# Patient Record
Sex: FEMALE | Race: OTHER | HISPANIC OR LATINO | Employment: UNEMPLOYED | URBAN - METROPOLITAN AREA
[De-identification: names, ages, dates, MRNs, and addresses within clinical notes are randomized per-mention and may not be internally consistent; named-entity substitution may affect disease eponyms.]

---

## 2020-03-25 ENCOUNTER — TELEPHONE (OUTPATIENT)
Dept: OBGYN CLINIC | Facility: OTHER | Age: 30
End: 2020-03-25

## 2020-03-25 NOTE — TELEPHONE ENCOUNTER
Attempted to call patient, went directly to [employer] then proceeded to say voicemail box is not set up yet  If patient happens to call, advise them that Dr Gerhardt Last is NOT seeing patients in the office for her appointment tomorrow 03/26/2020  He is offering to do virtual visits with patients, if patient is interested in this please set up accordingly and contact me, Hannah, so I can put it in Bookings

## 2020-04-01 ENCOUNTER — TELEPHONE (OUTPATIENT)
Dept: OBGYN CLINIC | Facility: OTHER | Age: 30
End: 2020-04-01

## 2020-04-30 ENCOUNTER — TELEMEDICINE (OUTPATIENT)
Dept: ENDOCRINOLOGY | Facility: CLINIC | Age: 30
End: 2020-04-30
Payer: COMMERCIAL

## 2020-04-30 ENCOUNTER — TELEPHONE (OUTPATIENT)
Dept: ENDOCRINOLOGY | Facility: CLINIC | Age: 30
End: 2020-04-30

## 2020-04-30 DIAGNOSIS — E78.2 MIXED HYPERLIPIDEMIA: ICD-10-CM

## 2020-04-30 DIAGNOSIS — I10 ESSENTIAL HYPERTENSION: ICD-10-CM

## 2020-04-30 DIAGNOSIS — E10.9 TYPE 1 DIABETES MELLITUS WITHOUT COMPLICATION (HCC): Primary | ICD-10-CM

## 2020-04-30 PROBLEM — E78.5 HYPERLIPIDEMIA: Status: ACTIVE | Noted: 2020-04-30

## 2020-04-30 PROCEDURE — G2012 BRIEF CHECK IN BY MD/QHP: HCPCS | Performed by: INTERNAL MEDICINE

## 2020-04-30 RX ORDER — INSULIN LISPRO 100 [IU]/ML
5 INJECTION, SOLUTION INTRAVENOUS; SUBCUTANEOUS
Qty: 5 PEN | Refills: 1 | Status: SHIPPED | OUTPATIENT
Start: 2020-04-30 | End: 2020-07-08 | Stop reason: SDUPTHER

## 2020-05-04 LAB
CREAT ?TM UR-SCNC: 23 UMOL/L
EXT MICROALBUMIN URINE RANDOM: 0.2
HBA1C MFR BLD HPLC: 13.9 %
MICROALBUMIN/CREAT UR: 9 MG/G{CREAT}

## 2020-05-05 ENCOUNTER — TELEPHONE (OUTPATIENT)
Dept: ENDOCRINOLOGY | Facility: CLINIC | Age: 30
End: 2020-05-05

## 2020-05-05 LAB
ALBUMIN SERPL-MCNC: 3.8 G/DL (ref 3.6–5.1)
ALBUMIN/CREAT UR: 9 MCG/MG CREAT
ALBUMIN/GLOB SERPL: 1.8 (CALC) (ref 1–2.5)
ALP SERPL-CCNC: 76 U/L (ref 31–125)
ALT SERPL-CCNC: 10 U/L (ref 6–29)
AST SERPL-CCNC: 9 U/L (ref 10–30)
BILIRUB SERPL-MCNC: 0.3 MG/DL (ref 0.2–1.2)
BUN SERPL-MCNC: 17 MG/DL (ref 7–25)
BUN/CREAT SERPL: ABNORMAL (CALC) (ref 6–22)
CALCIUM SERPL-MCNC: 8.4 MG/DL (ref 8.6–10.2)
CHLORIDE SERPL-SCNC: 101 MMOL/L (ref 98–110)
CHOLEST SERPL-MCNC: 204 MG/DL
CHOLEST/HDLC SERPL: 2.6 (CALC)
CO2 SERPL-SCNC: 20 MMOL/L (ref 20–32)
CREAT SERPL-MCNC: 0.59 MG/DL (ref 0.5–1.1)
CREAT UR-MCNC: 23 MG/DL (ref 20–275)
EST. AVERAGE GLUCOSE BLD GHB EST-MCNC: 352 (CALC)
EST. AVERAGE GLUCOSE BLD GHB EST-SCNC: 19.5 (CALC)
GLOBULIN SER CALC-MCNC: 2.1 G/DL (CALC) (ref 1.9–3.7)
GLUCOSE SERPL-MCNC: 413 MG/DL (ref 65–99)
HBA1C MFR BLD: 13.9 % OF TOTAL HGB
HDLC SERPL-MCNC: 77 MG/DL
LDLC SERPL CALC-MCNC: 108 MG/DL (CALC)
MICROALBUMIN UR-MCNC: 0.2 MG/DL
NONHDLC SERPL-MCNC: 127 MG/DL (CALC)
POTASSIUM SERPL-SCNC: 4.1 MMOL/L (ref 3.5–5.3)
PROT SERPL-MCNC: 5.9 G/DL (ref 6.1–8.1)
SL AMB EGFR AFRICAN AMERICAN: 145 ML/MIN/1.73M2
SL AMB EGFR NON AFRICAN AMERICAN: 125 ML/MIN/1.73M2
SODIUM SERPL-SCNC: 131 MMOL/L (ref 135–146)
T4 FREE SERPL-MCNC: 1.2 NG/DL (ref 0.8–1.8)
TRIGL SERPL-MCNC: 93 MG/DL
TSH SERPL-ACNC: 1.04 MIU/L

## 2020-05-21 ENCOUNTER — TELEPHONE (OUTPATIENT)
Dept: ENDOCRINOLOGY | Facility: CLINIC | Age: 30
End: 2020-05-21

## 2020-06-04 ENCOUNTER — APPOINTMENT (OUTPATIENT)
Dept: RADIOLOGY | Facility: AMBULARY SURGERY CENTER | Age: 30
End: 2020-06-04
Attending: ORTHOPAEDIC SURGERY
Payer: COMMERCIAL

## 2020-06-04 ENCOUNTER — TELEPHONE (OUTPATIENT)
Dept: FAMILY MEDICINE CLINIC | Facility: CLINIC | Age: 30
End: 2020-06-04

## 2020-06-04 ENCOUNTER — OFFICE VISIT (OUTPATIENT)
Dept: OBGYN CLINIC | Facility: CLINIC | Age: 30
End: 2020-06-04
Payer: COMMERCIAL

## 2020-06-04 VITALS
BODY MASS INDEX: 23.39 KG/M2 | WEIGHT: 116 LBS | SYSTOLIC BLOOD PRESSURE: 108 MMHG | DIASTOLIC BLOOD PRESSURE: 72 MMHG | HEIGHT: 59 IN | HEART RATE: 98 BPM

## 2020-06-04 DIAGNOSIS — M76.31 IT BAND SYNDROME, RIGHT: ICD-10-CM

## 2020-06-04 DIAGNOSIS — M25.561 RIGHT KNEE PAIN, UNSPECIFIED CHRONICITY: Primary | ICD-10-CM

## 2020-06-04 DIAGNOSIS — M25.561 RIGHT KNEE PAIN, UNSPECIFIED CHRONICITY: ICD-10-CM

## 2020-06-04 PROCEDURE — 73562 X-RAY EXAM OF KNEE 3: CPT

## 2020-06-04 PROCEDURE — 99243 OFF/OP CNSLTJ NEW/EST LOW 30: CPT | Performed by: ORTHOPAEDIC SURGERY

## 2020-06-04 RX ORDER — SIMVASTATIN 10 MG
TABLET ORAL
COMMUNITY
End: 2020-11-14 | Stop reason: HOSPADM

## 2020-06-04 RX ORDER — BENZONATATE 100 MG/1
CAPSULE ORAL
COMMUNITY
End: 2020-11-14 | Stop reason: HOSPADM

## 2020-06-04 RX ORDER — LISINOPRIL 2.5 MG/1
TABLET ORAL
Status: ON HOLD | COMMUNITY
End: 2020-11-12

## 2020-06-04 RX ORDER — ATORVASTATIN CALCIUM 10 MG/1
TABLET, FILM COATED ORAL
COMMUNITY
End: 2020-11-14 | Stop reason: HOSPADM

## 2020-07-08 DIAGNOSIS — E10.9 TYPE 1 DIABETES MELLITUS WITHOUT COMPLICATION (HCC): ICD-10-CM

## 2020-07-08 RX ORDER — INSULIN LISPRO 100 [IU]/ML
5 INJECTION, SOLUTION INTRAVENOUS; SUBCUTANEOUS
Qty: 5 PEN | Refills: 1 | Status: SHIPPED | OUTPATIENT
Start: 2020-07-08 | End: 2020-11-14 | Stop reason: HOSPADM

## 2020-08-14 DIAGNOSIS — M25.561 ACUTE PAIN OF RIGHT KNEE: Primary | ICD-10-CM

## 2020-08-14 NOTE — PROGRESS NOTES
Kiana from [address] ortho called for amb referral for knee pain for pt who was seen in [facility]

## 2020-09-03 ENCOUNTER — TELEPHONE (OUTPATIENT)
Dept: OBGYN CLINIC | Facility: CLINIC | Age: 30
End: 2020-09-03

## 2020-09-03 NOTE — TELEPHONE ENCOUNTER
Patient was a no show for OV today with Dr Sindy Sumner, tried to reach patient, no answer and VM not set up  slc

## 2020-11-12 ENCOUNTER — HOSPITAL ENCOUNTER (INPATIENT)
Facility: HOSPITAL | Age: 30
LOS: 2 days | Discharge: HOME | DRG: 566 | End: 2020-11-14
Attending: INTERNAL MEDICINE | Admitting: INTERNAL MEDICINE
Payer: COMMERCIAL

## 2020-11-12 ENCOUNTER — HOSPITAL ENCOUNTER (EMERGENCY)
Facility: HOSPITAL | Age: 30
Discharge: ANOTHER HOSPITAL | End: 2020-11-12
Attending: EMERGENCY MEDICINE
Payer: COMMERCIAL

## 2020-11-12 ENCOUNTER — APPOINTMENT (EMERGENCY)
Dept: ULTRASOUND IMAGING | Facility: HOSPITAL | Age: 30
End: 2020-11-12
Payer: COMMERCIAL

## 2020-11-12 VITALS
HEART RATE: 102 BPM | DIASTOLIC BLOOD PRESSURE: 62 MMHG | TEMPERATURE: 98.8 F | SYSTOLIC BLOOD PRESSURE: 116 MMHG | RESPIRATION RATE: 22 BRPM | WEIGHT: 125.2 LBS | BODY MASS INDEX: 25.29 KG/M2 | OXYGEN SATURATION: 100 %

## 2020-11-12 DIAGNOSIS — Z34.90 PREGNANCY, UNSPECIFIED GESTATIONAL AGE: ICD-10-CM

## 2020-11-12 DIAGNOSIS — Z34.90 PREGNANCY, UNSPECIFIED GESTATIONAL AGE: Primary | ICD-10-CM

## 2020-11-12 DIAGNOSIS — E10.10 DIABETIC KETOACIDOSIS WITHOUT COMA ASSOCIATED WITH TYPE 1 DIABETES MELLITUS (HCC): Primary | ICD-10-CM

## 2020-11-12 DIAGNOSIS — Z34.90 PREGNANCY: ICD-10-CM

## 2020-11-12 DIAGNOSIS — E10.9 TYPE 1 DIABETES MELLITUS WITHOUT COMPLICATION (HCC): ICD-10-CM

## 2020-11-12 DIAGNOSIS — Z3A.01 LESS THAN 8 WEEKS GESTATION OF PREGNANCY: ICD-10-CM

## 2020-11-12 LAB
ALBUMIN SERPL BCP-MCNC: 4.3 G/DL (ref 3.4–4.8)
ALP SERPL-CCNC: 81.2 U/L (ref 35–140)
ALT SERPL W P-5'-P-CCNC: 12 U/L (ref 5–54)
ANION GAP SERPL CALCULATED.3IONS-SCNC: 14 MMOL/L (ref 4–13)
ANION GAP SERPL CALCULATED.3IONS-SCNC: 24 MMOL/L (ref 4–13)
AST SERPL W P-5'-P-CCNC: 17 U/L (ref 15–41)
ATRIAL RATE: 107 BPM
B-HCG SERPL-ACNC: 9395 MIU/ML
BASE EX.OXY STD BLDV CALC-SCNC: 78.6 % (ref 60–80)
BASE EXCESS BLDA CALC-SCNC: -18 MMOL/L (ref -2–3)
BASE EXCESS BLDA CALC-SCNC: -19 MMOL/L (ref -2–3)
BASE EXCESS BLDV CALC-SCNC: -14.2 MMOL/L
BASOPHILS # BLD AUTO: 0.02 THOUSANDS/ΜL (ref 0–0.1)
BASOPHILS NFR BLD AUTO: 0 % (ref 0–1)
BETA-HYDROXYBUTYRATE: 5.7 MMOL/L
BILIRUB SERPL-MCNC: 0.48 MG/DL (ref 0.3–1.2)
BILIRUB UR QL STRIP: NEGATIVE
BUN SERPL-MCNC: 16 MG/DL (ref 6–20)
BUN SERPL-MCNC: 9 MG/DL (ref 5–25)
CA-I BLD-SCNC: 1.17 MMOL/L (ref 1.12–1.32)
CA-I BLD-SCNC: 1.17 MMOL/L (ref 1.12–1.32)
CALCIUM SERPL-MCNC: 7.7 MG/DL (ref 8.3–10.1)
CALCIUM SERPL-MCNC: 9.1 MG/DL (ref 8.4–10.2)
CHLORIDE SERPL-SCNC: 105 MMOL/L (ref 100–108)
CHLORIDE SERPL-SCNC: 96 MMOL/L (ref 96–108)
CLARITY UR: CLEAR
CO2 SERPL-SCNC: 13 MMOL/L (ref 21–32)
CO2 SERPL-SCNC: 8 MMOL/L (ref 22–33)
COLOR UR: YELLOW
CREAT SERPL-MCNC: 0.51 MG/DL (ref 0.6–1.3)
CREAT SERPL-MCNC: 0.78 MG/DL (ref 0.4–1.1)
EOSINOPHIL # BLD AUTO: 0.01 THOUSAND/ΜL (ref 0–0.61)
EOSINOPHIL NFR BLD AUTO: 0 % (ref 0–6)
ERYTHROCYTE [DISTWIDTH] IN BLOOD BY AUTOMATED COUNT: 11.6 % (ref 11.6–15.1)
EXT PREG TEST URINE: POSITIVE
EXT. CONTROL ED NAV: ABNORMAL
FLUAV RNA RESP QL NAA+PROBE: NEGATIVE
FLUBV RNA RESP QL NAA+PROBE: NEGATIVE
GFR SERPL CREATININE-BSD FRML MDRD: 104 ML/MIN/1.73SQ M
GFR SERPL CREATININE-BSD FRML MDRD: 131 ML/MIN/1.73SQ M
GLUCOSE SERPL-MCNC: 125 MG/DL (ref 65–140)
GLUCOSE SERPL-MCNC: 148 MG/DL (ref 65–140)
GLUCOSE SERPL-MCNC: 194 MG/DL (ref 65–140)
GLUCOSE SERPL-MCNC: 236 MG/DL (ref 65–140)
GLUCOSE SERPL-MCNC: 249 MG/DL (ref 65–140)
GLUCOSE SERPL-MCNC: 258 MG/DL (ref 65–140)
GLUCOSE SERPL-MCNC: 278 MG/DL (ref 65–140)
GLUCOSE SERPL-MCNC: 486 MG/DL (ref 65–140)
GLUCOSE SERPL-MCNC: 638 MG/DL (ref 65–140)
GLUCOSE SERPL-MCNC: >500 MG/DL (ref 65–140)
GLUCOSE SERPL-MCNC: >600 MG/DL (ref 65–140)
GLUCOSE UR STRIP-MCNC: ABNORMAL MG/DL
HCO3 BLDA-SCNC: 6.7 MMOL/L (ref 24–30)
HCO3 BLDA-SCNC: 8.9 MMOL/L (ref 24–30)
HCO3 BLDV-SCNC: 12.4 MMOL/L (ref 24–30)
HCT VFR BLD AUTO: 43.3 % (ref 34.8–46.1)
HCT VFR BLD CALC: 35 % (ref 34.8–46.1)
HCT VFR BLD CALC: 44 % (ref 34.8–46.1)
HGB BLD-MCNC: 15.2 G/DL (ref 11.5–15.4)
HGB BLDA-MCNC: 11.9 G/DL (ref 11.5–15.4)
HGB BLDA-MCNC: 15 G/DL (ref 11.5–15.4)
HGB UR QL STRIP.AUTO: NEGATIVE
IMM GRANULOCYTES # BLD AUTO: 0.01 THOUSAND/UL (ref 0–0.2)
IMM GRANULOCYTES NFR BLD AUTO: 0 % (ref 0–2)
KETONES UR STRIP-MCNC: ABNORMAL MG/DL
LEUKOCYTE ESTERASE UR QL STRIP: NEGATIVE
LYMPHOCYTES # BLD AUTO: 1.16 THOUSANDS/ΜL (ref 0.6–4.47)
LYMPHOCYTES NFR BLD AUTO: 14 % (ref 14–44)
MAGNESIUM SERPL-MCNC: 1.7 MG/DL (ref 1.6–2.6)
MCH RBC QN AUTO: 32.1 PG (ref 26.8–34.3)
MCHC RBC AUTO-ENTMCNC: 35.1 G/DL (ref 31.4–37.4)
MCV RBC AUTO: 91 FL (ref 82–98)
MONOCYTES # BLD AUTO: 0.28 THOUSAND/ΜL (ref 0.17–1.22)
MONOCYTES NFR BLD AUTO: 3 % (ref 4–12)
NEUTROPHILS # BLD AUTO: 6.9 THOUSANDS/ΜL (ref 1.85–7.62)
NEUTS SEG NFR BLD AUTO: 83 % (ref 43–75)
NITRITE UR QL STRIP: NEGATIVE
O2 CT BLDV-SCNC: 16.3 ML/DL
P AXIS: 81 DEGREES
PCO2 BLD: 10 MMOL/L (ref 21–32)
PCO2 BLD: 17.1 MM HG (ref 42–50)
PCO2 BLD: 25.3 MM HG (ref 42–50)
PCO2 BLD: 7 MMOL/L (ref 21–32)
PCO2 BLDV: 31.9 MM HG (ref 42–50)
PH BLD: 7.15 [PH] (ref 7.3–7.4)
PH BLD: 7.2 [PH] (ref 7.3–7.4)
PH BLDV: 7.21 [PH] (ref 7.3–7.4)
PH UR STRIP.AUTO: 5 [PH]
PHOSPHATE SERPL-MCNC: 2.2 MG/DL (ref 2.7–4.5)
PLATELET # BLD AUTO: 235 THOUSANDS/UL (ref 149–390)
PLATELET # BLD AUTO: 277 THOUSANDS/UL (ref 149–390)
PMV BLD AUTO: 8.8 FL (ref 8.9–12.7)
PMV BLD AUTO: 9.4 FL (ref 8.9–12.7)
PO2 BLD: 36 MM HG (ref 35–45)
PO2 BLD: 60 MM HG (ref 35–45)
PO2 BLDV: 45.5 MM HG (ref 35–45)
POTASSIUM BLD-SCNC: 4 MMOL/L (ref 3.5–5.3)
POTASSIUM BLD-SCNC: 4.6 MMOL/L (ref 3.5–5.3)
POTASSIUM SERPL-SCNC: 3.7 MMOL/L (ref 3.5–5.3)
POTASSIUM SERPL-SCNC: 4.7 MMOL/L (ref 3.5–5)
PR INTERVAL: 134 MS
PROT SERPL-MCNC: 7.1 G/DL (ref 6.4–8.3)
PROT UR STRIP-MCNC: NEGATIVE MG/DL
QRS AXIS: 52 DEGREES
QRSD INTERVAL: 79 MS
QT INTERVAL: 330 MS
QTC INTERVAL: 441 MS
RBC # BLD AUTO: 4.74 MILLION/UL (ref 3.81–5.12)
RSV RNA RESP QL NAA+PROBE: NEGATIVE
SAO2 % BLD FROM PO2: 55 % (ref 60–85)
SAO2 % BLD FROM PO2: 86 % (ref 60–85)
SARS-COV-2 RNA RESP QL NAA+PROBE: NEGATIVE
SODIUM BLD-SCNC: 128 MMOL/L (ref 136–145)
SODIUM BLD-SCNC: 134 MMOL/L (ref 136–145)
SODIUM SERPL-SCNC: 128 MMOL/L (ref 133–145)
SODIUM SERPL-SCNC: 132 MMOL/L (ref 136–145)
SP GR UR STRIP.AUTO: 1.02 (ref 1–1.03)
SPECIMEN SOURCE: ABNORMAL
SPECIMEN SOURCE: ABNORMAL
T WAVE AXIS: 45 DEGREES
UROBILINOGEN UR QL STRIP.AUTO: 0.2 E.U./DL
VENTRICULAR RATE: 107 BPM
WBC # BLD AUTO: 8.38 THOUSAND/UL (ref 4.31–10.16)

## 2020-11-12 PROCEDURE — 82948 REAGENT STRIP/BLOOD GLUCOSE: CPT

## 2020-11-12 PROCEDURE — 81025 URINE PREGNANCY TEST: CPT | Performed by: PHYSICIAN ASSISTANT

## 2020-11-12 PROCEDURE — 96361 HYDRATE IV INFUSION ADD-ON: CPT

## 2020-11-12 PROCEDURE — 0241U HB NFCT DS VIR RESP RNA 4 TRGT: CPT | Performed by: INTERNAL MEDICINE

## 2020-11-12 PROCEDURE — 36415 COLL VENOUS BLD VENIPUNCTURE: CPT | Performed by: PHYSICIAN ASSISTANT

## 2020-11-12 PROCEDURE — 99284 EMERGENCY DEPT VISIT MOD MDM: CPT

## 2020-11-12 PROCEDURE — 84702 CHORIONIC GONADOTROPIN TEST: CPT | Performed by: PHYSICIAN ASSISTANT

## 2020-11-12 PROCEDURE — 82330 ASSAY OF CALCIUM: CPT

## 2020-11-12 PROCEDURE — 96376 TX/PRO/DX INJ SAME DRUG ADON: CPT

## 2020-11-12 PROCEDURE — 84132 ASSAY OF SERUM POTASSIUM: CPT

## 2020-11-12 PROCEDURE — 82947 ASSAY GLUCOSE BLOOD QUANT: CPT

## 2020-11-12 PROCEDURE — 80053 COMPREHEN METABOLIC PANEL: CPT | Performed by: PHYSICIAN ASSISTANT

## 2020-11-12 PROCEDURE — 76801 OB US < 14 WKS SINGLE FETUS: CPT

## 2020-11-12 PROCEDURE — 80048 BASIC METABOLIC PNL TOTAL CA: CPT | Performed by: INTERNAL MEDICINE

## 2020-11-12 PROCEDURE — 96366 THER/PROPH/DIAG IV INF ADDON: CPT

## 2020-11-12 PROCEDURE — 96365 THER/PROPH/DIAG IV INF INIT: CPT

## 2020-11-12 PROCEDURE — 82010 KETONE BODYS QUAN: CPT | Performed by: PHYSICIAN ASSISTANT

## 2020-11-12 PROCEDURE — 82805 BLOOD GASES W/O2 SATURATION: CPT | Performed by: INTERNAL MEDICINE

## 2020-11-12 PROCEDURE — 85049 AUTOMATED PLATELET COUNT: CPT | Performed by: INTERNAL MEDICINE

## 2020-11-12 PROCEDURE — 99291 CRITICAL CARE FIRST HOUR: CPT | Performed by: PHYSICIAN ASSISTANT

## 2020-11-12 PROCEDURE — 85014 HEMATOCRIT: CPT

## 2020-11-12 PROCEDURE — 81003 URINALYSIS AUTO W/O SCOPE: CPT | Performed by: PHYSICIAN ASSISTANT

## 2020-11-12 PROCEDURE — 85025 COMPLETE CBC W/AUTO DIFF WBC: CPT | Performed by: PHYSICIAN ASSISTANT

## 2020-11-12 PROCEDURE — 99254 IP/OBS CNSLTJ NEW/EST MOD 60: CPT | Performed by: OBSTETRICS & GYNECOLOGY

## 2020-11-12 PROCEDURE — 99291 CRITICAL CARE FIRST HOUR: CPT | Performed by: INTERNAL MEDICINE

## 2020-11-12 PROCEDURE — 93010 ELECTROCARDIOGRAM REPORT: CPT | Performed by: INTERNAL MEDICINE

## 2020-11-12 PROCEDURE — 82803 BLOOD GASES ANY COMBINATION: CPT

## 2020-11-12 PROCEDURE — 84100 ASSAY OF PHOSPHORUS: CPT | Performed by: INTERNAL MEDICINE

## 2020-11-12 PROCEDURE — 84295 ASSAY OF SERUM SODIUM: CPT

## 2020-11-12 PROCEDURE — 87086 URINE CULTURE/COLONY COUNT: CPT | Performed by: PHYSICIAN ASSISTANT

## 2020-11-12 PROCEDURE — 83735 ASSAY OF MAGNESIUM: CPT | Performed by: INTERNAL MEDICINE

## 2020-11-12 PROCEDURE — 93005 ELECTROCARDIOGRAM TRACING: CPT

## 2020-11-12 RX ORDER — MAGNESIUM SULFATE HEPTAHYDRATE 40 MG/ML
4 INJECTION, SOLUTION INTRAVENOUS ONCE
Status: COMPLETED | OUTPATIENT
Start: 2020-11-12 | End: 2020-11-13

## 2020-11-12 RX ORDER — SODIUM CHLORIDE 9 MG/ML
2000 INJECTION, SOLUTION INTRAVENOUS CONTINUOUS
Status: CANCELLED | OUTPATIENT
Start: 2020-11-12 | End: 2020-11-12

## 2020-11-12 RX ORDER — MAGNESIUM SULFATE HEPTAHYDRATE 40 MG/ML
4 INJECTION, SOLUTION INTRAVENOUS ONCE
Status: DISCONTINUED | OUTPATIENT
Start: 2020-11-12 | End: 2020-11-14 | Stop reason: HOSPADM

## 2020-11-12 RX ORDER — POTASSIUM CHLORIDE 20MEQ/15ML
40 LIQUID (ML) ORAL ONCE
Status: COMPLETED | OUTPATIENT
Start: 2020-11-12 | End: 2020-11-12

## 2020-11-12 RX ORDER — SODIUM CHLORIDE 9 MG/ML
250 INJECTION, SOLUTION INTRAVENOUS CONTINUOUS
Status: DISCONTINUED | OUTPATIENT
Start: 2020-11-12 | End: 2020-11-12

## 2020-11-12 RX ORDER — DEXTROSE AND SODIUM CHLORIDE 5; .45 G/100ML; G/100ML
250 INJECTION, SOLUTION INTRAVENOUS CONTINUOUS
Status: DISCONTINUED | OUTPATIENT
Start: 2020-11-12 | End: 2020-11-13

## 2020-11-12 RX ORDER — DEXTROSE AND SODIUM CHLORIDE 5; .9 G/100ML; G/100ML
250 INJECTION, SOLUTION INTRAVENOUS CONTINUOUS
Status: CANCELLED | OUTPATIENT
Start: 2020-11-12

## 2020-11-12 RX ORDER — DEXTROSE AND SODIUM CHLORIDE 5; .45 G/100ML; G/100ML
250 INJECTION, SOLUTION INTRAVENOUS CONTINUOUS
Status: DISCONTINUED | OUTPATIENT
Start: 2020-11-12 | End: 2020-11-12 | Stop reason: HOSPADM

## 2020-11-12 RX ORDER — SODIUM CHLORIDE 9 MG/ML
500 INJECTION, SOLUTION INTRAVENOUS CONTINUOUS
Status: DISCONTINUED | OUTPATIENT
Start: 2020-11-12 | End: 2020-11-12

## 2020-11-12 RX ORDER — SODIUM CHLORIDE 9 MG/ML
500 INJECTION, SOLUTION INTRAVENOUS CONTINUOUS
Status: CANCELLED | OUTPATIENT
Start: 2020-11-12 | End: 2020-11-12

## 2020-11-12 RX ORDER — SODIUM CHLORIDE 9 MG/ML
250 INJECTION, SOLUTION INTRAVENOUS CONTINUOUS
Status: CANCELLED | OUTPATIENT
Start: 2020-11-12 | End: 2020-11-13

## 2020-11-12 RX ORDER — POTASSIUM CHLORIDE 14.9 MG/ML
20 INJECTION INTRAVENOUS ONCE
Status: DISCONTINUED | OUTPATIENT
Start: 2020-11-12 | End: 2020-11-12

## 2020-11-12 RX ORDER — SODIUM CHLORIDE 9 MG/ML
2000 INJECTION, SOLUTION INTRAVENOUS CONTINUOUS
Status: DISCONTINUED | OUTPATIENT
Start: 2020-11-12 | End: 2020-11-12

## 2020-11-12 RX ORDER — HYDRALAZINE HYDROCHLORIDE 20 MG/ML
5 INJECTION INTRAMUSCULAR; INTRAVENOUS EVERY 6 HOURS PRN
Status: DISCONTINUED | OUTPATIENT
Start: 2020-11-12 | End: 2020-11-13

## 2020-11-12 RX ORDER — CHLORHEXIDINE GLUCONATE 0.12 MG/ML
15 RINSE ORAL EVERY 12 HOURS SCHEDULED
Status: DISCONTINUED | OUTPATIENT
Start: 2020-11-12 | End: 2020-11-12

## 2020-11-12 RX ADMIN — DEXTROSE AND SODIUM CHLORIDE 250 ML/HR: 5; .45 INJECTION, SOLUTION INTRAVENOUS at 17:44

## 2020-11-12 RX ADMIN — SODIUM CHLORIDE, SODIUM LACTATE, POTASSIUM CHLORIDE, AND CALCIUM CHLORIDE 1000 ML: .6; .31; .03; .02 INJECTION, SOLUTION INTRAVENOUS at 17:45

## 2020-11-12 RX ADMIN — INSULIN HUMAN 10 UNITS: 100 INJECTION, SOLUTION PARENTERAL at 12:25

## 2020-11-12 RX ADMIN — POTASSIUM CHLORIDE 40 MEQ: 20 SOLUTION ORAL at 20:54

## 2020-11-12 RX ADMIN — MAGNESIUM SULFATE HEPTAHYDRATE 4 G: 40 INJECTION, SOLUTION INTRAVENOUS at 20:55

## 2020-11-12 RX ADMIN — SODIUM CHLORIDE 5.7 UNITS/HR: 9 INJECTION, SOLUTION INTRAVENOUS at 13:12

## 2020-11-12 RX ADMIN — POTASSIUM PHOSPHATE, MONOBASIC POTASSIUM PHOSPHATE, DIBASIC 30 MMOL: 224; 236 INJECTION, SOLUTION, CONCENTRATE INTRAVENOUS at 22:14

## 2020-11-12 RX ADMIN — SODIUM CHLORIDE 2 UNITS/HR: 9 INJECTION, SOLUTION INTRAVENOUS at 20:16

## 2020-11-12 RX ADMIN — DEXTROSE AND SODIUM CHLORIDE 250 ML/HR: 5; .45 INJECTION, SOLUTION INTRAVENOUS at 20:17

## 2020-11-12 RX ADMIN — SODIUM CHLORIDE 1000 ML: 0.9 INJECTION, SOLUTION INTRAVENOUS at 12:16

## 2020-11-12 RX ADMIN — DEXTROSE AND SODIUM CHLORIDE 250 ML/HR: 5; .45 INJECTION, SOLUTION INTRAVENOUS at 15:36

## 2020-11-12 RX ADMIN — SODIUM CHLORIDE 1000 ML: 0.9 INJECTION, SOLUTION INTRAVENOUS at 12:25

## 2020-11-13 LAB
ANION GAP SERPL CALCULATED.3IONS-SCNC: 11 MMOL/L (ref 4–13)
B-HCG SERPL-ACNC: 6879.9 MIU/ML
BACTERIA UR CULT: NORMAL
BUN SERPL-MCNC: 5 MG/DL (ref 5–25)
CALCIUM SERPL-MCNC: 7 MG/DL (ref 8.3–10.1)
CHLORIDE SERPL-SCNC: 107 MMOL/L (ref 100–108)
CO2 SERPL-SCNC: 17 MMOL/L (ref 21–32)
CREAT SERPL-MCNC: 0.28 MG/DL (ref 0.6–1.3)
ERYTHROCYTE [DISTWIDTH] IN BLOOD BY AUTOMATED COUNT: 11.6 % (ref 11.6–15.1)
GFR SERPL CREATININE-BSD FRML MDRD: 160 ML/MIN/1.73SQ M
GLUCOSE SERPL-MCNC: 101 MG/DL (ref 65–140)
GLUCOSE SERPL-MCNC: 105 MG/DL (ref 65–140)
GLUCOSE SERPL-MCNC: 108 MG/DL (ref 65–140)
GLUCOSE SERPL-MCNC: 112 MG/DL (ref 65–140)
GLUCOSE SERPL-MCNC: 131 MG/DL (ref 65–140)
GLUCOSE SERPL-MCNC: 156 MG/DL (ref 65–140)
GLUCOSE SERPL-MCNC: 173 MG/DL (ref 65–140)
GLUCOSE SERPL-MCNC: 173 MG/DL (ref 65–140)
GLUCOSE SERPL-MCNC: 176 MG/DL (ref 65–140)
GLUCOSE SERPL-MCNC: 187 MG/DL (ref 65–140)
GLUCOSE SERPL-MCNC: 197 MG/DL (ref 65–140)
GLUCOSE SERPL-MCNC: 205 MG/DL (ref 65–140)
GLUCOSE SERPL-MCNC: 305 MG/DL (ref 65–140)
HCT VFR BLD AUTO: 37.3 % (ref 34.8–46.1)
HGB BLD-MCNC: 13 G/DL (ref 11.5–15.4)
MAGNESIUM SERPL-MCNC: 2.5 MG/DL (ref 1.6–2.6)
MAGNESIUM SERPL-MCNC: 2.9 MG/DL (ref 1.6–2.6)
MCH RBC QN AUTO: 32.2 PG (ref 26.8–34.3)
MCHC RBC AUTO-ENTMCNC: 34.9 G/DL (ref 31.4–37.4)
MCV RBC AUTO: 92 FL (ref 82–98)
PHOSPHATE SERPL-MCNC: 2.5 MG/DL (ref 2.7–4.5)
PHOSPHATE SERPL-MCNC: 4.3 MG/DL (ref 2.7–4.5)
PLATELET # BLD AUTO: 216 THOUSANDS/UL (ref 149–390)
PMV BLD AUTO: 8.6 FL (ref 8.9–12.7)
POTASSIUM SERPL-SCNC: 3.5 MMOL/L (ref 3.5–5.3)
POTASSIUM SERPL-SCNC: 4.1 MMOL/L (ref 3.5–5.3)
RBC # BLD AUTO: 4.04 MILLION/UL (ref 3.81–5.12)
SODIUM SERPL-SCNC: 135 MMOL/L (ref 136–145)
WBC # BLD AUTO: 6.98 THOUSAND/UL (ref 4.31–10.16)

## 2020-11-13 PROCEDURE — 90686 IIV4 VACC NO PRSV 0.5 ML IM: CPT | Performed by: INTERNAL MEDICINE

## 2020-11-13 PROCEDURE — 82948 REAGENT STRIP/BLOOD GLUCOSE: CPT

## 2020-11-13 PROCEDURE — 90471 IMMUNIZATION ADMIN: CPT | Performed by: INTERNAL MEDICINE

## 2020-11-13 PROCEDURE — 84100 ASSAY OF PHOSPHORUS: CPT | Performed by: PHYSICIAN ASSISTANT

## 2020-11-13 PROCEDURE — 99232 SBSQ HOSP IP/OBS MODERATE 35: CPT | Performed by: INTERNAL MEDICINE

## 2020-11-13 PROCEDURE — 85732 THROMBOPLASTIN TIME PARTIAL: CPT | Performed by: INTERNAL MEDICINE

## 2020-11-13 PROCEDURE — 85670 THROMBIN TIME PLASMA: CPT | Performed by: INTERNAL MEDICINE

## 2020-11-13 PROCEDURE — 85613 RUSSELL VIPER VENOM DILUTED: CPT | Performed by: INTERNAL MEDICINE

## 2020-11-13 PROCEDURE — 86038 ANTINUCLEAR ANTIBODIES: CPT | Performed by: INTERNAL MEDICINE

## 2020-11-13 PROCEDURE — 80048 BASIC METABOLIC PNL TOTAL CA: CPT | Performed by: PHYSICIAN ASSISTANT

## 2020-11-13 PROCEDURE — 99232 SBSQ HOSP IP/OBS MODERATE 35: CPT | Performed by: OBSTETRICS & GYNECOLOGY

## 2020-11-13 PROCEDURE — 83735 ASSAY OF MAGNESIUM: CPT | Performed by: PHYSICIAN ASSISTANT

## 2020-11-13 PROCEDURE — 85027 COMPLETE CBC AUTOMATED: CPT | Performed by: NURSE PRACTITIONER

## 2020-11-13 PROCEDURE — 84132 ASSAY OF SERUM POTASSIUM: CPT | Performed by: PHYSICIAN ASSISTANT

## 2020-11-13 PROCEDURE — 86039 ANTINUCLEAR ANTIBODIES (ANA): CPT | Performed by: INTERNAL MEDICINE

## 2020-11-13 PROCEDURE — 84702 CHORIONIC GONADOTROPIN TEST: CPT | Performed by: INTERNAL MEDICINE

## 2020-11-13 PROCEDURE — 85705 THROMBOPLASTIN INHIBITION: CPT | Performed by: INTERNAL MEDICINE

## 2020-11-13 RX ORDER — POTASSIUM CHLORIDE 20 MEQ/1
20 TABLET, EXTENDED RELEASE ORAL ONCE
Status: DISCONTINUED | OUTPATIENT
Start: 2020-11-13 | End: 2020-11-13

## 2020-11-13 RX ORDER — INSULIN GLARGINE 100 [IU]/ML
20 INJECTION, SOLUTION SUBCUTANEOUS
Status: DISCONTINUED | OUTPATIENT
Start: 2020-11-13 | End: 2020-11-14 | Stop reason: HOSPADM

## 2020-11-13 RX ORDER — SODIUM CHLORIDE, SODIUM GLUCONATE, SODIUM ACETATE, POTASSIUM CHLORIDE, MAGNESIUM CHLORIDE, SODIUM PHOSPHATE, DIBASIC, AND POTASSIUM PHOSPHATE .53; .5; .37; .037; .03; .012; .00082 G/100ML; G/100ML; G/100ML; G/100ML; G/100ML; G/100ML; G/100ML
50 INJECTION, SOLUTION INTRAVENOUS CONTINUOUS
Status: DISCONTINUED | OUTPATIENT
Start: 2020-11-13 | End: 2020-11-13

## 2020-11-13 RX ORDER — ACETAMINOPHEN 325 MG/1
650 TABLET ORAL EVERY 6 HOURS PRN
Status: DISCONTINUED | OUTPATIENT
Start: 2020-11-13 | End: 2020-11-14 | Stop reason: HOSPADM

## 2020-11-13 RX ORDER — KETAMINE HYDROCHLORIDE 50 MG/ML
INJECTION, SOLUTION, CONCENTRATE INTRAMUSCULAR; INTRAVENOUS
Status: DISPENSED
Start: 2020-11-13 | End: 2020-11-13

## 2020-11-13 RX ORDER — INSULIN GLARGINE 100 [IU]/ML
10 INJECTION, SOLUTION SUBCUTANEOUS ONCE
Status: COMPLETED | OUTPATIENT
Start: 2020-11-13 | End: 2020-11-13

## 2020-11-13 RX ORDER — POTASSIUM CHLORIDE 14.9 MG/ML
20 INJECTION INTRAVENOUS ONCE
Status: DISCONTINUED | OUTPATIENT
Start: 2020-11-13 | End: 2020-11-14 | Stop reason: HOSPADM

## 2020-11-13 RX ORDER — POTASSIUM CHLORIDE 20 MEQ/1
40 TABLET, EXTENDED RELEASE ORAL ONCE
Status: COMPLETED | OUTPATIENT
Start: 2020-11-13 | End: 2020-11-13

## 2020-11-13 RX ADMIN — PRENATAL VIT W/ FE FUMARATE-FA TAB 27-0.8 MG 1 TABLET: 27-0.8 TAB at 08:11

## 2020-11-13 RX ADMIN — INSULIN LISPRO 5 UNITS: 100 INJECTION, SOLUTION INTRAVENOUS; SUBCUTANEOUS at 08:31

## 2020-11-13 RX ADMIN — SODIUM CHLORIDE, SODIUM GLUCONATE, SODIUM ACETATE, POTASSIUM CHLORIDE AND MAGNESIUM CHLORIDE 50 ML/HR: 526; 502; 368; 37; 30 INJECTION, SOLUTION INTRAVENOUS at 00:37

## 2020-11-13 RX ADMIN — INSULIN GLARGINE 10 UNITS: 100 INJECTION, SOLUTION SUBCUTANEOUS at 08:08

## 2020-11-13 RX ADMIN — INFLUENZA VIRUS VACCINE 0.5 ML: 15; 15; 15; 15 SUSPENSION INTRAMUSCULAR at 10:14

## 2020-11-13 RX ADMIN — POTASSIUM CHLORIDE 40 MEQ: 1500 TABLET, EXTENDED RELEASE ORAL at 06:41

## 2020-11-13 RX ADMIN — INSULIN LISPRO 5 UNITS: 100 INJECTION, SOLUTION INTRAVENOUS; SUBCUTANEOUS at 17:09

## 2020-11-13 RX ADMIN — INSULIN LISPRO 5 UNITS: 100 INJECTION, SOLUTION INTRAVENOUS; SUBCUTANEOUS at 11:51

## 2020-11-13 RX ADMIN — INSULIN GLARGINE 20 UNITS: 100 INJECTION, SOLUTION SUBCUTANEOUS at 22:31

## 2020-11-14 VITALS
OXYGEN SATURATION: 99 % | DIASTOLIC BLOOD PRESSURE: 60 MMHG | SYSTOLIC BLOOD PRESSURE: 104 MMHG | TEMPERATURE: 98.9 F | WEIGHT: 128.53 LBS | HEART RATE: 95 BPM | BODY MASS INDEX: 26.98 KG/M2 | HEIGHT: 58 IN | RESPIRATION RATE: 18 BRPM

## 2020-11-14 PROBLEM — E10.10 DIABETIC KETOACIDOSIS ASSOCIATED WITH TYPE 1 DIABETES MELLITUS (HCC): Status: RESOLVED | Noted: 2020-11-12 | Resolved: 2020-11-14

## 2020-11-14 LAB
ALBUMIN SERPL BCP-MCNC: 2.8 G/DL (ref 3.5–5)
ALP SERPL-CCNC: 64 U/L (ref 46–116)
ALT SERPL W P-5'-P-CCNC: 18 U/L (ref 12–78)
ANION GAP SERPL CALCULATED.3IONS-SCNC: 4 MMOL/L (ref 4–13)
AST SERPL W P-5'-P-CCNC: 13 U/L (ref 5–45)
B-HCG SERPL-ACNC: ABNORMAL MIU/ML
BILIRUB SERPL-MCNC: 0.45 MG/DL (ref 0.2–1)
BUN SERPL-MCNC: 6 MG/DL (ref 5–25)
CALCIUM ALBUM COR SERPL-MCNC: 9.3 MG/DL (ref 8.3–10.1)
CALCIUM SERPL-MCNC: 8.3 MG/DL (ref 8.3–10.1)
CHLORIDE SERPL-SCNC: 107 MMOL/L (ref 100–108)
CO2 SERPL-SCNC: 26 MMOL/L (ref 21–32)
CREAT SERPL-MCNC: 0.46 MG/DL (ref 0.6–1.3)
GFR SERPL CREATININE-BSD FRML MDRD: 136 ML/MIN/1.73SQ M
GLUCOSE SERPL-MCNC: 112 MG/DL (ref 65–140)
GLUCOSE SERPL-MCNC: 143 MG/DL (ref 65–140)
GLUCOSE SERPL-MCNC: 84 MG/DL (ref 65–140)
POTASSIUM SERPL-SCNC: 3.6 MMOL/L (ref 3.5–5.3)
PROT SERPL-MCNC: 5.8 G/DL (ref 6.4–8.2)
SODIUM SERPL-SCNC: 137 MMOL/L (ref 136–145)

## 2020-11-14 PROCEDURE — 80053 COMPREHEN METABOLIC PANEL: CPT | Performed by: INTERNAL MEDICINE

## 2020-11-14 PROCEDURE — 84702 CHORIONIC GONADOTROPIN TEST: CPT | Performed by: OBSTETRICS & GYNECOLOGY

## 2020-11-14 PROCEDURE — 99239 HOSP IP/OBS DSCHRG MGMT >30: CPT | Performed by: INTERNAL MEDICINE

## 2020-11-14 PROCEDURE — 99232 SBSQ HOSP IP/OBS MODERATE 35: CPT | Performed by: OBSTETRICS & GYNECOLOGY

## 2020-11-14 PROCEDURE — 82948 REAGENT STRIP/BLOOD GLUCOSE: CPT

## 2020-11-14 RX ORDER — PRENATAL 168/IRON/FOLIC/OMEGA3 27-800-235
1 CAPSULE ORAL DAILY
Refills: 0 | Status: ON HOLD
Start: 2020-11-14 | End: 2022-03-08

## 2020-11-14 RX ORDER — INSULIN GLARGINE 100 [IU]/ML
55 INJECTION, SOLUTION SUBCUTANEOUS DAILY
Qty: 6 PEN | Refills: 1 | Status: SHIPPED | OUTPATIENT
Start: 2020-11-14 | End: 2021-04-14 | Stop reason: SDUPTHER

## 2020-11-14 RX ADMIN — INSULIN LISPRO 5 UNITS: 100 INJECTION, SOLUTION INTRAVENOUS; SUBCUTANEOUS at 08:10

## 2020-11-14 RX ADMIN — PRENATAL VIT W/ FE FUMARATE-FA TAB 27-0.8 MG 1 TABLET: 27-0.8 TAB at 10:06

## 2020-11-14 RX ADMIN — INSULIN LISPRO 5 UNITS: 100 INJECTION, SOLUTION INTRAVENOUS; SUBCUTANEOUS at 12:37

## 2020-11-16 LAB
ANA HOMOGEN SER QL IF: NORMAL
ANA HOMOGEN TITR SER: NORMAL {TITER}
RYE IGE QN: POSITIVE

## 2020-11-17 ENCOUNTER — OFFICE VISIT (OUTPATIENT)
Dept: OBGYN CLINIC | Facility: CLINIC | Age: 30
End: 2020-11-17
Payer: COMMERCIAL

## 2020-11-17 VITALS
SYSTOLIC BLOOD PRESSURE: 108 MMHG | BODY MASS INDEX: 26.66 KG/M2 | HEIGHT: 58 IN | DIASTOLIC BLOOD PRESSURE: 62 MMHG | WEIGHT: 127 LBS | TEMPERATURE: 97.4 F

## 2020-11-17 DIAGNOSIS — Z3A.01 LESS THAN 8 WEEKS GESTATION OF PREGNANCY: ICD-10-CM

## 2020-11-17 DIAGNOSIS — E10.9 TYPE 1 DIABETES MELLITUS WITHOUT COMPLICATION (HCC): ICD-10-CM

## 2020-11-17 DIAGNOSIS — N91.2 AMENORRHEA: Primary | ICD-10-CM

## 2020-11-17 LAB
APTT SCREEN TO CONFIRM RATIO: 1.09 RATIO (ref 0–1.4)
CONFIRM APTT/NORMAL: 29.3 SEC (ref 0–55)
LA PPP-IMP: NORMAL
SCREEN APTT: 25.8 SEC (ref 0–51.9)
SCREEN DRVVT: 29.7 SEC (ref 0–47)
THROMBIN TIME: 15.6 SEC (ref 0–23)

## 2020-11-17 PROCEDURE — 99202 OFFICE O/P NEW SF 15 MIN: CPT | Performed by: OBSTETRICS & GYNECOLOGY

## 2020-11-25 ENCOUNTER — HOSPITAL ENCOUNTER (OUTPATIENT)
Dept: ULTRASOUND IMAGING | Facility: HOSPITAL | Age: 30
Discharge: HOME | End: 2020-11-25
Attending: OBSTETRICS & GYNECOLOGY
Payer: COMMERCIAL

## 2020-11-25 DIAGNOSIS — N91.2 AMENORRHEA: ICD-10-CM

## 2020-11-25 PROCEDURE — 76801 OB US < 14 WKS SINGLE FETUS: CPT

## 2020-12-02 ENCOUNTER — OFFICE VISIT (OUTPATIENT)
Dept: OBGYN CLINIC | Facility: CLINIC | Age: 30
End: 2020-12-02
Payer: COMMERCIAL

## 2020-12-02 VITALS
SYSTOLIC BLOOD PRESSURE: 112 MMHG | HEIGHT: 58 IN | WEIGHT: 131 LBS | BODY MASS INDEX: 27.5 KG/M2 | DIASTOLIC BLOOD PRESSURE: 60 MMHG

## 2020-12-02 DIAGNOSIS — Z91.89 AT RISK FOR SEXUALLY TRANSMITTED DISEASE DUE TO UNPROTECTED SEX: ICD-10-CM

## 2020-12-02 DIAGNOSIS — Z12.4 SCREENING FOR MALIGNANT NEOPLASM OF THE CERVIX: ICD-10-CM

## 2020-12-02 DIAGNOSIS — Z01.419 ROUTINE GYNECOLOGICAL EXAMINATION: Primary | ICD-10-CM

## 2020-12-02 DIAGNOSIS — Z11.3 SCREENING FOR STDS (SEXUALLY TRANSMITTED DISEASES): ICD-10-CM

## 2020-12-02 PROCEDURE — 99395 PREV VISIT EST AGE 18-39: CPT | Performed by: OBSTETRICS & GYNECOLOGY

## 2020-12-02 PROCEDURE — 87491 CHLMYD TRACH DNA AMP PROBE: CPT | Performed by: OBSTETRICS & GYNECOLOGY

## 2020-12-02 PROCEDURE — G0145 SCR C/V CYTO,THINLAYER,RESCR: HCPCS | Performed by: OBSTETRICS & GYNECOLOGY

## 2020-12-02 PROCEDURE — 87591 N.GONORRHOEAE DNA AMP PROB: CPT | Performed by: OBSTETRICS & GYNECOLOGY

## 2020-12-03 LAB
C TRACH DNA SPEC QL NAA+PROBE: NEGATIVE
N GONORRHOEA DNA SPEC QL NAA+PROBE: NEGATIVE

## 2020-12-08 LAB
LAB AP GYN PRIMARY INTERPRETATION: NORMAL
Lab: NORMAL
PATH INTERP SPEC-IMP: NORMAL

## 2020-12-09 ENCOUNTER — INITIAL PRENATAL (OUTPATIENT)
Dept: OBGYN CLINIC | Facility: CLINIC | Age: 30
End: 2020-12-09

## 2020-12-09 VITALS
DIASTOLIC BLOOD PRESSURE: 70 MMHG | BODY MASS INDEX: 26.99 KG/M2 | SYSTOLIC BLOOD PRESSURE: 110 MMHG | TEMPERATURE: 97.4 F | WEIGHT: 128.6 LBS | HEIGHT: 58 IN | HEART RATE: 80 BPM | RESPIRATION RATE: 16 BRPM

## 2020-12-09 DIAGNOSIS — E13.9 OTHER SPECIFIED DIABETES MELLITUS WITHOUT COMPLICATION, WITH LONG-TERM CURRENT USE OF INSULIN (HCC): ICD-10-CM

## 2020-12-09 DIAGNOSIS — Z34.81 MULTIGRAVIDA IN FIRST TRIMESTER: ICD-10-CM

## 2020-12-09 DIAGNOSIS — Z33.1 NORMAL PREGNANCY, INCIDENTAL: Primary | ICD-10-CM

## 2020-12-09 DIAGNOSIS — Z79.4 OTHER SPECIFIED DIABETES MELLITUS WITHOUT COMPLICATION, WITH LONG-TERM CURRENT USE OF INSULIN (HCC): ICD-10-CM

## 2020-12-10 ENCOUNTER — TELEPHONE (OUTPATIENT)
Dept: PERINATAL CARE | Facility: CLINIC | Age: 30
End: 2020-12-10

## 2020-12-23 ENCOUNTER — TELEPHONE (OUTPATIENT)
Dept: PERINATAL CARE | Facility: CLINIC | Age: 30
End: 2020-12-23

## 2020-12-28 ENCOUNTER — TELEPHONE (OUTPATIENT)
Dept: PERINATAL CARE | Facility: CLINIC | Age: 30
End: 2020-12-28

## 2020-12-28 ENCOUNTER — ROUTINE PRENATAL (OUTPATIENT)
Dept: OBGYN CLINIC | Facility: CLINIC | Age: 30
End: 2020-12-28
Payer: COMMERCIAL

## 2020-12-28 VITALS
SYSTOLIC BLOOD PRESSURE: 112 MMHG | DIASTOLIC BLOOD PRESSURE: 64 MMHG | HEIGHT: 58 IN | BODY MASS INDEX: 27.92 KG/M2 | WEIGHT: 133 LBS | TEMPERATURE: 98 F

## 2020-12-28 DIAGNOSIS — Z3A.11 11 WEEKS GESTATION OF PREGNANCY: ICD-10-CM

## 2020-12-28 DIAGNOSIS — E10.9 INSULIN DEPENDENT DIABETES MELLITUS TYPE IA (HCC): Primary | ICD-10-CM

## 2020-12-28 PROCEDURE — 99213 OFFICE O/P EST LOW 20 MIN: CPT | Performed by: OBSTETRICS & GYNECOLOGY

## 2020-12-28 RX ORDER — INSULIN LISPRO 100 [IU]/ML
INJECTION, SOLUTION INTRAVENOUS; SUBCUTANEOUS
Status: ON HOLD | COMMUNITY
End: 2021-01-30 | Stop reason: CLARIF

## 2020-12-28 RX ORDER — LANCING DEVICE
EACH MISCELLANEOUS
Status: ON HOLD | COMMUNITY
End: 2021-01-30 | Stop reason: CLARIF

## 2021-01-04 ENCOUNTER — TELEPHONE (OUTPATIENT)
Dept: PERINATAL CARE | Facility: CLINIC | Age: 31
End: 2021-01-04

## 2021-01-04 NOTE — TELEPHONE ENCOUNTER
Attemptped to leave voicemail on patient's phone  Voicemail not set-up yet  Will call patient later to reschedule

## 2021-01-05 ENCOUNTER — TELEPHONE (OUTPATIENT)
Dept: PERINATAL CARE | Facility: CLINIC | Age: 31
End: 2021-01-05

## 2021-01-06 ENCOUNTER — OB ABSTRACT (OUTPATIENT)
Dept: OBGYN CLINIC | Facility: CLINIC | Age: 31
End: 2021-01-06

## 2021-01-12 ENCOUNTER — HOSPITAL ENCOUNTER (EMERGENCY)
Facility: HOSPITAL | Age: 31
Discharge: HOME | End: 2021-01-12
Attending: EMERGENCY MEDICINE | Admitting: EMERGENCY MEDICINE
Payer: COMMERCIAL

## 2021-01-12 VITALS
HEART RATE: 62 BPM | OXYGEN SATURATION: 100 % | BODY MASS INDEX: 27.92 KG/M2 | HEIGHT: 58 IN | WEIGHT: 133 LBS | RESPIRATION RATE: 18 BRPM | DIASTOLIC BLOOD PRESSURE: 61 MMHG | SYSTOLIC BLOOD PRESSURE: 130 MMHG | TEMPERATURE: 97.5 F

## 2021-01-12 DIAGNOSIS — Z20.822 ENCOUNTER FOR LABORATORY TESTING FOR COVID-19 VIRUS: Primary | ICD-10-CM

## 2021-01-12 PROCEDURE — 99283 EMERGENCY DEPT VISIT LOW MDM: CPT

## 2021-01-12 PROCEDURE — U0003 INFECTIOUS AGENT DETECTION BY NUCLEIC ACID (DNA OR RNA); SEVERE ACUTE RESPIRATORY SYNDROME CORONAVIRUS 2 (SARS-COV-2) (CORONAVIRUS DISEASE [COVID-19]), AMPLIFIED PROBE TECHNIQUE, MAKING USE OF HIGH THROUGHPUT TECHNOLOGIES AS DESCRIBED BY CMS-2020-01-R: HCPCS | Performed by: PHYSICIAN ASSISTANT

## 2021-01-12 PROCEDURE — 99282 EMERGENCY DEPT VISIT SF MDM: CPT | Performed by: PHYSICIAN ASSISTANT

## 2021-01-12 PROCEDURE — U0005 INFEC AGEN DETEC AMPLI PROBE: HCPCS | Performed by: PHYSICIAN ASSISTANT

## 2021-01-12 NOTE — DISCHARGE INSTRUCTIONS
You were tested today for COVID-19, BUT you also had contact with a COVID + person  You must continue quarantining for 14 days after last contact with that person, so until 1/25  If test is positive , you must continue isolation for 10 days since onset of symptoms, must be fever free for 24 hours and show improvement of symptoms  Please follow-up with your primary care doctor for any notes needed

## 2021-01-12 NOTE — ED PROVIDER NOTES
History  Chief Complaint   Patient presents with    Medical Problem     Pt requesting covid testings, positive contact yesterday  Pt with Past Medical History: Diabetes mellitus type 1, HTN, High cholesterol, Miscarriage , no pertinent PSH, presents to ED requesting COVID testing  Pt reports boyfriend found out he was +, just got out of correction and she was with him  and   Pt has no symptoms currently  Pt states currentyl pregnant            Prior to Admission Medications   Prescriptions Last Dose Informant Patient Reported? Taking?    Lancet Devices (Lancing Device) MISC   Yes No   Sig: lancing device   AS DIRECTED   Prenat-Fe Carbonyl-FA-Omega 3 (One-A-Day Womens Prenatal 1) 28-0 8-235 MG CAPS   No No   Sig: Take 1 tablet by mouth daily   insulin glargine (Basaglar KwikPen) 100 units/mL injection pen   No No   Sig: Inject 55 Units under the skin daily   insulin lispro (Admelog SoloStar) 100 units/mL injection pen   Yes No   Sig: Admelog SoloStar U-100 Insulin lispro 100 unit/mL subcutaneous pen      Facility-Administered Medications: None       Past Medical History:   Diagnosis Date    Diabetes mellitus ([facility] [address] )     uses kwiki pen     Diabetes mellitus type 1 ([facility] )     High blood pressure     recently dx/ put on lisinopril     High cholesterol     Hypertension      no hypersion     Miscarriage        Past Surgical History:   Procedure Laterality Date     SECTION  01/02/2014     X1    DILATION AND CURETTAGE OF UTERUS  04/10/2019    Missed AB     WISDOM TOOTH EXTRACTION         Family History   Problem Relation Age of Onset    Diabetes Paternal Grandmother     No Known Problems Mother     No Known Problems Father     No Known Problems Sister     No Known Problems Brother     No Known Problems Daughter     No Known Problems Maternal Grandmother     No Known Problems Paternal Grandfather     No Known Problems Sister      I have reviewed and agree with the history as documented  E-Cigarette/Vaping    E-Cigarette Use Never User      E-Cigarette/Vaping Substances    Nicotine No     THC No     CBD No     Flavoring No     Other No     Unknown No      Social History     Tobacco Use    Smoking status: Never Smoker    Smokeless tobacco: Never Used   Substance Use Topics    Alcohol use: Never     Frequency: Never     Comment: Alcohol intake:   None - As per Carol Ellison Drug use: Never     Comment: Illicit drugs:   No  - As per [place]        Review of Systems   Constitutional: Negative for chills and fever  HENT: Negative for hearing loss and sore throat  Eyes: Negative for visual disturbance  Respiratory: Negative for cough and shortness of breath  Cardiovascular: Negative for chest pain and leg swelling  Gastrointestinal: Negative for abdominal pain and vomiting  Genitourinary: Negative for dysuria, frequency, vaginal bleeding and vaginal discharge  Musculoskeletal: Negative for arthralgias and myalgias  Skin: Negative for color change and pallor  Neurological: Negative for dizziness, weakness, light-headedness and headaches  Psychiatric/Behavioral: Negative for behavioral problems  All other systems reviewed and are negative  Physical Exam  Physical Exam  Vitals signs and nursing note reviewed  Constitutional:       Appearance: Normal appearance  She is well-developed  She is not ill-appearing  HENT:      Head: Normocephalic and atraumatic  Right Ear: External ear normal       Left Ear: External ear normal       Nose: Nose normal       Mouth/Throat:      Mouth: Mucous membranes are moist       Pharynx: Oropharynx is clear  Eyes:      Conjunctiva/sclera: Conjunctivae normal    Neck:      Musculoskeletal: Normal range of motion  Cardiovascular:      Rate and Rhythm: Normal rate and regular rhythm  Pulmonary:      Effort: Pulmonary effort is normal       Breath sounds: Normal breath sounds     Musculoskeletal: Normal range of motion  Skin:     General: Skin is warm and dry  Capillary Refill: Capillary refill takes less than 2 seconds  Findings: No rash  Neurological:      General: No focal deficit present  Mental Status: She is alert and oriented to person, place, and time  Psychiatric:         Mood and Affect: Mood normal          Behavior: Behavior normal          Vital Signs  ED Triage Vitals   Temperature Pulse Respirations Blood Pressure SpO2   01/12/21 1603 01/12/21 1604 01/12/21 1603 01/12/21 1604 01/12/21 1604   97 5 °F (36 4 °C) 62 18 130/61 100 %      Temp Source Heart Rate Source Patient Position - Orthostatic VS BP Location FiO2 (%)   01/12/21 1603 01/12/21 1603 01/12/21 1603 01/12/21 1603 --   Tympanic Monitor Sitting Left arm       Pain Score       --                  Vitals:    01/12/21 1603 01/12/21 1604   BP:  130/61   Pulse:  62   Patient Position - Orthostatic VS: Sitting          Visual Acuity      ED Medications  Medications - No data to display    Diagnostic Studies  Results Reviewed     Procedure Component Value Units Date/Time    Novel Coronavirus (COVID-19), PCR [facility] [[id number]] Collected: 01/12/21 1643    Lab Status: In process Specimen: Nasopharyngeal Swab Updated: 01/12/21 1645                 No orders to display              Procedures  Procedures         ED Course                                           MDM  Number of Diagnoses or Management Options  Encounter for laboratory testing for COVID-19 virus:   Diagnosis management comments: Discussed CDC recommendations with patient including 14 days quarantine after positive contact and 10 days isolation after positive test instructions were given and explained to patient in great length  /  Pt lives in apartment with mother and 2 kids      Disposition  Final diagnoses:   Encounter for laboratory testing for COVID-19 virus     Time reflects when diagnosis was documented in both MDM as applicable and the Disposition within this note Time User Action Codes Description Comment    1/12/2021  4:41 PM Eros Augustin Add [L40 039] Encounter for laboratory testing for COVID-19 virus       ED Disposition     ED Disposition Condition Date/Time Comment    Discharge Stable Tue Jan 12, 2021  4:41 PM Iron Peralta discharge to home/self care  Follow-up Information     Follow up With Specialties Details Why Contact Info    Sonido Sanchez, [address] [place] [address]  [phone]            Discharge Medication List as of 1/12/2021  4:44 PM      CONTINUE these medications which have NOT CHANGED    Details   insulin glargine (Basaglar KwikPen) 100 units/mL injection pen Inject 55 Units under the skin daily, Starting Sat 11/14/2020, Until Mon 12/14/2020, Normal      insulin lispro (Admelog SoloStar) 100 units/mL injection pen Admelog SoloStar U-100 Insulin lispro 100 unit/mL subcutaneous pen, Historical Med      Lancet Devices (Lancing Device) MISC lancing device   AS DIRECTED, Historical Med      Prenat-Fe Carbonyl-FA-Omega 3 (One-A-Day Womens Prenatal 1) 28-0 8-235 MG CAPS Take 1 tablet by mouth daily, Starting Sat 11/14/2020, No Print           No discharge procedures on file      PDMP Review     None          ED Provider  Electronically Signed by           Carmen Ayala PA-C  01/12/21 8210

## 2021-01-13 ENCOUNTER — TELEPHONE (OUTPATIENT)
Dept: PERINATAL CARE | Facility: OTHER | Age: 31
End: 2021-01-13

## 2021-01-13 LAB
CFTR MUT ANL BLD/T: NEGATIVE
EXTERNAL GENE TEST BETA-THALASSEMIA: NORMAL
GLUCOSE P FAST SERPL-MCNC: 44 MG/DL
SARS-COV-2 RNA SPEC QL NAA+PROBE: NOT DETECTED

## 2021-01-13 NOTE — RESULT ENCOUNTER NOTE
Attempted calling Milind Castaneda twice, no answer, unable to leave a message, she does not have a voice mail set up

## 2021-01-13 NOTE — TELEPHONE ENCOUNTER
Attempted to reach patient by phone and left voicemail to confirm appointment for MFM ultrasound  1 support person ( must be over the age of 15) may accompany you for your appointment  Please wear masks ( [place] [facility] of [facility])  You and your support person will be screened upon arrival   IF not feeling well- cough, fever, shortness of breath or any flu like symptoms contact your primary care physician or [phone]  ? Please call our office prior to entering the building  Check in and rooming questions will be done via phone  Inside office # provided:  ? [place] line: [phone]    [facility] does not allow cell phone use, recording device or streaming during ultrasound     Any questions with these instructions please call Maternal Fetal Medicine nurse line today @ # [phone]

## 2021-01-14 ENCOUNTER — ROUTINE PRENATAL (OUTPATIENT)
Dept: PERINATAL CARE | Facility: CLINIC | Age: 31
End: 2021-01-14
Payer: COMMERCIAL

## 2021-01-14 DIAGNOSIS — Z3A.13 13 WEEKS GESTATION OF PREGNANCY: ICD-10-CM

## 2021-01-14 DIAGNOSIS — Z98.891 PREVIOUS CESAREAN SECTION: ICD-10-CM

## 2021-01-14 DIAGNOSIS — Z13.79 GENETIC SCREENING: ICD-10-CM

## 2021-01-14 DIAGNOSIS — Z36.82 NUCHAL TRANSLUCENCY OF FETUS ON PRENATAL ULTRASOUND: ICD-10-CM

## 2021-01-14 DIAGNOSIS — E10.65 TYPE 1 DIABETES MELLITUS WITH HYPERGLYCEMIA (HCC): Primary | ICD-10-CM

## 2021-01-14 DIAGNOSIS — Z79.4 OTHER SPECIFIED DIABETES MELLITUS WITHOUT COMPLICATION, WITH LONG-TERM CURRENT USE OF INSULIN (HCC): ICD-10-CM

## 2021-01-14 DIAGNOSIS — E13.9 OTHER SPECIFIED DIABETES MELLITUS WITHOUT COMPLICATION, WITH LONG-TERM CURRENT USE OF INSULIN (HCC): ICD-10-CM

## 2021-01-14 DIAGNOSIS — I10 ESSENTIAL HYPERTENSION: ICD-10-CM

## 2021-01-14 PROCEDURE — 99215 OFFICE O/P EST HI 40 MIN: CPT | Performed by: OBSTETRICS & GYNECOLOGY

## 2021-01-14 PROCEDURE — 76813 OB US NUCHAL MEAS 1 GEST: CPT | Performed by: OBSTETRICS & GYNECOLOGY

## 2021-01-14 NOTE — LETTER
2021     Halina Becerra CRNA  [facility]    Patient: Damián Byrne   YOB: 1990   Date of Visit: 2021       Dear Dr Bethany Cantrell: Thank you for referring Damián Byrne to me for evaluation  Below are my notes for this consultation  If you have questions, please do not hesitate to call me  I look forward to following your patient along with you  Sincerely,        César Lynch MD        CC: MD César Gibson MD  2021  4:19 PM  Sign when Signing Visit  Ob ultrasound and MFM consultation     Ms María Valdez is a 30 yo   patient at 14 9/7 weeks gestation  T 1 DM non compliant and hx of elevated HbA1c of 12 2 and normal creatinine of 0 5 on 20  Ms María Valdez is not a good historian and has missed several recent medical appointments with the Diabetes counselors  An ultrasound for viability, dating and nuchal translucency was completed today  See Ob Procedures in EPIC  1  Live, mueller fetus with size = dates; VICKI 2021  Normal nuchal translucency 1 3  Today's ultrasound findings and suggested follow-up were discussed  with the patient  The Sequential Screen was discussed in detail, including the sensitivity for detection of Down syndrome estimated at 95%  Cell-free DNA (cfDNA)  (Non invasive prenatal testing)-NIPT) screening has a 99% detection rate for Down syndrome, 96% for trisomy 18, and 91% for trisomy 13 with <1% false positive rate  It may need to obtain approval from the insurance company  The patient  declined use of definitive prenatal diagnosis, which is possible only through genetic amniocentesis or CVS       The patient had a fingerstick blood collection for hCG and CAITY-A to complete the initial component of the Sequential Screen  Results should be available within one week  Ob Hx:     : Delivery at term via C section, live baby girl, 8lb 14 oz   Unclear the indication for a C section  2019: Unexplained miscarriage at 8 weeks  No D/C    Medical hx: T 1 DM diagnosed approximately in 2012  Has received counseling by endocrinology in the past  Seen by ABIGAIL at [facility] in 2013 for her  pregnancy delivered in 2014  Had a pre conceptional consultation with ABIGAIL on 08 25 16 at [facility] [place] [place]  More recently was admitted from the ED on 11 20 20 due to DKA and received treatment in the ICU at [facility]   HbA1c at 12 2 on 11 06 20, with a random glucose at 305 mg/dl on 11 -13-20  Found to have a + YUMIKO and negative LAC on 11 13 20  Consulted the ED on 01 12 21 due to concern about exposure to COVID-19; her partner was incarcerated and found to be COVID + with whom she had contact  The test result reported for her as negative  Recommended to observe a quarantine for 14 days  Has a Dx of CHTN from noted in the record  Not reported by Ms Rachel Hernández  Surgical hx:  C section x1    Medications:  PNV  Insulin glargine (Basaglar) 55 units and Lispro as per a sliding scale  The medical record indicates she was prescribed lisinoprine for [facility]  Allergies: none    Family Hx: non contributory    Social Hx: No tobacco alcohol or illicit drug use  I reviewed the results of this ultrasound with Ms Rachel Hernández and answered her questions  Unfortunately Ms Rachel Hernández has been able to achieve very poor control of Type 1 DM  Overall, the maternal and fetal prognosis during pregnancies complicated by type I diabetes depends upon the degree of maternal metabolic control (reflected by glucose control) and the presence or absence of maternal microvascular disease particularly, the presence or absence of maternal nephropathy and hypertension  Ms Rachel Hernández indicated to me she was not certain if she has had an ophtalmologic examination in the recent past or at all  Background retinopathy may increase somewhat during pregnancy (3% of patients)   There is some controversy regarding whether or not pregnancy itself increases the risk of proliferative retinopathy  Some data suggest that the tight glucose control obtained during pregnancy may increase the risk of proliferative retinopathy  Other data suggest that the incidence of progressive proliferative retinopathy depends on the severity of the pre-existing poor glucose control  However, untreated proliferative retinopathy is progressive in up to 80 percent of pregnant patients  Overall, tight control has proven beneficial to gravid and non-gravid patients  Ms Basim Win should complete a 24 hr urine collection which will help us determine if there is evidence of renal damage, reflected by proteinuria  Pregnancy does not appear to have a post pregnancy negative impact on diabetic nephropathy  The degree of nephropathy is related to the incidence of fetal growth restriction and maternal pregnancy induced hypertension/preeclampsia  In addition to the increased metabolic demand that occurs in normal pregnancy, preeclampsia causes impaired renal function and proteinuria  Thus women with pre-gestational diabetic nephropathy may experience transient worsening of their renal function  Once we have the result of a 24 hr urine collection we can discuss further if there is evidence of proteinuria that would indicate some degree of nephropathy  A urinalysis for examination of the sediment and casts is also recommended  She will be at increased risk for preeclampsia  Over 48 percent of women with pregestational diabetic nephropathy will develop preeclampsia and or fetal growth restriction (FGR)  Low dose aspirin therapy may delay the onset of preeclampsia and reduce its severity  Pregnancy increases the risk of diabetic keto-acidosis (DKA)  This increase is largely due to an increase in the rate of pyelonephritis and/or a failure to accommodate the increasing insulin requirements of the second half of pregnancy   Unfortunately Ms Basim Win was recently diagnosed with DKA, episode she recovered from  These events are associated with increased risk for maternal morbidity and mortality  Due to the risk of fetal macrosomia and placental insufficiency, diabetic women have an increased risk of  section (40-50%)  Additionally, those with microvascular disease have an increased risk of postsurgical wound complications  Fetal risks    There is a fourfold-increased risk of congenital anomalies among the fetuses of 's with preconceptional diabetes mellitus  The baseline population risk is 2-3%, translating into an overall risk of congenital anomalies in these women of approximately 10 percent  The risk of congenital anomalies is modified by the degree of glycemic control as reflected by the hemoglobin A1C  When the value is > 9 0 the risk of congenital anomalies approaches 25-30%  For reasons that remain unclear, even women with good control (Hb A1C <7 2) have an increased risk of congenital anomalies (3-5%)  The most common anomalies encountered are cardiac in nature followed by neural tube defects, skeletal hypoplasia and genito-urinary system abnormalities  Fetuses whose mothers have microvascular disease often suffer the consequences of  fetal growth restriction and maternal preeclampsia  Both may result in premature birth and its associated sequelae  The incidence of late fetal death is increased in diabetic women with poor glycemic control  This is likely due to fetal hyperinsulinemia that may result in fetal hypoxia  [de-identified] percent of gravidas with diabetes mellitus deliver fetuses that are macrosomic  Good glucose control reduces this risk substantially  Because fetal hyperglycemia and hyperinsulinemia cause fetal organomegally, these fetuses tend to have normal sized heads and large bodies and are prone to shoulder dystocia at the time of birth  Shoulder dystocia can result in permanent fetal neurological injury or death      ASSESSMENT  I discussed with  Ms Nela Zhang the impact that not well controlled pregestational type I diabetes may have on pregnancy and vice versa  She is aware of the increased risk for congenital malformations (most commonly cardiac, NTDs, skeletal hypoplasia and genitourinary), miscarriage, placental insufficiency related fluid and fetal growth rate abnormalities, fetal macrosomia, polyhydramnios,  labor and delivery, maternal hypertensive complications, maternal and fetal trauma associated with difficult vaginal delivery if macrosomia or shoulder dystocia is present, increased risk of  delivery, as well as post-operative wound complications  There is also increased risk of  morbidities, such as hypoglycemia, hypothermia and calcium metabolic disorders in the  period  She is aware of the ever increasing need for insulin through the course of the pregnancy, and if not closely controlled, her increased risk of diabetic ketoacidosis  I also briefly discussed diabetes associated increased life risk for cardiac disease, stroke, peripheral neuropathy, blindness and renal failure  In Ms Sadia Garcia unfortunately there has been poor control for at least one year or longer, and her las measurement of HgbA1C is > 10  Her pregnancy is also complicated by [facility]  She did not indicate is taking lisinopril an ACE inhibitor which is associated with increased risk for fetal congenital anomalies such as cardiac anomalies, are contraindicated in the 2nd and 3rd trimester due to increased risk for renal damage  It is unclear to me when she D/C use  I encouraged Ms Sadia Garcia to keep her appointment with the diabetes counselor    RECOMMENDATIONS    1  Obtain an EKG, baseline eye exam report and if a retinal exam was not done please schedule it  Repeat the exam each trimester if the exam is benign and more often if there is evidence of proliferative retinopathy  2  24-hour urine for total protein and creatinine clearance   If these tests are normal (total protein < 300 mg/day and a creatinine clearance is > 125 ml per minute) repeat only as necessary to help rule out preeclampsia  If the tests are abnormal begin monthly testing by 22 weeks  3  Do a urine culture every month  Treat the positives even if she is asymptomatic  4  TSH should also be done to screen for thyroid dysfunction  5 Daily prenatal vitamins  6  I recommend consideration of initiation of low-dose aspirin daily for the prevention of  preeclampsia, per ACOG Committee Opinion # [id number]  This prophylactic medication is more effective when started prior to 16 weeks but can be started as late as 28 weeks  Use of aspirin 162 mg daily  (81 milligram baby aspirin two tablets)   up to 36 weeks gestation is recommended, which is a dose different than the one recommended by ACOG  New data suggests that a dose higher than 100 mg and started before 16 weeks gestation can reduce the risk of  preeclampsia ([facility]  2017  [facility] et al)  Since Aspirin in the [place] comes only in 2 dosage forms 81 and 325 mg we are now advising use of 162 mg daily as a way to approach a dose > 100 mg and close to 150 mg, but less than 300 mg which is the maximum dose that was studied  7  Follow-up multiple marker serum screening at 16 to 20 weeks gestation is recommended to complete the Sequential Screen  8  Early fetal LII ultrasound at 16-18 weeks    9  Fetal Level II ultrasound imaging is scheduled at about 20 weeks gestation  10  fetal echocardiogram by pediatric cardiology at 20 weeks gestation  11  Obtain an ultrasonographic assessment of the fetus every 4 weeks beginning after 24 weeks in order to assess fetal growth, fetal weight, and amniotic fluid volume  12  Begin  testing with non-stress tests (twice weekly) and weekly amniotic fluid volume assessments by 30-32 weeks gestation or sooner if the fetal/maternal status indicates, with daily fetal movement counts      13  Plan delivery at 39 weeks as long as the  testing is reassuring and there is no evidence of other maternal or fetal complications  It is likely delivery will need to be recommended before 39 weeks  I will contact Ms Asher Toussaint and ask her to attend a follow-up appointment this week, to discuss non continuation of pregnancy  I encouarge you to have that conversation with Ms Asher Toussaint as well if you consider it appropriate  She was not able to remain for en extended visit today  The risks for her and to the fetus from her current metabolic status and the recent episode of DKA, indicate she is not stable resulting in significantly increased risks for fetal ane maternal morbidity and mortality  Thank you for referring your patient to our offices  The limitations of ultrasound to detect all anomalies was reviewed and how it is not  a test to rule out aneuploidy  If you have any further questions do not hesitate to contact us as [phone]      Silvana Hernandez MD

## 2021-01-14 NOTE — LETTER
2021     Opal Jamison CRNA  [facility]    Patient: Radha Lozoya   YOB: 1990   Date of Visit: 2021       Dear Dr Mary oG: Thank you for referring Radha Lozoya to me for evaluation  Below are my notes for this consultation  If you have questions, please do not hesitate to call me  I look forward to following your patient along with you  Sincerely,        Josh Yao MD        CC: DEREK Goyal MD  2021  4:23 PM  Signed  Ob ultrasound and MFM consultation     Ms Nga Sarah is a 32 yo   patient at 14 9/7 weeks gestation  T 1 DM non compliant and hx of elevated HbA1c of 12 2 and normal creatinine of 0 5 on 20  Ms Nga Sarah is not a good historian and has missed several recent medical appointments with the Diabetes counselors  An ultrasound for viability, dating and nuchal translucency was completed today  See Ob Procedures in EPIC  1  Live, mueller fetus with size = dates; VICKI 2021  Normal nuchal translucency 1 3  Today's ultrasound findings and suggested follow-up were discussed  with the patient  The Sequential Screen was discussed in detail, including the sensitivity for detection of Down syndrome estimated at 95%  Cell-free DNA (cfDNA)  (Non invasive prenatal testing)-NIPT) screening has a 99% detection rate for Down syndrome, 96% for trisomy 18, and 91% for trisomy 13 with <1% false positive rate  It may need to obtain approval from the insurance company  The patient  declined use of definitive prenatal diagnosis, which is possible only through genetic amniocentesis or CVS       The patient had a fingerstick blood collection for hCG and CAITY-A to complete the initial component of the Sequential Screen  Results should be available within one week  Ob Hx:     : Delivery at term via C section, live baby girl, 8lb 14 oz   Unclear the indication for a C section  2019: Unexplained miscarriage at 8 weeks  No D/C    Medical hx: T 1 DM diagnosed approximately in 2012  Has received counseling by endocrinology in the past  Seen by ABIGAIL at [facility] in 2013 for her  pregnancy delivered in 2014  Had a pre conceptional consultation with ABIGAIL on 08 25 16 at [facility]  More recently was admitted from the ED on 11 20 20 due to DKA and received treatment in the ICU at [facility]   HbA1c at 12 2 on 11 06 20, with a random glucose at 305 mg/dl on 11 -13-20  Found to have a + YUMIKO and negative LAC on 11 13 20  Consulted the ED on 01 12 21 due to concern about exposure to COVID-19; her partner was incarcerated and found to be COVID + with whom she had contact  The test result reported for her as negative  Recommended to observe a quarantine for 14 days  Has a Dx of CHTN from noted in the record  Not reported by Ms Nela Zhang  Surgical hx:  C section x1    Medications:  PNV  Insulin glargine (Basaglar) 55 units and Lispro as per a sliding scale  The medical record indicates she was prescribed lisinoprine for [facility]  Allergies: none    Family Hx: non contributory    Social Hx: No tobacco alcohol or illicit drug use  I reviewed the results of this ultrasound with Ms Nela Zhang and answered her questions  Unfortunately Ms Nela Zhang has been able to achieve very poor control of Type 1 DM  Overall, the maternal and fetal prognosis during pregnancies complicated by type I diabetes depends upon the degree of maternal metabolic control (reflected by glucose control) and the presence or absence of maternal microvascular disease particularly, the presence or absence of maternal nephropathy and hypertension  Ms Nela Zhang indicated to me she was not certain if she has had an ophtalmologic examination in the recent past or at all  Background retinopathy may increase somewhat during pregnancy (3% of patients)   There is some controversy regarding whether or not pregnancy itself increases the risk of proliferative retinopathy  Some data suggest that the tight glucose control obtained during pregnancy may increase the risk of proliferative retinopathy  Other data suggest that the incidence of progressive proliferative retinopathy depends on the severity of the pre-existing poor glucose control  However, untreated proliferative retinopathy is progressive in up to 80 percent of pregnant patients  Overall, tight control has proven beneficial to gravid and non-gravid patients  Ms Basim Win should complete a 24 hr urine collection which will help us determine if there is evidence of renal damage, reflected by proteinuria  Pregnancy does not appear to have a post pregnancy negative impact on diabetic nephropathy  The degree of nephropathy is related to the incidence of fetal growth restriction and maternal pregnancy induced hypertension/preeclampsia  In addition to the increased metabolic demand that occurs in normal pregnancy, preeclampsia causes impaired renal function and proteinuria  Thus women with pre-gestational diabetic nephropathy may experience transient worsening of their renal function  Once we have the result of a 24 hr urine collection we can discuss further if there is evidence of proteinuria that would indicate some degree of nephropathy  A urinalysis for examination of the sediment and casts is also recommended  She will be at increased risk for preeclampsia  Over 48 percent of women with pregestational diabetic nephropathy will develop preeclampsia and or fetal growth restriction (FGR)  Low dose aspirin therapy may delay the onset of preeclampsia and reduce its severity  Pregnancy increases the risk of diabetic keto-acidosis (DKA)  This increase is largely due to an increase in the rate of pyelonephritis and/or a failure to accommodate the increasing insulin requirements of the second half of pregnancy   Unfortunately Ms Basim Win was recently diagnosed with DKA, episode she recovered from  These events are associated with increased risk for maternal morbidity and mortality  Due to the risk of fetal macrosomia and placental insufficiency, diabetic women have an increased risk of  section (40-50%)  Additionally, those with microvascular disease have an increased risk of postsurgical wound complications  Fetal risks    There is a fourfold-increased risk of congenital anomalies among the fetuses of 's with preconceptional diabetes mellitus  The baseline population risk is 2-3%, translating into an overall risk of congenital anomalies in these women of approximately 10 percent  The risk of congenital anomalies is modified by the degree of glycemic control as reflected by the hemoglobin A1C  When the value is > 9 0 the risk of congenital anomalies approaches 25-30%  For reasons that remain unclear, even women with good control (Hb A1C <7 2) have an increased risk of congenital anomalies (3-5%)  The most common anomalies encountered are cardiac in nature followed by neural tube defects, skeletal hypoplasia and genito-urinary system abnormalities  Fetuses whose mothers have microvascular disease often suffer the consequences of  fetal growth restriction and maternal preeclampsia  Both may result in premature birth and its associated sequelae  The incidence of late fetal death is increased in diabetic women with poor glycemic control  This is likely due to fetal hyperinsulinemia that may result in fetal hypoxia  [de-identified] percent of gravidas with diabetes mellitus deliver fetuses that are macrosomic  Good glucose control reduces this risk substantially  Because fetal hyperglycemia and hyperinsulinemia cause fetal organomegally, these fetuses tend to have normal sized heads and large bodies and are prone to shoulder dystocia at the time of birth  Shoulder dystocia can result in permanent fetal neurological injury or death      ASSESSMENT  I discussed with  Ms Nathalie Durán the impact that not well controlled pregestational type I diabetes may have on pregnancy and vice versa  She is aware of the increased risk for congenital malformations (most commonly cardiac, NTDs, skeletal hypoplasia and genitourinary), miscarriage, placental insufficiency related fluid and fetal growth rate abnormalities, fetal macrosomia, polyhydramnios,  labor and delivery, maternal hypertensive complications, maternal and fetal trauma associated with difficult vaginal delivery if macrosomia or shoulder dystocia is present, increased risk of  delivery, as well as post-operative wound complications  There is also increased risk of  morbidities, such as hypoglycemia, hypothermia and calcium metabolic disorders in the  period  She is aware of the ever increasing need for insulin through the course of the pregnancy, and if not closely controlled, her increased risk of diabetic ketoacidosis  I also briefly discussed diabetes associated increased life risk for cardiac disease, stroke, peripheral neuropathy, blindness and renal failure  In Ms Karen Hartman unfortunately there has been poor control for at least one year or longer, and her las measurement of HgbA1C is > 10  Her pregnancy is also complicated by [facility]  She did not indicate is taking lisinopril an ACE inhibitor which is associated with increased risk for fetal congenital anomalies such as cardiac anomalies, are contraindicated in the 2nd and 3rd trimester due to increased risk for renal damage  It is unclear to me when she D/C use  I encouraged Ms Karen Hartman to keep her appointment with the diabetes counselor    RECOMMENDATIONS    1  Obtain an EKG, baseline eye exam report and if a retinal exam was not done please schedule it  Repeat the exam each trimester if the exam is benign and more often if there is evidence of proliferative retinopathy  2  24-hour urine for total protein and creatinine clearance   If these tests are normal (total protein < 300 mg/day and a creatinine clearance is > 125 ml per minute) repeat only as necessary to help rule out preeclampsia  If the tests are abnormal begin monthly testing by 22 weeks  3  Do a urine culture every month  Treat the positives even if she is asymptomatic  4  TSH should also be done to screen for thyroid dysfunction  5 Daily prenatal vitamins  6  I recommend consideration of initiation of low-dose aspirin daily for the prevention of  preeclampsia, per ACOG Committee Opinion # [id number]  This prophylactic medication is more effective when started prior to 16 weeks but can be started as late as 28 weeks  Use of aspirin 162 mg daily  (81 milligram baby aspirin two tablets)   up to 36 weeks gestation is recommended, which is a dose different than the one recommended by ACOG  New data suggests that a dose higher than 100 mg and started before 16 weeks gestation can reduce the risk of  preeclampsia ([facility]  2017  [facility] D et al)  Since Aspirin in the [place] comes only in 2 dosage forms 81 and 325 mg we are now advising use of 162 mg daily as a way to approach a dose > 100 mg and close to 150 mg, but less than 300 mg which is the maximum dose that was studied  7  Follow-up multiple marker serum screening at 16 to 20 weeks gestation is recommended to complete the Sequential Screen  8  Early fetal LII ultrasound at 16-18 weeks    9  Fetal Level II ultrasound imaging is scheduled at about 20 weeks gestation  10  fetal echocardiogram by pediatric cardiology at 20 weeks gestation  11  Obtain an ultrasonographic assessment of the fetus every 4 weeks beginning after 24 weeks in order to assess fetal growth, fetal weight, and amniotic fluid volume      12  Begin  testing with non-stress tests (twice weekly) and weekly amniotic fluid volume assessments by 30-32 weeks gestation or sooner if the fetal/maternal status indicates, with daily fetal movement counts  15  Plan delivery at 39 weeks as long as the  testing is reassuring and there is no evidence of other maternal or fetal complications  It is likely delivery will need to be recommended before 39 weeks  I will contact Ms Elen Arciniega and ask her to attend a follow-up appointment this week, to discuss non continuation of pregnancy  I encouarge you to have that conversation with Ms Elen Arciniega as well if you consider it appropriate  She was not able to remain for en extended visit today  The risks for her and to the fetus from her current metabolic status and the recent episode of DKA, indicate she is not stable resulting in significantly increased risks for fetal ane maternal morbidity and mortality  Thank you for referring your patient to our offices  The limitations of ultrasound to detect all anomalies was reviewed and how it is not  a test to rule out aneuploidy  If you have any further questions do not hesitate to contact us as [phone]      Osorio Castillo MD

## 2021-01-17 VITALS
BODY MASS INDEX: 27.16 KG/M2 | HEART RATE: 87 BPM | WEIGHT: 129.4 LBS | HEIGHT: 58 IN | SYSTOLIC BLOOD PRESSURE: 118 MMHG | DIASTOLIC BLOOD PRESSURE: 71 MMHG

## 2021-01-17 PROBLEM — Z13.79 GENETIC SCREENING: Status: ACTIVE | Noted: 2021-01-17

## 2021-01-17 PROBLEM — Z98.891 PREVIOUS CESAREAN SECTION: Status: ACTIVE | Noted: 2021-01-17

## 2021-01-17 PROBLEM — Z3A.13 13 WEEKS GESTATION OF PREGNANCY: Status: RESOLVED | Noted: 2021-01-17 | Resolved: 2021-01-17

## 2021-01-17 PROBLEM — Z3A.13 13 WEEKS GESTATION OF PREGNANCY: Status: ACTIVE | Noted: 2021-01-17

## 2021-01-17 PROBLEM — Z36.82 NUCHAL TRANSLUCENCY OF FETUS ON PRENATAL ULTRASOUND: Status: ACTIVE | Noted: 2021-01-17

## 2021-01-17 NOTE — PROGRESS NOTES
Ob ultrasound and MFM consultation     Ms Jori Fowler is a 30 yo   patient at 15 6/7 weeks gestation  T 1 DM non compliant and hx of elevated HbA1c of 12 2 and normal creatinine of 0 5 on 20  Ms Jori Fowler is not a good historian and has missed several recent medical appointments with the Diabetes counselors  An ultrasound for viability, dating and nuchal translucency was completed today  See Ob Procedures in EPIC  1  Live, mueller fetus with size = dates; VICKI 2021  Normal nuchal translucency 1 3  Today's ultrasound findings and suggested follow-up were discussed  with the patient  The Sequential Screen was discussed in detail, including the sensitivity for detection of Down syndrome estimated at 95%  Cell-free DNA (cfDNA)  (Non invasive prenatal testing)-NIPT) screening has a 99% detection rate for Down syndrome, 96% for trisomy 18, and 91% for trisomy 13 with <1% false positive rate  It may need to obtain approval from the insurance company  The patient  declined use of definitive prenatal diagnosis, which is possible only through genetic amniocentesis or CVS       The patient had a fingerstick blood collection for hCG and CAITY-A to complete the initial component of the Sequential Screen  Results should be available within one week  Ob Hx:     2014: Delivery at term via C section, live baby girl, 8lb 14 oz  Unclear the indication for a C section  2019: Unexplained miscarriage at 8 weeks  No D/C    Medical hx: T 1 DM diagnosed approximately in   Has received counseling by endocrinology in the past  Seen by MFM at [facility] in  for her  pregnancy delivered in   Had a pre conceptional consultation with MFM on 16 at [facility]  More recently was admitted from the ED on 20 due to DKA and received treatment in the ICU at [facility]   HbA1c at 12 2 on 20, with a random glucose at 305 mg/dl on     Found to have a + YUMIKO and negative LAC on 11 13 20  Consulted the ED on 01 12 21 due to concern about exposure to COVID-19; her partner was incarcerated and found to be COVID + with whom she had contact  The test result reported for her as negative  Recommended to observe a quarantine for 14 days  Has a Dx of CHTN from noted in the record  Not reported by Ms Elen Arciniega  Surgical hx:  C section x1    Medications:  PNV  Insulin glargine (Basaglar) 55 units and Lispro as per a sliding scale  The medical record indicates she was prescribed lisinoprine for [facility]  Allergies: none    Family Hx: non contributory    Social Hx: No tobacco alcohol or illicit drug use  I reviewed the results of this ultrasound with Ms Elen Arciniega and answered her questions  Unfortunately Ms Elen Arciniega has been able to achieve very poor control of Type 1 DM  Overall, the maternal and fetal prognosis during pregnancies complicated by type I diabetes depends upon the degree of maternal metabolic control (reflected by glucose control) and the presence or absence of maternal microvascular disease particularly, the presence or absence of maternal nephropathy and hypertension  Ms Elen Arciniega indicated to me she was not certain if she has had an ophtalmologic examination in the recent past or at all  Background retinopathy may increase somewhat during pregnancy (3% of patients)  There is some controversy regarding whether or not pregnancy itself increases the risk of proliferative retinopathy  Some data suggest that the tight glucose control obtained during pregnancy may increase the risk of proliferative retinopathy  Other data suggest that the incidence of progressive proliferative retinopathy depends on the severity of the pre-existing poor glucose control  However, untreated proliferative retinopathy is progressive in up to 80 percent of pregnant patients  Overall, tight control has proven beneficial to gravid and non-gravid patients      Ms Elen Arciniega should complete a 24 hr urine collection which will help us determine if there is evidence of renal damage, reflected by proteinuria  Pregnancy does not appear to have a post pregnancy negative impact on diabetic nephropathy  The degree of nephropathy is related to the incidence of fetal growth restriction and maternal pregnancy induced hypertension/preeclampsia  In addition to the increased metabolic demand that occurs in normal pregnancy, preeclampsia causes impaired renal function and proteinuria  Thus women with pre-gestational diabetic nephropathy may experience transient worsening of their renal function  Once we have the result of a 24 hr urine collection we can discuss further if there is evidence of proteinuria that would indicate some degree of nephropathy  A urinalysis for examination of the sediment and casts is also recommended  She will be at increased risk for preeclampsia  Over 48 percent of women with pregestational diabetic nephropathy will develop preeclampsia and or fetal growth restriction (FGR)  Low dose aspirin therapy may delay the onset of preeclampsia and reduce its severity  Pregnancy increases the risk of diabetic keto-acidosis (DKA)  This increase is largely due to an increase in the rate of pyelonephritis and/or a failure to accommodate the increasing insulin requirements of the second half of pregnancy  Unfortunately Ms Kris Meza was recently diagnosed with DKA, episode she recovered from  These events are associated with increased risk for maternal morbidity and mortality  Due to the risk of fetal macrosomia and placental insufficiency, diabetic women have an increased risk of  section (40-50%)  Additionally, those with microvascular disease have an increased risk of postsurgical wound complications  Fetal risks    There is a fourfold-increased risk of congenital anomalies among the fetuses of 's with preconceptional diabetes mellitus   The baseline population risk is 2-3%, translating into an overall risk of congenital anomalies in these women of approximately 10 percent  The risk of congenital anomalies is modified by the degree of glycemic control as reflected by the hemoglobin A1C  When the value is > 9 0 the risk of congenital anomalies approaches 25-30%  For reasons that remain unclear, even women with good control (Hb A1C <7 2) have an increased risk of congenital anomalies (3-5%)  The most common anomalies encountered are cardiac in nature followed by neural tube defects, skeletal hypoplasia and genito-urinary system abnormalities  Fetuses whose mothers have microvascular disease often suffer the consequences of  fetal growth restriction and maternal preeclampsia  Both may result in premature birth and its associated sequelae  The incidence of late fetal death is increased in diabetic women with poor glycemic control  This is likely due to fetal hyperinsulinemia that may result in fetal hypoxia  [de-identified] percent of gravidas with diabetes mellitus deliver fetuses that are macrosomic  Good glucose control reduces this risk substantially  Because fetal hyperglycemia and hyperinsulinemia cause fetal organomegally, these fetuses tend to have normal sized heads and large bodies and are prone to shoulder dystocia at the time of birth  Shoulder dystocia can result in permanent fetal neurological injury or death  ASSESSMENT  I discussed with  Ms Jamal Pallas the impact that not well controlled pregestational type I diabetes may have on pregnancy and vice versa   She is aware of the increased risk for congenital malformations (most commonly cardiac, NTDs, skeletal hypoplasia and genitourinary), miscarriage, placental insufficiency related fluid and fetal growth rate abnormalities, fetal macrosomia, polyhydramnios,  labor and delivery, maternal hypertensive complications, maternal and fetal trauma associated with difficult vaginal delivery if macrosomia or shoulder dystocia is present, increased risk of  delivery, as well as post-operative wound complications  There is also increased risk of  morbidities, such as hypoglycemia, hypothermia and calcium metabolic disorders in the  period  She is aware of the ever increasing need for insulin through the course of the pregnancy, and if not closely controlled, her increased risk of diabetic ketoacidosis  I also briefly discussed diabetes associated increased life risk for cardiac disease, stroke, peripheral neuropathy, blindness and renal failure  In Ms Sadia Garcia unfortunately there has been poor control for at least one year or longer, and her las measurement of HgbA1C is > 10  Her pregnancy is also complicated by [facility]  She did not indicate is taking lisinopril an ACE inhibitor which is associated with increased risk for fetal congenital anomalies such as cardiac anomalies, are contraindicated in the 2nd and 3rd trimester due to increased risk for renal damage  It is unclear to me when she D/C use  I encouraged Ms Sadia Garcia to keep her appointment with the diabetes counselor    RECOMMENDATIONS    1  Obtain an EKG, baseline eye exam report and if a retinal exam was not done please schedule it  Repeat the exam each trimester if the exam is benign and more often if there is evidence of proliferative retinopathy  2  24-hour urine for total protein and creatinine clearance  If these tests are normal (total protein < 300 mg/day and a creatinine clearance is > 125 ml per minute) repeat only as necessary to help rule out preeclampsia  If the tests are abnormal begin monthly testing by 22 weeks  3  Do a urine culture every month  Treat the positives even if she is asymptomatic  4  TSH should also be done to screen for thyroid dysfunction  5 Daily prenatal vitamins  6  I recommend consideration of initiation of low-dose aspirin daily for the prevention of  preeclampsia, per ACOG Committee Opinion # [id number] [id number]   This prophylactic medication is more effective when started prior to 16 weeks but can be started as late as 28 weeks  Use of aspirin 162 mg daily  (81 milligram baby aspirin two tablets)   up to 36 weeks gestation is recommended, which is a dose different than the one recommended by ACOG  New data suggests that a dose higher than 100 mg and started before 16 weeks gestation can reduce the risk of  preeclampsia (NEJ  2017  [facility] D et al)  Since Aspirin in the [place] comes only in 2 dosage forms 81 and 325 mg we are now advising use of 162 mg daily as a way to approach a dose > 100 mg and close to 150 mg, but less than 300 mg which is the maximum dose that was studied  7  Follow-up multiple marker serum screening at 16 to 20 weeks gestation is recommended to complete the Sequential Screen  8  Early fetal LII ultrasound at 16-18 weeks    9  Fetal Level II ultrasound imaging is scheduled at about 20 weeks gestation  10  fetal echocardiogram by pediatric cardiology at 20 weeks gestation  11  Obtain an ultrasonographic assessment of the fetus every 4 weeks beginning after 24 weeks in order to assess fetal growth, fetal weight, and amniotic fluid volume  12  Begin  testing with non-stress tests (twice weekly) and weekly amniotic fluid volume assessments by 30-32 weeks gestation or sooner if the fetal/maternal status indicates, with daily fetal movement counts  15  Plan delivery at 39 weeks as long as the  testing is reassuring and there is no evidence of other maternal or fetal complications  It is likely delivery will need to be recommended before 39 weeks  I will contact Ms David Madison and ask her to attend a follow-up appointment this week, to discuss non continuation of pregnancy  I encouarge you to have that conversation with Ms David Madison as well if you consider it appropriate  She was not able to remain for en extended visit today   The risks for her and to the fetus from her current metabolic status and the recent episode of DKA, indicate she is not stable resulting in significantly increased risks for fetal ane maternal morbidity and mortality  Thank you for referring your patient to our offices  The limitations of ultrasound to detect all anomalies was reviewed and how it is not  a test to rule out aneuploidy  If you have any further questions do not hesitate to contact us as [phone]      Tona Chapa MD

## 2021-01-19 ENCOUNTER — TELEPHONE (OUTPATIENT)
Dept: PERINATAL CARE | Facility: CLINIC | Age: 31
End: 2021-01-19

## 2021-01-19 NOTE — TELEPHONE ENCOUNTER
Called patient to set up appointment with Evgeny Damon and one for the dietician  Patient's voicemail not set up  Unable to leave her a message for a call back

## 2021-01-21 ENCOUNTER — TELEPHONE (OUTPATIENT)
Dept: PERINATAL CARE | Facility: CLINIC | Age: 31
End: 2021-01-21

## 2021-01-21 NOTE — TELEPHONE ENCOUNTER
I attempted to call Aleksandr Daniel to review the result of her sequential screen part 1  However, there was no answer and her voice mailbox isn't set up and her communication consent does not allow a message to be left  TRF mailed with instructions

## 2021-01-21 NOTE — TELEPHONE ENCOUNTER
----- Message from Stacy Scanlon MD sent at 1/21/2021 10:21 AM EST -----  I have reviewed the patient's lab results which are normal   Please contact the patient to inform her of the normal results        Stacy Scanlon MD Fever greater than 101/Persistent Nausea and Vomiting/Unable to Urinate Swelling that continues/Fever greater than 101/Bleeding that does not stop/Pain not relieved by Medications/Persistent Nausea and Vomiting

## 2021-01-21 NOTE — LETTER
01/21/21  Eric Saleht  1990    Thank you for completing Part 1 of your Sequential Screen  To obtain a complete test result, please complete blood work for Part 2 Sequential Screen between the weeks of 2/1/21 to 2/15/21  Please verify a laboratory In Network with your insurance plan [facility] or [facility])  If you choose to use a [facility] lab, below are the sites to have this blood work drawn  If you choose [facility], the specimen must be forwarded to [place]  For [facility] [employer]   Call Maternal Fetal Medicine nurse line any questions at [phone] [address] [facility] Office Building  [address] [facility], [facility] [address]                [address] [facility] [address] 80 [facility]  [facility] 97, [place], [address] [place] [address]             [facility] [address], Radha Soares PA    [address] [address]             1425 [facility] [facility] [address]  1430 [facility], 230 [facility] [address] [place] [address]  [address] [facility] [address], [facility] [address]                        [address] [facility] [address], [facility] Rashaad 2313 [facility] [address]            207 [facility], [address]  Jose Miguel , [facility] [address]    Thank you,  Julianna 73 Maternal Fetal Medicine Staff

## 2021-01-22 ENCOUNTER — TELEPHONE (OUTPATIENT)
Dept: OBGYN CLINIC | Facility: CLINIC | Age: 31
End: 2021-01-22

## 2021-01-22 ENCOUNTER — OB ABSTRACT (OUTPATIENT)
Dept: OBGYN CLINIC | Facility: CLINIC | Age: 31
End: 2021-01-22

## 2021-01-22 DIAGNOSIS — R73.09 ELEVATED HEMOGLOBIN A1C: Primary | ICD-10-CM

## 2021-01-22 NOTE — TELEPHONE ENCOUNTER
Paper blood work results from [employer], Hemoglobin A 1 c is  10 5  High , spoke with the patient ABOUT HER BLOOD RK, [address] [facility] DOES HAVE AN APPOINTMENT WITH HER ENDOCRINOLOGIST SHE HAD IN THE PAST, SHE DOES NOT KNOW THE NAME OR WHEN THE APPOINTMENT IS FOR, ADVISED THAT WE ARE PUTTING IN A REFERRAL  AND IF SHE GETS THE INFO FROM WHO SHE IS GOING TO WE CAN SEND HER THE REFERRAL , referral issued and sent in the mail

## 2021-01-27 ENCOUNTER — HOSPITAL ENCOUNTER (EMERGENCY)
Facility: HOSPITAL | Age: 31
End: 2021-01-27
Attending: INTERNAL MEDICINE | Admitting: INTERNAL MEDICINE
Payer: COMMERCIAL

## 2021-01-27 ENCOUNTER — HOSPITAL ENCOUNTER (INPATIENT)
Facility: HOSPITAL | Age: 31
LOS: 3 days | Discharge: HOME | DRG: 566 | End: 2021-01-30
Attending: INTERNAL MEDICINE | Admitting: INTERNAL MEDICINE
Payer: COMMERCIAL

## 2021-01-27 VITALS
OXYGEN SATURATION: 100 % | SYSTOLIC BLOOD PRESSURE: 133 MMHG | RESPIRATION RATE: 27 BRPM | TEMPERATURE: 98.3 F | HEART RATE: 102 BPM | DIASTOLIC BLOOD PRESSURE: 68 MMHG

## 2021-01-27 DIAGNOSIS — E10.10 DIABETIC KETOACIDOSIS WITHOUT COMA ASSOCIATED WITH TYPE 1 DIABETES MELLITUS (HCC): Primary | ICD-10-CM

## 2021-01-27 DIAGNOSIS — Z3A.16 16 WEEKS GESTATION OF PREGNANCY: Primary | ICD-10-CM

## 2021-01-27 DIAGNOSIS — Z3A.16 16 WEEKS GESTATION OF PREGNANCY: ICD-10-CM

## 2021-01-27 PROBLEM — E87.5 HYPERKALEMIA: Status: ACTIVE | Noted: 2021-01-27

## 2021-01-27 PROBLEM — E87.20 METABOLIC ACIDOSIS WITH INCREASED ANION GAP AND ACCUMULATION OF ORGANIC ACIDS: Status: ACTIVE | Noted: 2021-01-27

## 2021-01-27 PROBLEM — E87.2 METABOLIC ACIDOSIS WITH INCREASED ANION GAP AND ACCUMULATION OF ORGANIC ACIDS: Status: ACTIVE | Noted: 2021-01-27

## 2021-01-27 PROBLEM — R06.02 SHORTNESS OF BREATH: Status: ACTIVE | Noted: 2021-01-27

## 2021-01-27 LAB
ALBUMIN SERPL BCP-MCNC: 4.6 G/DL (ref 3.4–4.8)
ALP SERPL-CCNC: 75.6 U/L (ref 35–140)
ALT SERPL W P-5'-P-CCNC: 16 U/L (ref 5–54)
ANION GAP SERPL CALCULATED.3IONS-SCNC: 12 MMOL/L (ref 4–13)
ANION GAP SERPL CALCULATED.3IONS-SCNC: 19 MMOL/L (ref 4–13)
ANION GAP SERPL CALCULATED.3IONS-SCNC: 28 MMOL/L (ref 4–13)
AST SERPL W P-5'-P-CCNC: 27 U/L (ref 15–41)
ATRIAL RATE: 117 BPM
BACTERIA UR QL AUTO: NORMAL /HPF
BASE EXCESS BLDA CALC-SCNC: -24 MMOL/L (ref -2–3)
BASOPHILS # BLD AUTO: 0.02 THOUSANDS/ΜL (ref 0–0.1)
BASOPHILS NFR BLD AUTO: 0 % (ref 0–1)
BETA-HYDROXYBUTYRATE: 5.2 MMOL/L
BILIRUB SERPL-MCNC: 0.4 MG/DL (ref 0.3–1.2)
BILIRUB UR QL STRIP: NEGATIVE
BUN SERPL-MCNC: 14 MG/DL (ref 5–25)
BUN SERPL-MCNC: 20 MG/DL (ref 5–25)
BUN SERPL-MCNC: 27 MG/DL (ref 6–20)
CA-I BLD-SCNC: 1.1 MMOL/L (ref 1.12–1.32)
CALCIUM SERPL-MCNC: 7.5 MG/DL (ref 8.3–10.1)
CALCIUM SERPL-MCNC: 8.1 MG/DL (ref 8.3–10.1)
CALCIUM SERPL-MCNC: 9.8 MG/DL (ref 8.4–10.2)
CHLORIDE SERPL-SCNC: 104 MMOL/L (ref 100–108)
CHLORIDE SERPL-SCNC: 108 MMOL/L (ref 100–108)
CHLORIDE SERPL-SCNC: 97 MMOL/L (ref 96–108)
CLARITY UR: CLEAR
CO2 SERPL-SCNC: 12 MMOL/L (ref 21–32)
CO2 SERPL-SCNC: 14 MMOL/L (ref 21–32)
CO2 SERPL-SCNC: 7 MMOL/L (ref 22–33)
COLOR UR: YELLOW
CREAT SERPL-MCNC: 0.69 MG/DL (ref 0.6–1.3)
CREAT SERPL-MCNC: 0.77 MG/DL (ref 0.6–1.3)
CREAT SERPL-MCNC: 1.01 MG/DL (ref 0.4–1.1)
EOSINOPHIL # BLD AUTO: 0.06 THOUSAND/ΜL (ref 0–0.61)
EOSINOPHIL NFR BLD AUTO: 1 % (ref 0–6)
ERYTHROCYTE [DISTWIDTH] IN BLOOD BY AUTOMATED COUNT: 12.3 % (ref 11.6–15.1)
EST. AVERAGE GLUCOSE BLD GHB EST-MCNC: 258 MG/DL
FLUAV RNA RESP QL NAA+PROBE: NEGATIVE
FLUBV RNA RESP QL NAA+PROBE: NEGATIVE
GFR SERPL CREATININE-BSD FRML MDRD: 105 ML/MIN/1.73SQ M
GFR SERPL CREATININE-BSD FRML MDRD: 119 ML/MIN/1.73SQ M
GFR SERPL CREATININE-BSD FRML MDRD: 76 ML/MIN/1.73SQ M
GLUCOSE SERPL-MCNC: 140 MG/DL (ref 65–140)
GLUCOSE SERPL-MCNC: 162 MG/DL (ref 65–140)
GLUCOSE SERPL-MCNC: 181 MG/DL (ref 65–140)
GLUCOSE SERPL-MCNC: 197 MG/DL (ref 65–140)
GLUCOSE SERPL-MCNC: 206 MG/DL (ref 65–140)
GLUCOSE SERPL-MCNC: 211 MG/DL (ref 65–140)
GLUCOSE SERPL-MCNC: 212 MG/DL (ref 65–140)
GLUCOSE SERPL-MCNC: 212 MG/DL (ref 65–140)
GLUCOSE SERPL-MCNC: 213 MG/DL (ref 65–140)
GLUCOSE SERPL-MCNC: 213 MG/DL (ref 65–140)
GLUCOSE SERPL-MCNC: 233 MG/DL (ref 65–140)
GLUCOSE SERPL-MCNC: 259 MG/DL (ref 65–140)
GLUCOSE SERPL-MCNC: 364 MG/DL (ref 65–140)
GLUCOSE SERPL-MCNC: 570 MG/DL (ref 65–140)
GLUCOSE SERPL-MCNC: 94 MG/DL (ref 65–140)
GLUCOSE SERPL-MCNC: >500 MG/DL (ref 65–140)
GLUCOSE UR STRIP-MCNC: ABNORMAL MG/DL
HBA1C MFR BLD: 10.6 %
HCO3 BLDA-SCNC: 4.8 MMOL/L (ref 24–30)
HCT VFR BLD AUTO: 46.6 % (ref 34.8–46.1)
HCT VFR BLD CALC: 42 % (ref 34.8–46.1)
HGB BLD-MCNC: 16.1 G/DL (ref 11.5–15.4)
HGB BLDA-MCNC: 14.3 G/DL (ref 11.5–15.4)
HGB UR QL STRIP.AUTO: ABNORMAL
IMM GRANULOCYTES # BLD AUTO: 0.07 THOUSAND/UL (ref 0–0.2)
IMM GRANULOCYTES NFR BLD AUTO: 1 % (ref 0–2)
KETONES UR STRIP-MCNC: ABNORMAL MG/DL
LEUKOCYTE ESTERASE UR QL STRIP: NEGATIVE
LYMPHOCYTES # BLD AUTO: 1.05 THOUSANDS/ΜL (ref 0.6–4.47)
LYMPHOCYTES NFR BLD AUTO: 8 % (ref 14–44)
MAGNESIUM SERPL-MCNC: 1.6 MG/DL (ref 1.6–2.6)
MAGNESIUM SERPL-MCNC: 2 MG/DL (ref 1.6–2.6)
MCH RBC QN AUTO: 31.2 PG (ref 26.8–34.3)
MCHC RBC AUTO-ENTMCNC: 34.5 G/DL (ref 31.4–37.4)
MCV RBC AUTO: 90 FL (ref 82–98)
MONOCYTES # BLD AUTO: 0.17 THOUSAND/ΜL (ref 0.17–1.22)
MONOCYTES NFR BLD AUTO: 1 % (ref 4–12)
NEUTROPHILS # BLD AUTO: 11.61 THOUSANDS/ΜL (ref 1.85–7.62)
NEUTS SEG NFR BLD AUTO: 89 % (ref 43–75)
NITRITE UR QL STRIP: NEGATIVE
NON-SQ EPI CELLS URNS QL MICRO: NORMAL /HPF
P AXIS: 87 DEGREES
PCO2 BLD: 18.4 MM HG (ref 42–50)
PCO2 BLD: 5 MMOL/L (ref 21–32)
PH BLD: 7.02 [PH] (ref 7.3–7.4)
PH UR STRIP.AUTO: 5.5 [PH]
PHOSPHATE SERPL-MCNC: 2.2 MG/DL (ref 2.7–4.5)
PHOSPHATE SERPL-MCNC: 2.6 MG/DL (ref 2.7–4.5)
PLATELET # BLD AUTO: 258 THOUSANDS/UL (ref 149–390)
PMV BLD AUTO: 9.6 FL (ref 8.9–12.7)
PO2 BLD: 42 MM HG (ref 35–45)
POTASSIUM BLD-SCNC: 5 MMOL/L (ref 3.5–5.3)
POTASSIUM SERPL-SCNC: 4.1 MMOL/L (ref 3.5–5.3)
POTASSIUM SERPL-SCNC: 4.6 MMOL/L (ref 3.5–5.3)
POTASSIUM SERPL-SCNC: 5.5 MMOL/L (ref 3.5–5)
PR INTERVAL: 123 MS
PROT SERPL-MCNC: 8 G/DL (ref 6.4–8.3)
PROT UR STRIP-MCNC: ABNORMAL MG/DL
QRS AXIS: 77 DEGREES
QRSD INTERVAL: 92 MS
QT INTERVAL: 319 MS
QTC INTERVAL: 445 MS
RBC # BLD AUTO: 5.16 MILLION/UL (ref 3.81–5.12)
RBC #/AREA URNS AUTO: NORMAL /HPF
RSV RNA RESP QL NAA+PROBE: NEGATIVE
SAO2 % BLD FROM PO2: 57 % (ref 60–85)
SARS-COV-2 RNA RESP QL NAA+PROBE: NEGATIVE
SODIUM BLD-SCNC: 135 MMOL/L (ref 136–145)
SODIUM SERPL-SCNC: 132 MMOL/L (ref 133–145)
SODIUM SERPL-SCNC: 134 MMOL/L (ref 136–145)
SODIUM SERPL-SCNC: 135 MMOL/L (ref 136–145)
SP GR UR STRIP.AUTO: >=1.03 (ref 1–1.03)
SPECIMEN SOURCE: ABNORMAL
T WAVE AXIS: 56 DEGREES
UROBILINOGEN UR QL STRIP.AUTO: 0.2 E.U./DL
VENTRICULAR RATE: 117 BPM
WBC # BLD AUTO: 12.98 THOUSAND/UL (ref 4.31–10.16)
WBC #/AREA URNS AUTO: NORMAL /HPF

## 2021-01-27 PROCEDURE — 96361 HYDRATE IV INFUSION ADD-ON: CPT

## 2021-01-27 PROCEDURE — 85025 COMPLETE CBC W/AUTO DIFF WBC: CPT | Performed by: INTERNAL MEDICINE

## 2021-01-27 PROCEDURE — 80053 COMPREHEN METABOLIC PANEL: CPT | Performed by: INTERNAL MEDICINE

## 2021-01-27 PROCEDURE — 82948 REAGENT STRIP/BLOOD GLUCOSE: CPT

## 2021-01-27 PROCEDURE — 82947 ASSAY GLUCOSE BLOOD QUANT: CPT

## 2021-01-27 PROCEDURE — 93010 ELECTROCARDIOGRAM REPORT: CPT | Performed by: INTERNAL MEDICINE

## 2021-01-27 PROCEDURE — 84132 ASSAY OF SERUM POTASSIUM: CPT

## 2021-01-27 PROCEDURE — 96365 THER/PROPH/DIAG IV INF INIT: CPT

## 2021-01-27 PROCEDURE — 96366 THER/PROPH/DIAG IV INF ADDON: CPT

## 2021-01-27 PROCEDURE — 81001 URINALYSIS AUTO W/SCOPE: CPT | Performed by: INTERNAL MEDICINE

## 2021-01-27 PROCEDURE — 82330 ASSAY OF CALCIUM: CPT

## 2021-01-27 PROCEDURE — 96374 THER/PROPH/DIAG INJ IV PUSH: CPT

## 2021-01-27 PROCEDURE — 99285 EMERGENCY DEPT VISIT HI MDM: CPT | Performed by: INTERNAL MEDICINE

## 2021-01-27 PROCEDURE — 82010 KETONE BODYS QUAN: CPT | Performed by: INTERNAL MEDICINE

## 2021-01-27 PROCEDURE — 87086 URINE CULTURE/COLONY COUNT: CPT | Performed by: INTERNAL MEDICINE

## 2021-01-27 PROCEDURE — 85014 HEMATOCRIT: CPT

## 2021-01-27 PROCEDURE — NC001 PR NO CHARGE: Performed by: OBSTETRICS & GYNECOLOGY

## 2021-01-27 PROCEDURE — 0241U HB NFCT DS VIR RESP RNA 4 TRGT: CPT | Performed by: INTERNAL MEDICINE

## 2021-01-27 PROCEDURE — 99291 CRITICAL CARE FIRST HOUR: CPT | Performed by: NURSE PRACTITIONER

## 2021-01-27 PROCEDURE — 84100 ASSAY OF PHOSPHORUS: CPT | Performed by: NURSE PRACTITIONER

## 2021-01-27 PROCEDURE — 80048 BASIC METABOLIC PNL TOTAL CA: CPT | Performed by: NURSE PRACTITIONER

## 2021-01-27 PROCEDURE — 83735 ASSAY OF MAGNESIUM: CPT | Performed by: NURSE PRACTITIONER

## 2021-01-27 PROCEDURE — 82803 BLOOD GASES ANY COMBINATION: CPT

## 2021-01-27 PROCEDURE — 36415 COLL VENOUS BLD VENIPUNCTURE: CPT | Performed by: INTERNAL MEDICINE

## 2021-01-27 PROCEDURE — 93005 ELECTROCARDIOGRAM TRACING: CPT

## 2021-01-27 PROCEDURE — 83036 HEMOGLOBIN GLYCOSYLATED A1C: CPT | Performed by: INTERNAL MEDICINE

## 2021-01-27 PROCEDURE — 84295 ASSAY OF SERUM SODIUM: CPT

## 2021-01-27 PROCEDURE — 99291 CRITICAL CARE FIRST HOUR: CPT

## 2021-01-27 RX ORDER — POTASSIUM CHLORIDE 14.9 MG/ML
20 INJECTION INTRAVENOUS
Status: DISCONTINUED | OUTPATIENT
Start: 2021-01-27 | End: 2021-01-27

## 2021-01-27 RX ORDER — POTASSIUM CHLORIDE 14.9 MG/ML
20 INJECTION INTRAVENOUS ONCE
Status: DISCONTINUED | OUTPATIENT
Start: 2021-01-27 | End: 2021-01-27

## 2021-01-27 RX ORDER — POTASSIUM CHLORIDE 20 MEQ/1
20 TABLET, EXTENDED RELEASE ORAL ONCE
Status: COMPLETED | OUTPATIENT
Start: 2021-01-27 | End: 2021-01-27

## 2021-01-27 RX ORDER — SODIUM CHLORIDE 9 MG/ML
500 INJECTION, SOLUTION INTRAVENOUS CONTINUOUS
Status: DISPENSED | OUTPATIENT
Start: 2021-01-27 | End: 2021-01-27

## 2021-01-27 RX ORDER — SODIUM CHLORIDE 9 MG/ML
1000 INJECTION, SOLUTION INTRAVENOUS CONTINUOUS
Status: DISPENSED | OUTPATIENT
Start: 2021-01-27 | End: 2021-01-27

## 2021-01-27 RX ORDER — SODIUM CHLORIDE 9 MG/ML
3 INJECTION INTRAVENOUS
Status: DISCONTINUED | OUTPATIENT
Start: 2021-01-27 | End: 2021-01-27 | Stop reason: HOSPADM

## 2021-01-27 RX ORDER — MAGNESIUM SULFATE HEPTAHYDRATE 40 MG/ML
4 INJECTION, SOLUTION INTRAVENOUS ONCE
Status: COMPLETED | OUTPATIENT
Start: 2021-01-27 | End: 2021-01-28

## 2021-01-27 RX ORDER — DEXTROSE AND SODIUM CHLORIDE 5; .9 G/100ML; G/100ML
250 INJECTION, SOLUTION INTRAVENOUS CONTINUOUS
Status: DISCONTINUED | OUTPATIENT
Start: 2021-01-27 | End: 2021-01-28

## 2021-01-27 RX ORDER — SODIUM CHLORIDE 9 MG/ML
250 INJECTION, SOLUTION INTRAVENOUS CONTINUOUS
Status: DISCONTINUED | OUTPATIENT
Start: 2021-01-27 | End: 2021-01-28

## 2021-01-27 RX ORDER — MAGNESIUM SULFATE HEPTAHYDRATE 40 MG/ML
2 INJECTION, SOLUTION INTRAVENOUS ONCE
Status: DISCONTINUED | OUTPATIENT
Start: 2021-01-27 | End: 2021-01-27

## 2021-01-27 RX ADMIN — SODIUM CHLORIDE 2000 ML: 0.9 INJECTION, SOLUTION INTRAVENOUS at 11:17

## 2021-01-27 RX ADMIN — POTASSIUM CHLORIDE 20 MEQ: 1500 TABLET, EXTENDED RELEASE ORAL at 17:26

## 2021-01-27 RX ADMIN — DEXTROSE AND SODIUM CHLORIDE 250 ML/HR: 5; .9 INJECTION, SOLUTION INTRAVENOUS at 15:57

## 2021-01-27 RX ADMIN — ENOXAPARIN SODIUM 30 MG: 30 INJECTION SUBCUTANEOUS at 15:57

## 2021-01-27 RX ADMIN — MAGNESIUM SULFATE HEPTAHYDRATE 4 G: 40 INJECTION, SOLUTION INTRAVENOUS at 21:32

## 2021-01-27 RX ADMIN — SODIUM PHOSPHATE, MONOBASIC, MONOHYDRATE 12 MMOL: 276; 142 INJECTION, SOLUTION INTRAVENOUS at 17:59

## 2021-01-27 RX ADMIN — POTASSIUM CHLORIDE 20 MEQ: 1500 TABLET, EXTENDED RELEASE ORAL at 21:32

## 2021-01-27 RX ADMIN — DEXTROSE AND SODIUM CHLORIDE 250 ML/HR: 5; .9 INJECTION, SOLUTION INTRAVENOUS at 20:22

## 2021-01-27 RX ADMIN — MAGNESIUM OXIDE TAB 400 MG (241.3 MG ELEMENTAL MG) 800 MG: 400 (241.3 MG) TAB at 17:26

## 2021-01-27 RX ADMIN — INSULIN HUMAN 10 UNITS: 100 INJECTION, SOLUTION PARENTERAL at 11:20

## 2021-01-27 RX ADMIN — SODIUM CHLORIDE 5.6 UNITS/HR: 9 INJECTION, SOLUTION INTRAVENOUS at 16:09

## 2021-01-27 RX ADMIN — SODIUM CHLORIDE 6 UNITS/HR: 9 INJECTION, SOLUTION INTRAVENOUS at 12:30

## 2021-01-27 NOTE — CONSULTS
CONSULTATION - Gynecology   Pernell Monge 31 y o  female MRN: [id number]  Unit/Bed#: ICU 01 Encounter: [id number]      SUBJECTIVE    Physician/Team requesting consult: ICU  Reason for Consult / Principal Problem: Pregnancy at 15w5d in the setting of DKA     HPI: Pernell Monge is a 31 y o   at 15w5d with type 1 diabetic who presents with DKA to [place]  Patient presents to the ED with shortness of breath and noted to be in DKA with POC glucose >500  Patient was then transferred to [facility] for ICU bed with OB/GYN in house  Consult was placed as patient is pregnant  Patient states that she missed her insulin for the passed two days and has a history of homelessness, living in homeless shelter, noncompliance with medications  This is her second admission this pregnancy for DKA where she received treatment in the ICU at [facility]  Her last hba1c on  was 12 2  Upon arrival, patient was noted to be feeling better  She denies any vaginal bleeding, leakage of fluid, or vaginal discharge  Patient states she had a prior  section in  and states it was because "her cervix did not dilate " She also had a prior miscarriage at 8 weeks  Review of Systems   Constitutional: Negative for chills, fatigue and fever  Eyes: Negative for visual disturbance  Respiratory: Negative for shortness of breath  Cardiovascular: Negative for chest pain  Gastrointestinal: Negative for constipation, diarrhea, nausea and vomiting  Genitourinary: Negative for pelvic pain, urgency, vaginal bleeding, vaginal discharge and vaginal pain  Neurological: Negative for headaches         Past Medical History:   Diagnosis Date    Diabetes mellitus ([facility] )     uses kwiki pen     Diabetes mellitus type 1 ([facility] )     High blood pressure     recently dx/ put on lisinopril     High cholesterol     Hypertension      no hypersion     Miscarriage     Recurrent pregnancy loss, antepartum condition or complication      Past Surgical History:   Procedure Laterality Date     SECTION  01/02/2014     X1    DILATION AND CURETTAGE OF UTERUS  04/10/2019    Missed AB     WISDOM TOOTH EXTRACTION       OB History    Para Term  AB Living   4 1 1 0 1 1   SAB TAB Ectopic Multiple Live Births   1 0 0 0 1      # Outcome Date GA Lbr Bill/2nd Weight Sex Delivery Anes PTL Lv   4 Current            3 2018 8w0d    SAB      2 Term 14 40w0d  3629 g (8 lb) F CS-Classical Spinal N JAMILA      Complications: Failure to Progress in First Stage   1               Family History   Problem Relation Age of Onset    Diabetes Paternal Grandmother     No Known Problems Mother     No Known Problems Father     No Known Problems Sister     No Known Problems Brother     No Known Problems Daughter     No Known Problems Maternal Grandmother     No Known Problems Paternal Grandfather     No Known Problems Sister      Social History   Social History     Substance and Sexual Activity   Alcohol Use Never    Frequency: Never    Comment: Alcohol intake:   None - As per [place]      Social History     Substance and Sexual Activity   Drug Use Never    Comment: Illicit drugs:   No  - As per [place]      Social History     Tobacco Use   Smoking Status Never Smoker   Smokeless Tobacco Never Used       Meds/Allergies   Current Facility-Administered Medications   Medication Dose Route Frequency    dextrose 5 % and sodium chloride 0 9 % infusion  250 mL/hr Intravenous Continuous    enoxaparin (LOVENOX) subcutaneous injection 30 mg  30 mg Subcutaneous Daily    insulin regular (HumuLIN R,NovoLIN R) 1 Units/mL in sodium chloride 0 9 % 100 mL infusion  0 1-30 Units/hr Intravenous Continuous    sodium chloride 0 9 % infusion  500 mL/hr Intravenous Continuous    Followed by    sodium chloride 0 9 % infusion  250 mL/hr Intravenous Continuous    sodium phosphate 12 mmol in sodium chloride 0 9 % 100 mL Infusion  12 mmol Intravenous Once       No Known Allergies    Objective   Vitals:  Vitals:    01/27/21 1514 01/27/21 1747 01/27/21 1806   BP: 123/62 103/57 110/62   Pulse: 98 90    Resp: (!) 23 21    SpO2: 100% 100%    Weight: 56 kg (123 lb 7 3 oz)     Height: 4' 10" (1 473 m)           Intake/Output Summary (Last 24 hours) at 1/27/2021 1818  Last data filed at 1/27/2021 1808  Gross per 24 hour   Intake 12 5 ml   Output 550 ml   Net -537 5 ml       Physical Exam  Vitals signs and nursing note reviewed  Constitutional:       Appearance: She is well-developed  Cardiovascular:      Rate and Rhythm: Normal rate and regular rhythm  Pulmonary:      Effort: Pulmonary effort is normal       Breath sounds: Normal breath sounds  Abdominal:      General: Bowel sounds are normal       Tenderness: There is no abdominal tenderness  There is no guarding or rebound  Musculoskeletal: Normal range of motion  General: No tenderness  Neurological:      Mental Status: She is alert and oriented to person, place, and time           LAB RESULTS  Admission on 01/27/2021   Component Date Value    POC Glucose 01/27/2021 162*    Sodium 01/27/2021 135*    Potassium 01/27/2021 4 6     Chloride 01/27/2021 104     CO2 01/27/2021 12*    ANION GAP 01/27/2021 19*    BUN 01/27/2021 20     Creatinine 01/27/2021 0 77     Glucose 01/27/2021 212*    Calcium 01/27/2021 8 1*    eGFR 01/27/2021 105     Magnesium 01/27/2021 2 0     Phosphorus 01/27/2021 2 2*    POC Glucose 01/27/2021 197*   Admission on 01/27/2021, Discharged on 01/27/2021   Component Date Value    POC Glucose 01/27/2021 >500*    BETA-HYDROXYBUTYRATE 01/27/2021 5 2*    Sodium 01/27/2021 132*    Potassium 01/27/2021 5 5*    Chloride 01/27/2021 97     CO2 01/27/2021 7*    ANION GAP 01/27/2021 28*    BUN 01/27/2021 27*    Creatinine 01/27/2021 1 01     Glucose 01/27/2021 570*    Calcium 01/27/2021 9 8     AST 01/27/2021 27     ALT 01/27/2021 16     Alkaline Phosphatase 01/27/2021 75 6     Total Protein 01/27/2021 8 0     Albumin 01/27/2021 4 6     Total Bilirubin 01/27/2021 0 40     eGFR 01/27/2021 76     WBC 01/27/2021 12 98*    RBC 01/27/2021 5 16*    Hemoglobin 01/27/2021 16 1*    Hematocrit 01/27/2021 46 6*    MCV 01/27/2021 90     MCH 01/27/2021 31 2     MCHC 01/27/2021 34 5     RDW 01/27/2021 12 3     MPV 01/27/2021 9 6     Platelets 82/21/5516 258     Neutrophils Relative 01/27/2021 89*    Immat GRANS % 01/27/2021 1     Lymphocytes Relative 01/27/2021 8*    Monocytes Relative 01/27/2021 1*    Eosinophils Relative 01/27/2021 1     Basophils Relative 01/27/2021 0     Neutrophils Absolute 01/27/2021 11 61*    Immature Grans Absolute 01/27/2021 0 07     Lymphocytes Absolute 01/27/2021 1 05     Monocytes Absolute 01/27/2021 0 17     Eosinophils Absolute 01/27/2021 0 06     Basophils Absolute 01/27/2021 0 02     Color, UA 01/27/2021 Yellow     Clarity, UA 01/27/2021 Clear     Specific Gravity, UA 01/27/2021 >=1 030     pH, UA 01/27/2021 5 5     Leukocytes, UA 01/27/2021 Negative     Nitrite, UA 01/27/2021 Negative     Protein, UA 01/27/2021 1+*    Glucose, UA 01/27/2021 2+*    Ketones, UA 01/27/2021 80 (3+)*    Urobilinogen, UA 01/27/2021 0 2     Bilirubin, UA 01/27/2021 Negative     Blood, UA 01/27/2021 Trace-Intact*    URINE COMMENT 01/27/2021      Hemoglobin A1C 01/27/2021 10 6*    EAG 01/27/2021 258     Ventricular Rate 01/27/2021 117     Atrial Rate 01/27/2021 117     [place] Interval 01/27/2021 123     QRSD Interval 01/27/2021 92     QT Interval 01/27/2021 319     QTC Interval 01/27/2021 445     P Axis 01/27/2021 87     QRS Axis 01/27/2021 77     T Wave Axis 01/27/2021 56     ph, Alexandre ISTAT 01/27/2021 7 024*    pCO2, Alexandre i-STAT 01/27/2021 18 4*    pO2, Alexandre i-STAT 01/27/2021 42 0     BE, i-STAT 01/27/2021 -24*    HCO3, Alexandre i-STAT 01/27/2021 4 8*    CO2, i-STAT 01/27/2021 5*    O2 Sat, i-STAT 01/27/2021 57*    SODIUM, I-STAT 01/27/2021 135*    Potassium, i-STAT 2021 5 0     Calcium, Ionized i-STAT 2021 1 10*    Hct, i-STAT 2021 42     Hgb, i-STAT 2021 14 3     Glucose, i-STAT 2021 364*    Specimen Type 2021 VENOUS     SARS-CoV-2 2021 Negative     INFLUENZA A PCR 2021 Negative     INFLUENZA B PCR 2021 Negative     RSV PCR 2021 Negative     RBC, UA 2021 None Seen     WBC, UA 2021 0-5     Epithelial Cells 2021 Occasional     Bacteria, UA 2021 None Seen     URINE COMMENT 2021      POC Glucose 2021 259*    POC Glucose 2021 212*       IMAGING  TAUS shows fetal cardiac activity +147bpm  +FM    Assessment/Plan     Assessment:  Patient is a 30 yo  at 15w5d who is here with viable IUP at 15w5d and +FHT  Plan:  Discussed with patient importance of glucose control in T1DM especially in pregnancy  Pregnancy risks range from cardiac abnormalities, fetal demise, to even possible maternal death  We discussed with patient that she has a diabetes education appointment on 21 and  appointment 21  Patient expresses understanding  All questions answered  MFM also aware  As pregnancy is nonviable, will not do NST or fetal tones  Will reassess fetal tones prior to discharge  If patient develops any worsening symptoms, please feel free to reach out to OB/GYN again  Will sign off at this time  Patient was seen and evaluated with Dr Muriel Glover  2021  6:18 PM

## 2021-01-27 NOTE — ASSESSMENT & PLAN NOTE
· As evidence by potassium 5 5 on admission, in the setting of DKA  · Do not treat K at this time since value is expected to go down secondary to DKA  · trend q 4 hours

## 2021-01-27 NOTE — ASSESSMENT & PLAN NOTE
· Per patient, this is her 3rd pregnancy  · Consult OBGYN to help monitor fetus  · Primary team to manage DKA

## 2021-01-27 NOTE — EMTALA/ACUTE CARE TRANSFER
[facility] EMERGENCY DEPARTMENT  [address] [facility] [address]  Dept: [phone]      EMTALA TRANSFER CONSENT    NAME Taniya Olea                                         1990                              MRN [id number]    I have been informed of my rights regarding examination, treatment, and transfer   by Dr Aditi Thomas MD    Benefits: Specialized equipment and/or services available at the receiving facility (Include comment)________________________    Risks: Potential for delay in receiving treatment, Potential deterioration of medical condition, Loss of IV, Increased discomfort during transfer, Possible worsening of condition or death during transfer      Transfer Request   I acknowledge that my medical condition has been evaluated and explained to me by the emergency department physician or other qualified medical person and/or my attending physician who has recommended and offered to me further medical examination and treatment  I understand the Hospital's obligation with respect to the treatment and stabilization of my emergency medical condition  I nevertheless request to be transferred  I release the Hospital, the doctor, and any other persons caring for me from all responsibility or liability for any injury or ill effects that may result from my transfer and agree to accept all responsibility for the consequences of my choice to transfer, rather than receive stabilizing treatment at the Hospital  I understand that because the transfer is my request, my insurance may not provide reimbursement for the services  The Hospital will assist and direct me and my family in how to make arrangements for transfer, but the hospital is not liable for any fees charged by the transport service    In spite of this understanding, I refuse to consent to further medical examination and treatment which has been offered to me, and request transfer to  Radah Rodriguez Name, [facility] State : [facility]  I authorize the performance of emergency medical procedures and treatments upon me in both transit and upon arrival at the receiving facility  Additionally, I authorize the release of any and all medical records to the receiving facility and request they be transported with me, if possible  I authorize the performance of emergency medical procedures and treatments upon me in both transit and upon arrival at the receiving facility  Additionally, I authorize the release of any and all medical records to the receiving facility and request they be transported with me, if possible  I understand that the safest mode of transportation during a medical emergency is an ambulance and that the Hospital advocates the use of this mode of transport  Risks of traveling to the receiving facility by car, including absence of medical control, life sustaining equipment, such as oxygen, and medical personnel has been explained to me and I fully understand them  (EUGENIA CORRECT BOX BELOW)  [  ]  I consent to the stated transfer and to be transported by ambulance/helicopter  [  ]  I consent to the stated transfer, but refuse transportation by ambulance and accept full responsibility for my transportation by car  I understand the risks of non-ambulance transfers and I exonerate the Hospital and its staff from any deterioration in my condition that results from this refusal     X___________________________________________    DATE  21  TIME________  Signature of patient or legally responsible individual signing on patient behalf           RELATIONSHIP TO PATIENT_________________________          Provider Certification    NAME Claudy Wong                                        [facility] 1990                              MRN [id number]    A medical screening exam was performed on the above named patient    Based on the examination:    Condition Necessitating Transfer The primary encounter diagnosis was Diabetic ketoacidosis without coma associated with type 1 diabetes mellitus ([facility] )  A diagnosis of 16 weeks gestation of pregnancy was also pertinent to this visit  Patient Condition: The patient has been stabilized such that within reasonable medical probability, no material deterioration of the patient condition or the condition of the unborn child(parveen) is likely to result from the transfer    Reason for Transfer: Level of Care needed not available at this facility    Transfer Requirements: [address]   · Space available and qualified personnel available for treatment as acknowledged by    · Agreed to accept transfer and to provide appropriate medical treatment as acknowledged by       Ketty  · Appropriate medical records of the examination and treatment of the patient are provided at the time of transfer   [address], [address] _______  · Transfer will be performed by qualified personnel from    and appropriate transfer equipment as required, including the use of necessary and appropriate life support measures      Provider Certification: I have examined the patient and explained the following risks and benefits of being transferred/refusing transfer to the patient/family:  General risk, such as traffic hazards, adverse weather conditions, rough terrain or turbulence, possible failure of equipment (including vehicle or aircraft), or consequences of actions of persons outside the control of the transport personnel, Risk of worsening condition, The possibility of a transport vehicle being unavailable, Unanticipated needs of medical equipment and personnel during transport      Based on these reasonable risks and benefits to the patient and/or the unborn child(parveen), and based upon the information available at the time of the patients examination, I certify that the medical benefits reasonably to be expected from the provision of appropriate medical treatments at another medical facility outweigh the increasing risks, if any, to the individuals medical condition, and in the case of labor to the unborn child, from effecting the transfer      X____________________________________________ DATE 01/27/21        TIME_______      ORIGINAL - SEND TO MEDICAL RECORDS   COPY - SEND WITH PATIENT DURING TRANSFER

## 2021-01-27 NOTE — PLAN OF CARE
Problem: Potential for Falls  Goal: Patient will remain free of falls  Description: INTERVENTIONS:  - Assess patient frequently for physical needs  -  Identify cognitive and physical deficits and behaviors that affect risk of falls    -  [place] fall precautions as indicated by assessment   - Educate patient/family on patient safety including physical limitations  - Instruct patient to call for assistance with activity based on assessment  - Modify environment to reduce risk of injury  - Consider OT/PT consult to assist with strengthening/mobility  1/27/2021 1734 by Humphrey Johnston RN  Outcome: Progressing  1/27/2021 1733 by Humphrey Johnston RN  Outcome: Progressing     Problem: PAIN - ADULT  Goal: Verbalizes/displays adequate comfort level or baseline comfort level  Description: Interventions:  - Encourage patient to monitor pain and request assistance  - Assess pain using appropriate pain scale  - Administer analgesics based on type and severity of pain and evaluate response  - Implement non-pharmacological measures as appropriate and evaluate response  - Consider cultural and social influences on pain and pain management  - Notify physician/advanced practitioner if interventions unsuccessful or patient reports new pain  Outcome: Progressing     Problem: INFECTION - ADULT  Goal: Absence or prevention of progression during hospitalization  Description: INTERVENTIONS:  - Assess and monitor for signs and symptoms of infection  - Monitor lab/diagnostic results  - Monitor all insertion sites, i e  indwelling lines, tubes, and drains  - Monitor endotracheal if appropriate and nasal secretions for changes in amount and color  - [place] appropriate cooling/warming therapies per order  - Administer medications as ordered  - Instruct and encourage patient and family to use good hand hygiene technique  - Identify and instruct in appropriate isolation precautions for identified infection/condition  Outcome: Progressing  Goal: Absence of fever/infection during neutropenic period  Description: INTERVENTIONS:  - Monitor WBC    Outcome: Progressing     Problem: SAFETY ADULT  Goal: Patient will remain free of falls  Description: INTERVENTIONS:  - Assess patient frequently for physical needs  -  Identify cognitive and physical deficits and behaviors that affect risk of falls    -  [place] fall precautions as indicated by assessment   - Educate patient/family on patient safety including physical limitations  - Instruct patient to call for assistance with activity based on assessment  - Modify environment to reduce risk of injury  - Consider OT/PT consult to assist with strengthening/mobility  1/27/2021 1734 by Jesika Shah RN  Outcome: Progressing  1/27/2021 1733 by Jesika Shah RN  Outcome: Progressing  Goal: Maintain or return to baseline ADL function  Description: INTERVENTIONS:  -  Assess patient's ability to carry out ADLs; assess patient's baseline for ADL function and identify physical deficits which impact ability to perform ADLs (bathing, care of mouth/teeth, toileting, grooming, dressing, etc )  - Assess/evaluate cause of self-care deficits   - Assess range of motion  - Assess patient's mobility; develop plan if impaired  - Assess patient's need for assistive devices and provide as appropriate  - Encourage maximum independence but intervene and supervise when necessary  - Involve family in performance of ADLs  - Assess for home care needs following discharge   - Consider OT consult to assist with ADL evaluation and planning for discharge  - Provide patient education as appropriate  Outcome: Progressing  Goal: Maintain or return mobility status to optimal level  Description: INTERVENTIONS:  - Assess patient's baseline mobility status (ambulation, transfers, stairs, etc )    - Identify cognitive and physical deficits and behaviors that affect mobility  - Identify mobility aids required to assist with transfers and/or ambulation (gait belt, sit-to-stand, lift, walker, cane, etc )  - [place] fall precautions as indicated by assessment  - Record patient progress and toleration of activity level on Mobility SBAR; progress patient to next Phase/Stage  - Instruct patient to call for assistance with activity based on assessment  - Consider rehabilitation consult to assist with strengthening/weightbearing, etc   Outcome: Progressing     Problem: DISCHARGE PLANNING  Goal: Discharge to home or other facility with appropriate resources  Description: INTERVENTIONS:  - Identify barriers to discharge w/patient and caregiver  - Arrange for needed discharge resources and transportation as appropriate  - Identify discharge learning needs (meds, wound care, etc )  - Arrange for interpretive services to assist at discharge as needed  - Refer to Case Management Department for coordinating discharge planning if the patient needs post-hospital services based on physician/advanced practitioner order or complex needs related to functional status, cognitive ability, or social support system  Outcome: Progressing     Problem: Knowledge Deficit  Goal: Patient/family/caregiver demonstrates understanding of disease process, treatment plan, medications, and discharge instructions  Description: Complete learning assessment and assess knowledge base    Interventions:  - Provide teaching at level of understanding  - Provide teaching via preferred learning methods  Outcome: Progressing     Problem: DISCHARGE PLANNING - CARE MANAGEMENT  Goal: Discharge to post-acute care or home with appropriate resources  Description: INTERVENTIONS:  - Conduct assessment to determine patient/family and health care team treatment goals, and need for post-acute services based on payer coverage, community resources, and patient preferences, and barriers to discharge  - Address psychosocial, clinical, and financial barriers to discharge as identified in assessment in conjunction with the patient/family and health care team  - Arrange appropriate level of post-acute services according to patients   needs and preference and payer coverage in collaboration with the physician and health care team  - Communicate with and update the patient/family, physician, and health care team regarding progress on the discharge plan  - Arrange appropriate transportation to post-acute venues  Outcome: Progressing

## 2021-01-27 NOTE — ASSESSMENT & PLAN NOTE
· Patient presented initially with complaints of shortness of breath, found to be in DKA  · Stable on room air at this time  · COVID-19 test negative however patient does admit to a recent exposure  · No chest x-ray was done secondary to patient being 16 weeks pregnant

## 2021-01-27 NOTE — ASSESSMENT & PLAN NOTE
Lab Results   Component Value Date    HGBA1C 13 9 (H) 05/04/2020       Recent Labs     01/27/21  1102 01/27/21  1331 01/27/21  1417 01/27/21  1516   POCGLU >500* 259* 212* 162*       Blood Sugar Average: Last 72 hrs:  (P) 162     · Presented to [facility] on 01/27/2021 with complaints of shortness of breath  · History of diabetes type 1 since age 25  · Insulin dependent at home however has been recently noncompliance  · Presented in DKA - in [facility] receives 10 units regular insulin subcu, 2 L normal saline, and started on insulin infusion  · Transferred to [address] [id number] for further monitoring since patient is 16 weeks pregnant  · Continue DKA protocol  · Trend labs q 4 hours as per protocol  · Keep NPO until anion gap closes and bicarb at least greater than 21

## 2021-01-27 NOTE — H&P
H&P- Missy Rowland 1990, 31 y o  female MRN: [id number]    Unit/Bed#: ICU 01 Encounter: [id number]    Primary Care Provider: Robin Jacobo DO   Date and time admitted to hospital: 1/27/2021  3:03 PM        * Diabetic ketoacidosis without coma associated with type 1 diabetes mellitus [facility])  Assessment & Plan  Lab Results   Component Value Date    HGBA1C 13 9 (H) 05/04/2020       Recent Labs     01/27/21  1102 01/27/21  1331 01/27/21  1417 01/27/21  1516   POCGLU >500* 259* 212* 162*       Blood Sugar Average: Last 72 hrs:  (P) 162     · Presented to [facility] on 01/27/2021 with complaints of shortness of breath  · History of diabetes type 1 since age 25  · Insulin dependent at home however has been recently noncompliance  · Presented in DKA - in [facility] ER receives 10 units regular insulin subcu, 2 L normal saline, and started on insulin infusion  · Transferred to [facility] for further monitoring since patient is 16 weeks pregnant  · Continue DKA protocol  · Trend labs q 4 hours as per protocol  · Keep NPO until anion gap closes and bicarb at least greater than 21    Metabolic acidosis with increased anion gap and accumulation of organic acids  Assessment & Plan  · As evidence by anion gap 28, venous pH 7 02 in the setting of DKA  · Continue to trend labs q 4 hours as per DKA protocol  · Leave patient on insulin infusion until anion gap closes and sodium bicarb normalizes    Hyperkalemia  Assessment & Plan  · As evidence by potassium 5 5 on admission, in the setting of DKA  · Do not treat K at this time since value is expected to go down secondary to DKA  · trend q 4 hours    Shortness of breath  Assessment & Plan  · Patient presented initially with complaints of shortness of breath, found to be in DKA  · Stable on room air at this time  · COVID-19 test negative however patient does admit to a recent exposure  · No chest x-ray was done secondary to patient being 16 weeks pregnant    16 weeks gestation of pregnancy  Assessment & Plan  · Per patient, this is her 3rd pregnancy  · Consult OBGYN to help monitor fetus  · Primary team to manage DKA      -------------------------------------------------------------------------------------------------------------  Chief Complaint:  shortness of breath, found to be in DKA    History of Present Illness   Rusty Tenorio is a 31 y o  female who presents with no significant past medical history arrived to the [facility] on 01/27/2021 with complaints of shortness of breath  She was found to be COVID negative however admitted to having a recent COVID exposure  She is currently 16 weeks pregnant with her 3rd child  She has a 9year-old at home and her 2nd pregnancy was a miscarriage  When asked about her diabetes, the patient states she has been diagnosis since she was 25 and although she is insulin dependent, she sometimes is noncompliant due to lack of resource  She states she does not always take her insulin every day as prescribed, and does not check her blood sugar most the time  Her last insulin use was approximately 2 days ago per patient, and she stated she started with some nausea earlier this morning  She states she has had DKA admissions in the past   She denies any fevers, chills, shortness of breath, or pain at this time  So far she states her pregnancy has been uneventful   Although she has not followed up yet, she states she does have an appointment with an endocrinologist in February  In the ER at [facility], patient was found to have glucose 570, WBC 12 98, K 5 5, sodium 132, anion gap 28, VBG pH 7 02  At that time she received 10 units of regular insulin subcu, 2 L normal saline, and was started on a DKA insulin infusion  [facility] monitoring of both her DKA and her pregnancy  OBGYN team was notified of the patient's arrival   Patient will require greater than 2 midnight stay      History obtained from the patient   -------------------------------------------------------------------------------------------------------------  Dispo: Admit to Critical Care     Code Status: Level 1 - Full Code  --------------------------------------------------------------------------------------------------------------  Review of Systems   Constitutional: Positive for fatigue  HENT: Negative  Eyes: Negative  Respiratory: Positive for shortness of breath  Cardiovascular: Negative  Gastrointestinal: Positive for nausea  Endocrine: Negative  Genitourinary: Negative  Musculoskeletal: Negative  Skin: Negative  Allergic/Immunologic: Negative  Neurological: Positive for weakness  Hematological: Negative  Psychiatric/Behavioral: Negative  A 12-point, complete review of systems was reviewed and negative except as stated above     Physical Exam  Vitals signs and nursing note reviewed  Constitutional:       Appearance: Normal appearance  HENT:      Head: Normocephalic and atraumatic  Right Ear: Tympanic membrane, ear canal and external ear normal       Left Ear: Tympanic membrane, ear canal and external ear normal       Nose: Nose normal       Mouth/Throat:      Mouth: Mucous membranes are dry  Pharynx: Oropharynx is clear  Eyes:      Extraocular Movements: Extraocular movements intact  Conjunctiva/sclera: Conjunctivae normal       Pupils: Pupils are equal, round, and reactive to light  Neck:      Musculoskeletal: Normal range of motion and neck supple  Cardiovascular:      Rate and Rhythm: Regular rhythm  Tachycardia present  Pulses: Normal pulses  Heart sounds: Normal heart sounds  Pulmonary:      Effort: Pulmonary effort is normal       Comments: Bilateral breath sounds clear  On room air  Abdominal:      Palpations: Abdomen is soft  Comments: Hypoactive bowel sounds   Sixteen weeks pregnant   Musculoskeletal: Normal range of motion     Skin: General: Skin is warm and dry  Capillary Refill: Capillary refill takes less than 2 seconds  Neurological:      Mental Status: She is alert and oriented to person, place, and time  Mental status is at baseline  Psychiatric:         Mood and Affect: Mood normal          Behavior: Behavior normal          Thought Content: Thought content normal          Judgment: Judgment normal        --------------------------------------------------------------------------------------------------------------  Vitals:   Vitals:    01/27/21 1514   BP: 123/62   Pulse: 98   Resp: (!) 23   SpO2: 100%   Weight: 56 kg (123 lb 7 3 oz)   Height: 4' 10" (1 473 m)     Temp  Min: 98 3 °F (36 8 °C)  Max: 98 3 °F (36 8 °C)  IBW: 40 9 kg  Height: 4' 10" (147 3 cm)  Body mass index is 25 8 kg/m²  Laboratory and Diagnostics:  Results from last 7 days   Lab Units 01/27/21  1227 01/27/21  1115   WBC Thousand/uL  --  12 98*   HEMOGLOBIN g/dL  --  16 1*   I STAT HEMOGLOBIN g/dl 14 3  --    HEMATOCRIT %  --  46 6*   HEMATOCRIT, ISTAT % 42  --    PLATELETS Thousands/uL  --  258   NEUTROS PCT %  --  89*   MONOS PCT %  --  1*     Results from last 7 days   Lab Units 01/27/21  1227 01/27/21  1115   SODIUM mmol/L  --  132*   POTASSIUM mmol/L  --  5 5*   CHLORIDE mmol/L  --  97   CO2 mmol/L  --  7*   CO2, I-STAT mmol/L 5*  --    ANION GAP mmol/L  --  28*   BUN mg/dL  --  27*   CREATININE mg/dL  --  1 01   CALCIUM mg/dL  --  9 8   GLUCOSE RANDOM mg/dL  --  570*   ALT U/L  --  16   AST U/L  --  27   ALK PHOS U/L  --  75 6   ALBUMIN g/dL  --  4 6   TOTAL BILIRUBIN mg/dL  --  0 40                         EKG: Sinus tachycardia, heart rate 102  Imaging: I have personally reviewed pertinent reports  No results found        Historical Information   Past Medical History:   Diagnosis Date    Diabetes mellitus ([facility] )     uses kwiki pen     Diabetes mellitus type 1 ([facility] )     High blood pressure     recently dx/ put on lisinopril     High cholesterol  Hypertension      no hypersion     Miscarriage     Recurrent pregnancy loss, antepartum condition or complication      Past Surgical History:   Procedure Laterality Date     SECTION  01/02/2014     X1    DILATION AND CURETTAGE OF UTERUS  04/10/2019    Missed AB     WISDOM TOOTH EXTRACTION       Social History   Social History     Substance and Sexual Activity   Alcohol Use Never    Frequency: Never    Comment: Alcohol intake:   None - As per [place]      Social History     Substance and Sexual Activity   Drug Use Never    Comment: Illicit drugs:   No  - As per [place]      Social History     Tobacco Use   Smoking Status Never Smoker   Smokeless Tobacco Never Used       Family History:   Family History   Problem Relation Age of Onset    Diabetes Paternal Grandmother     No Known Problems Mother     No Known Problems Father     No Known Problems Sister     No Known Problems Brother     No Known Problems Daughter     No Known Problems Maternal Grandmother     No Known Problems Paternal Grandfather     No Known Problems Sister      Family history unknown      Medications:  Current Facility-Administered Medications   Medication Dose Route Frequency    dextrose 5 % and sodium chloride 0 9 % infusion  250 mL/hr Intravenous Continuous    enoxaparin (LOVENOX) subcutaneous injection 30 mg  30 mg Subcutaneous Daily    insulin regular (HumuLIN R,NovoLIN R) 1 Units/mL in sodium chloride 0 9 % 100 mL infusion  0 1-30 Units/hr Intravenous Continuous    sodium chloride 0 9 % infusion  1,000 mL/hr Intravenous Continuous    Followed by   Wili Pastrana sodium chloride 0 9 % infusion  500 mL/hr Intravenous Continuous    Followed by   Wili Pastrana sodium chloride 0 9 % infusion  250 mL/hr Intravenous Continuous     Home medications:  Prior to Admission Medications   Prescriptions Last Dose Informant Patient Reported? Taking?    Lancet Devices (Lancing Device) MISC  Self Yes No   Sig: lancing device   AS DIRECTED   Prenat-Fe Carbonyl-FA-Omega 3 (One-A-Day Womens Prenatal 1) 28-0 8-235 MG CAPS  Self No No   Sig: Take 1 tablet by mouth daily   insulin glargine (Basaglar KwikPen) 100 units/mL injection pen   No No   Sig: Inject 55 Units under the skin daily   insulin lispro (Admelog SoloStar) 100 units/mL injection pen  Self Yes No   Sig: Admelog SoloStar U-100 Insulin lispro 100 unit/mL subcutaneous pen      Facility-Administered Medications: None     Allergies:  No Known Allergies    ------------------------------------------------------------------------------------------------------------  Anticipated Length of Stay is > 2 midnights    Care Time Delivered:   Upon my evaluation, this patient had a high probability of imminent or life-threatening deterioration due to Diabetic ketoacidosis, metabolic acidosis, which required my direct attention, intervention, and personal management  I have personally provided 30 minutes (1545 to 1615) of critical care time, exclusive of procedures, teaching, family meetings, and any prior time recorded by providers other than myself  CECILIO Ontiveros        Portions of the record may have been created with voice recognition software  Occasional wrong word or "sound a like" substitutions may have occurred due to the inherent limitations of voice recognition software    Read the chart carefully and recognize, using context, where substitutions have occurred

## 2021-01-27 NOTE — ED PROVIDER NOTES
History  Chief Complaint   Patient presents with    Shortness of Breath     pt reports SOB that started last night with assoc N/V  Reports she is about 16 weeks pregnant with recent prenatal care  Recent covid test was negative but reports recent +exp     This is a 31years old , pregnant 16 weeks  Came for having shortness of breaths was respiratory rate 22 per minute  Patient is type 1 diabetic  Patient gave herself 65 units of lispro insulin this morning  And she came to the ER  On the arrival to the ER her blood sugar is more than 500  Patient take the glargine insulin 55 units every day she give it to herself  Patient did not take any insulin for 2 days  Patient had been in DKA in the past   Patient denies any dysuria hematuria  Patient has no cough no chest pain  Patient denies any vaginal bleeding or discharge  Prior to Admission Medications   Prescriptions Last Dose Informant Patient Reported? Taking?    Lancet Devices (Lancing Device) MISC  Self Yes No   Sig: lancing device   AS DIRECTED   Prenat-Fe Carbonyl-FA-Omega 3 (One-A-Day Womens Prenatal 1) 28-0 8-235 MG CAPS  Self No Yes   Sig: Take 1 tablet by mouth daily   insulin glargine (Basaglar KwikPen) 100 units/mL injection pen   No No   Sig: Inject 55 Units under the skin daily   insulin lispro (Admelog SoloStar) 100 units/mL injection pen Not Taking at Unknown time Self Yes No   Sig: Admelog SoloStar U-100 Insulin lispro 100 unit/mL subcutaneous pen      Facility-Administered Medications: None       Past Medical History:   Diagnosis Date    Diabetes mellitus ([facility] )     uses kwiki pen     Diabetes mellitus type 1 ([facility] )     High blood pressure     recently dx/ put on lisinopril     High cholesterol     Hypertension      no hypersion     Miscarriage     Recurrent pregnancy loss, antepartum condition or complication        Past Surgical History:   Procedure Laterality Date     SECTION  01/02/2014     X1    DILATION AND CURETTAGE OF UTERUS  04/10/2019    Missed AB     WISDOM TOOTH EXTRACTION         Family History   Problem Relation Age of Onset    Diabetes Paternal Grandmother     No Known Problems Mother     No Known Problems Father     No Known Problems Sister     No Known Problems Brother     No Known Problems Daughter     No Known Problems Maternal Grandmother     No Known Problems Paternal Grandfather     No Known Problems Sister      I have reviewed and agree with the history as documented  E-Cigarette/Vaping    E-Cigarette Use Never User      E-Cigarette/Vaping Substances    Nicotine No     THC No     CBD No     Flavoring No     Other No     Unknown No      Social History     Tobacco Use    Smoking status: Never Smoker    Smokeless tobacco: Never Used   Substance Use Topics    Alcohol use: Never     Frequency: Never     Comment: Alcohol intake:   None - As per [facility] Drug use: Never     Comment: Illicit drugs:   No  - As per [place]        Review of Systems   Constitutional: Negative for fatigue and fever  Respiratory: Negative for cough and shortness of breath  Cardiovascular: Negative for chest pain and palpitations  Gastrointestinal: Negative for abdominal pain, diarrhea, nausea and vomiting  Genitourinary: Negative for difficulty urinating, dysuria, flank pain and frequency  Musculoskeletal: Negative for back pain, myalgias, neck pain and neck stiffness  Skin: Negative for color change, pallor and rash  Neurological: Negative for dizziness, light-headedness and headaches  Psychiatric/Behavioral: Negative for agitation and behavioral problems  Physical Exam  Physical Exam  Constitutional:       Appearance: She is well-developed  HENT:      Head: Normocephalic and atraumatic  Mouth/Throat:      Pharynx: No oropharyngeal exudate  Neck:      Musculoskeletal: Normal range of motion and neck supple     Cardiovascular:      Rate and Rhythm: Normal rate and regular rhythm  Heart sounds: Normal heart sounds  No murmur  No friction rub  No gallop  Pulmonary:      Effort: Pulmonary effort is normal  No respiratory distress  Breath sounds: Normal breath sounds  No decreased breath sounds, wheezing, rhonchi or rales  Chest:      Chest wall: No mass, deformity, tenderness, crepitus or edema  Abdominal:      General: Bowel sounds are normal       Palpations: Abdomen is soft  Musculoskeletal: Normal range of motion  General: No tenderness or deformity  Right lower leg: No edema  Left lower leg: No edema  Skin:     General: Skin is warm and dry  Capillary Refill: Capillary refill takes less than 2 seconds  Neurological:      Mental Status: She is alert and oriented to person, place, and time     Psychiatric:         Behavior: Behavior normal          Vital Signs  ED Triage Vitals   Temperature Pulse Respirations Blood Pressure SpO2   01/27/21 1113 01/27/21 1055 01/27/21 1055 01/27/21 1055 01/27/21 1055   98 3 °F (36 8 °C) (!) 112 22 125/86 100 %      Temp Source Heart Rate Source Patient Position - Orthostatic VS BP Location FiO2 (%)   01/27/21 1113 01/27/21 1055 01/27/21 1055 01/27/21 1055 --   Oral Monitor Lying Left arm       Pain Score       01/27/21 1055       No Pain           Vitals:    01/27/21 1055 01/27/21 1155 01/27/21 1313   BP: 125/86 116/80 133/68   Pulse: (!) 112 (!) 106 102   Patient Position - Orthostatic VS: Lying Lying Lying         Visual Acuity      ED Medications  Medications   sodium chloride 0 9 % bolus 2,000 mL (0 mL Intravenous Stopped 1/27/21 1300)   insulin regular (HumuLIN R,NovoLIN R) injection 10 Units (10 Units Intravenous Given 1/27/21 1120)       Diagnostic Studies  Results Reviewed     Procedure Component Value Units Date/Time    Fingerstick Glucose (POCT) [[id number]]  (Abnormal) Collected: 01/27/21 1417    Lab Status: Final result Updated: 01/27/21 1417     POC Glucose 212 mg/dl     COVID19, Influenza A/B, RSV PCR, [facility] [[id number]]  (Normal) Collected: 01/27/21 1313    Lab Status: Final result Specimen: Nasopharyngeal Swab Updated: 01/27/21 1400     SARS-CoV-2 Negative     INFLUENZA A PCR Negative     INFLUENZA B PCR Negative     RSV PCR Negative    Narrative: This test has been authorized by FDA under an EUA (Emergency Use Assay) for use by authorized laboratories  Clinical caution and judgement should be used with the interpretation of these results with consideration of the clinical impression and other laboratory testing  Testing reported as "Positive" or "Negative" has been proven to be accurate according to standard laboratory validation requirements  All testing is performed with control materials showing appropriate reactivity at standard intervals  Urine Microscopic [[id number]] Collected: 01/27/21 1312    Lab Status: Final result Specimen: Urine, Clean Catch Updated: 01/27/21 1335     RBC, UA None Seen /hpf      WBC, UA 0-5 /hpf      Epithelial Cells Occasional /hpf      Bacteria, UA None Seen /hpf      URINE COMMENT --    Fingerstick Glucose (POCT) [[id number]]  (Abnormal) Collected: 01/27/21 1331    Lab Status: Final result Updated: 01/27/21 1333     POC Glucose 259 mg/dl     UA w Reflex to Microscopic w Reflex to Culture [[id number]]  (Abnormal) Collected: 01/27/21 1312    Lab Status: Final result Specimen: Urine, Clean Catch Updated: 01/27/21 1321     Color, UA Yellow     Clarity, UA Clear     Specific Gravity, UA >=1 030     pH, UA 5 5     Leukocytes, UA Negative     Nitrite, UA Negative     Protein, UA 1+ mg/dl      Glucose, UA 2+ mg/dl      Ketones, UA 80 (3+) mg/dl      Urobilinogen, UA 0 2 E U /dl      Bilirubin, UA Negative     Blood, UA Trace-Intact     URINE COMMENT --    Urine culture [[id number]] Collected: 01/27/21 1312    Lab Status:  In process Specimen: Urine, Clean Catch Updated: 01/27/21 1321    POCT Blood Gas (CG8+) [[id number]]  (Abnormal) Collected: 01/27/21 1227    Lab Status: Final result Specimen: Venous Updated: 01/27/21 1231     ph, Alexandre ISTAT 7 024     pCO2, Alexandre i-STAT 18 4 mm HG      pO2, Alexandre i-STAT 42 0 mm HG      BE, i-STAT -24 mmol/L      HCO3, Alexandre i-STAT 4 8 mmol/L      CO2, i-STAT 5 mmol/L      O2 Sat, i-STAT 57 %      SODIUM, I-STAT 135 mmol/l      Potassium, i-STAT 5 0 mmol/L      Calcium, Ionized i-STAT 1 10 mmol/L      Hct, i-STAT 42 %      Hgb, i-STAT 14 3 g/dl      Glucose, i-STAT 364 mg/dl      Specimen Type VENOUS    Comprehensive metabolic panel [[id number]]  (Abnormal) Collected: 01/27/21 1115    Lab Status: Final result Specimen: Blood from Arm, Left Updated: 01/27/21 1206     Sodium 132 mmol/L      Potassium 5 5 mmol/L      Chloride 97 mmol/L      CO2 7 mmol/L      ANION GAP 28 mmol/L      BUN 27 mg/dL      Creatinine 1 01 mg/dL      Glucose 570 mg/dL      Calcium 9 8 mg/dL      AST 27 U/L      ALT 16 U/L      Alkaline Phosphatase 75 6 U/L      Total Protein 8 0 g/dL      Albumin 4 6 g/dL      Total Bilirubin 0 40 mg/dL      eGFR 76 ml/min/1 73sq m     Narrative:      [place] guidelines for Chronic Kidney Disease (CKD):     Stage 1 with normal or high GFR (GFR > 90 mL/min/1 73 square meters)    Stage 2 Mild CKD (GFR = 60-89 mL/min/1 73 square meters)    Stage 3A Moderate CKD (GFR = 45-59 mL/min/1 73 square meters)    Stage 3B Moderate CKD (GFR = 30-44 mL/min/1 73 square meters)    Stage 4 Severe CKD (GFR = 15-29 mL/min/1 73 square meters)    Stage 5 End Stage CKD (GFR <15 mL/min/1 73 square meters)  Note: GFR calculation is accurate only with a steady state creatinine    Beta Hydroxybutyrate [[id number]]  (Abnormal) Collected: 01/27/21 1115    Lab Status: Final result Specimen: Blood from Arm, Left Updated: 01/27/21 1132     BETA-HYDROXYBUTYRATE 5 2 mmol/L     CBC and differential [[id number]]  (Abnormal) Collected: 01/27/21 1115    Lab Status: Final result Specimen: Blood from Arm, Left Updated: 01/27/21 1127     WBC 12 98 Thousand/uL RBC 5 16 Million/uL      Hemoglobin 16 1 g/dL      Hematocrit 46 6 %      MCV 90 fL      MCH 31 2 pg      MCHC 34 5 g/dL      RDW 12 3 %      MPV 9 6 fL      Platelets 869 Thousands/uL      Neutrophils Relative 89 %      Immat GRANS % 1 %      Lymphocytes Relative 8 %      Monocytes Relative 1 %      Eosinophils Relative 1 %      Basophils Relative 0 %      Neutrophils Absolute 11 61 Thousands/µL      Immature Grans Absolute 0 07 Thousand/uL      Lymphocytes Absolute 1 05 Thousands/µL      Monocytes Absolute 0 17 Thousand/µL      Eosinophils Absolute 0 06 Thousand/µL      Basophils Absolute 0 02 Thousands/µL     Hemoglobin A1C [[id number]] Collected: 01/27/21 1115    Lab Status: In process Specimen: Blood from Arm, Left Updated: 01/27/21 1123    Fingerstick Glucose (POCT) [[id number]]  (Abnormal) Collected: 01/27/21 1102    Lab Status: Final result Updated: 01/27/21 1104     POC Glucose >500 mg/dl                  No orders to display              Procedures  Procedures         ED Course  ED Course as of Jan 27 1621   Wed Jan 27, 2021   1133 EKG; Sinus tachy rate 117/min, R atrial enlargement  Low voltage  SBIRT 22yo+      Most Recent Value   SBIRT (22 yo +)   In order to provide better care to our patients, we are screening all of our patients for alcohol and drug use  Would it be okay to ask you these screening questions? No Filed at: 01/27/2021 1055   Initial Alcohol Screen: US AUDIT-C    1  How often do you have a drink containing alcohol?  0 Filed at: 01/27/2021 1055   2  How many drinks containing alcohol do you have on a typical day you are drinking? 0 Filed at: 01/27/2021 1055   3a  Male UNDER 65: How often do you have five or more drinks on one occasion? 0 Filed at: 01/27/2021 1055   3b  FEMALE Any Age, or MALE 65+: How often do you have 4 or more drinks on one occassion?   0 Filed at: 01/27/2021 1055   Audit-C Score  0 Filed at: 01/27/2021 1055   CINDY: How many times in the past year have you    Used an illegal drug or used a prescription medication for non-medical reasons? Never Filed at: 01/27/2021 1055                    MDM  Number of Diagnoses or Management Options  Diagnosis management comments: Pt informed she is in DKA  Talked to dr Kang Mtz at [facility] and pt accepted to ICU  CASE DISCUSSED with pt and informed of her findings       Amount and/or Complexity of Data Reviewed  Clinical lab tests: ordered and reviewed    Risk of Complications, Morbidity, and/or Mortality  Presenting problems: high  Diagnostic procedures: high  Management options: high        Disposition  Final diagnoses:   Diabetic ketoacidosis without coma associated with type 1 diabetes mellitus ([facility] )   16 weeks gestation of pregnancy     Time reflects when diagnosis was documented in both MDM as applicable and the Disposition within this note     Time User Action Codes Description Comment    1/27/2021 12:39 PM Estiven Lundy [E10 10] Diabetic ketoacidosis without coma associated with type 1 diabetes mellitus ([facility] )     1/27/2021 12:39 PM Estiven Lundy [Z3A 16] 16 weeks gestation of pregnancy       ED Disposition     ED Disposition Condition Date/Time Comment    Transfer to Another Facility-In Network  Wed Jan 27, 2021 12:40 PM Eleni Rosenberg should be transferred out to [facility], dr Kang Mtz accepted   Pt will go TO ICU        MD Documentation      Most Recent Value   Patient Condition  The patient has been stabilized such that within reasonable medical probability, no material deterioration of the patient condition or the condition of the unborn child(parveen) is likely to result from the transfer   Reason for Transfer  Level of Care needed not available at this facility   Benefits of Transfer  Specialized equipment and/or services available at the receiving facility (Include comment)________________________   Risks of Transfer  Potential for delay in receiving treatment, Potential deterioration of medical condition, Loss of IV, Increased discomfort during transfer, Possible worsening of condition or death during transfer   Accepting Physician  MAR[address] Name, Ronnie Hyatt   Sending MD  [facility]   Provider Certification  General risk, such as traffic hazards, adverse weather conditions, rough terrain or turbulence, possible failure of equipment (including vehicle or aircraft), or consequences of actions of persons outside the control of the transport personnel, Risk of worsening condition, The possibility of a transport vehicle being unavailable, Unanticipated needs of medical equipment and personnel during transport      RN Documentation      [address] Name, Ronnie Hyatt      Follow-up Information    None         Discharge Medication List as of 1/27/2021  2:32 PM      CONTINUE these medications which have NOT CHANGED    Details   Prenat-Fe Carbonyl-FA-Omega 3 (One-A-Day Womens Prenatal 1) 28-0 8-235 MG CAPS Take 1 tablet by mouth daily, Starting Sat 11/14/2020, No Print      insulin glargine (Basaglar KwikPen) 100 units/mL injection pen Inject 55 Units under the skin daily, Starting Sat 11/14/2020, Until Mon 12/14/2020, Normal      insulin lispro (Admelog SoloStar) 100 units/mL injection pen Admelog SoloStar U-100 Insulin lispro 100 unit/mL subcutaneous pen, Historical Med      Lancet Devices (Lancing Device) MISC lancing device   AS DIRECTED, Historical Med           No discharge procedures on file      PDMP Review     None          ED Provider  Electronically Signed by           Mark Britt MD  01/27/21 1512

## 2021-01-27 NOTE — ASSESSMENT & PLAN NOTE
· As evidence by anion gap 28, venous pH 7 02 in the setting of DKA  · Continue to trend labs q 4 hours as per DKA protocol  · Leave patient on insulin infusion until anion gap closes and sodium bicarb normalizes

## 2021-01-28 PROBLEM — E87.5 HYPERKALEMIA: Status: RESOLVED | Noted: 2021-01-27 | Resolved: 2021-01-28

## 2021-01-28 LAB
ALBUMIN SERPL BCP-MCNC: 2.5 G/DL (ref 3.5–5)
ALP SERPL-CCNC: 46 U/L (ref 46–116)
ALT SERPL W P-5'-P-CCNC: 14 U/L (ref 12–78)
ANION GAP SERPL CALCULATED.3IONS-SCNC: 10 MMOL/L (ref 4–13)
ANION GAP SERPL CALCULATED.3IONS-SCNC: 11 MMOL/L (ref 4–13)
AST SERPL W P-5'-P-CCNC: 12 U/L (ref 5–45)
BACTERIA UR CULT: NORMAL
BASOPHILS # BLD AUTO: 0.02 THOUSANDS/ΜL (ref 0–0.1)
BASOPHILS NFR BLD AUTO: 0 % (ref 0–1)
BILIRUB SERPL-MCNC: 0.28 MG/DL (ref 0.2–1)
BUN SERPL-MCNC: 13 MG/DL (ref 5–25)
BUN SERPL-MCNC: 13 MG/DL (ref 5–25)
CALCIUM ALBUM COR SERPL-MCNC: 8.2 MG/DL (ref 8.3–10.1)
CALCIUM SERPL-MCNC: 7 MG/DL (ref 8.3–10.1)
CALCIUM SERPL-MCNC: 7.6 MG/DL (ref 8.3–10.1)
CHLORIDE SERPL-SCNC: 111 MMOL/L (ref 100–108)
CHLORIDE SERPL-SCNC: 112 MMOL/L (ref 100–108)
CO2 SERPL-SCNC: 15 MMOL/L (ref 21–32)
CO2 SERPL-SCNC: 17 MMOL/L (ref 21–32)
CREAT SERPL-MCNC: 0.52 MG/DL (ref 0.6–1.3)
CREAT SERPL-MCNC: 0.6 MG/DL (ref 0.6–1.3)
EOSINOPHIL # BLD AUTO: 0.01 THOUSAND/ΜL (ref 0–0.61)
EOSINOPHIL NFR BLD AUTO: 0 % (ref 0–6)
ERYTHROCYTE [DISTWIDTH] IN BLOOD BY AUTOMATED COUNT: 12.6 % (ref 11.6–15.1)
GFR SERPL CREATININE-BSD FRML MDRD: 124 ML/MIN/1.73SQ M
GFR SERPL CREATININE-BSD FRML MDRD: 130 ML/MIN/1.73SQ M
GLUCOSE SERPL-MCNC: 104 MG/DL (ref 65–140)
GLUCOSE SERPL-MCNC: 109 MG/DL (ref 65–140)
GLUCOSE SERPL-MCNC: 113 MG/DL (ref 65–140)
GLUCOSE SERPL-MCNC: 116 MG/DL (ref 65–140)
GLUCOSE SERPL-MCNC: 118 MG/DL (ref 65–140)
GLUCOSE SERPL-MCNC: 127 MG/DL (ref 65–140)
GLUCOSE SERPL-MCNC: 128 MG/DL (ref 65–140)
GLUCOSE SERPL-MCNC: 210 MG/DL (ref 65–140)
GLUCOSE SERPL-MCNC: 252 MG/DL (ref 65–140)
GLUCOSE SERPL-MCNC: 77 MG/DL (ref 65–140)
GLUCOSE SERPL-MCNC: 90 MG/DL (ref 65–140)
GLUCOSE SERPL-MCNC: 96 MG/DL (ref 65–140)
GLUCOSE SERPL-MCNC: 96 MG/DL (ref 65–140)
GLUCOSE SERPL-MCNC: 97 MG/DL (ref 65–140)
HCT VFR BLD AUTO: 35.7 % (ref 34.8–46.1)
HGB BLD-MCNC: 12.5 G/DL (ref 11.5–15.4)
IMM GRANULOCYTES # BLD AUTO: 0.05 THOUSAND/UL (ref 0–0.2)
IMM GRANULOCYTES NFR BLD AUTO: 1 % (ref 0–2)
LYMPHOCYTES # BLD AUTO: 1.65 THOUSANDS/ΜL (ref 0.6–4.47)
LYMPHOCYTES NFR BLD AUTO: 16 % (ref 14–44)
MAGNESIUM SERPL-MCNC: 2.6 MG/DL (ref 1.6–2.6)
MAGNESIUM SERPL-MCNC: 3.6 MG/DL (ref 1.6–2.6)
MCH RBC QN AUTO: 32.9 PG (ref 26.8–34.3)
MCHC RBC AUTO-ENTMCNC: 35 G/DL (ref 31.4–37.4)
MCV RBC AUTO: 94 FL (ref 82–98)
MONOCYTES # BLD AUTO: 0.56 THOUSAND/ΜL (ref 0.17–1.22)
MONOCYTES NFR BLD AUTO: 5 % (ref 4–12)
NEUTROPHILS # BLD AUTO: 8.22 THOUSANDS/ΜL (ref 1.85–7.62)
NEUTS SEG NFR BLD AUTO: 78 % (ref 43–75)
NRBC BLD AUTO-RTO: 0 /100 WBCS
PHOSPHATE SERPL-MCNC: 1.4 MG/DL (ref 2.7–4.5)
PHOSPHATE SERPL-MCNC: 1.5 MG/DL (ref 2.7–4.5)
PLATELET # BLD AUTO: 203 THOUSANDS/UL (ref 149–390)
PMV BLD AUTO: 8.5 FL (ref 8.9–12.7)
POTASSIUM SERPL-SCNC: 3.5 MMOL/L (ref 3.5–5.3)
POTASSIUM SERPL-SCNC: 3.5 MMOL/L (ref 3.5–5.3)
PROT SERPL-MCNC: 5.6 G/DL (ref 6.4–8.2)
RBC # BLD AUTO: 3.8 MILLION/UL (ref 3.81–5.12)
SODIUM SERPL-SCNC: 137 MMOL/L (ref 136–145)
SODIUM SERPL-SCNC: 139 MMOL/L (ref 136–145)
WBC # BLD AUTO: 10.51 THOUSAND/UL (ref 4.31–10.16)

## 2021-01-28 PROCEDURE — 85025 COMPLETE CBC W/AUTO DIFF WBC: CPT | Performed by: NURSE PRACTITIONER

## 2021-01-28 PROCEDURE — 83735 ASSAY OF MAGNESIUM: CPT | Performed by: NURSE PRACTITIONER

## 2021-01-28 PROCEDURE — 84100 ASSAY OF PHOSPHORUS: CPT | Performed by: NURSE PRACTITIONER

## 2021-01-28 PROCEDURE — 82948 REAGENT STRIP/BLOOD GLUCOSE: CPT

## 2021-01-28 PROCEDURE — 99232 SBSQ HOSP IP/OBS MODERATE 35: CPT | Performed by: INTERNAL MEDICINE

## 2021-01-28 PROCEDURE — 80048 BASIC METABOLIC PNL TOTAL CA: CPT | Performed by: NURSE PRACTITIONER

## 2021-01-28 PROCEDURE — 80053 COMPREHEN METABOLIC PANEL: CPT | Performed by: NURSE PRACTITIONER

## 2021-01-28 RX ORDER — POTASSIUM CHLORIDE 20 MEQ/1
40 TABLET, EXTENDED RELEASE ORAL ONCE
Status: COMPLETED | OUTPATIENT
Start: 2021-01-28 | End: 2021-01-28

## 2021-01-28 RX ORDER — TRISODIUM CITRATE DIHYDRATE AND CITRIC ACID MONOHYDRATE 500; 334 MG/5ML; MG/5ML
30 SOLUTION ORAL
Status: COMPLETED | OUTPATIENT
Start: 2021-01-28 | End: 2021-01-29

## 2021-01-28 RX ORDER — POTASSIUM CHLORIDE 20 MEQ/1
40 TABLET, EXTENDED RELEASE ORAL
Status: COMPLETED | OUTPATIENT
Start: 2021-01-28 | End: 2021-01-28

## 2021-01-28 RX ADMIN — ENOXAPARIN SODIUM 30 MG: 30 INJECTION SUBCUTANEOUS at 09:08

## 2021-01-28 RX ADMIN — POTASSIUM CHLORIDE 40 MEQ: 1500 TABLET, EXTENDED RELEASE ORAL at 06:02

## 2021-01-28 RX ADMIN — INSULIN LISPRO 2 UNITS: 100 INJECTION, SOLUTION INTRAVENOUS; SUBCUTANEOUS at 22:27

## 2021-01-28 RX ADMIN — DEXTROSE AND SODIUM CHLORIDE 250 ML/HR: 5; .9 INJECTION, SOLUTION INTRAVENOUS at 00:14

## 2021-01-28 RX ADMIN — SODIUM CITRATE AND CITRIC ACID MONOHYDRATE 30 ML: 500; 334 SOLUTION ORAL at 22:25

## 2021-01-28 RX ADMIN — DEXTROSE AND SODIUM CHLORIDE 250 ML/HR: 5; .9 INJECTION, SOLUTION INTRAVENOUS at 08:38

## 2021-01-28 RX ADMIN — POTASSIUM CHLORIDE 40 MEQ: 1500 TABLET, EXTENDED RELEASE ORAL at 11:21

## 2021-01-28 RX ADMIN — POTASSIUM CHLORIDE 40 MEQ: 1500 TABLET, EXTENDED RELEASE ORAL at 17:17

## 2021-01-28 RX ADMIN — DEXTROSE AND SODIUM CHLORIDE 250 ML/HR: 5; .9 INJECTION, SOLUTION INTRAVENOUS at 04:43

## 2021-01-28 RX ADMIN — SODIUM CITRATE AND CITRIC ACID MONOHYDRATE 30 ML: 500; 334 SOLUTION ORAL at 11:30

## 2021-01-28 RX ADMIN — SODIUM CITRATE AND CITRIC ACID MONOHYDRATE 30 ML: 500; 334 SOLUTION ORAL at 18:08

## 2021-01-28 RX ADMIN — INSULIN LISPRO 1 UNITS: 100 INJECTION, SOLUTION INTRAVENOUS; SUBCUTANEOUS at 09:58

## 2021-01-28 RX ADMIN — SODIUM PHOSPHATE, MONOBASIC, MONOHYDRATE 30 MMOL: 276; 142 INJECTION, SOLUTION INTRAVENOUS at 06:02

## 2021-01-28 RX ADMIN — INSULIN LISPRO 2 UNITS: 100 INJECTION, SOLUTION INTRAVENOUS; SUBCUTANEOUS at 18:08

## 2021-01-28 NOTE — PROGRESS NOTES
Pastoral Care Progress Note    2021  Patient: Maria A Yeboah : 1990  Admission Date & Time: 2021 1503  MRN: [id number] CSN: [id number]                     Chaplaincy Interventions Utilized:       Exploration: Explored emotional needs & resources and Explored relational needs & resources    Relationship Building: Cultivated a relationship of care and support    Spiritual Coping Strategies Utilized:   No spiritual coping    Patient visited no special needs at presence

## 2021-01-28 NOTE — PROGRESS NOTES
Progress Note - Taniya Olea 1990, 31 y o  female MRN: [id number]    Unit/Bed#: ICU 01 Encounter: [id number]    Primary Care Provider: Jodi Francois DO   Date and time admitted to hospital: 1/27/2021  3:03 PM        * Diabetic ketoacidosis without coma associated with type 1 diabetes mellitus [facility])  Assessment & Plan  Lab Results   Component Value Date    HGBA1C 10 6 (H) 01/27/2021       Recent Labs     01/28/21  0013 01/28/21  0116 01/28/21  0222 01/28/21  0315   POCGLU 77 90 96 113       Blood Sugar Average: Last 72 hrs:  (P) 066 [id number]     · Presented to [facility] on 01/27/2021 with complaints of shortness of breath  · History of diabetes type 1 since age 25  · Insulin dependent at home however has been recently noncompliance  · Continue DKA protocol  · Trend labs q 6 hours  · Keep NPO until anion gap closes and bicarb at least greater than 21     Metabolic acidosis with increased anion gap and accumulation of organic acids  Assessment & Plan  · As evidence by anion gap 28, venous pH 7 02 in the setting of DKA  · Continue to trend labs q6  · Leave patient on insulin infusion until anion gap closes and sodium bicarb normalizes     Hyperkalemia-resolved as of 1/28/2021  Assessment & Plan  · As evidence by potassium 5 5 on admission, in the setting of DKA  · Do not treat K at this time since value is expected to go down secondary to DKA  · trend q 4 hours    Shortness of breath  Assessment & Plan  · Patient presented initially with complaints of shortness of breath, found to be in DKA  · Stable on room air at this time  · COVID-19 test negative however patient does admit to a recent exposure  · No chest x-ray was done secondary to patient being 16 weeks pregnant     16 weeks gestation of pregnancy  Assessment & Plan  · Per patient, this is her 3rd pregnancy  · Consult OBGYN to help monitor fetus  · Primary team to manage DKA ----------------------------------------------------------------------------------------  HPI/24hr events: No events overnight  Disposition: Admit to Stepdown Level 1  Code Status: Level 1 - Full Code  ---------------------------------------------------------------------------------------  SUBJECTIVE  Offers no complaints  Review of Systems  Review of systems was reviewed and negative unless stated above in HPI/24-hour events   ---------------------------------------------------------------------------------------  OBJECTIVE    Vitals   Vitals:    21 0244 21 0344 21 0444 21 0531   BP: (!) 103/46 104/57 103/56    Pulse: 86 84 84    Resp: 15 15 16    Temp:  97 6 °F (36 4 °C)     TempSrc:       SpO2: 100% 99% 100%    Weight:   62 7 kg (138 lb 3 7 oz) 62 7 kg (138 lb 3 7 oz)   Height:         Temp (24hrs), Av 4 °F (36 9 °C), Min:97 6 °F (36 4 °C), Max:98 9 °F (37 2 °C)  Current: Temperature: 97 6 °F (36 4 °C)          Respiratory:  SpO2: SpO2: 100 %       Invasive/non-invasive ventilation settings   Respiratory    Lab Data (Last 4 hours)    None         O2/Vent Data (Last 4 hours)    None                Physical Exam  Vitals signs and nursing note reviewed  Constitutional:       Appearance: Normal appearance  She is normal weight  HENT:      Head: Normocephalic and atraumatic  Right Ear: External ear normal       Left Ear: External ear normal       Nose: Nose normal       Mouth/Throat:      Mouth: Mucous membranes are moist       Pharynx: Oropharynx is clear  Eyes:      Extraocular Movements: Extraocular movements intact  Pupils: Pupils are equal, round, and reactive to light  Neck:      Musculoskeletal: Normal range of motion and neck supple  Cardiovascular:      Rate and Rhythm: Normal rate and regular rhythm  Pulses: Normal pulses  Heart sounds: Normal heart sounds     Pulmonary:      Effort: Pulmonary effort is normal       Breath sounds: Normal breath sounds  Abdominal:      General: Bowel sounds are normal       Palpations: Abdomen is soft  Musculoskeletal: Normal range of motion  Skin:     General: Skin is warm and dry  Capillary Refill: Capillary refill takes less than 2 seconds  Neurological:      General: No focal deficit present  Mental Status: She is alert and oriented to person, place, and time  Psychiatric:         Mood and Affect: Mood normal          Behavior: Behavior normal          Thought Content: Thought content normal          Judgment: Judgment normal          Laboratory and Diagnostics:  Results from last 7 days   Lab Units 01/28/21  0438 01/27/21  1227 01/27/21  1115   WBC Thousand/uL 10 51*  --  12 98*   HEMOGLOBIN g/dL 12 5  --  16 1*   I STAT HEMOGLOBIN g/dl  --  14 3  --    HEMATOCRIT % 35 7  --  46 6*   HEMATOCRIT, ISTAT %  --  42  --    PLATELETS Thousands/uL 203  --  258   NEUTROS PCT % 78*  --  89*   MONOS PCT % 5  --  1*     Results from last 7 days   Lab Units 01/28/21 0438 01/28/21  0014 01/27/21 2018 01/27/21  1608 01/27/21  1227 01/27/21  1115   SODIUM mmol/L 139 137 134* 135*  --  132*   POTASSIUM mmol/L 3 5 3 5 4 1 4 6  --  5 5*   CHLORIDE mmol/L 112* 111* 108 104  --  97   CO2 mmol/L 17* 15* 14* 12*  --  7*   CO2, I-STAT mmol/L  --   --   --   --  5*  --    ANION GAP mmol/L 10 11 12 19*  --  28*   BUN mg/dL 13 13 14 20  --  27*   CREATININE mg/dL 0 52* 0 60 0 69 0 77  --  1 01   CALCIUM mg/dL 7 0* 7 6* 7 5* 8 1*  --  9 8   GLUCOSE RANDOM mg/dL 109 96 233* 212*  --  570*   ALT U/L 14  --   --   --   --  16   AST U/L 12  --   --   --   --  27   ALK PHOS U/L 46  --   --   --   --  75 6   ALBUMIN g/dL 2 5*  --   --   --   --  4 6   TOTAL BILIRUBIN mg/dL 0 28  --   --   --   --  0 40     Results from last 7 days   Lab Units 01/28/21 0014 01/27/21 2018 01/27/21  1608   MAGNESIUM mg/dL 3 6* 1 6 2 0   PHOSPHORUS mg/dL 1 5* 2 6* 2 2*                   ABG:    VBG:          Micro        EKG: NSR  Imaging:  I have personally reviewed pertinent reports  Intake and Output  I/O       01/26 0701 - 01/27 0700 01/27 0701 - 01/28 0700    I V  (mL/kg)  3233 2 (51 6)    Total Intake(mL/kg)  3233 2 (51 6)    Urine (mL/kg/hr)  550    Total Output  550    Net  +2683 2                Height and Weights   Height: 4' 10" (147 3 cm)  IBW: 40 9 kg  Body mass index is 28 89 kg/m²  Weight (last 2 days)     Date/Time   Weight    01/28/21 0531   62 7 (138 23)    01/28/21 0444   62 7 (138 23)    01/27/21 1514   56 (123 46)                Nutrition       Diet Orders   (From admission, onward)             Start     Ordered    01/27/21 1522  Diet NPO  Diet effective now     Question Answer Comment   Diet Type NPO    RD to adjust diet per protocol?  No        01/27/21 1535                  Active Medications  Scheduled Meds:  Current Facility-Administered Medications   Medication Dose Route Frequency Provider Last Rate    dextrose 5 % and sodium chloride 0 9 %  250 mL/hr Intravenous Continuous [place] Seen, CRNP 250 mL/hr (01/28/21 0443)    enoxaparin  30 mg Subcutaneous Daily [place] Seen, CRNP      insulin regular (HumuLIN R,NovoLIN R) infusion  0 1-30 Units/hr Intravenous Continuous [place] Seen, CRNP Stopped (01/27/21 2334)    potassium chloride  40 mEq Oral Once CECILIO Alvarez       Continuous Infusions:  dextrose 5 % and sodium chloride 0 9 %, 250 mL/hr, Last Rate: 250 mL/hr (01/28/21 0443)  insulin regular (HumuLIN R,NovoLIN R) infusion, 0 1-30 Units/hr, Last Rate: Stopped (01/27/21 2334)      PRN Meds:        Invasive Devices Review  Invasive Devices     Peripheral Intravenous Line            Peripheral IV 01/27/21 Left Hand less than 1 day    Peripheral IV 01/27/21 Right Hand less than 1 day                Rationale for remaining devices: medical necessity  ---------------------------------------------------------------------------------------  Advance Directive and Living Will:      Power of :    POLST: ---------------------------------------------------------------------------------------  Care Time Delivered:   No Critical Care time spent       CECILIO Ken      Portions of the record may have been created with voice recognition software  Occasional wrong word or "sound a like" substitutions may have occurred due to the inherent limitations of voice recognition software    Read the chart carefully and recognize, using context, where substitutions have occurred

## 2021-01-28 NOTE — UTILIZATION REVIEW
Notification of Inpatient Admission/Inpatient Authorization Request   This is a Notification of Inpatient Admission for [address]  Be advised that this patient was admitted to our facility under Inpatient Status  Contact Paty Solomon at [phone] for additional admission information  [facility] DEPT  DEDICATED [phone]  Patient Name:   Taniya Olea   YOB: 1990       [address]   [address]:   [address] [facility]  Tax ID: [id number]  NPI: [id number] Attending Provider/NPI:  Phone:  Address: Jonatan White [[id number]]  [phone]  Same as the facility   Place of Service Code: 24 Place of Service Name:  [address] [place] [address]   Start Date: 1/27/21 1503 Discharge Date & Time: No discharge date for patient encounter  Type of Admission: Inpatient Status Discharge Disposition   (if discharged): [address] [place] [address]   Patient Diagnoses: DKA (diabetic ketoacidoses) ([facility] [address] ) [E11 10]     Orders: Admission Orders (From admission, onward)     Ordered        01/27/21 1516  Inpatient Admission  Once                    Assigned Utilization Review Contact: Todd Burkett  Utilization   Network Utilization Review Department  Phone: [phone]; Fax [phone]  Email: [email or web address]   ATTENTION PAYERS: Please call the assigned Utilization  directly with any questions or concerns ALL voicemails in the department are confidential  Send all requests for admission clinical reviews, approved or denied determinations and any other requests to dedicated fax number belonging to the campus where the patient is receiving treatment

## 2021-01-28 NOTE — ASSESSMENT & PLAN NOTE
· As evidence by anion gap 28, venous pH 7 02 in the setting of DKA  · Continue to trend labs q6  · Leave patient on insulin infusion until anion gap closes and sodium bicarb normalizes

## 2021-01-28 NOTE — UTILIZATION REVIEW
Initial Clinical Review    Transfer from [facility] ED    Admission: Date/Time/Statement:   Admission Orders (From admission, onward)     Ordered        01/27/21 1516  Inpatient Admission  Once                   Orders Placed This Encounter   Procedures    Inpatient Admission     Standing Status:   Standing     Number of Occurrences:   1     Order Specific Question:   Level of Care     Answer:   Level 1 Stepdown [13]     Order Specific Question:   Estimated length of stay     Answer:   More than 2 Midnights     Order Specific Question:   Certification     Answer:   I certify that inpatient services are medically necessary for this patient for a duration of greater than two midnights  See H&P and MD Progress Notes for additional information about the patient's course of treatment  Assessment/Plan:    31 Y O   Female initially presented to ED at  [facility] with shortness of breath,  COVID  Negative, however, admitted to recent COVID  Exposure  Currently  Is  16 weeks pregnant with 3rd child,  Has a  10 YO and other pregnancy was a  Miscarriage  Additional PMH  Is  DM, diagnosed at  Age 25 and insulin  Dependent, however, sometimes noncompliant due to lack of resources, and  Past admissions for DKA  Does not always  Take her insulin and does not check blood sugars  Last used insulin  2 days  Prior to admission and had nausea the am of admission  Labs  In ED  Revealed  Blood sugar  570, K  5 5, anion gap 28, WBC  12 98 and  VBG Ph 7 02  Given insulin, IVF, started insulin drip  Transferred to [facility] for further care  Admit  Ip with  DKA, metabolic acidosis with increased anion gap, hyperkalemia  And shortness of breath and plan is  IVF, insulin drip,monitor labs, NPO initially and OB consult  1/28    Remains in  [facility]  Insulin drip and IVF  D/c  Wait  OB  Consult  Continue to monitor labs,  No CXR due to pregnancy          Additional Vital Signs:   01/28/21 0908  --  90  16  --  --  --  -- 01/28/21 0900  --  86  18  --  --  --  --   01/28/21 0844  --  --  --  99/58  73  --  --   01/28/21 0836  --  --  --  --  --  100 %  --   01/28/21 0800  --  90  16  --  --  99 %  --   01/28/21 0744  --  --  --  100/50  67  --  --   01/28/21 0727  99 2 °F (37 3 °C)  --  --  --  --  --  --   01/28/21 0644  --  84  16  100/54  65  99 %  --   01/28/21 0544  --  86  15  99/52  65  99 %  --   01/28/21 0444  --  84  16  103/56  76  100 %  --   01/28/21 0344  97 6 °F (36 4 °C)  84  15  104/57  79  99 %  --   01/28/21 0244  --  86  15  103/46Abnormal   68  100 %  --   01/28/21 0144  --  84  14  106/57  73  99 %  --   01/28/21 0044  --  90  15  104/53  75  99 %  --   01/27/21 2344  98 4 °F (36 9 °C)  96  19  107/59  74  98 %  --   01/27/21 2244  --  92  19  105/54  74  98 %  --   01/27/21 2144  --  96  19  105/53  77  99 %  --   01/27/21 2044  --  98  20  104/50  68  98 %  --   01/27/21 1944  98 9 °F (37 2 °C)  102  19  105/54  67  99 %  --   01/27/21 1900  98 9 °F (37 2 °C)  --  --  --  --  --  --   01/27/21 1806  --  --  --  110/62  77  --  --   01/27/21 1747  --  90  21  103/57  75  100 %  None (Room air)   01/27/21 1600  98 4 °F (36 9 °C)  --  --  --  --  --  --   01/27/21 1514  --  98  23Abnormal   123/62  80  100 %           Pertinent Labs/Diagnostic Test Results:   EKG     ST   HR  117      R atrial enlargement       Results from last 7 days   Lab Units 01/27/21  1313   SARS-COV-2  Negative     Results from last 7 days   Lab Units 01/28/21  0438 01/27/21  1227 01/27/21  1115   WBC Thousand/uL 10 51*  --  12 98*   HEMOGLOBIN g/dL 12 5  --  16 1*   I STAT HEMOGLOBIN g/dl  --  14 3  --    HEMATOCRIT % 35 7  --  46 6*   HEMATOCRIT, ISTAT %  --  42  --    PLATELETS Thousands/uL 203  --  258   NEUTROS ABS Thousands/µL 8 22*  --  11 61*         Results from last 7 days   Lab Units 01/28/21  0438 01/28/21  0014 01/27/21  2018 01/27/21  1608 01/27/21  1227 01/27/21  1115   SODIUM mmol/L 139 137 134* 135*  --  132*   POTASSIUM mmol/L 3 5 3 5 4 1 4 6  --  5 5*   CHLORIDE mmol/L 112* 111* 108 104  --  97   CO2 mmol/L 17* 15* 14* 12*  --  7*   CO2, I-STAT mmol/L  --   --   --   --  5*  --    ANION GAP mmol/L 10 11 12 19*  --  28*   BUN mg/dL 13 13 14 20  --  27*   CREATININE mg/dL 0 52* 0 60 0 69 0 77  --  1 01   EGFR ml/min/1 73sq m 130 124 119 105  --  76   CALCIUM mg/dL 7 0* 7 6* 7 5* 8 1*  --  9 8   CALCIUM, IONIZED, ISTAT mmol/L  --   --   --   --  1 10*  --    MAGNESIUM mg/dL 2 6 3 6* 1 6 2 0  --   --    PHOSPHORUS mg/dL 1 4* 1 5* 2 6* 2 2*  --   --      Results from last 7 days   Lab Units 01/28/21  0438 01/27/21  1115   AST U/L 12 27   ALT U/L 14 16   ALK PHOS U/L 46 75 6   TOTAL PROTEIN g/dL 5 6* 8 0   ALBUMIN g/dL 2 5* 4 6   TOTAL BILIRUBIN mg/dL 0 28 0 40     Results from last 7 days   Lab Units 01/28/21  0910 01/28/21  0819 01/28/21  0726 01/28/21  0611 01/28/21  0443 01/28/21  0315 01/28/21  0222 01/28/21  0116 01/28/21  0013 01/27/21  2324 01/27/21  2208 01/27/21  2105   POC GLUCOSE mg/dl 128 127 118 97 104 113 96 90 77 94 140 181*     Results from last 7 days   Lab Units 01/28/21  0438 01/28/21  0014 01/27/21  2018 01/27/21  1608 01/27/21  1115   GLUCOSE RANDOM mg/dL 109 96 233* 212* 570*         Results from last 7 days   Lab Units 01/27/21  1115   HEMOGLOBIN A1C % 10 6*   EAG mg/dl 258     BETA-HYDROXYBUTYRATE   Date Value Ref Range Status   01/27/2021 5 2 (H) <0 6 mmol/L Final              Results from last 7 days   Lab Units 01/27/21  1227   PH, ALEXSANDER I-STAT  7 024*   PCO2, ALEXSANDER ISTAT mm HG 18 4*   PO2, ALEXSANDER ISTAT mm HG 42 0   HCO3, ALEXSANDER ISTAT mmol/L 4 8*   I STAT BASE EXC mmol/L -24*   I STAT O2 SAT % 57*         Results from last 7 days   Lab Units 01/27/21  1312   CLARITY UA  Clear   COLOR UA  Yellow   SPEC GRAV UA  >=1 030   PH UA  5 5   GLUCOSE UA mg/dl 2+*   KETONES UA mg/dl 80 (3+)*   BLOOD UA  Trace-Intact*   PROTEIN UA mg/dl 1+*   NITRITE UA  Negative   BILIRUBIN UA  Negative   UROBILINOGEN UA E U /dl 0 2   LEUKOCYTES UA  Negative   WBC UA /hpf 0-5   RBC UA /hpf None Seen   BACTERIA UA /hpf None Seen   EPITHELIAL CELLS WET PREP /hpf Occasional     Results from last 7 days   Lab Units 01/27/21  1313   INFLUENZA A PCR  Negative   INFLUENZA B PCR  Negative   RSV PCR  Negative           Present on Admission:   Diabetic ketoacidosis without coma associated with type 1 diabetes mellitus ([place] [address] )   Metabolic acidosis with increased anion gap and accumulation of organic acids   (Resolved) Hyperkalemia   Shortness of breath      Admitting Diagnosis: DKA (diabetic ketoacidoses) ([place] [address] ) [E11 10]  Age/Sex: 31 y o  female  Admission Orders:  Scheduled Medications:  enoxaparin, 30 mg, Subcutaneous, Daily  insulin lispro, 1-5 Units, Subcutaneous, TID AC  insulin lispro, 1-5 Units, Subcutaneous, HS  sodium phosphate, 30 mmol, Intravenous, Once      Continuous IV Infusions:  Insulin drip   D/C  1/28  @  0909  IVF  250/hr - D/C   1/28  @  0909     PRN Meds:       IP CONSULT TO CASE MANAGEMENT  IP CONSULT TO OB GYN     Fall precautions  Dysphagia eval  Neuro checks Q 4 hrs  Daily weight    Network Utilization Review Department  ATTENTION: Please call with any questions or concerns to [phone] and carefully listen to the prompts so that you are directed to the right person  All voicemails are confidential   Maranda Kidd all requests for admission clinical reviews, approved or denied determinations and any other requests to dedicated fax number below belonging to the campus where the patient is receiving treatment   List of dedicated fax numbers for the Facilities:  [address] DENIALS (Administrative/Medical Necessity) [phone]   [address] [place] [address] (Maternity/NICU/Pediatrics) 261 [facility],[address] 40 [address] [facility] [address] [place] 1323 [facility] 115 [facility] (  [address] [place] "[address]ph[address]) [address] [place] [address] [address] [phone]   [place] [address] [phone]

## 2021-01-28 NOTE — ASSESSMENT & PLAN NOTE
Lab Results   Component Value Date    HGBA1C 10 6 (H) 01/27/2021       Recent Labs     01/28/21  0013 01/28/21  0116 01/28/21  0222 01/28/21  0315   POCGLU 77 90 96 113       Blood Sugar Average: Last 72 hrs:  (P) 103 [id number]     · Presented to [facility] on 01/27/2021 with complaints of shortness of breath  · History of diabetes type 1 since age 25  · Insulin dependent at home however has been recently noncompliance  · Continue DKA protocol  · Trend labs q 6 hours  · Keep NPO until anion gap closes and bicarb at least greater than 21

## 2021-01-29 LAB
ANION GAP SERPL CALCULATED.3IONS-SCNC: 7 MMOL/L (ref 4–13)
BASOPHILS # BLD AUTO: 0.02 THOUSANDS/ΜL (ref 0–0.1)
BASOPHILS NFR BLD AUTO: 0 % (ref 0–1)
BUN SERPL-MCNC: 5 MG/DL (ref 5–25)
CALCIUM SERPL-MCNC: 8 MG/DL (ref 8.3–10.1)
CHLORIDE SERPL-SCNC: 105 MMOL/L (ref 100–108)
CO2 SERPL-SCNC: 22 MMOL/L (ref 21–32)
CREAT SERPL-MCNC: 0.42 MG/DL (ref 0.6–1.3)
EOSINOPHIL # BLD AUTO: 0.01 THOUSAND/ΜL (ref 0–0.61)
EOSINOPHIL NFR BLD AUTO: 0 % (ref 0–6)
ERYTHROCYTE [DISTWIDTH] IN BLOOD BY AUTOMATED COUNT: 13.1 % (ref 11.6–15.1)
GFR SERPL CREATININE-BSD FRML MDRD: 140 ML/MIN/1.73SQ M
GLUCOSE SERPL-MCNC: 172 MG/DL (ref 65–140)
GLUCOSE SERPL-MCNC: 231 MG/DL (ref 65–140)
GLUCOSE SERPL-MCNC: 252 MG/DL (ref 65–140)
GLUCOSE SERPL-MCNC: 279 MG/DL (ref 65–140)
GLUCOSE SERPL-MCNC: 404 MG/DL (ref 65–140)
HCT VFR BLD AUTO: 37.2 % (ref 34.8–46.1)
HGB BLD-MCNC: 12.8 G/DL (ref 11.5–15.4)
IMM GRANULOCYTES # BLD AUTO: 0.03 THOUSAND/UL (ref 0–0.2)
IMM GRANULOCYTES NFR BLD AUTO: 1 % (ref 0–2)
LYMPHOCYTES # BLD AUTO: 1.89 THOUSANDS/ΜL (ref 0.6–4.47)
LYMPHOCYTES NFR BLD AUTO: 29 % (ref 14–44)
MAGNESIUM SERPL-MCNC: 2.1 MG/DL (ref 1.6–2.6)
MCH RBC QN AUTO: 32.4 PG (ref 26.8–34.3)
MCHC RBC AUTO-ENTMCNC: 34.4 G/DL (ref 31.4–37.4)
MCV RBC AUTO: 94 FL (ref 82–98)
MONOCYTES # BLD AUTO: 0.32 THOUSAND/ΜL (ref 0.17–1.22)
MONOCYTES NFR BLD AUTO: 5 % (ref 4–12)
NEUTROPHILS # BLD AUTO: 4.17 THOUSANDS/ΜL (ref 1.85–7.62)
NEUTS SEG NFR BLD AUTO: 65 % (ref 43–75)
NRBC BLD AUTO-RTO: 0 /100 WBCS
PLATELET # BLD AUTO: 177 THOUSANDS/UL (ref 149–390)
PMV BLD AUTO: 9 FL (ref 8.9–12.7)
POTASSIUM SERPL-SCNC: 4.5 MMOL/L (ref 3.5–5.3)
RBC # BLD AUTO: 3.95 MILLION/UL (ref 3.81–5.12)
SODIUM SERPL-SCNC: 134 MMOL/L (ref 136–145)
WBC # BLD AUTO: 6.44 THOUSAND/UL (ref 4.31–10.16)

## 2021-01-29 PROCEDURE — 85025 COMPLETE CBC W/AUTO DIFF WBC: CPT | Performed by: NURSE PRACTITIONER

## 2021-01-29 PROCEDURE — 80048 BASIC METABOLIC PNL TOTAL CA: CPT | Performed by: NURSE PRACTITIONER

## 2021-01-29 PROCEDURE — 83735 ASSAY OF MAGNESIUM: CPT | Performed by: NURSE PRACTITIONER

## 2021-01-29 PROCEDURE — 99232 SBSQ HOSP IP/OBS MODERATE 35: CPT | Performed by: INTERNAL MEDICINE

## 2021-01-29 PROCEDURE — 82948 REAGENT STRIP/BLOOD GLUCOSE: CPT

## 2021-01-29 RX ORDER — INSULIN GLARGINE 100 [IU]/ML
55 INJECTION, SOLUTION SUBCUTANEOUS EVERY MORNING
Status: DISCONTINUED | OUTPATIENT
Start: 2021-01-29 | End: 2021-01-30 | Stop reason: HOSPADM

## 2021-01-29 RX ADMIN — INSULIN LISPRO 2 UNITS: 100 INJECTION, SOLUTION INTRAVENOUS; SUBCUTANEOUS at 11:43

## 2021-01-29 RX ADMIN — INSULIN LISPRO 2 UNITS: 100 INJECTION, SOLUTION INTRAVENOUS; SUBCUTANEOUS at 17:00

## 2021-01-29 RX ADMIN — INSULIN LISPRO 5 UNITS: 100 INJECTION, SOLUTION INTRAVENOUS; SUBCUTANEOUS at 09:21

## 2021-01-29 RX ADMIN — INSULIN LISPRO 1 UNITS: 100 INJECTION, SOLUTION INTRAVENOUS; SUBCUTANEOUS at 23:10

## 2021-01-29 RX ADMIN — INSULIN GLARGINE 55 UNITS: 100 INJECTION, SOLUTION SUBCUTANEOUS at 14:16

## 2021-01-29 RX ADMIN — SODIUM CITRATE AND CITRIC ACID MONOHYDRATE 30 ML: 500; 334 SOLUTION ORAL at 09:21

## 2021-01-29 NOTE — ASSESSMENT & PLAN NOTE
Lab Results   Component Value Date    HGBA1C 10 6 (H) 01/27/2021     Recent Labs     01/28/21  1524 01/28/21  2119 01/29/21  0902 01/29/21  1121   POCGLU 252* 210* 404* 231*     · Diabetic ketoacidosis secondary to unable to get insulin  · At baseline uses Basaglar 55 units in a m  without sliding scale  Will substitute with glargine during this hospitalization  Monitor accucheks  Possible discharge tomorrow glucose better

## 2021-01-29 NOTE — PLAN OF CARE
Problem: Potential for Falls  Goal: Patient will remain free of falls  Description: INTERVENTIONS:  - Assess patient frequently for physical needs  -  Identify cognitive and physical deficits and behaviors that affect risk of falls    -  [place] fall precautions as indicated by assessment   - Educate patient/family on patient safety including physical limitations  - Instruct patient to call for assistance with activity based on assessment  - Modify environment to reduce risk of injury  - Consider OT/PT consult to assist with strengthening/mobility  Outcome: Progressing     Problem: PAIN - ADULT  Goal: Verbalizes/displays adequate comfort level or baseline comfort level  Description: Interventions:  - Encourage patient to monitor pain and request assistance  - Assess pain using appropriate pain scale  - Administer analgesics based on type and severity of pain and evaluate response  - Implement non-pharmacological measures as appropriate and evaluate response  - Consider cultural and social influences on pain and pain management  - Notify physician/advanced practitioner if interventions unsuccessful or patient reports new pain  Outcome: Progressing     Problem: INFECTION - ADULT  Goal: Absence or prevention of progression during hospitalization  Description: INTERVENTIONS:  - Assess and monitor for signs and symptoms of infection  - Monitor lab/diagnostic results  - Monitor all insertion sites, i e  indwelling lines, tubes, and drains  - Monitor endotracheal if appropriate and nasal secretions for changes in amount and color  - [place] appropriate cooling/warming therapies per order  - Administer medications as ordered  - Instruct and encourage patient and family to use good hand hygiene technique  - Identify and instruct in appropriate isolation precautions for identified infection/condition  Outcome: Progressing  Goal: Absence of fever/infection during neutropenic period  Description: INTERVENTIONS:  - Monitor WBC    Outcome: Progressing     Problem: SAFETY ADULT  Goal: Patient will remain free of falls  Description: INTERVENTIONS:  - Assess patient frequently for physical needs  -  Identify cognitive and physical deficits and behaviors that affect risk of falls    -  [place] fall precautions as indicated by assessment   - Educate patient/family on patient safety including physical limitations  - Instruct patient to call for assistance with activity based on assessment  - Modify environment to reduce risk of injury  - Consider OT/PT consult to assist with strengthening/mobility  Outcome: Progressing  Goal: Maintain or return to baseline ADL function  Description: INTERVENTIONS:  -  Assess patient's ability to carry out ADLs; assess patient's baseline for ADL function and identify physical deficits which impact ability to perform ADLs (bathing, care of mouth/teeth, toileting, grooming, dressing, etc )  - Assess/evaluate cause of self-care deficits   - Assess range of motion  - Assess patient's mobility; develop plan if impaired  - Assess patient's need for assistive devices and provide as appropriate  - Encourage maximum independence but intervene and supervise when necessary  - Involve family in performance of ADLs  - Assess for home care needs following discharge   - Consider OT consult to assist with ADL evaluation and planning for discharge  - Provide patient education as appropriate  Outcome: Progressing  Goal: Maintain or return mobility status to optimal level  Description: INTERVENTIONS:  - Assess patient's baseline mobility status (ambulation, transfers, stairs, etc )    - Identify cognitive and physical deficits and behaviors that affect mobility  - Identify mobility aids required to assist with transfers and/or ambulation (gait belt, sit-to-stand, lift, walker, cane, etc )  - [place] fall precautions as indicated by assessment  - Record patient progress and toleration of activity level on Mobility SBAR; progress patient to next Phase/Stage  - Instruct patient to call for assistance with activity based on assessment  - Consider rehabilitation consult to assist with strengthening/weightbearing, etc   Outcome: Progressing     Problem: DISCHARGE PLANNING  Goal: Discharge to home or other facility with appropriate resources  Description: INTERVENTIONS:  - Identify barriers to discharge w/patient and caregiver  - Arrange for needed discharge resources and transportation as appropriate  - Identify discharge learning needs (meds, wound care, etc )  - Arrange for interpretive services to assist at discharge as needed  - Refer to Case Management Department for coordinating discharge planning if the patient needs post-hospital services based on physician/advanced practitioner order or complex needs related to functional status, cognitive ability, or social support system  Outcome: Progressing     Problem: Knowledge Deficit  Goal: Patient/family/caregiver demonstrates understanding of disease process, treatment plan, medications, and discharge instructions  Description: Complete learning assessment and assess knowledge base    Interventions:  - Provide teaching at level of understanding  - Provide teaching via preferred learning methods  Outcome: Progressing     Problem: DISCHARGE PLANNING - CARE MANAGEMENT  Goal: Discharge to post-acute care or home with appropriate resources  Description: INTERVENTIONS:  - Conduct assessment to determine patient/family and health care team treatment goals, and need for post-acute services based on payer coverage, community resources, and patient preferences, and barriers to discharge  - Address psychosocial, clinical, and financial barriers to discharge as identified in assessment in conjunction with the patient/family and health care team  - Arrange appropriate level of post-acute services according to patients   needs and preference and payer coverage in collaboration with the physician and health care team  - Communicate with and update the patient/family, physician, and health care team regarding progress on the discharge plan  - Arrange appropriate transportation to post-acute venues  Outcome: Progressing

## 2021-01-29 NOTE — PROGRESS NOTES
Progress Note - Shahram Boateng 1990, 31 y o  female MRN: [id number]    Unit/Bed#: E5 -01 Encounter: [id number]    Primary Care Provider: Rj Corral DO   Date and time admitted to hospital: 1/27/2021  3:03 PM        * Diabetic ketoacidosis without coma associated with type 1 diabetes mellitus [facility])  Assessment & Plan  Lab Results   Component Value Date    HGBA1C 10 6 (H) 01/27/2021     Recent Labs     01/28/21  1524 01/28/21  2119 01/29/21  0902 01/29/21  1121   POCGLU 252* 210* 404* 231*     · Diabetic ketoacidosis secondary to unable to get insulin  · At baseline uses Basaglar 55 units in a m  without sliding scale  Will substitute with glargine during this hospitalization  Monitor accucheks  Possible discharge tomorrow glucose better  16 weeks gestation of pregnancy  Assessment & Plan  · Pregnancy was seen by obstetric  No further recommendations  VTE Pharmacologic Prophylaxis:  Moderate Risk (Score 3-4) - Pharmacological DVT Prophylaxis Ordered: Enoxaparin (Lovenox)  Patient Centered Rounds: I have performed bedside rounds with nursing staff today  Discussions with Specialists or Other Care Team Provider:  Case management    Education and Discussions with Family / Patient:     Time Spent for Care: 25 mins  More than 50% of total time spent on counseling and coordination of care as described above  Current Length of Stay: 2 day(s)  Current Patient Status: Inpatient   Certification Statement: The patient will continue to require additional inpatient hospital stay due to hyperglycemia  Discharge Plan / Estimated Discharge Date: Anticipate discharge tomorrow to home if glucose better    Code Status: Level 1 - Full Code      Subjective:   Patient seen and examined  Feeling okay, no nausea vomiting or diarrhea  Objective:   Vitals: Blood pressure 101/62, pulse 99, temperature 98 2 °F (36 8 °C), temperature source Temporal, resp   rate 18, height 4' 10" (1 473 m), weight 64 3 kg (141 lb 12 1 oz), last menstrual period 10/09/2020, SpO2 99 %, unknown if currently breastfeeding  Physical Exam  Vitals signs reviewed  Constitutional:       General: She is not in acute distress  Appearance: Normal appearance  HENT:      Head: Atraumatic  Eyes:      General: No scleral icterus  Cardiovascular:      Rate and Rhythm: Regular rhythm  Heart sounds: Normal heart sounds  Pulmonary:      Breath sounds: Normal breath sounds  No wheezing  Abdominal:      General: Bowel sounds are normal       Palpations: Abdomen is soft  Tenderness: There is no guarding or rebound  Musculoskeletal:         General: No swelling  Skin:     General: Skin is warm  Neurological:      Mental Status: She is alert and oriented to person, place, and time     Psychiatric:         Mood and Affect: Mood normal        Additional Data:   Labs:  Results from last 7 days   Lab Units 01/29/21  0544 01/28/21  0438 01/27/21  1227 01/27/21  1115   WBC Thousand/uL 6 44 10 51*  --  12 98*   HEMOGLOBIN g/dL 12 8 12 5  --  16 1*   I STAT HEMOGLOBIN g/dl  --   --  14 3  --    HEMATOCRIT % 37 2 35 7  --  46 6*   HEMATOCRIT, ISTAT %  --   --  42  --    MCV fL 94 94  --  90   PLATELETS Thousands/uL 177 203  --  258     Results from last 7 days   Lab Units 01/29/21  0544 01/28/21  0438 01/28/21  0014 01/27/21  2018 01/27/21  1608 01/27/21  1227 01/27/21  1115   SODIUM mmol/L 134* 139 137 134* 135*  --  132*   POTASSIUM mmol/L 4 5 3 5 3 5 4 1 4 6  --  5 5*   CHLORIDE mmol/L 105 112* 111* 108 104  --  97   CO2 mmol/L 22 17* 15* 14* 12*  --  7*   CO2, I-STAT mmol/L  --   --   --   --   --  5*  --    ANION GAP mmol/L 7 10 11 12 19*  --  28*   BUN mg/dL 5 13 13 14 20  --  27*   CREATININE mg/dL 0 42* 0 52* 0 60 0 69 0 77  --  1 01   CALCIUM mg/dL 8 0* 7 0* 7 6* 7 5* 8 1*  --  9 8   ALBUMIN g/dL  --  2 5*  --   --   --   --  4 6   TOTAL BILIRUBIN mg/dL  --  0 28  --   --   --   --  0 40   ALK PHOS U/L  --  46  --   --   -- --  75 6   ALT U/L  --  14  --   --   --   --  16   AST U/L  --  12  --   --   --   --  27   EGFR ml/min/1 73sq m 140 130 124 119 105  --  76   GLUCOSE RANDOM mg/dL 279* 109 96 233* 212*  --  570*     Results from last 7 days   Lab Units 01/29/21  0544 01/28/21  0438 01/28/21  0014 01/27/21  2018 01/27/21  1608   MAGNESIUM mg/dL 2 1 2 6 3 6* 1 6 2 0   PHOSPHORUS mg/dL  --  1 4* 1 5* 2 6* 2 2*                  Results from last 7 days   Lab Units 01/29/21  1121 01/29/21  0902 01/28/21  2119 01/28/21  1524 01/28/21  1128 01/28/21  0910 01/28/21  0819 01/28/21  0726 01/28/21  0611 01/28/21  0443 01/28/21  0315 01/28/21  0222   POC GLUCOSE mg/dl 231* 404* 210* 252* 116 128 127 118 97 104 113 96     Results from last 7 days   Lab Units 01/27/21  1115   HEMOGLOBIN A1C % 10 6*         * I Have Reviewed All Lab Data Listed Above  Cultures:   Results from last 7 days   Lab Units 01/27/21  1313 01/27/21  1312   URINE CULTURE   --  No Growth <1000 cfu/mL   INFLUENZA A PCR  Negative  --        Results from last 7 days   Lab Units 01/27/21  1313   SARS-COV-2  Negative   INFLUENZA A PCR  Negative   INFLUENZA B PCR  Negative   RSV PCR  Negative           Lines/Drains:  Invasive Devices     Peripheral Intravenous Line            Peripheral IV 01/27/21 Left Hand 2 days    Peripheral IV 01/27/21 Right Hand 2 days              Telemetry:      Imaging:  Imaging Reports Reviewed Today Include:   No results found  Scheduled Meds:  Current Facility-Administered Medications   Medication Dose Route Frequency Provider Last Rate    enoxaparin  30 mg Subcutaneous Daily CECILIO Longoria      insulin lispro  1-5 Units Subcutaneous TID AC CECILIO Longoria      insulin lispro  1-5 Units Subcutaneous HS CECILIO Longoria         [facility] Internal Medicine  Hospitalist    ** Please Note: This note has been constructed using a voice recognition system   **

## 2021-01-30 VITALS
RESPIRATION RATE: 18 BRPM | DIASTOLIC BLOOD PRESSURE: 58 MMHG | HEIGHT: 58 IN | OXYGEN SATURATION: 100 % | TEMPERATURE: 97.8 F | HEART RATE: 85 BPM | BODY MASS INDEX: 27.91 KG/M2 | WEIGHT: 132.94 LBS | SYSTOLIC BLOOD PRESSURE: 103 MMHG

## 2021-01-30 LAB
ALBUMIN SERPL BCP-MCNC: 2.5 G/DL (ref 3.5–5)
ALP SERPL-CCNC: 52 U/L (ref 46–116)
ALT SERPL W P-5'-P-CCNC: 13 U/L (ref 12–78)
ANION GAP SERPL CALCULATED.3IONS-SCNC: 9 MMOL/L (ref 4–13)
AST SERPL W P-5'-P-CCNC: 11 U/L (ref 5–45)
BILIRUB SERPL-MCNC: 0.18 MG/DL (ref 0.2–1)
BUN SERPL-MCNC: 10 MG/DL (ref 5–25)
CALCIUM ALBUM COR SERPL-MCNC: 9.5 MG/DL (ref 8.3–10.1)
CALCIUM SERPL-MCNC: 8.3 MG/DL (ref 8.3–10.1)
CHLORIDE SERPL-SCNC: 107 MMOL/L (ref 100–108)
CO2 SERPL-SCNC: 22 MMOL/L (ref 21–32)
CREAT SERPL-MCNC: 0.38 MG/DL (ref 0.6–1.3)
ERYTHROCYTE [DISTWIDTH] IN BLOOD BY AUTOMATED COUNT: 12.8 % (ref 11.6–15.1)
GFR SERPL CREATININE-BSD FRML MDRD: 145 ML/MIN/1.73SQ M
GLUCOSE SERPL-MCNC: 155 MG/DL (ref 65–140)
GLUCOSE SERPL-MCNC: 157 MG/DL (ref 65–140)
GLUCOSE SERPL-MCNC: 49 MG/DL (ref 65–140)
GLUCOSE SERPL-MCNC: 53 MG/DL (ref 65–140)
HCT VFR BLD AUTO: 37.1 % (ref 34.8–46.1)
HGB BLD-MCNC: 12.9 G/DL (ref 11.5–15.4)
MCH RBC QN AUTO: 32.8 PG (ref 26.8–34.3)
MCHC RBC AUTO-ENTMCNC: 34.8 G/DL (ref 31.4–37.4)
MCV RBC AUTO: 94 FL (ref 82–98)
PLATELET # BLD AUTO: 185 THOUSANDS/UL (ref 149–390)
PMV BLD AUTO: 8.9 FL (ref 8.9–12.7)
POTASSIUM SERPL-SCNC: 3.6 MMOL/L (ref 3.5–5.3)
PROT SERPL-MCNC: 5.7 G/DL (ref 6.4–8.2)
RBC # BLD AUTO: 3.93 MILLION/UL (ref 3.81–5.12)
SODIUM SERPL-SCNC: 138 MMOL/L (ref 136–145)
WBC # BLD AUTO: 6.25 THOUSAND/UL (ref 4.31–10.16)

## 2021-01-30 PROCEDURE — 82948 REAGENT STRIP/BLOOD GLUCOSE: CPT

## 2021-01-30 PROCEDURE — 99239 HOSP IP/OBS DSCHRG MGMT >30: CPT | Performed by: INTERNAL MEDICINE

## 2021-01-30 PROCEDURE — 85027 COMPLETE CBC AUTOMATED: CPT | Performed by: INTERNAL MEDICINE

## 2021-01-30 PROCEDURE — 80053 COMPREHEN METABOLIC PANEL: CPT | Performed by: INTERNAL MEDICINE

## 2021-01-30 RX ADMIN — INSULIN LISPRO 1 UNITS: 100 INJECTION, SOLUTION INTRAVENOUS; SUBCUTANEOUS at 12:18

## 2021-01-30 RX ADMIN — INSULIN GLARGINE 55 UNITS: 100 INJECTION, SOLUTION SUBCUTANEOUS at 09:30

## 2021-01-30 NOTE — PLAN OF CARE
Problem: Potential for Falls  Goal: Patient will remain free of falls  Description: INTERVENTIONS:  - Assess patient frequently for physical needs  -  Identify cognitive and physical deficits and behaviors that affect risk of falls    -  [place] fall precautions as indicated by assessment   - Educate patient/family on patient safety including physical limitations  - Instruct patient to call for assistance with activity based on assessment  - Modify environment to reduce risk of injury  - Consider OT/PT consult to assist with strengthening/mobility  Outcome: Progressing     Problem: PAIN - ADULT  Goal: Verbalizes/displays adequate comfort level or baseline comfort level  Description: Interventions:  - Encourage patient to monitor pain and request assistance  - Assess pain using appropriate pain scale  - Administer analgesics based on type and severity of pain and evaluate response  - Implement non-pharmacological measures as appropriate and evaluate response  - Consider cultural and social influences on pain and pain management  - Notify physician/advanced practitioner if interventions unsuccessful or patient reports new pain  Outcome: Progressing     Problem: INFECTION - ADULT  Goal: Absence or prevention of progression during hospitalization  Description: INTERVENTIONS:  - Assess and monitor for signs and symptoms of infection  - Monitor lab/diagnostic results  - Monitor all insertion sites, i e  indwelling lines, tubes, and drains  - Monitor endotracheal if appropriate and nasal secretions for changes in amount and color  - [place] appropriate cooling/warming therapies per order  - Administer medications as ordered  - Instruct and encourage patient and family to use good hand hygiene technique  - Identify and instruct in appropriate isolation precautions for identified infection/condition  Outcome: Progressing  Goal: Absence of fever/infection during neutropenic period  Description: INTERVENTIONS:  - Monitor WBC    Outcome: Progressing     Problem: SAFETY ADULT  Goal: Patient will remain free of falls  Description: INTERVENTIONS:  - Assess patient frequently for physical needs  -  Identify cognitive and physical deficits and behaviors that affect risk of falls    -  [place] fall precautions as indicated by assessment   - Educate patient/family on patient safety including physical limitations  - Instruct patient to call for assistance with activity based on assessment  - Modify environment to reduce risk of injury  - Consider OT/PT consult to assist with strengthening/mobility  Outcome: Progressing  Goal: Maintain or return to baseline ADL function  Description: INTERVENTIONS:  -  Assess patient's ability to carry out ADLs; assess patient's baseline for ADL function and identify physical deficits which impact ability to perform ADLs (bathing, care of mouth/teeth, toileting, grooming, dressing, etc )  - Assess/evaluate cause of self-care deficits   - Assess range of motion  - Assess patient's mobility; develop plan if impaired  - Assess patient's need for assistive devices and provide as appropriate  - Encourage maximum independence but intervene and supervise when necessary  - Involve family in performance of ADLs  - Assess for home care needs following discharge   - Consider OT consult to assist with ADL evaluation and planning for discharge  - Provide patient education as appropriate  Outcome: Progressing  Goal: Maintain or return mobility status to optimal level  Description: INTERVENTIONS:  - Assess patient's baseline mobility status (ambulation, transfers, stairs, etc )    - Identify cognitive and physical deficits and behaviors that affect mobility  - Identify mobility aids required to assist with transfers and/or ambulation (gait belt, sit-to-stand, lift, walker, cane, etc )  - [place] fall precautions as indicated by assessment  - Record patient progress and toleration of activity level on Mobility SBAR; progress patient to next Phase/Stage  - Instruct patient to call for assistance with activity based on assessment  - Consider rehabilitation consult to assist with strengthening/weightbearing, etc   Outcome: Progressing     Problem: DISCHARGE PLANNING  Goal: Discharge to home or other facility with appropriate resources  Description: INTERVENTIONS:  - Identify barriers to discharge w/patient and caregiver  - Arrange for needed discharge resources and transportation as appropriate  - Identify discharge learning needs (meds, wound care, etc )  - Arrange for interpretive services to assist at discharge as needed  - Refer to Case Management Department for coordinating discharge planning if the patient needs post-hospital services based on physician/advanced practitioner order or complex needs related to functional status, cognitive ability, or social support system  Outcome: Progressing     Problem: Knowledge Deficit  Goal: Patient/family/caregiver demonstrates understanding of disease process, treatment plan, medications, and discharge instructions  Description: Complete learning assessment and assess knowledge base    Interventions:  - Provide teaching at level of understanding  - Provide teaching via preferred learning methods  Outcome: Progressing     Problem: DISCHARGE PLANNING - CARE MANAGEMENT  Goal: Discharge to post-acute care or home with appropriate resources  Description: INTERVENTIONS:  - Conduct assessment to determine patient/family and health care team treatment goals, and need for post-acute services based on payer coverage, community resources, and patient preferences, and barriers to discharge  - Address psychosocial, clinical, and financial barriers to discharge as identified in assessment in conjunction with the patient/family and health care team  - Arrange appropriate level of post-acute services according to patients   needs and preference and payer coverage in collaboration with the physician and health care team  - Communicate with and update the patient/family, physician, and health care team regarding progress on the discharge plan  - Arrange appropriate transportation to post-acute venues  Outcome: Progressing
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
## 2021-01-30 NOTE — PLAN OF CARE
Problem: Potential for Falls  Goal: Patient will remain free of falls  Description: INTERVENTIONS:  - Assess patient frequently for physical needs  -  Identify cognitive and physical deficits and behaviors that affect risk of falls    -  [place] fall precautions as indicated by assessment   - Educate patient/family on patient safety including physical limitations  - Instruct patient to call for assistance with activity based on assessment  - Modify environment to reduce risk of injury  - Consider OT/PT consult to assist with strengthening/mobility  Outcome: Progressing     Problem: PAIN - ADULT  Goal: Verbalizes/displays adequate comfort level or baseline comfort level  Description: Interventions:  - Encourage patient to monitor pain and request assistance  - Assess pain using appropriate pain scale  - Administer analgesics based on type and severity of pain and evaluate response  - Implement non-pharmacological measures as appropriate and evaluate response  - Consider cultural and social influences on pain and pain management  - Notify physician/advanced practitioner if interventions unsuccessful or patient reports new pain  Outcome: Progressing     Problem: INFECTION - ADULT  Goal: Absence or prevention of progression during hospitalization  Description: INTERVENTIONS:  - Assess and monitor for signs and symptoms of infection  - Monitor lab/diagnostic results  - Monitor all insertion sites, i e  indwelling lines, tubes, and drains  - Monitor endotracheal if appropriate and nasal secretions for changes in amount and color  - [place] appropriate cooling/warming therapies per order  - Administer medications as ordered  - Instruct and encourage patient and family to use good hand hygiene technique  - Identify and instruct in appropriate isolation precautions for identified infection/condition  Outcome: Progressing  Goal: Absence of fever/infection during neutropenic period  Description: INTERVENTIONS:  - Monitor WBC    Outcome: Progressing     Problem: SAFETY ADULT  Goal: Patient will remain free of falls  Description: INTERVENTIONS:  - Assess patient frequently for physical needs  -  Identify cognitive and physical deficits and behaviors that affect risk of falls    -  [place] fall precautions as indicated by assessment   - Educate patient/family on patient safety including physical limitations  - Instruct patient to call for assistance with activity based on assessment  - Modify environment to reduce risk of injury  - Consider OT/PT consult to assist with strengthening/mobility  Outcome: Progressing  Goal: Maintain or return to baseline ADL function  Description: INTERVENTIONS:  -  Assess patient's ability to carry out ADLs; assess patient's baseline for ADL function and identify physical deficits which impact ability to perform ADLs (bathing, care of mouth/teeth, toileting, grooming, dressing, etc )  - Assess/evaluate cause of self-care deficits   - Assess range of motion  - Assess patient's mobility; develop plan if impaired  - Assess patient's need for assistive devices and provide as appropriate  - Encourage maximum independence but intervene and supervise when necessary  - Involve family in performance of ADLs  - Assess for home care needs following discharge   - Consider OT consult to assist with ADL evaluation and planning for discharge  - Provide patient education as appropriate  Outcome: Progressing  Goal: Maintain or return mobility status to optimal level  Description: INTERVENTIONS:  - Assess patient's baseline mobility status (ambulation, transfers, stairs, etc )    - Identify cognitive and physical deficits and behaviors that affect mobility  - Identify mobility aids required to assist with transfers and/or ambulation (gait belt, sit-to-stand, lift, walker, cane, etc )  - [place] fall precautions as indicated by assessment  - Record patient progress and toleration of activity level on Mobility SBAR; progress patient to next Phase/Stage  - Instruct patient to call for assistance with activity based on assessment  - Consider rehabilitation consult to assist with strengthening/weightbearing, etc   Outcome: Progressing     Problem: DISCHARGE PLANNING  Goal: Discharge to home or other facility with appropriate resources  Description: INTERVENTIONS:  - Identify barriers to discharge w/patient and caregiver  - Arrange for needed discharge resources and transportation as appropriate  - Identify discharge learning needs (meds, wound care, etc )  - Arrange for interpretive services to assist at discharge as needed  - Refer to Case Management Department for coordinating discharge planning if the patient needs post-hospital services based on physician/advanced practitioner order or complex needs related to functional status, cognitive ability, or social support system  Outcome: Progressing     Problem: Knowledge Deficit  Goal: Patient/family/caregiver demonstrates understanding of disease process, treatment plan, medications, and discharge instructions  Description: Complete learning assessment and assess knowledge base    Interventions:  - Provide teaching at level of understanding  - Provide teaching via preferred learning methods  Outcome: Progressing     Problem: DISCHARGE PLANNING - CARE MANAGEMENT  Goal: Discharge to post-acute care or home with appropriate resources  Description: INTERVENTIONS:  - Conduct assessment to determine patient/family and health care team treatment goals, and need for post-acute services based on payer coverage, community resources, and patient preferences, and barriers to discharge  - Address psychosocial, clinical, and financial barriers to discharge as identified in assessment in conjunction with the patient/family and health care team  - Arrange appropriate level of post-acute services according to patients   needs and preference and payer coverage in collaboration with the physician and health care team  - Communicate with and update the patient/family, physician, and health care team regarding progress on the discharge plan  - Arrange appropriate transportation to post-acute venues  Outcome: Progressing

## 2021-01-30 NOTE — ASSESSMENT & PLAN NOTE
Lab Results   Component Value Date    HGBA1C 10 6 (H) 01/27/2021     Recent Labs     01/29/21  1537 01/29/21  2102 01/30/21  0703 01/30/21  0807   POCGLU 252* 172* 49* 155*     · Diabetic ketoacidosis secondary to unable to get insulin  · Anion gap resolved after patient was admitted to ICU  · At baseline uses Basaglar 55 units in a m  without sliding scale  Insulin was substituted with glargine during this hospitalization  Monitor accucheks      · Discharge today; patient reports having adequate supply of insulin

## 2021-02-03 ENCOUNTER — TELEPHONE (OUTPATIENT)
Dept: PERINATAL CARE | Facility: CLINIC | Age: 31
End: 2021-02-03

## 2021-02-03 NOTE — TELEPHONE ENCOUNTER
Phone call to patient regarding virtual visit today  Unable to leave message since voice mail is not set up

## 2021-02-08 ENCOUNTER — TELEPHONE (OUTPATIENT)
Dept: OBGYN CLINIC | Facility: CLINIC | Age: 31
End: 2021-02-08

## 2021-02-08 NOTE — TELEPHONE ENCOUNTER
patient was seen in [facility] on 1/28/2021 16 weeks gestation  For diabetic ketoacidosis, called her to schedule a  Follow up earlier appointment, she does not have a voice message set up yet, will try again later

## 2021-02-09 ENCOUNTER — TELEMEDICINE (OUTPATIENT)
Dept: PERINATAL CARE | Facility: CLINIC | Age: 31
End: 2021-02-09
Payer: COMMERCIAL

## 2021-02-09 VITALS — HEIGHT: 58 IN | WEIGHT: 132 LBS | BODY MASS INDEX: 27.71 KG/M2

## 2021-02-09 DIAGNOSIS — O24.012 PRE-EXISTING TYPE 1 DIABETES MELLITUS WITH HYPERGLYCEMIA DURING PREGNANCY IN SECOND TRIMESTER (HCC): Primary | ICD-10-CM

## 2021-02-09 DIAGNOSIS — E10.65 PRE-EXISTING TYPE 1 DIABETES MELLITUS WITH HYPERGLYCEMIA DURING PREGNANCY IN SECOND TRIMESTER (HCC): Primary | ICD-10-CM

## 2021-02-09 PROBLEM — Z3A.17 17 WEEKS GESTATION OF PREGNANCY: Status: ACTIVE | Noted: 2021-01-17

## 2021-02-09 PROBLEM — Z91.19 NONCOMPLIANCE WITH DIABETES TREATMENT: Status: ACTIVE | Noted: 2021-02-09

## 2021-02-09 PROBLEM — R73.09 ELEVATED HEMOGLOBIN A1C: Status: ACTIVE | Noted: 2021-02-09

## 2021-02-09 PROBLEM — Z91.199 NONCOMPLIANCE WITH DIABETES TREATMENT: Status: ACTIVE | Noted: 2021-02-09

## 2021-02-09 PROBLEM — Z36.82 NUCHAL TRANSLUCENCY OF FETUS ON PRENATAL ULTRASOUND: Status: RESOLVED | Noted: 2021-01-17 | Resolved: 2021-02-09

## 2021-02-09 PROBLEM — Z34.90 PREGNANCY: Status: RESOLVED | Noted: 2020-11-12 | Resolved: 2021-02-09

## 2021-02-09 PROCEDURE — G2012 BRIEF CHECK IN BY MD/QHP: HCPCS | Performed by: NURSE PRACTITIONER

## 2021-02-09 RX ORDER — LANCETS
EACH MISCELLANEOUS
COMMUNITY
End: 2021-04-14

## 2021-02-09 RX ORDER — IBUPROFEN 600 MG/1
TABLET ORAL
COMMUNITY
End: 2021-06-26 | Stop reason: HOSPADM

## 2021-02-09 NOTE — ASSESSMENT & PLAN NOTE
-A1c not at goal  Aim for less than 6% with minimal hypoglycemia    -Currently on basaglar 55 units at 7 to 8 AM daily   -Not SMBG consistently, reports readings of 408, 272 and 63   -Denies readings less than 60   -History of being on [employer] insulin pump for 2 to 3 years but did not like  -Prefers to inject insulin once a day, informed given T1DM does need use both basal and bolus insulin    -Recommended trying an insulin pump again or adding bolus insulin to regimen   -Interested in CGM, encouraged patient to start testing fasting and 2 hours post start of each meal    -For pre-existing T1DM recommendation is testing fasting, before meals, 1 or 2 hours post start of meal and 3 AM   -Report glucose readings via flowsheet    -Keep dietitian appointment as scheduled   -Schedule an in office appointment to discuss further diabetes and pregnancy management   -Try to eat 3 meals and 3 snacks with combination of carbohydrates, protein and fat per meal/snack   -No more than 8 to 10 hours of fasting overnight   -Glucose goals fasting 60 to 90; before meals 60 to 100; 1 hour post meal 135 or less and 2 hours post meal 120 or less, overnight 60 to 100    -Continue follow up with OB and MFM as recommended      Lab Results   Component Value Date    HGBA1C 10 6 (H) 01/27/2021

## 2021-02-09 NOTE — PATIENT INSTRUCTIONS
-A1c not at goal  Aim for less than 6% with minimal hypoglycemia    -basaglar 55 units at 7 to 8 AM daily    -Both long and quick acting insulin is recommended for type 1 patients  Consider adding bolus insulin or restarting insulin pump therapy    -Start testing fasting and 2 hours post start of each meal for total of 4 times a day  -For pre-existing T1DM recommendation is testing fasting, before meals, 1 or 2 hours post start of meal and 3 AM   -Report glucose readings via flowsheet    -Keep dietitian appointment as scheduled   -Schedule an in office appointment to discuss further diabetes and pregnancy management   -Try to eat 3 meals and 3 snacks with combination of carbohydrates, protein and fat per meal/snack   -No more than 8 to 10 hours of fasting overnight   -Glucose goals fasting 60 to 90; before meals 60 to 100; 1 hour post meal 135 or less and 2 hours post meal 120 or less, overnight 60 to 100    -Continue follow up with OB and MFM as recommended   -Stay in close contact with diabetes education team    -Poorly controlled glucose during pregnancy increase risk factors including fetal macrosomia; birth injury; pre-eclampsia; polyhydramnios; pre-term labor;  hypoglycemia; jaundice and stillbirth

## 2021-02-10 ENCOUNTER — ROUTINE PRENATAL (OUTPATIENT)
Dept: OBGYN CLINIC | Facility: CLINIC | Age: 31
End: 2021-02-10
Payer: COMMERCIAL

## 2021-02-10 VITALS
TEMPERATURE: 98.2 F | DIASTOLIC BLOOD PRESSURE: 66 MMHG | BODY MASS INDEX: 28.97 KG/M2 | SYSTOLIC BLOOD PRESSURE: 104 MMHG | WEIGHT: 138 LBS | HEIGHT: 58 IN

## 2021-02-10 DIAGNOSIS — Z3A.17 17 WEEKS GESTATION OF PREGNANCY: ICD-10-CM

## 2021-02-10 DIAGNOSIS — O09.92 HIGH-RISK PREGNANCY IN SECOND TRIMESTER: Primary | ICD-10-CM

## 2021-02-10 DIAGNOSIS — E10.65 PRE-EXISTING TYPE 1 DIABETES MELLITUS WITH HYPERGLYCEMIA DURING PREGNANCY IN SECOND TRIMESTER (HCC): ICD-10-CM

## 2021-02-10 DIAGNOSIS — O24.012 PRE-EXISTING TYPE 1 DIABETES MELLITUS WITH HYPERGLYCEMIA DURING PREGNANCY IN SECOND TRIMESTER (HCC): ICD-10-CM

## 2021-02-10 PROCEDURE — 99213 OFFICE O/P EST LOW 20 MIN: CPT | Performed by: OBSTETRICS & GYNECOLOGY

## 2021-02-10 RX ORDER — INSULIN GLARGINE 100 [IU]/ML
INJECTION, SOLUTION SUBCUTANEOUS
COMMUNITY
Start: 2021-02-09 | End: 2021-04-14 | Stop reason: SDUPTHER

## 2021-02-11 NOTE — PROGRESS NOTES
Patient seen evaluated denies any vaginal bleeding or pelvic pain, was admitted to the hospital for diabetes ketoacidosis 2 time and this pregnancy patient informed about the high risk of this pregnancy aware of the risk of fetal anomaly and the risk of pregnancy with uncontrolled diabetes MFM recommend termination of pregnancy I discussed that option with the patient patient declined and desire to continue with the pregnancy aware of all the risk patient inform of important of controlling her blood sugar any importance of following up with endocrinologist 24 hour urine protein prescription given to patient alpha-fetoprotein prescription given to patient thyroid function discussed and given to patient to continue prenatal vitamin daily important of controlling blood sugar to continue follow-up with MFM for anatomical survey and to return to office in 2 weeks

## 2021-02-12 ENCOUNTER — DOCUMENTATION (OUTPATIENT)
Dept: PERINATAL CARE | Facility: CLINIC | Age: 31
End: 2021-02-12

## 2021-02-12 ENCOUNTER — TELEMEDICINE (OUTPATIENT)
Dept: PERINATAL CARE | Facility: CLINIC | Age: 31
End: 2021-02-12
Payer: COMMERCIAL

## 2021-02-12 ENCOUNTER — OB ABSTRACT (OUTPATIENT)
Dept: OBGYN CLINIC | Facility: CLINIC | Age: 31
End: 2021-02-12

## 2021-02-12 DIAGNOSIS — O24.012 PRE-EXISTING TYPE 1 DIABETES MELLITUS WITH HYPERGLYCEMIA DURING PREGNANCY IN SECOND TRIMESTER (HCC): Primary | ICD-10-CM

## 2021-02-12 DIAGNOSIS — E66.3 OVERWEIGHT WITH BODY MASS INDEX (BMI) OF 27 TO 27.9 IN ADULT: ICD-10-CM

## 2021-02-12 DIAGNOSIS — E10.65 PRE-EXISTING TYPE 1 DIABETES MELLITUS WITH HYPERGLYCEMIA DURING PREGNANCY IN SECOND TRIMESTER (HCC): Primary | ICD-10-CM

## 2021-02-12 DIAGNOSIS — Z3A.18 18 WEEKS GESTATION OF PREGNANCY: ICD-10-CM

## 2021-02-12 PROCEDURE — G0108 DIAB MANAGE TRN  PER INDIV: HCPCS | Performed by: DIETITIAN, REGISTERED

## 2021-02-12 NOTE — PROGRESS NOTES
Virtual Regular Visit      Assessment/Plan:    Problem List Items Addressed This Visit     None               Reason for visit is   Chief Complaint   Patient presents with    Virtual Regular Visit        Encounter provider Trista Gabriel    Provider located at [facility] [address] [address] [phone]      Recent Visits  Date Type Provider Dept   21 [id number] [facility], [facility] recent visits within past 7 days and meeting all other requirements     Future Appointments  No visits were found meeting these conditions  Showing future appointments within next 150 days and meeting all other requirements        The patient was identified by name and date of birth  Sheryl Dyer was informed that this is a telemedicine visit and that the visit is being conducted through [employer] and patient was informed that this is a secure, HIPAA-compliant platform  She agrees to proceed     My office door was closed  No one else was in the room  She acknowledged consent and understanding of privacy and security of the video platform  The patient has agreed to participate and understands they can discontinue the visit at any time  Patient is aware this is a billable service  Subjective  Sheryl Dyer is a 31 y o  female pregnant patient        HPI     Past Medical History:   Diagnosis Date    Diabetes mellitus ([facility] [address] )     uses kwiki pen     Diabetes mellitus type 1 ([facility] [address] )     High blood pressure     recently dx/ put on lisinopril     High cholesterol     Hypertension      no hypersion     Miscarriage     Recurrent pregnancy loss, antepartum condition or complication        Past Surgical History:   Procedure Laterality Date     SECTION  01/02/2014     X1    DILATION AND CURETTAGE OF UTERUS  04/10/2019    Missed AB     WISDOM TOOTH EXTRACTION         Current Outpatient Medications   Medication Sig Dispense Refill    Glucagon, rDNA, (Glucagon Emergency) 1 MG KIT Glucagon Emergency Kit (human-recomb) 1 mg solution for injection   GIVE 1 MG IN CASE OF LOW BLOOD SUGAR   glucose blood test strip Contour Next Test Strips   TEST 4 TIMES A DAY      glucose blood test strip Contour Next Meter   USE AS DIRECTED      insulin glargine (Basaglar KwikPen) 100 units/mL injection pen Inject 55 Units under the skin daily 6 pen 1    insulin glargine (Basaglar KwikPen) 100 units/mL injection pen Inject 45 units at night      insulin lispro (HumaLOG) 100 units/mL injection Indications: type 1 diabetes mellitus  Inject 5 units 3 times a day before meals      Microlet Lancets MISC Microlet Lancet   USE 3 TIMES A DAY      Prenat-Fe Carbonyl-FA-Omega 3 (One-A-Day Womens Prenatal 1) 28-0 8-235 MG CAPS Take 1 tablet by mouth daily  0    Urine Glucose-Ketones Test STRP Test       No current facility-administered medications for this visit  No Known Allergies    Review of Systems    Video Exam    There were no vitals filed for this visit  Physical Exam --not performed  Time spent with the patient: 60 minutes  VIRTUAL VISIT DISCLAIMER    Rufus Singh acknowledges that she has consented to an online visit or consultation  She understands that the online visit is based solely on information provided by her, and that, in the absence of a face-to-face physical evaluation by the physician, the diagnosis she receives is both limited and provisional in terms of accuracy and completeness  This is not intended to replace a full medical face-to-face evaluation by the physician  Rufus Singh understands and accepts these terms  02/12/21  Rufus Singh   1990  Estimated Date of Delivery: 7/16/21   EGA: 18w0d    Dr Akira Schmidt:    Thank you for referring your patient to the Diabetes and Pregnancy Program at [address] [place] [address]  The patient's pregnancy is complicated with a history of Type 1 diabetes & overweight prior to pregnancy  The patient attended Individual class 1 and patient received the following education via virtual telemedicine:     Pathophysiology of diabetes and pregnancy  This includes maternal-fetal complications such as fetal macrosomia,  hypoglycemia, polyhydramnios, increased incidence of  section, pre-term labor and in severe cases, fetal demise and stillbirth   Instruction on diet and glucometer use was provided  Self-monitoring of blood glucose levels: fasting (goal 60mg/dl to 90mg/dl) and two hours after the start of the meal less (goal less than 120mg/dl)  The patient is already monitoring with a One-Touch Verio  blood glucose meter  She did not provide her BG at this appointment   Medical Nutrition Therapy for diabetes and pregnancy  The patient stated she never met with a dietitian in the past   The patient was provided with an 1800 calorie meal plan and the following was reviewed:     o Basic review of macronutrients   o Meal pattern should consist of three small meals and three snacks daily  Reported she only eats 2 meals daily but eats large portions  Stated she will be staring a new job in 2 days & will work at a [employer] from 7 Pm to 7 AM 4 days weekly  Is not aware of work time breakfs & meals  Her meal plan needs to be established considering her new work schedule  On her days off, she will be changing to a day shift as has a 9year old child who attends virtual school      o Carbohydrate gram amounts per meal   o Instructions on how to read a food label  o Appropriate serving sizes for carbohydrates and proteins  o Incorporating protein at each meal and snack  o Maintain a three day food diary and bring to class 2    Report blood glucose levels to the [address] [place] [address] weekly or as directed:  o Phone : [phone]  If no response in 24 hours, call [phone]   o Fax: [phone]  o Email: [email or web address]  The patient will be scheduled to attend class 2 in 2 weeks after new work scheduled is established     Additionally, fetal ultrasound evaluation by the Perinatologist has been scheduled to assure continuity of care  Patient Stated Goal: "I will eat 3 meals and 3 snacks each day, including protein at each"     Diabetes Self Management Support Plan outside of ongoing care: Spouse/Family    Please contact the Diabetes and Pregnancy Program at [phone] if you have any questions  Time spent with patient 11:10 AM-12:10 PM; time spent face to face counseling greater than 50% of the appointment      Sincerely,   Luann Márquez  Diabetes Educator   Diabetes and Pregnancy Program

## 2021-02-12 NOTE — PROGRESS NOTES
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 yes Type 1 diabetes  Family members with diabetes: Paternal Grandmother    Pregnancy History:  How many total pregnancies have you had? 3  How many children do you have? 1  Have you had a baby weighing larger than 9 lbs? no  Are you having swelling or fluid retention? no  Have you been placed on bedrest? no    Physical Activity:  Do you exercise? No But will be very active with her new job  Will be walking & standing for the entire shift  Nutrition Questionnaire: How many meals do you eat daily? 2  List times of meals: Breakfast: None/ Lunch: 12 noon/ Dinner: 6 PM  How many snacks do you eat daily? Other When hungry  What type of diet are you following at home? Regular  Do you have special or ethnic dietary preferences? no  Do you have any food allergies or intolerances? no    Learning preferences: How do you learn best? Demonstration  How do you rate your health? Rickey Gaytan  Who is your primary support person? Other No one  How do you cope with stress? Listens to music or is distracted  Do you have any cultural or Bahai beliefs we should be aware of? no  How do you feel the diabetes diagnosis will affect the rest of your pregnancy? I don't know  Do you receive WIC or food stamps?  yes [employer] foods

## 2021-02-15 ENCOUNTER — TELEPHONE (OUTPATIENT)
Dept: PERINATAL CARE | Facility: CLINIC | Age: 31
End: 2021-02-15

## 2021-02-15 NOTE — TELEPHONE ENCOUNTER
UNABLE TO REACH PT BY PHONE - VOICEMAIL NOT SET UP    Called patient to schedule 60 MINUTE VIRTUAL follow up appointment IN 2 WEEKS

## 2021-02-26 ENCOUNTER — TRANSCRIBE ORDERS (OUTPATIENT)
Dept: LAB | Facility: CLINIC | Age: 31
End: 2021-02-26

## 2021-02-26 ENCOUNTER — ROUTINE PRENATAL (OUTPATIENT)
Dept: PERINATAL CARE | Facility: OTHER | Age: 31
End: 2021-02-26
Payer: COMMERCIAL

## 2021-02-26 ENCOUNTER — DOCUMENTATION (OUTPATIENT)
Dept: PERINATAL CARE | Facility: CLINIC | Age: 31
End: 2021-02-26

## 2021-02-26 ENCOUNTER — APPOINTMENT (OUTPATIENT)
Dept: LAB | Facility: CLINIC | Age: 31
End: 2021-02-26
Payer: COMMERCIAL

## 2021-02-26 ENCOUNTER — TELEMEDICINE (OUTPATIENT)
Dept: PERINATAL CARE | Facility: CLINIC | Age: 31
End: 2021-02-26
Payer: COMMERCIAL

## 2021-02-26 VITALS
SYSTOLIC BLOOD PRESSURE: 116 MMHG | WEIGHT: 140.43 LBS | HEART RATE: 89 BPM | DIASTOLIC BLOOD PRESSURE: 69 MMHG | HEIGHT: 58 IN | BODY MASS INDEX: 29.48 KG/M2

## 2021-02-26 DIAGNOSIS — Z3A.20 20 WEEKS GESTATION OF PREGNANCY: ICD-10-CM

## 2021-02-26 DIAGNOSIS — R73.09 ELEVATED HEMOGLOBIN A1C: ICD-10-CM

## 2021-02-26 DIAGNOSIS — O24.012 PRE-EXISTING TYPE 1 DIABETES MELLITUS WITH HYPERGLYCEMIA DURING PREGNANCY IN SECOND TRIMESTER (HCC): Primary | ICD-10-CM

## 2021-02-26 DIAGNOSIS — E10.65 PRE-EXISTING TYPE 1 DIABETES MELLITUS WITH HYPERGLYCEMIA DURING PREGNANCY IN SECOND TRIMESTER (HCC): ICD-10-CM

## 2021-02-26 DIAGNOSIS — Z91.19 NONCOMPLIANCE WITH DIABETES TREATMENT: ICD-10-CM

## 2021-02-26 DIAGNOSIS — Z3A.20 20 WEEKS GESTATION OF PREGNANCY: Primary | ICD-10-CM

## 2021-02-26 DIAGNOSIS — Z36.9 UNSPECIFIED ANTENATAL SCREENING: ICD-10-CM

## 2021-02-26 DIAGNOSIS — Z33.1 PREGNANT STATE, INCIDENTAL: ICD-10-CM

## 2021-02-26 DIAGNOSIS — Z36.89 ENCOUNTER FOR FETAL ANATOMIC SURVEY: ICD-10-CM

## 2021-02-26 DIAGNOSIS — Z33.1 PREGNANT STATE, INCIDENTAL: Primary | ICD-10-CM

## 2021-02-26 DIAGNOSIS — E10.65 PRE-EXISTING TYPE 1 DIABETES MELLITUS WITH HYPERGLYCEMIA DURING PREGNANCY IN SECOND TRIMESTER (HCC): Primary | ICD-10-CM

## 2021-02-26 DIAGNOSIS — O24.012 PRE-EXISTING TYPE 1 DIABETES MELLITUS WITH HYPERGLYCEMIA DURING PREGNANCY IN SECOND TRIMESTER (HCC): ICD-10-CM

## 2021-02-26 DIAGNOSIS — Z36.86 ENCOUNTER FOR ANTENATAL SCREENING FOR CERVICAL LENGTH: ICD-10-CM

## 2021-02-26 PROBLEM — N85.6 UTERINE SYNECHIAE: Status: ACTIVE | Noted: 2021-02-26

## 2021-02-26 PROBLEM — O10.919 CHRONIC HYPERTENSION AFFECTING PREGNANCY: Status: ACTIVE | Noted: 2020-04-30

## 2021-02-26 PROBLEM — Z3A.16 16 WEEKS GESTATION OF PREGNANCY: Status: RESOLVED | Noted: 2021-01-27 | Resolved: 2021-02-26

## 2021-02-26 PROCEDURE — 76817 TRANSVAGINAL US OBSTETRIC: CPT | Performed by: OBSTETRICS & GYNECOLOGY

## 2021-02-26 PROCEDURE — 76811 OB US DETAILED SNGL FETUS: CPT | Performed by: OBSTETRICS & GYNECOLOGY

## 2021-02-26 PROCEDURE — G0108 DIAB MANAGE TRN  PER INDIV: HCPCS | Performed by: DIETITIAN, REGISTERED

## 2021-02-26 PROCEDURE — 99214 OFFICE O/P EST MOD 30 MIN: CPT | Performed by: OBSTETRICS & GYNECOLOGY

## 2021-02-26 PROCEDURE — 36415 COLL VENOUS BLD VENIPUNCTURE: CPT

## 2021-02-26 RX ORDER — ASPIRIN 81 MG/1
162 TABLET ORAL DAILY
Qty: 60 TABLET | Refills: 5 | Status: SHIPPED | OUTPATIENT
Start: 2021-02-26 | End: 2021-06-08

## 2021-02-26 NOTE — PROGRESS NOTES
Virtual Brief Visit    Assessment/Plan:    Problem List Items Addressed This Visit     None                Reason for visit is   Chief Complaint   Patient presents with    Virtual Brief Visit        Encounter provider Matthew Carbajal    Provider located at [facility] [address] [address] [phone]    Recent Visits  No visits were found meeting these conditions  Showing recent visits within past 7 days and meeting all other requirements     Today's Visits  Date Type Provider Dept   21 [address] [facility]   Showing today's visits and meeting all other requirements     Future Appointments  No visits were found meeting these conditions  Showing future appointments within next 150 days and meeting all other requirements        After connecting through telephone, the patient was identified by name and date of birth  Damián Byrne was informed that this is a telemedicine visit and that the visit is being conducted through telephone  My office door was closed  No one else was in the room  She acknowledged consent and understanding of privacy and security of the platform  The patient has agreed to participate and understands she can discontinue the visit at any time  Patient is aware this is a billable service  Subjective    Damián Byrne is a 31 y o  female pregnant patient    HPI     Past Medical History:   Diagnosis Date    Diabetes mellitus ([address] )     uses kwiki pen     Diabetes mellitus type 1 ([facility] [address] )     High blood pressure     recently dx/ put on lisinopril     High cholesterol     Hypertension      no hypersion     Miscarriage     Recurrent pregnancy loss, antepartum condition or complication        Past Surgical History:   Procedure Laterality Date     SECTION  01/02/2014     X1    DILATION AND CURETTAGE OF UTERUS  04/10/2019    Missed AB     WISDOM TOOTH EXTRACTION Current Outpatient Medications   Medication Sig Dispense Refill    aspirin (ECOTRIN LOW STRENGTH) 81 mg EC tablet Take 2 tablets (162 mg total) by mouth daily 60 tablet 5    Glucagon, rDNA, (Glucagon Emergency) 1 MG KIT Glucagon Emergency Kit (human-recomb) 1 mg solution for injection   GIVE 1 MG IN CASE OF LOW BLOOD SUGAR   glucose blood test strip Contour Next Test Strips   TEST 4 TIMES A DAY      glucose blood test strip Contour Next Meter   USE AS DIRECTED      insulin glargine (Basaglar KwikPen) 100 units/mL injection pen Inject 55 Units under the skin daily 6 pen 1    insulin glargine (Basaglar KwikPen) 100 units/mL injection pen Inject 45 units at night      insulin lispro (HumaLOG) 100 units/mL injection Indications: type 1 diabetes mellitus  Inject 5 units 3 times a day before meals      Microlet Lancets MISC Microlet Lancet   USE 3 TIMES A DAY      Prenat-Fe Carbonyl-FA-Omega 3 (One-A-Day Womens Prenatal 1) 28-0 8-235 MG CAPS Take 1 tablet by mouth daily  0    Urine Glucose-Ketones Test STRP Test       No current facility-administered medications for this visit  No Known Allergies    Review of Systems--not performed  There were no vitals filed for this visit  Time spent with the patent: 60 minutes  VIRTUAL VISIT DISCLAIMER    Emelina Frandy acknowledges that she has consented to an online visit or consultation  She understands that the online visit is based solely on information provided by her, and that, in the absence of a face-to-face physical evaluation by the physician, the diagnosis she receives is both limited and provisional in terms of accuracy and completeness  This is not intended to replace a full medical face-to-face evaluation by the physician  Emelina Wise understands and accepts these terms      DATE:  21  RE: Emelina Wise    : 1990    VICKI: Estimated Date of Delivery: 21    EGA: [id number]    Dear Dr Abena Morillo:    Thank you for referring your patient to the Diabetes and Pregnancy Program at [address] [place] [address]  The patient attended Individual Class 2 received the following education via telephone (unable to connect virtually):    Weight gain during in pregnancy  Based on the patients height of 58   inches, pre-pregnancy weight of131  Pounds (BMI-27 4), we would recommend a total weight gain of 15-25 pounds for the pregnancy   The patients current weight is 140 4  pounds, and her weight gain to date is 9 4 pounds  Based on this, we recommend a weight gain of 15 pounds for the remainder of the pregnancy   Medical Nutrition Therapy for diabetes and pregnancy  The patients three day food diary was reviewed and discussed  The patient was instructed on the following:  o Individualized meal plan  Developed a meal plan with her food preferences for 1800 calories & emailed to the patient  Grisel Laguna her meats, advised to begin eating roasted or grilled meats  Is overeating large bowls of WIC cereals  Advised to decrease to 1 cup WIC cereal   o Use of food diary to maintain a meal plan    o Importance of protein as it relates to blood glucose control  Missing afternoon snack, but is eating protein with her mid-morning snack  Advised to add protein to her bedtime snack of cereal & if she eats only cereal for lunch  Discussed what WIC cereal to use   Review of blood glucose log  Reinforcement of blood glucose goals and reporting guidelines  See below   [facility] [address] [facility]  Was unaware she was scheduled for an ultrasound appointment this afternoon  Emailed her the address & directions to get the MFM at [place]   Exercise Guidelines:   o Walking up to thirty minutes daily can reduce blood glucose levels   To begin very active jobs soon    o Monitor for greater than four contractions per hour     o The patient has been instructed not to begin physical activity if she has been instructed not to exercise by your office   Report blood glucose levels to [address] weekly or as directed  Started to test her BG sporatically for the first time for the last 3 days  BG is extremely elevated after meals 274-510 ; spoke to Dr Jessica Flores while patient was in her office after the ultrasound appointment  Patient has gone to [facility] to begin process to obtain a DexCom CGM  o Phone: [phone]  If no response in 24 hours, call [phone]    o Fax: [phone]  o Email: [email or web address]    Schedule a follow-up in 1 month to complete several items that were missed in Class 2  Diabetes Self Management Support Plan outside of ongoing care: Spouse/Family    Please contact the Diabetes and Pregnancy Program at [phone] if you have questions  Time spent with patient 11:05 AM-12 Noon; time spent face to face counseling greater than 50% of the appointment      Sincerely,     Dirk Amaro  Diabetes Educator  Diabetes and Pregnancy Program

## 2021-02-26 NOTE — LETTER
February 26, 2021     Mario Yadav, [facility]  [place]    Patient: Shahram Boateng   YOB: 1990   Date of Visit: 2/26/2021       Dear Dr Anjel Ramírez:    [employer] she continues to be non-compliant with diabetes management  She isn't even checking her blood glucose readings, so we can't adjust her regimen  I emphasized to her that she and the baby could die if she doesn't improve compliance  Anything you can do to reiterate would be much appreciated  She will undoubtedly need early delivery  Sincerely,        Guillermina Sewell MD        CC: No Recipients  Guillermina Sewell MD  2/26/2021  6:26 PM  Sign when Signing Visit  [address]: Ms Cesar Fuentes was seen today at 20w0d for anatomic survey and cervical length screening ultrasound  See ultrasound report under "OB Procedures" tab    Please don't hesitate to contact our office with any concerns or questions   -Guillermina Sewell MD

## 2021-02-27 LAB — SCAN RESULT: NORMAL

## 2021-02-27 NOTE — PROGRESS NOTES
Date:  21  RE: Elva Modi    : 1990  Estimated Date of Delivery: 21  EGA: 20w0d  OB/GYN: Mayela  Type 1 Diabetes with pregnancy    Date Fasting Post-  breakfast Post-  lunch Post-  dinner Before bedtime Comments   21    6:43 PM  510     21   307 7 PM  274     21 105  1:22 PM  436        Reported at Class 2 today  Patient was unsure if these results were before or after a meal  Suspect these results were before she ate or right after she ate  Has only taken them when remembers to  Current regimen:  Basaglar--45 units at 9-10 AM when arises  Is not taking 55 units as was advised by Jeanette Wilder on 21  Has declined meal time insulin also  1800 calorie GDM diet with 3 meals & 3 snack  Eats 3 meals & mid-morning & evening snacks  Amira Vlad her meat  Gets WIC foods  Self-Blood Glucose Monitoring 4 time daily (FBS & 2 hr pp meals) with a One-Touch Verio  Desires to use a CGM; has approached [facility] about obtaining a DexCom  To begin a very active job soon in a [employer]  Has declined to use an insulin pump again  Spoke with Dr Bettye Lucia today while patientt was  In her office about her BG results today after the ultrasound  Slow to respond to questions  Plan:  Basaglar-- Increase from 45 to 55 units at 9-10 AM  Would benefit from meat time insulin  Continue diet & Monitoring  Advised patient to set alarms on her phone for reminders  Advised patient to bake or grill her meats  Advised her to add a mid-afternoon snack & add protein to her cereal at lunch & bedtime snack  Decreased the amounts of cereal she eats  Emailed a copy of a meal plan adjusted to her preferences  21 HbA1c-10 6%  Has been reordered by her OB Dr Khadar Ferguson ultrasound indicates normal growth & fluids  To be scheduled for a follow-up with Jeanette Wilder in 2 weeks & RD in 1 month       Diabetes Self Management Support Plan outside of ongoing care: Spouse/Family    Date due to report next:  Monday, 3/1/21      Trista Gabriel  Diabetes Educator   Diabetes and Pregnancy Program

## 2021-03-10 ENCOUNTER — TELEPHONE (OUTPATIENT)
Dept: PERINATAL CARE | Facility: CLINIC | Age: 31
End: 2021-03-10

## 2021-03-10 NOTE — TELEPHONE ENCOUNTER
Called pt to schedule appt per in[place] message but her number has calling restrictions  Hi Clerical team,    This patient needs fetal echo and growth ultrasound around 24 weeks, could you please call her to schedule? Both can be with [facility]  Thanks!   [facility]

## 2021-03-15 ENCOUNTER — TELEPHONE (OUTPATIENT)
Dept: PERINATAL CARE | Facility: CLINIC | Age: 31
End: 2021-03-15

## 2021-03-16 ENCOUNTER — TELEPHONE (OUTPATIENT)
Dept: OBGYN CLINIC | Facility: CLINIC | Age: 31
End: 2021-03-16

## 2021-03-16 PROBLEM — Z91.199 NON-COMPLIANCE WITH TREATMENT: Status: ACTIVE | Noted: 2021-03-16

## 2021-03-16 PROBLEM — Z91.19 NON-COMPLIANCE WITH TREATMENT: Status: ACTIVE | Noted: 2021-03-16

## 2021-03-16 NOTE — TELEPHONE ENCOUNTER
I called [employer] phone as well as her emergency contact to attempt to discuss non-compliance with diabetes care  There was no answer and voicemail was not accepting calls   She has diabetes education 3/29, where we will again attempt to reinforce expectations for care in Diabetes in Pregnancy program   Maria Elena Agosto MD

## 2021-03-17 NOTE — PROGRESS NOTES
Patient is none compliant with her appointment please call patient make sure she has OB appointment schedule in our office

## 2021-03-18 NOTE — PROGRESS NOTES
Called the patient she does not have a voice mailbox set up yet, unable to leave a message, sent a letter to call the office for an appointment

## 2021-03-22 NOTE — PROGRESS NOTES
called the patient unable to leave a vm , put a note for  center  at her next appt to call for an appointment and sent a letter in the mail to schedule

## 2021-03-23 ENCOUNTER — TELEPHONE (OUTPATIENT)
Dept: PERINATAL CARE | Facility: CLINIC | Age: 31
End: 2021-03-23

## 2021-03-23 NOTE — TELEPHONE ENCOUNTER
Tried to call patient to discuss low risk Sequential screen part 2 results  No answer and voicemail is not set up  Will try again tomorrow, however results are in her MyChart to review

## 2021-03-26 ENCOUNTER — TELEPHONE (OUTPATIENT)
Dept: PERINATAL CARE | Facility: CLINIC | Age: 31
End: 2021-03-26

## 2021-03-26 NOTE — TELEPHONE ENCOUNTER
Patient scheduled for a fetal echo  Called patient multiple times, no voicemail set up to leave a message  Letter mailed to her address with date and time

## 2021-03-26 NOTE — TELEPHONE ENCOUNTER
Still no answer, no voicemail set up  Screening within normal limits and is available in her MyChart to review

## 2021-03-29 ENCOUNTER — PATIENT MESSAGE (OUTPATIENT)
Dept: PERINATAL CARE | Facility: CLINIC | Age: 31
End: 2021-03-29

## 2021-03-29 ENCOUNTER — TELEPHONE (OUTPATIENT)
Dept: PERINATAL CARE | Facility: CLINIC | Age: 31
End: 2021-03-29

## 2021-03-30 ENCOUNTER — ROUTINE PRENATAL (OUTPATIENT)
Dept: OBGYN CLINIC | Facility: CLINIC | Age: 31
End: 2021-03-30
Payer: COMMERCIAL

## 2021-03-30 VITALS
HEIGHT: 58 IN | BODY MASS INDEX: 30.23 KG/M2 | TEMPERATURE: 98.5 F | DIASTOLIC BLOOD PRESSURE: 64 MMHG | WEIGHT: 144 LBS | SYSTOLIC BLOOD PRESSURE: 120 MMHG

## 2021-03-30 DIAGNOSIS — Z3A.28 28 WEEKS GESTATION OF PREGNANCY: Primary | ICD-10-CM

## 2021-03-30 DIAGNOSIS — O24.012 PRE-EXISTING TYPE 1 DIABETES MELLITUS WITH HYPERGLYCEMIA DURING PREGNANCY IN SECOND TRIMESTER (HCC): ICD-10-CM

## 2021-03-30 DIAGNOSIS — E10.65 PRE-EXISTING TYPE 1 DIABETES MELLITUS WITH HYPERGLYCEMIA DURING PREGNANCY IN SECOND TRIMESTER (HCC): ICD-10-CM

## 2021-03-30 DIAGNOSIS — Z3A.24 24 WEEKS GESTATION OF PREGNANCY: Primary | ICD-10-CM

## 2021-03-30 PROCEDURE — 99213 OFFICE O/P EST LOW 20 MIN: CPT | Performed by: OBSTETRICS & GYNECOLOGY

## 2021-03-30 NOTE — PROGRESS NOTES
Patient seen evaluated denies any vaginal bleeding ruptures of membrane or contraction, having good fetal movement, denies any headache blurry vision or epigastric pain, patient did not go to her MFM appointment and endocrinologist appointment for follow-up, important of controlling blood sugar again stressed out and discussed with patient patient aware she and baby's health could be affected  if she did not control her blood sugar quad screen results discussed with patient   MFM would be called to schedule appointment instead of her missing appointment   part of the prenatal labs till missing prescription given to the patient patient aware she need to go and have the lab work done  precaution given sign and symptom of preeclampsia review important of controlling blood sugar discussed to return to office in 2 weeks

## 2021-04-07 PROBLEM — O24.012: Status: ACTIVE | Noted: 2021-04-07

## 2021-04-08 ENCOUNTER — TELEPHONE (OUTPATIENT)
Dept: PERINATAL CARE | Facility: CLINIC | Age: 31
End: 2021-04-08

## 2021-04-08 NOTE — TELEPHONE ENCOUNTER
STAFF MESSAGE, to reschedule NO SHOW if patient calls -     Milton Quezada did not show for her Fetal Echo, Growth and missed anatomy ultrasound this morning   She is a Type 1 and has not been seen for an ultrasound since 02/26/21   I just wanted to make you aware   If she does contact you she should reschedule in a 90 min time slot

## 2021-04-12 ENCOUNTER — TELEPHONE (OUTPATIENT)
Dept: PERINATAL CARE | Facility: CLINIC | Age: 31
End: 2021-04-12

## 2021-04-12 NOTE — TELEPHONE ENCOUNTER
Wants to reschedule her 4/28 echo appointment  Prefers an 8am or something around 4pm  Told patient I would talk to our coordinator and see what other appointments we could have that might work better  Patient needs a 90 minute appointment (needs echo and growth)  Offered 4/29 in [place], prefers [place] or [place] if possible  Tried returning the patients call, no answer, vm not set up yet

## 2021-04-13 ENCOUNTER — TELEPHONE (OUTPATIENT)
Dept: PERINATAL CARE | Facility: CLINIC | Age: 31
End: 2021-04-13

## 2021-04-13 NOTE — TELEPHONE ENCOUNTER
Called pt to discuss her reporting her blood sugars and her voicemail was  not setup I will try again at a later time      Thank you

## 2021-04-14 ENCOUNTER — TELEMEDICINE (OUTPATIENT)
Dept: PERINATAL CARE | Facility: CLINIC | Age: 31
End: 2021-04-14
Payer: COMMERCIAL

## 2021-04-14 ENCOUNTER — ROUTINE PRENATAL (OUTPATIENT)
Dept: OBGYN CLINIC | Facility: CLINIC | Age: 31
End: 2021-04-14
Payer: COMMERCIAL

## 2021-04-14 VITALS
SYSTOLIC BLOOD PRESSURE: 114 MMHG | DIASTOLIC BLOOD PRESSURE: 66 MMHG | BODY MASS INDEX: 31.91 KG/M2 | WEIGHT: 152 LBS | TEMPERATURE: 97.9 F | HEIGHT: 58 IN

## 2021-04-14 VITALS — WEIGHT: 144 LBS | BODY MASS INDEX: 30.23 KG/M2 | HEIGHT: 58 IN

## 2021-04-14 DIAGNOSIS — E10.65 PRE-EXISTING TYPE 1 DIABETES MELLITUS WITH HYPERGLYCEMIA DURING PREGNANCY IN SECOND TRIMESTER (HCC): ICD-10-CM

## 2021-04-14 DIAGNOSIS — Z3A.26 26 WEEKS GESTATION OF PREGNANCY: ICD-10-CM

## 2021-04-14 DIAGNOSIS — O24.012 PRE-EXISTING TYPE 1 DIABETES MELLITUS WITH HYPERGLYCEMIA DURING PREGNANCY IN SECOND TRIMESTER (HCC): Primary | ICD-10-CM

## 2021-04-14 DIAGNOSIS — E10.65 PRE-EXISTING TYPE 1 DIABETES MELLITUS WITH HYPERGLYCEMIA DURING PREGNANCY IN SECOND TRIMESTER (HCC): Primary | ICD-10-CM

## 2021-04-14 DIAGNOSIS — Z91.19 NONCOMPLIANCE WITH DIABETES TREATMENT: ICD-10-CM

## 2021-04-14 DIAGNOSIS — O24.012 PRE-EXISTING TYPE 1 DIABETES MELLITUS WITH HYPERGLYCEMIA DURING PREGNANCY IN SECOND TRIMESTER (HCC): ICD-10-CM

## 2021-04-14 DIAGNOSIS — Z3A.26 26 WEEKS GESTATION OF PREGNANCY: Primary | ICD-10-CM

## 2021-04-14 PROBLEM — E78.2 HYPERLIPIDEMIA, MIXED: Status: ACTIVE | Noted: 2020-04-30

## 2021-04-14 LAB
SL AMB  POCT GLUCOSE, UA: NORMAL
SL AMB LEUKOCYTE ESTERASE,UA: NORMAL
SL AMB POCT NITRITE,UA: NORMAL
SL AMB POCT URINE PROTEIN: NORMAL

## 2021-04-14 PROCEDURE — 99213 OFFICE O/P EST LOW 20 MIN: CPT | Performed by: OBSTETRICS & GYNECOLOGY

## 2021-04-14 PROCEDURE — 99215 OFFICE O/P EST HI 40 MIN: CPT | Performed by: NURSE PRACTITIONER

## 2021-04-14 RX ORDER — BLOOD-GLUCOSE,RECEIVER,CONT
EACH MISCELLANEOUS
Qty: 1 DEVICE | Refills: 0 | Status: SHIPPED | OUTPATIENT
Start: 2021-04-14

## 2021-04-14 RX ORDER — BLOOD-GLUCOSE SENSOR
EACH MISCELLANEOUS
Qty: 1 EACH | Refills: 12 | Status: SHIPPED | OUTPATIENT
Start: 2021-04-14

## 2021-04-14 RX ORDER — BLOOD SUGAR DIAGNOSTIC
STRIP MISCELLANEOUS
Qty: 150 EACH | Refills: 2 | Status: SHIPPED | OUTPATIENT
Start: 2021-04-14 | End: 2021-06-26 | Stop reason: HOSPADM

## 2021-04-14 RX ORDER — INSULIN LISPRO 100 U/ML
5 INJECTION, SOLUTION SUBCUTANEOUS
COMMUNITY
Start: 2021-02-15 | End: 2021-04-14 | Stop reason: SDUPTHER

## 2021-04-14 RX ORDER — INSULIN LISPRO 100 U/ML
5 INJECTION, SOLUTION SUBCUTANEOUS
Qty: 15 ML | Refills: 0 | Status: ON HOLD | OUTPATIENT
Start: 2021-04-14 | End: 2021-04-26 | Stop reason: SDUPTHER

## 2021-04-14 RX ORDER — INSULIN GLARGINE 100 [IU]/ML
55 INJECTION, SOLUTION SUBCUTANEOUS DAILY
Qty: 16.5 ML | Refills: 0 | Status: ON HOLD | OUTPATIENT
Start: 2021-04-14 | End: 2021-04-26 | Stop reason: SDUPTHER

## 2021-04-14 RX ORDER — BLOOD-GLUCOSE TRANSMITTER
EACH MISCELLANEOUS
Qty: 1 EACH | Refills: 3 | Status: SHIPPED | OUTPATIENT
Start: 2021-04-14

## 2021-04-14 RX ORDER — LANCETS
EACH MISCELLANEOUS
Qty: 102 EACH | Refills: 2 | Status: SHIPPED | OUTPATIENT
Start: 2021-04-14

## 2021-04-14 NOTE — PROGRESS NOTES
Patient seen evaluated denies any vaginal bleeding ruptures of membrane or contraction, patient is having good fetal movement, patient still none compliant with monitoring her blood sugar, has appointment today with MFM/nutrition to go over her blood sugar and important off checking her blood sugar and taking her insulin lab prescription given to patient by myself and there is prescription for lab work given by MFM as well important of having her blood sugar checked 4 times a day as well as important of having her lab done explained and discussed with patient in details, patient already has appointment schedule as well with MFM for ultrasound to continue prenatal vitamin daily important of measuring her blood sugar have the blood work done and return to office in 2 weeks next visit Tdap vaccine  Again risk of diabetes that it is not control and pregnancy discussed with patient in details important of compliant and important of controlling her blood sugar discussed

## 2021-04-14 NOTE — PROGRESS NOTES
Virtual Regular Visit      Assessment/Plan:    Problem List Items Addressed This Visit        Endocrine    Pre-existing type 1 diabetes mellitus with hyperglycemia during pregnancy in second trimester ([facility] [address] ) - Primary     -Check A1c, CMP and spot urine protein/creatinine ratio  -A1c goal is less than 6% with minimal hypoglycemia  -Unable to titration insulin due to no glucose readings available   -Start testing at minimum 4 times a day, check fasting and 1 or 2 hours post start of meal  Set phone alarm as a reminder   -Report glucose readings on Monday, 4/19/21    -Continue basaglar/Lantus 55 units at 8 AM daily   -Continue Admelog 5 units before meals   -Try to eat 3 meals and 3 snacks including combination of carbohydrates, protein and fat per meal/snack  -Walk up to 30 minutes a day if no restriction from your OB  -Fetal growth ultrasound every 4 weeks or as recommended   -Starting at 32 weeks gestation NST twice a week and TREVOR weekly  -Stay in close contact with diabetes education team   -Glucose goals fasting 60-90; before meals ; 1 hour post meal 140 or less; 2 hours post meal 120 or less and overnight     -Very important to maintain tight glucose control during pregnancy to decrease risk factors including fetal death    -Follow up with OB and MFM as recommended    -Follow up in 1 week       Lab Results   Component Value Date    HGBA1C 10 6 (H) 01/27/2021            Relevant Medications    Admelog SoloStar 100 units/mL injection pen    insulin glargine (Basaglar KwikPen) 100 units/mL injection pen    Insulin Pen Needle 31G X 5 MM MISC    Contour Next Test test strip    Accu-Chek FastClix Lancets MISC    Continuous Blood Gluc  (Dexcom G6 ) NERY    Continuous Blood Gluc Sensor (Dexcom G6 Sensor) MISC    Continuous Blood Gluc Transmit (Dexcom G6 Transmitter) MISC    Other Relevant Orders    Hemoglobin A1C    Comprehensive metabolic panel    Protein / creatinine ratio, urine Other    Noncompliance with diabetes treatment    Relevant Medications    Admelog SoloStar 100 units/mL injection pen    insulin glargine (Basaglar KwikPen) 100 units/mL injection pen    Insulin Pen Needle 31G X 5 MM MISC    Contour Next Test test strip    Accu-Chek FastClix Lancets MISC    Continuous Blood Gluc  (Dexcom G6 ) NERY    Continuous Blood Gluc Sensor (Dexcom G6 Sensor) MISC    Continuous Blood Gluc Transmit (Dexcom G6 Transmitter) MISC    Other Relevant Orders    Hemoglobin A1C    Comprehensive metabolic panel    Protein / creatinine ratio, urine      Other Visit Diagnoses     26 weeks gestation of pregnancy        Relevant Medications    Admelog SoloStar 100 units/mL injection pen    insulin glargine (Basaglar KwikPen) 100 units/mL injection pen    Insulin Pen Needle 31G X 5 MM MISC    Contour Next Test test strip    Accu-Chek FastClix Lancets MISC    Continuous Blood Gluc  (Dexcom G6 ) NERY    Continuous Blood Gluc Sensor (Dexcom G6 Sensor) MISC    Continuous Blood Gluc Transmit (Dexcom G6 Transmitter) MISC    Other Relevant Orders    Hemoglobin A1C    Comprehensive metabolic panel    Protein / creatinine ratio, urine    Type 1 diabetes mellitus without complication (HCC)        Relevant Medications    Admelog SoloStar 100 units/mL injection pen    insulin glargine (Basaglar KwikPen) 100 units/mL injection pen               Reason for visit is   Chief Complaint   Patient presents with    Virtual Regular Visit    Diabetes Type 1        Encounter provider Kavitha Knapp    Provider located at [address] [place] [address]  [address] [place] [address] [phone]      Recent Visits  Date Type Provider Dept   04/13/21 Telephone Luis Wesley, [address] [place] [address] Drive   04/12/21 Telephone [address] [place] [address] Drive   04/08/21 Telephone Yulia Jain [address] [place] [address]  recent visits within past 7 days and meeting all other requirements     Today's Visits  Date Type Provider Dept   21 [id number] [facility], [address] [facility] today's visits and meeting all other requirements     Future Appointments  No visits were found meeting these conditions  Showing future appointments within next 150 days and meeting all other requirements        The patient was identified by name and date of birth  Cheko Baker was informed that this is a telemedicine visit and that the visit is being conducted through [employer] and patient was informed that this is a secure, HIPAA-compliant platform  She agrees to proceed  My office door was closed  No one else was in the room  She acknowledged consent and understanding of privacy and security of the video platform  The patient has agreed to participate and understands they can discontinue the visit at any time  Patient unable to connect virtually and requested visit be completed via phone  Patient is aware this is a billable service  Subjective  Cheko Baker is a 36 y o  female  27 5/7 weeks gestation pre-existing T1DM diagnosed age 25  Currently on Lantus or basaglar 55 units at 8 AM daily and Admelog 5 units before meals  Last tested glucose readings on , denies hypoglycemia  Eating 2 meals a day and 2 or more snacks  Positive fetal movement  Reports next ultrasound appointment on 21  Difficult to update medication list due to patient keeps saying basaglar or Lantus and Admelog or Humalog         Past Medical History:   Diagnosis Date    Diabetes mellitus ([address] )     uses kwiki pen     Diabetes mellitus type 1 ([facility] [address] )     High blood pressure     recently dx/ put on lisinopril     High cholesterol     Hypertension      no hypersion     Miscarriage     Recurrent pregnancy loss, antepartum condition or complication        Past Surgical History:   Procedure Laterality Date     SECTION  01/02/2014     X1    DILATION AND CURETTAGE OF UTERUS  04/10/2019    Missed AB     WISDOM TOOTH EXTRACTION         Current Outpatient Medications   Medication Sig Dispense Refill    Accu-Chek FastClix Lancets MISC Use 6 a day and as directed  102 each 2    Admelog SoloStar 100 units/mL injection pen Inject 5 Units under the skin 3 (three) times a day before meals 15 mL 0    aspirin (ECOTRIN LOW STRENGTH) 81 mg EC tablet Take 2 tablets (162 mg total) by mouth daily 60 tablet 5    Continuous Blood Gluc  (Dexcom G6 ) NERY Use as directed  1 Device 0    Continuous Blood Gluc Sensor (Dexcom G6 Sensor) MISC 1 box=1 month supply or 3 sensors, use 1 sensor every 10 days  1 each 12    Continuous Blood Gluc Transmit (Dexcom G6 Transmitter) MISC Transmitter change every 90 days  1 each 3    Contour Next Test test strip Test up to 6 times a day  150 each 2    Glucagon, rDNA, (Glucagon Emergency) 1 MG KIT Glucagon Emergency Kit (human-recomb) 1 mg solution for injection   GIVE 1 MG IN CASE OF LOW BLOOD SUGAR   insulin glargine (Basaglar KwikPen) 100 units/mL injection pen Inject 55 Units under the skin daily 16 5 mL 0    Insulin Pen Needle 31G X 5 MM MISC Inject under the skin daily at bedtime Use one a day or as directed  100 each 1    Prenat-Fe Carbonyl-FA-Omega 3 (One-A-Day Womens Prenatal 1) 28-0 8-235 MG CAPS Take 1 tablet by mouth daily  0    Urine Glucose-Ketones Test STRP Test       No current facility-administered medications for this visit  No Known Allergies    Review of Systems   Constitutional: Negative for fatigue and fever  HENT: Negative for congestion, sore throat and trouble swallowing  Eyes: Negative for visual disturbance  Respiratory: Negative for cough and shortness of breath  Cardiovascular: Negative for chest pain, palpitations and leg swelling  Gastrointestinal: Negative for constipation, diarrhea, nausea and vomiting  Endocrine: Negative for polydipsia, polyphagia and polyuria  Genitourinary: Negative for difficulty urinating and vaginal bleeding  Musculoskeletal: Negative for back pain  Neurological: Negative for headaches  Psychiatric/Behavioral: Negative for sleep disturbance  Video Exam    Vitals:    04/14/21 0846   Weight: 65 3 kg (144 lb)   Height: 4' 10" (1 473 m)       Physical Exam   It was my intent to perform this visit via video technology but the patient was not able to do a video connection so the visit was completed via audio telephone only  I spent 60 minutes with patient today in which greater than 50% of the time was spent in counseling/coordination of care regarding insulin regimen, lack of testing and encouraging compliance  VIRTUAL VISIT DISCLAIMER    Rc Hess acknowledges that she has consented to an online visit or consultation  She understands that the online visit is based solely on information provided by her, and that, in the absence of a face-to-face physical evaluation by the physician, the diagnosis she receives is both limited and provisional in terms of accuracy and completeness  This is not intended to replace a full medical face-to-face evaluation by the physician  Rc Hess understands and accepts these terms

## 2021-04-14 NOTE — PATIENT INSTRUCTIONS
-Check A1c, CMP and spot urine protein/creatinine ratio  -A1c goal is less than 6% with minimal hypoglycemia  -Unable to titration insulin due to no glucose readings available   -Start testing at minimum 4 times a day, check fasting and 1 or 2 hours post start of meal  Set phone alarm as a reminder   -Report glucose readings on Monday, 4/19/21    -Continue basaglar/Lantus 55 units at 8 AM daily   -Continue Admelog 5 units before meals   -Try to eat 3 meals and 3 snacks including combination of carbohydrates, protein and fat per meal/snack  -Walk up to 30 minutes a day if no restriction from your OB  -Fetal growth ultrasound every 4 weeks or as recommended   -Starting at 32 weeks gestation NST twice a week and TREVOR weekly  -Stay in close contact with diabetes education team   -Glucose goals fasting 60-90; before meals ; 1 hour post meal 140 or less; 2 hours post meal 120 or less and overnight     -Very important to maintain tight glucose control during pregnancy to decrease risk factors including fetal death    -Follow up with OB and MFM as recommended    -Follow up in 1 week

## 2021-04-14 NOTE — ASSESSMENT & PLAN NOTE
-Check A1c, CMP and spot urine protein/creatinine ratio  -A1c goal is less than 6% with minimal hypoglycemia  -Unable to titration insulin due to no glucose readings available   -Start testing at minimum 4 times a day, check fasting and 1 or 2 hours post start of meal  Set phone alarm as a reminder   -Report glucose readings on Monday, 4/19/21    -Continue basaglar/Lantus 55 units at 8 AM daily   -Continue Admelog 5 units before meals   -Try to eat 3 meals and 3 snacks including combination of carbohydrates, protein and fat per meal/snack  -Walk up to 30 minutes a day if no restriction from your OB  -Fetal growth ultrasound every 4 weeks or as recommended   -Starting at 32 weeks gestation NST twice a week and TREVOR weekly  -Stay in close contact with diabetes education team   -Glucose goals fasting 60-90; before meals ; 1 hour post meal 140 or less; 2 hours post meal 120 or less and overnight     -Very important to maintain tight glucose control during pregnancy to decrease risk factors including fetal death    -Follow up with OB and MFM as recommended    -Follow up in 1 week       Lab Results   Component Value Date    HGBA1C 10 6 (H) 01/27/2021

## 2021-04-16 ENCOUNTER — APPOINTMENT (OUTPATIENT)
Dept: LAB | Facility: HOSPITAL | Age: 31
End: 2021-04-16
Payer: COMMERCIAL

## 2021-04-16 DIAGNOSIS — Z91.19 NONCOMPLIANCE WITH DIABETES TREATMENT: ICD-10-CM

## 2021-04-16 DIAGNOSIS — E13.9 OTHER SPECIFIED DIABETES MELLITUS WITHOUT COMPLICATION, WITH LONG-TERM CURRENT USE OF INSULIN (HCC): ICD-10-CM

## 2021-04-16 DIAGNOSIS — Z79.4 OTHER SPECIFIED DIABETES MELLITUS WITHOUT COMPLICATION, WITH LONG-TERM CURRENT USE OF INSULIN (HCC): ICD-10-CM

## 2021-04-16 DIAGNOSIS — E10.65 PRE-EXISTING TYPE 1 DIABETES MELLITUS WITH HYPERGLYCEMIA DURING PREGNANCY IN SECOND TRIMESTER (HCC): ICD-10-CM

## 2021-04-16 DIAGNOSIS — Z33.1 NORMAL PREGNANCY, INCIDENTAL: ICD-10-CM

## 2021-04-16 DIAGNOSIS — Z3A.28 28 WEEKS GESTATION OF PREGNANCY: ICD-10-CM

## 2021-04-16 DIAGNOSIS — O09.92 HIGH-RISK PREGNANCY IN SECOND TRIMESTER: ICD-10-CM

## 2021-04-16 DIAGNOSIS — O24.012 PRE-EXISTING TYPE 1 DIABETES MELLITUS WITH HYPERGLYCEMIA DURING PREGNANCY IN SECOND TRIMESTER (HCC): ICD-10-CM

## 2021-04-16 DIAGNOSIS — Z3A.26 26 WEEKS GESTATION OF PREGNANCY: ICD-10-CM

## 2021-04-16 LAB
ALBUMIN SERPL BCP-MCNC: 3.3 G/DL (ref 3.4–4.8)
ALP SERPL-CCNC: 64.3 U/L (ref 35–140)
ALT SERPL W P-5'-P-CCNC: 10 U/L (ref 5–54)
ANION GAP SERPL CALCULATED.3IONS-SCNC: 9 MMOL/L (ref 4–13)
AST SERPL W P-5'-P-CCNC: 12 U/L (ref 15–41)
BACTERIA UR QL AUTO: ABNORMAL /HPF
BASOPHILS # BLD AUTO: 0.01 THOUSANDS/ΜL (ref 0–0.1)
BASOPHILS NFR BLD AUTO: 0 % (ref 0–1)
BILIRUB SERPL-MCNC: 0.34 MG/DL (ref 0.3–1.2)
BILIRUB UR QL STRIP: NEGATIVE
BUN SERPL-MCNC: 9 MG/DL (ref 6–20)
CALCIUM ALBUM COR SERPL-MCNC: 8.8 MG/DL (ref 8.3–10.1)
CALCIUM SERPL-MCNC: 8.2 MG/DL (ref 8.4–10.2)
CHLORIDE SERPL-SCNC: 105 MMOL/L (ref 96–108)
CLARITY UR: CLEAR
CO2 SERPL-SCNC: 24 MMOL/L (ref 22–33)
COLOR UR: YELLOW
CREAT SERPL-MCNC: 0.47 MG/DL (ref 0.4–1.1)
CREAT UR-MCNC: 143 MG/DL
EOSINOPHIL # BLD AUTO: 0.04 THOUSAND/ΜL (ref 0–0.61)
EOSINOPHIL NFR BLD AUTO: 1 % (ref 0–6)
ERYTHROCYTE [DISTWIDTH] IN BLOOD BY AUTOMATED COUNT: 11.8 % (ref 11.6–15.1)
EST. AVERAGE GLUCOSE BLD GHB EST-MCNC: 260 MG/DL
GFR SERPL CREATININE-BSD FRML MDRD: 134 ML/MIN/1.73SQ M
GLUCOSE P FAST SERPL-MCNC: 154 MG/DL (ref 70–105)
GLUCOSE UR STRIP-MCNC: ABNORMAL MG/DL
HBA1C MFR BLD: 10.7 %
HBV SURFACE AG SER QL: NORMAL
HCT VFR BLD AUTO: 37.6 % (ref 34.8–46.1)
HGB BLD-MCNC: 12.8 G/DL (ref 11.5–15.4)
HGB UR QL STRIP.AUTO: NEGATIVE
IMM GRANULOCYTES # BLD AUTO: 0.02 THOUSAND/UL (ref 0–0.2)
IMM GRANULOCYTES NFR BLD AUTO: 0 % (ref 0–2)
KETONES UR STRIP-MCNC: NEGATIVE MG/DL
LEUKOCYTE ESTERASE UR QL STRIP: NEGATIVE
LYMPHOCYTES # BLD AUTO: 1.27 THOUSANDS/ΜL (ref 0.6–4.47)
LYMPHOCYTES NFR BLD AUTO: 21 % (ref 14–44)
MCH RBC QN AUTO: 31.8 PG (ref 26.8–34.3)
MCHC RBC AUTO-ENTMCNC: 34 G/DL (ref 31.4–37.4)
MCV RBC AUTO: 93 FL (ref 82–98)
MONOCYTES # BLD AUTO: 0.41 THOUSAND/ΜL (ref 0.17–1.22)
MONOCYTES NFR BLD AUTO: 7 % (ref 4–12)
MUCOUS THREADS UR QL AUTO: ABNORMAL
NEUTROPHILS # BLD AUTO: 4.28 THOUSANDS/ΜL (ref 1.85–7.62)
NEUTS SEG NFR BLD AUTO: 71 % (ref 43–75)
NITRITE UR QL STRIP: NEGATIVE
NON-SQ EPI CELLS URNS QL MICRO: ABNORMAL /HPF
PH UR STRIP.AUTO: 6 [PH]
PLATELET # BLD AUTO: 214 THOUSANDS/UL (ref 149–390)
PMV BLD AUTO: 8.9 FL (ref 8.9–12.7)
POTASSIUM SERPL-SCNC: 3.9 MMOL/L (ref 3.5–5)
PROT SERPL-MCNC: 6.2 G/DL (ref 6.4–8.3)
PROT UR STRIP-MCNC: ABNORMAL MG/DL
PROT UR-MCNC: 40 MG/DL
PROT/CREAT UR: 0.28 MG/G{CREAT} (ref 0–0.1)
RBC # BLD AUTO: 4.03 MILLION/UL (ref 3.81–5.12)
RBC #/AREA URNS AUTO: ABNORMAL /HPF
RUBV IGG SERPL IA-ACNC: 130.6 IU/ML
SODIUM SERPL-SCNC: 138 MMOL/L (ref 133–145)
SP GR UR STRIP.AUTO: 1.02 (ref 1–1.03)
TSH SERPL DL<=0.05 MIU/L-ACNC: 2.61 UIU/ML (ref 0.34–5.6)
UROBILINOGEN UR QL STRIP.AUTO: 0.2 E.U./DL
WBC # BLD AUTO: 6.03 THOUSAND/UL (ref 4.31–10.16)
WBC #/AREA URNS AUTO: ABNORMAL /HPF

## 2021-04-16 PROCEDURE — 80053 COMPREHEN METABOLIC PANEL: CPT

## 2021-04-16 PROCEDURE — 81220 CFTR GENE COM VARIANTS: CPT

## 2021-04-16 PROCEDURE — 82570 ASSAY OF URINE CREATININE: CPT | Performed by: NURSE PRACTITIONER

## 2021-04-16 PROCEDURE — 83036 HEMOGLOBIN GLYCOSYLATED A1C: CPT | Performed by: NURSE PRACTITIONER

## 2021-04-16 PROCEDURE — 36415 COLL VENOUS BLD VENIPUNCTURE: CPT | Performed by: NURSE PRACTITIONER

## 2021-04-16 PROCEDURE — 81401 MOPATH PROCEDURE LEVEL 2: CPT

## 2021-04-16 PROCEDURE — 81001 URINALYSIS AUTO W/SCOPE: CPT

## 2021-04-16 PROCEDURE — 84443 ASSAY THYROID STIM HORMONE: CPT

## 2021-04-16 PROCEDURE — 82105 ALPHA-FETOPROTEIN SERUM: CPT

## 2021-04-16 PROCEDURE — 87086 URINE CULTURE/COLONY COUNT: CPT

## 2021-04-16 PROCEDURE — 86787 VARICELLA-ZOSTER ANTIBODY: CPT

## 2021-04-16 PROCEDURE — 83020 HEMOGLOBIN ELECTROPHORESIS: CPT

## 2021-04-16 PROCEDURE — 84156 ASSAY OF PROTEIN URINE: CPT | Performed by: NURSE PRACTITIONER

## 2021-04-16 PROCEDURE — 80081 OBSTETRIC PANEL INC HIV TSTG: CPT

## 2021-04-17 LAB
ABO GROUP BLD: NORMAL
BACTERIA UR CULT: NORMAL
BLD GP AB SCN SERPL QL: NEGATIVE
RH BLD: POSITIVE
SPECIMEN EXPIRATION DATE: NORMAL

## 2021-04-18 LAB — HIV 1+2 AB+HIV1 P24 AG SERPL QL IA: NORMAL

## 2021-04-19 ENCOUNTER — TELEPHONE (OUTPATIENT)
Dept: PERINATAL CARE | Facility: CLINIC | Age: 31
End: 2021-04-19

## 2021-04-19 LAB
RPR SER QL: NORMAL
VZV IGG SER IA-ACNC: NORMAL

## 2021-04-20 ENCOUNTER — HOSPITAL ENCOUNTER (EMERGENCY)
Facility: HOSPITAL | Age: 31
End: 2021-04-20
Attending: EMERGENCY MEDICINE
Payer: COMMERCIAL

## 2021-04-20 ENCOUNTER — HOSPITAL ENCOUNTER (INPATIENT)
Facility: HOSPITAL | Age: 31
LOS: 6 days | Discharge: LEFT BEFORE DISCHARGE | DRG: 566 | End: 2021-04-26
Attending: INTERNAL MEDICINE | Admitting: INTERNAL MEDICINE
Payer: COMMERCIAL

## 2021-04-20 VITALS
BODY MASS INDEX: 29.89 KG/M2 | RESPIRATION RATE: 21 BRPM | WEIGHT: 143 LBS | OXYGEN SATURATION: 100 % | DIASTOLIC BLOOD PRESSURE: 52 MMHG | SYSTOLIC BLOOD PRESSURE: 110 MMHG | TEMPERATURE: 97.8 F | HEART RATE: 93 BPM

## 2021-04-20 DIAGNOSIS — Z3A.26 26 WEEKS GESTATION OF PREGNANCY: ICD-10-CM

## 2021-04-20 DIAGNOSIS — R51.9 HEADACHE: Primary | ICD-10-CM

## 2021-04-20 DIAGNOSIS — O24.012 PRE-EXISTING TYPE 1 DIABETES MELLITUS WITH HYPERGLYCEMIA DURING PREGNANCY IN SECOND TRIMESTER (HCC): ICD-10-CM

## 2021-04-20 DIAGNOSIS — E11.10 DKA (DIABETIC KETOACIDOSES): ICD-10-CM

## 2021-04-20 DIAGNOSIS — Z91.19 NONCOMPLIANCE WITH DIABETES TREATMENT: ICD-10-CM

## 2021-04-20 DIAGNOSIS — E87.2 METABOLIC ACIDOSIS WITH INCREASED ANION GAP AND ACCUMULATION OF ORGANIC ACIDS: ICD-10-CM

## 2021-04-20 DIAGNOSIS — O24.012 PREGNANCY COMPLICATED BY PRE-EXISTING TYPE 1 DIABETES, SECOND TRIMESTER: Primary | ICD-10-CM

## 2021-04-20 DIAGNOSIS — O10.919 CHRONIC HYPERTENSION AFFECTING PREGNANCY: ICD-10-CM

## 2021-04-20 DIAGNOSIS — Z91.19 NON-COMPLIANCE WITH TREATMENT: ICD-10-CM

## 2021-04-20 DIAGNOSIS — E10.65 PRE-EXISTING TYPE 1 DIABETES MELLITUS WITH HYPERGLYCEMIA DURING PREGNANCY IN SECOND TRIMESTER (HCC): ICD-10-CM

## 2021-04-20 DIAGNOSIS — E10.10 DIABETIC KETOACIDOSIS WITHOUT COMA ASSOCIATED WITH TYPE 1 DIABETES MELLITUS (HCC): ICD-10-CM

## 2021-04-20 LAB
ALBUMIN SERPL BCP-MCNC: 2.4 G/DL (ref 3.5–5)
ALBUMIN SERPL BCP-MCNC: 3.6 G/DL (ref 3.4–4.8)
ALP SERPL-CCNC: 78 U/L (ref 46–116)
ALP SERPL-CCNC: 85.2 U/L (ref 35–140)
ALT SERPL W P-5'-P-CCNC: 14 U/L (ref 5–54)
ALT SERPL W P-5'-P-CCNC: 21 U/L (ref 12–78)
ANION GAP SERPL CALCULATED.3IONS-SCNC: 19 MMOL/L (ref 4–13)
ANION GAP SERPL CALCULATED.3IONS-SCNC: 21 MMOL/L (ref 4–13)
AST SERPL W P-5'-P-CCNC: 25 U/L (ref 15–41)
AST SERPL W P-5'-P-CCNC: 40 U/L (ref 5–45)
BASE EX.OXY STD BLDV CALC-SCNC: 79.6 % (ref 60–80)
BASE EX.OXY STD BLDV CALC-SCNC: 93 % (ref 60–80)
BASE EXCESS BLDV CALC-SCNC: -12.7 MMOL/L
BASE EXCESS BLDV CALC-SCNC: -17.2 MMOL/L
BASOPHILS # BLD AUTO: 0.02 THOUSANDS/ΜL (ref 0–0.1)
BASOPHILS NFR BLD AUTO: 0 % (ref 0–1)
BETA-HYDROXYBUTYRATE: 5.3 MMOL/L
BETA-HYDROXYBUTYRATE: 5.6 MMOL/L
BILIRUB SERPL-MCNC: 0.41 MG/DL (ref 0.2–1)
BILIRUB SERPL-MCNC: 0.52 MG/DL (ref 0.3–1.2)
BILIRUB UR QL STRIP: NEGATIVE
BUN SERPL-MCNC: 12 MG/DL (ref 5–25)
BUN SERPL-MCNC: 13 MG/DL (ref 6–20)
CALCIUM ALBUM COR SERPL-MCNC: 8.7 MG/DL (ref 8.3–10.1)
CALCIUM SERPL-MCNC: 7.4 MG/DL (ref 8.3–10.1)
CALCIUM SERPL-MCNC: 9.1 MG/DL (ref 8.4–10.2)
CHLORIDE SERPL-SCNC: 104 MMOL/L (ref 100–108)
CHLORIDE SERPL-SCNC: 97 MMOL/L (ref 96–108)
CLARITY UR: CLEAR
CO2 SERPL-SCNC: 11 MMOL/L (ref 21–32)
CO2 SERPL-SCNC: 13 MMOL/L (ref 22–33)
COLOR UR: YELLOW
CREAT SERPL-MCNC: 0.61 MG/DL (ref 0.6–1.3)
CREAT SERPL-MCNC: 0.66 MG/DL (ref 0.4–1.1)
EOSINOPHIL # BLD AUTO: 0 THOUSAND/ΜL (ref 0–0.61)
EOSINOPHIL NFR BLD AUTO: 0 % (ref 0–6)
ERYTHROCYTE [DISTWIDTH] IN BLOOD BY AUTOMATED COUNT: 11.8 % (ref 11.6–15.1)
ERYTHROCYTE [DISTWIDTH] IN BLOOD BY AUTOMATED COUNT: 11.9 % (ref 11.6–15.1)
EST. AVERAGE GLUCOSE BLD GHB EST-MCNC: 255 MG/DL
GFR SERPL CREATININE-BSD FRML MDRD: 120 ML/MIN/1.73SQ M
GFR SERPL CREATININE-BSD FRML MDRD: 123 ML/MIN/1.73SQ M
GLUCOSE SERPL-MCNC: 226 MG/DL (ref 65–140)
GLUCOSE SERPL-MCNC: 239 MG/DL (ref 65–140)
GLUCOSE SERPL-MCNC: 241 MG/DL (ref 65–140)
GLUCOSE SERPL-MCNC: 272 MG/DL (ref 65–140)
GLUCOSE SERPL-MCNC: 290 MG/DL (ref 65–140)
GLUCOSE SERPL-MCNC: 421 MG/DL (ref 65–140)
GLUCOSE SERPL-MCNC: 430 MG/DL (ref 65–140)
GLUCOSE SERPL-MCNC: 437 MG/DL (ref 65–140)
GLUCOSE UR STRIP-MCNC: ABNORMAL MG/DL
HBA1C MFR BLD: 10.5 %
HCO3 BLDV-SCNC: 12 MMOL/L (ref 24–30)
HCO3 BLDV-SCNC: 8 MMOL/L (ref 24–30)
HCT VFR BLD AUTO: 33.8 % (ref 34.8–46.1)
HCT VFR BLD AUTO: 40.6 % (ref 34.8–46.1)
HGB A MFR BLD: 2.9 % (ref 1.8–3.2)
HGB A MFR BLD: 97.1 % (ref 96.4–98.8)
HGB BLD-MCNC: 11.3 G/DL (ref 11.5–15.4)
HGB BLD-MCNC: 13.6 G/DL (ref 11.5–15.4)
HGB F MFR BLD: 0 % (ref 0–2)
HGB FRACT BLD-IMP: NORMAL
HGB S MFR BLD: 0 %
HGB UR QL STRIP.AUTO: NEGATIVE
IMM GRANULOCYTES # BLD AUTO: 0.03 THOUSAND/UL (ref 0–0.2)
IMM GRANULOCYTES NFR BLD AUTO: 0 % (ref 0–2)
KETONES UR STRIP-MCNC: ABNORMAL MG/DL
LACTATE SERPL-SCNC: 1.1 MMOL/L (ref 0.5–2)
LEUKOCYTE ESTERASE UR QL STRIP: NEGATIVE
LYMPHOCYTES # BLD AUTO: 1.02 THOUSANDS/ΜL (ref 0.6–4.47)
LYMPHOCYTES NFR BLD AUTO: 9 % (ref 14–44)
MAGNESIUM SERPL-MCNC: 1.6 MG/DL (ref 1.6–2.6)
MCH RBC QN AUTO: 31.4 PG (ref 26.8–34.3)
MCH RBC QN AUTO: 32.6 PG (ref 26.8–34.3)
MCHC RBC AUTO-ENTMCNC: 33.4 G/DL (ref 31.4–37.4)
MCHC RBC AUTO-ENTMCNC: 33.5 G/DL (ref 31.4–37.4)
MCV RBC AUTO: 94 FL (ref 82–98)
MCV RBC AUTO: 97 FL (ref 82–98)
MONOCYTES # BLD AUTO: 0.24 THOUSAND/ΜL (ref 0.17–1.22)
MONOCYTES NFR BLD AUTO: 2 % (ref 4–12)
NEUTROPHILS # BLD AUTO: 10.03 THOUSANDS/ΜL (ref 1.85–7.62)
NEUTS SEG NFR BLD AUTO: 89 % (ref 43–75)
NITRITE UR QL STRIP: NEGATIVE
O2 CT BLDV-SCNC: 15.5 ML/DL
O2 CT BLDV-SCNC: 16 ML/DL
PCO2 BLDV: 18.9 MM HG (ref 42–50)
PCO2 BLDV: 25.6 MM HG (ref 42–50)
PH BLDV: 7.24 [PH] (ref 7.3–7.4)
PH BLDV: 7.29 [PH] (ref 7.3–7.4)
PH UR STRIP.AUTO: 5.5 [PH]
PHOSPHATE SERPL-MCNC: 3.1 MG/DL (ref 2.7–4.5)
PLATELET # BLD AUTO: 205 THOUSANDS/UL (ref 149–390)
PLATELET # BLD AUTO: 242 THOUSANDS/UL (ref 149–390)
PMV BLD AUTO: 9 FL (ref 8.9–12.7)
PMV BLD AUTO: 9.1 FL (ref 8.9–12.7)
PO2 BLDV: 44.8 MM HG (ref 35–45)
PO2 BLDV: 82.6 MM HG (ref 35–45)
POTASSIUM SERPL-SCNC: 4.2 MMOL/L (ref 3.5–5.3)
POTASSIUM SERPL-SCNC: 5.2 MMOL/L (ref 3.5–5)
PROT SERPL-MCNC: 6.3 G/DL (ref 6.4–8.2)
PROT SERPL-MCNC: 7.2 G/DL (ref 6.4–8.3)
PROT UR STRIP-MCNC: NEGATIVE MG/DL
RBC # BLD AUTO: 3.47 MILLION/UL (ref 3.81–5.12)
RBC # BLD AUTO: 4.33 MILLION/UL (ref 3.81–5.12)
SODIUM SERPL-SCNC: 129 MMOL/L (ref 133–145)
SODIUM SERPL-SCNC: 136 MMOL/L (ref 136–145)
SP GR UR STRIP.AUTO: >=1.03 (ref 1–1.03)
UROBILINOGEN UR QL STRIP.AUTO: 0.2 E.U./DL
WBC # BLD AUTO: 11.14 THOUSAND/UL (ref 4.31–10.16)
WBC # BLD AUTO: 11.34 THOUSAND/UL (ref 4.31–10.16)

## 2021-04-20 PROCEDURE — 36415 COLL VENOUS BLD VENIPUNCTURE: CPT | Performed by: EMERGENCY MEDICINE

## 2021-04-20 PROCEDURE — 99284 EMERGENCY DEPT VISIT MOD MDM: CPT | Performed by: EMERGENCY MEDICINE

## 2021-04-20 PROCEDURE — 84100 ASSAY OF PHOSPHORUS: CPT | Performed by: NURSE PRACTITIONER

## 2021-04-20 PROCEDURE — 81003 URINALYSIS AUTO W/O SCOPE: CPT | Performed by: EMERGENCY MEDICINE

## 2021-04-20 PROCEDURE — 82010 KETONE BODYS QUAN: CPT | Performed by: EMERGENCY MEDICINE

## 2021-04-20 PROCEDURE — 4A1HXCZ MONITORING OF PRODUCTS OF CONCEPTION, CARDIAC RATE, EXTERNAL APPROACH: ICD-10-PCS | Performed by: OBSTETRICS & GYNECOLOGY

## 2021-04-20 PROCEDURE — 96374 THER/PROPH/DIAG INJ IV PUSH: CPT

## 2021-04-20 PROCEDURE — 83605 ASSAY OF LACTIC ACID: CPT | Performed by: NURSE PRACTITIONER

## 2021-04-20 PROCEDURE — 82805 BLOOD GASES W/O2 SATURATION: CPT | Performed by: NURSE PRACTITIONER

## 2021-04-20 PROCEDURE — 82948 REAGENT STRIP/BLOOD GLUCOSE: CPT

## 2021-04-20 PROCEDURE — 96361 HYDRATE IV INFUSION ADD-ON: CPT

## 2021-04-20 PROCEDURE — 96366 THER/PROPH/DIAG IV INF ADDON: CPT

## 2021-04-20 PROCEDURE — 80053 COMPREHEN METABOLIC PANEL: CPT | Performed by: EMERGENCY MEDICINE

## 2021-04-20 PROCEDURE — 96365 THER/PROPH/DIAG IV INF INIT: CPT

## 2021-04-20 PROCEDURE — 80053 COMPREHEN METABOLIC PANEL: CPT | Performed by: NURSE PRACTITIONER

## 2021-04-20 PROCEDURE — 85027 COMPLETE CBC AUTOMATED: CPT | Performed by: NURSE PRACTITIONER

## 2021-04-20 PROCEDURE — 85025 COMPLETE CBC W/AUTO DIFF WBC: CPT | Performed by: EMERGENCY MEDICINE

## 2021-04-20 PROCEDURE — 99284 EMERGENCY DEPT VISIT MOD MDM: CPT

## 2021-04-20 PROCEDURE — 99291 CRITICAL CARE FIRST HOUR: CPT | Performed by: NURSE PRACTITIONER

## 2021-04-20 PROCEDURE — 83036 HEMOGLOBIN GLYCOSYLATED A1C: CPT | Performed by: NURSE PRACTITIONER

## 2021-04-20 PROCEDURE — NC001 PR NO CHARGE: Performed by: OBSTETRICS & GYNECOLOGY

## 2021-04-20 PROCEDURE — 83735 ASSAY OF MAGNESIUM: CPT | Performed by: NURSE PRACTITIONER

## 2021-04-20 PROCEDURE — 82805 BLOOD GASES W/O2 SATURATION: CPT | Performed by: EMERGENCY MEDICINE

## 2021-04-20 PROCEDURE — 93005 ELECTROCARDIOGRAM TRACING: CPT

## 2021-04-20 PROCEDURE — 82010 KETONE BODYS QUAN: CPT | Performed by: NURSE PRACTITIONER

## 2021-04-20 RX ORDER — DEXTROSE AND SODIUM CHLORIDE 5; .45 G/100ML; G/100ML
250 INJECTION, SOLUTION INTRAVENOUS CONTINUOUS
Status: DISCONTINUED | OUTPATIENT
Start: 2021-04-20 | End: 2021-04-20

## 2021-04-20 RX ORDER — METOCLOPRAMIDE HYDROCHLORIDE 5 MG/ML
10 INJECTION INTRAMUSCULAR; INTRAVENOUS ONCE
Status: COMPLETED | OUTPATIENT
Start: 2021-04-20 | End: 2021-04-20

## 2021-04-20 RX ORDER — SODIUM CHLORIDE 9 MG/ML
1000 INJECTION, SOLUTION INTRAVENOUS ONCE
Status: COMPLETED | OUTPATIENT
Start: 2021-04-20 | End: 2021-04-20

## 2021-04-20 RX ORDER — DEXTROSE, SODIUM CHLORIDE, AND POTASSIUM CHLORIDE 5; .45; .3 G/100ML; G/100ML; G/100ML
250 INJECTION INTRAVENOUS CONTINUOUS
Status: DISCONTINUED | OUTPATIENT
Start: 2021-04-20 | End: 2021-04-21

## 2021-04-20 RX ORDER — SODIUM CHLORIDE 9 MG/ML
500 INJECTION, SOLUTION INTRAVENOUS CONTINUOUS
Status: DISCONTINUED | OUTPATIENT
Start: 2021-04-20 | End: 2021-04-20 | Stop reason: HOSPADM

## 2021-04-20 RX ORDER — SODIUM CHLORIDE 9 MG/ML
3 INJECTION INTRAVENOUS
Status: DISCONTINUED | OUTPATIENT
Start: 2021-04-20 | End: 2021-04-20 | Stop reason: HOSPADM

## 2021-04-20 RX ORDER — SODIUM CHLORIDE 450 MG/100ML
500 INJECTION, SOLUTION INTRAVENOUS CONTINUOUS
Status: DISPENSED | OUTPATIENT
Start: 2021-04-20 | End: 2021-04-21

## 2021-04-20 RX ORDER — DEXTROSE AND SODIUM CHLORIDE 5; .45 G/100ML; G/100ML
250 INJECTION, SOLUTION INTRAVENOUS CONTINUOUS
Status: DISCONTINUED | OUTPATIENT
Start: 2021-04-20 | End: 2021-04-20 | Stop reason: SDUPTHER

## 2021-04-20 RX ORDER — SODIUM CHLORIDE 9 MG/ML
250 INJECTION, SOLUTION INTRAVENOUS CONTINUOUS
Status: DISCONTINUED | OUTPATIENT
Start: 2021-04-20 | End: 2021-04-20 | Stop reason: HOSPADM

## 2021-04-20 RX ORDER — SODIUM CHLORIDE 450 MG/100ML
1000 INJECTION, SOLUTION INTRAVENOUS CONTINUOUS
Status: DISPENSED | OUTPATIENT
Start: 2021-04-20 | End: 2021-04-20

## 2021-04-20 RX ORDER — HEPARIN SODIUM 5000 [USP'U]/ML
5000 INJECTION, SOLUTION INTRAVENOUS; SUBCUTANEOUS EVERY 8 HOURS SCHEDULED
Status: DISCONTINUED | OUTPATIENT
Start: 2021-04-20 | End: 2021-04-20

## 2021-04-20 RX ORDER — SODIUM CHLORIDE 450 MG/100ML
250 INJECTION, SOLUTION INTRAVENOUS CONTINUOUS
Status: DISPENSED | OUTPATIENT
Start: 2021-04-21 | End: 2021-04-21

## 2021-04-20 RX ORDER — CHLORHEXIDINE GLUCONATE 0.12 MG/ML
15 RINSE ORAL EVERY 12 HOURS SCHEDULED
Status: DISCONTINUED | OUTPATIENT
Start: 2021-04-20 | End: 2021-04-21

## 2021-04-20 RX ORDER — ACETAMINOPHEN 325 MG/1
975 TABLET ORAL ONCE
Status: COMPLETED | OUTPATIENT
Start: 2021-04-20 | End: 2021-04-20

## 2021-04-20 RX ADMIN — SODIUM CHLORIDE 1000 ML/HR: 0.9 INJECTION, SOLUTION INTRAVENOUS at 17:59

## 2021-04-20 RX ADMIN — SODIUM CHLORIDE 500 ML/HR: 0.9 INJECTION, SOLUTION INTRAVENOUS at 19:26

## 2021-04-20 RX ADMIN — SODIUM CHLORIDE 1000 ML: 0.9 INJECTION, SOLUTION INTRAVENOUS at 23:28

## 2021-04-20 RX ADMIN — ACETAMINOPHEN 975 MG: 325 TABLET, FILM COATED ORAL at 17:15

## 2021-04-20 RX ADMIN — SODIUM CHLORIDE 3.25 UNITS/HR: 9 INJECTION, SOLUTION INTRAVENOUS at 20:30

## 2021-04-20 RX ADMIN — SODIUM CHLORIDE 6.5 UNITS/HR: 9 INJECTION, SOLUTION INTRAVENOUS at 18:20

## 2021-04-20 RX ADMIN — CHLORHEXIDINE GLUCONATE 0.12% ORAL RINSE 15 ML: 1.2 LIQUID ORAL at 22:00

## 2021-04-20 RX ADMIN — DEXTROSE, SODIUM CHLORIDE, AND POTASSIUM CHLORIDE 250 ML/HR: 5; .45; .3 INJECTION INTRAVENOUS at 22:58

## 2021-04-20 RX ADMIN — SODIUM CHLORIDE 1000 ML: 0.9 INJECTION, SOLUTION INTRAVENOUS at 16:52

## 2021-04-20 RX ADMIN — METOCLOPRAMIDE HYDROCHLORIDE 10 MG: 5 INJECTION INTRAMUSCULAR; INTRAVENOUS at 17:16

## 2021-04-20 NOTE — LETTER
[address] AND DELIVERY  [address] [place] [address]  Dept: [phone]    April 26, 2021     Patient: Caio Nash   YOB: 1990   Date of Visit: 4/20/2021       To Whom it May Concern:    Caio Nash is under my professional care  She was seen in the hospital from 4/20/2021   to 04/26/21  She may return to school on 4/26/21 without limitations  If you have any questions or concerns, please don't hesitate to call           Sincerely,          Francisco De,

## 2021-04-20 NOTE — EMTALA/ACUTE CARE TRANSFER
[facility] EMERGENCY DEPARTMENT  [address] [facility] [address]  Dept: [phone]      EMTALA TRANSFER CONSENT    NAME Rachelle Lerma                                         1990                              MRN [id number]    I have been informed of my rights regarding examination, treatment, and transfer   by Dr Tia Al DO    Benefits: Specialized equipment and/or services available at the receiving facility (Include comment)________________________    Risks: Potential for delay in receiving treatment, Potential deterioration of medical condition, Loss of IV, Increased discomfort during transfer      Consent for Transfer:  I acknowledge that my medical condition has been evaluated and explained to me by the emergency department physician or other qualified medical person and/or my attending physician, who has recommended that I be transferred to the service of  Accepting Physician: Dr Odalys Ramírez  at [address] Name, [address] : [facility]  The above potential benefits of such transfer, the potential risks associated with such transfer, and the probable risks of not being transferred have been explained to me, and I fully understand them  The doctor has explained that, in my case, the benefits of transfer outweigh the risks  I agree to be transferred  I authorize the performance of emergency medical procedures and treatments upon me in both transit and upon arrival at the receiving facility  Additionally, I authorize the release of any and all medical records to the receiving facility and request they be transported with me, if possible  I understand that the safest mode of transportation during a medical emergency is an ambulance and that the Hospital advocates the use of this mode of transport   Risks of traveling to the receiving facility by car, including absence of medical control, life sustaining equipment, such as oxygen, and medical personnel has been explained to me and I fully understand them  (EUGENIA CORRECT BOX BELOW)  [  ]  I consent to the stated transfer and to be transported by ambulance/helicopter  [  ]  I consent to the stated transfer, but refuse transportation by ambulance and accept full responsibility for my transportation by car  I understand the risks of non-ambulance transfers and I exonerate the Hospital and its staff from any deterioration in my condition that results from this refusal     X___________________________________________    DATE  21  TIME________  Signature of patient or legally responsible individual signing on patient behalf           RELATIONSHIP TO PATIENT_________________________          Provider Certification    NAME Jordy Fried                                         1990                              MRN [id number]    A medical screening exam was performed on the above named patient  Based on the examination:    Condition Necessitating Transfer The primary encounter diagnosis was Headache  A diagnosis of DKA (diabetic ketoacidoses) ([facility] [address] ) was also pertinent to this visit      Patient Condition: The patient has been stabilized such that within reasonable medical probability, no material deterioration of the patient condition or the condition of the unborn child(parveen) is likely to result from the transfer, An emergency transfer is being made prior to stabilization due to the need for definitive care and the benefit of transfer outweighs the risk    Reason for Transfer: Level of Care needed not available at this facility    Transfer Requirements: [address]   · Space available and qualified personnel available for treatment as acknowledged by    · Agreed to accept transfer and to provide appropriate medical treatment as acknowledged by       Dr Vaughn Do   · Appropriate medical records of the examination and treatment of the patient are provided at the time of transfer STAFF INITIAL WHEN COMPLETED _______  · Transfer will be performed by qualified personnel from    and appropriate transfer equipment as required, including the use of necessary and appropriate life support measures  Provider Certification: I have examined the patient and explained the following risks and benefits of being transferred/refusing transfer to the patient/family:  General risk, such as traffic hazards, adverse weather conditions, rough terrain or turbulence, possible failure of equipment (including vehicle or aircraft), or consequences of actions of persons outside the control of the transport personnel, Unanticipated needs of medical equipment and personnel during transport, Risk of worsening condition      Based on these reasonable risks and benefits to the patient and/or the unborn child(parveen), and based upon the information available at the time of the patients examination, I certify that the medical benefits reasonably to be expected from the provision of appropriate medical treatments at another medical facility outweigh the increasing risks, if any, to the individuals medical condition, and in the case of labor to the unborn child, from effecting the transfer      X____________________________________________ DATE 04/20/21        TIME_______      ORIGINAL - SEND TO MEDICAL RECORDS   COPY - SEND WITH PATIENT DURING TRANSFER

## 2021-04-20 NOTE — ED PROVIDER NOTES
History  Chief Complaint   Patient presents with    Headache     Headache since last night, took two aspirin, 6 5 months pregnant  Patient reports she left her insulin at friends house and has not been able to retrieve it  Has not contacted her OB or PCP   Nausea     Patient is a 44-year-old female at 10 and half weeks pregnant history of diabetes on insulin presenting for a headache  Patient says that the headache has been present since last night  It is located in the front of her head  She says that is been gradual in nature and has progressively gotten worse  She says she took 2 aspirin at home without relief  She admits to some mild nausea but denies any visual changes, vomiting, neck pain  She says she has had similar headaches in the past   Patient has been without her insulin for the past 2 days because she left at her friend's house" and has not been able to get it  She did not reach out to her OB or PCP about her symptoms today  She denies any issues with the pregnancy thus far  She denies any abdominal pain, vaginal bleeding, discharge or urinary symptoms  The patient has been able to eat and drink today  Prior to Admission Medications   Prescriptions Last Dose Informant Patient Reported? Taking? Accu-Chek FastClix Lancets MISC   No No   Sig: Use 6 a day and as directed  Admelog SoloStar 100 units/mL injection pen   No No   Sig: Inject 5 Units under the skin 3 (three) times a day before meals   Continuous Blood Gluc  (Dexcom G6 ) NERY   No No   Sig: Use as directed  Continuous Blood Gluc Sensor (Dexcom G6 Sensor) MISC   No No   Si box=1 month supply or 3 sensors, use 1 sensor every 10 days  Continuous Blood Gluc Transmit (Dexcom G6 Transmitter) MISC   No No   Sig: Transmitter change every 90 days  Contour Next Test test strip   No No   Sig: Test up to 6 times a day     Glucagon, rDNA, (Glucagon Emergency) 1 MG KIT  Self Yes No   Sig: Glucagon Emergency Kit (human-recomb) 1 mg solution for injection   GIVE 1 MG IN CASE OF LOW BLOOD SUGAR  Insulin Pen Needle 31G X 5 MM MISC   No No   Sig: Inject under the skin daily at bedtime Use one a day or as directed  Prenat-Fe Carbonyl-FA-Omega 3 (One-A-Day Womens Prenatal 1) 28-0 8-235 MG CAPS  Self No No   Sig: Take 1 tablet by mouth daily   Urine Glucose-Ketones Test STRP  Self Yes No   Sig: Test   aspirin (ECOTRIN LOW STRENGTH) 81 mg EC tablet   No No   Sig: Take 2 tablets (162 mg total) by mouth daily   Patient not taking: Reported on 2021   insulin glargine (Basaglar KwikPen) 100 units/mL injection pen   No No   Sig: Inject 55 Units under the skin daily      Facility-Administered Medications: None       Past Medical History:   Diagnosis Date    Diabetes mellitus ([facility] )     uses kwiki pen     Diabetes mellitus type 1 ([facility] )     High blood pressure     recently dx/ put on lisinopril     High cholesterol     Hypertension      no hypersion     Miscarriage     Recurrent pregnancy loss, antepartum condition or complication        Past Surgical History:   Procedure Laterality Date     SECTION  01/02/2014     X1    DILATION AND CURETTAGE OF UTERUS  04/10/2019    Missed AB     WISDOM TOOTH EXTRACTION         Family History   Problem Relation Age of Onset    Diabetes Paternal Grandmother     No Known Problems Mother     No Known Problems Father     No Known Problems Sister     No Known Problems Brother     No Known Problems Daughter     No Known Problems Maternal Grandmother     No Known Problems Paternal Grandfather     No Known Problems Sister      I have reviewed and agree with the history as documented      E-Cigarette/Vaping    E-Cigarette Use Never User      E-Cigarette/Vaping Substances    Nicotine No     THC No     CBD No     Flavoring No     Other No     Unknown No      Social History     Tobacco Use    Smoking status: Never Smoker    Smokeless tobacco: Never Used   Substance Use Topics    Alcohol use: Never     Frequency: Never     Comment: Alcohol intake:   None - As per Krystyna Lemos Drug use: Never     Comment: Illicit drugs:   No  - As per [place]        Review of Systems   Constitutional: Negative for fever and unexpected weight change  HENT: Negative for congestion, ear pain, sore throat and trouble swallowing  Eyes: Negative for pain and redness  Respiratory: Negative for cough, chest tightness and shortness of breath  Cardiovascular: Negative for chest pain and leg swelling  Gastrointestinal: Negative for abdominal distention, abdominal pain, diarrhea and vomiting  Endocrine: Negative for polyuria  Genitourinary: Negative for dysuria, hematuria, pelvic pain and vaginal bleeding  Musculoskeletal: Negative for back pain and myalgias  Skin: Negative for rash  Neurological: Negative for dizziness, syncope, weakness, light-headedness and headaches  Physical Exam  Physical Exam  Vitals signs and nursing note reviewed  Constitutional:       General: She is not in acute distress  Appearance: She is well-developed  HENT:      Head: Normocephalic and atraumatic  Right Ear: External ear normal       Left Ear: External ear normal       Mouth/Throat:      Pharynx: No oropharyngeal exudate  Eyes:      Conjunctiva/sclera: Conjunctivae normal       Pupils: Pupils are equal, round, and reactive to light  Neck:      Musculoskeletal: Normal range of motion and neck supple  Cardiovascular:      Rate and Rhythm: Normal rate and regular rhythm  Heart sounds: Normal heart sounds  No murmur  No friction rub  No gallop  Pulmonary:      Effort: Pulmonary effort is normal  No respiratory distress  Breath sounds: Normal breath sounds  No wheezing or rales  Abdominal:      General: There is no distension  Palpations: Abdomen is soft  Tenderness: There is no abdominal tenderness  There is no guarding        Comments: Gravid     Musculoskeletal: Normal range of motion  General: No tenderness or deformity  Lymphadenopathy:      Cervical: No cervical adenopathy  Skin:     General: Skin is warm and dry  Neurological:      General: No focal deficit present  Mental Status: She is alert and oriented to person, place, and time  Mental status is at baseline  Cranial Nerves: No cranial nerve deficit  Sensory: No sensory deficit  Motor: No weakness or abnormal muscle tone           Vital Signs  ED Triage Vitals   Temperature Pulse Respirations Blood Pressure SpO2   04/20/21 1619 04/20/21 1619 04/20/21 1619 04/20/21 1619 04/20/21 1619   98 3 °F (36 8 °C) 101 18 111/56 100 %      Temp Source Heart Rate Source Patient Position - Orthostatic VS BP Location FiO2 (%)   04/20/21 1619 04/20/21 1619 04/20/21 1619 04/20/21 1619 --   Oral Monitor Sitting Left arm       Pain Score       04/20/21 1700       7           Vitals:    04/20/21 1619 04/20/21 1826   BP: 111/56 110/52   Pulse: 101 93   Patient Position - Orthostatic VS: Sitting Lying         Visual Acuity      ED Medications  Medications   sodium chloride 0 9 % bolus 1,000 mL (0 mL Intravenous Stopped 4/20/21 1925)   acetaminophen (TYLENOL) tablet 975 mg (975 mg Oral Given 4/20/21 1715)   metoclopramide (REGLAN) injection 10 mg (10 mg Intravenous Given 4/20/21 1716)   sodium chloride 0 9 % infusion (0 mL/hr Intravenous Stopped 4/20/21 1924)       Diagnostic Studies  Results Reviewed     Procedure Component Value Units Date/Time    Fingerstick Glucose (POCT) [[id number]]  (Abnormal) Collected: 04/20/21 2032    Lab Status: Final result Updated: 04/20/21 2046     POC Glucose 272 mg/dl     Fingerstick Glucose (POCT) [[id number]]  (Abnormal) Collected: 04/20/21 1917    Lab Status: Final result Updated: 04/20/21 1918     POC Glucose 290 mg/dl     UA (URINE) with reflex to Scope [[id number]]  (Abnormal) Collected: 04/20/21 1803    Lab Status: Final result Specimen: Urine, Clean Catch Updated: 04/20/21 1813     Color, UA Yellow     Clarity, UA Clear     Specific Gravity, UA >=1 030     pH, UA 5 5     Leukocytes, UA Negative     Nitrite, UA Negative     Protein, UA Negative mg/dl      Glucose, UA 2+ mg/dl      Ketones, UA 80 (3+) mg/dl      Urobilinogen, UA 0 2 E U /dl      Bilirubin, UA Negative     Blood, UA Negative    Fingerstick Glucose (POCT) [[id number]]  (Abnormal) Collected: 04/20/21 1808    Lab Status: Final result Updated: 04/20/21 1809     POC Glucose 437 mg/dl     Comprehensive metabolic panel [[id number]]  (Abnormal) Collected: 04/20/21 1648    Lab Status: Final result Specimen: Blood from Hand, Left Updated: 04/20/21 1728     Sodium 129 mmol/L      Potassium 5 2 mmol/L      Chloride 97 mmol/L      CO2 13 mmol/L      ANION GAP 19 mmol/L      BUN 13 mg/dL      Creatinine 0 66 mg/dL      Glucose 430 mg/dL      Calcium 9 1 mg/dL      AST 25 U/L      ALT 14 U/L      Alkaline Phosphatase 85 2 U/L      Total Protein 7 2 g/dL      Albumin 3 6 g/dL      Total Bilirubin 0 52 mg/dL      eGFR 120 ml/min/1 73sq m     Narrative:      [facility] guidelines for Chronic Kidney Disease (CKD):     Stage 1 with normal or high GFR (GFR > 90 mL/min/1 73 square meters)    Stage 2 Mild CKD (GFR = 60-89 mL/min/1 73 square meters)    Stage 3A Moderate CKD (GFR = 45-59 mL/min/1 73 square meters)    Stage 3B Moderate CKD (GFR = 30-44 mL/min/1 73 square meters)    Stage 4 Severe CKD (GFR = 15-29 mL/min/1 73 square meters)    Stage 5 End Stage CKD (GFR <15 mL/min/1 73 square meters)  Note: GFR calculation is accurate only with a steady state creatinine    Blood gas, venous [[id number]]  (Abnormal) Collected: 04/20/21 1658    Lab Status: Final result Specimen: Blood from Hand, Left Updated: 04/20/21 1713     pH, Alexandre 7 290     pCO2, Alexandre 25 6 mm Hg      pO2, Alexandre 44 8 mm Hg      HCO3, Alexandre 12 0 mmol/L      Base Excess, Alexandre -12 7 mmol/L      O2 Content, Alexandre 15 5 ml/dL      O2 HGB, VENOUS 79 6 % Beta Hydroxybutyrate [[id number]]  (Abnormal) Collected: 04/20/21 1648    Lab Status: Final result Specimen: Blood from Hand, Left Updated: 04/20/21 1701     BETA-HYDROXYBUTYRATE 5 6 mmol/L     CBC and differential [[id number]]  (Abnormal) Collected: 04/20/21 1648    Lab Status: Final result Specimen: Blood from Hand, Left Updated: 04/20/21 1656     WBC 11 34 Thousand/uL      RBC 4 33 Million/uL      Hemoglobin 13 6 g/dL      Hematocrit 40 6 %      MCV 94 fL      MCH 31 4 pg      MCHC 33 5 g/dL      RDW 11 8 %      MPV 9 1 fL      Platelets 893 Thousands/uL      Neutrophils Relative 89 %      Immat GRANS % 0 %      Lymphocytes Relative 9 %      Monocytes Relative 2 %      Eosinophils Relative 0 %      Basophils Relative 0 %      Neutrophils Absolute 10 03 Thousands/µL      Immature Grans Absolute 0 03 Thousand/uL      Lymphocytes Absolute 1 02 Thousands/µL      Monocytes Absolute 0 24 Thousand/µL      Eosinophils Absolute 0 00 Thousand/µL      Basophils Absolute 0 02 Thousands/µL     Fingerstick Glucose (POCT) [[id number]]  (Abnormal) Collected: 04/20/21 1623    Lab Status: Final result Updated: 04/20/21 1625     POC Glucose 421 mg/dl                  No orders to display              Procedures  Procedures         ED Course  ED Course as of Apr 22 1407   Tue Apr 20, 2021   1738 No beds available at [facility], patient will need to be transferred to [address] [place] [address]  Number of Diagnoses or Management Options  DKA (diabetic ketoacidoses) [facility]):   Headache:   Diagnosis management comments: 53-year-old female at 10 and half months gestation, with history of diabetes presenting for headache  Frontal in nature  Gradual since last night  Has been without her insulin for 2 days  Denies visual changes, neck pain, nausea, vomiting, chest pain, shortness breath, abdominal pain  Vitals within normal limits  No focal neurologic deficits on exam  Will obtain DKA workup    Will give Tylenol, Reglan, IV fluids  Labs show evidence of DKA with elevated beta hydroxybutyrate, anion gap  Patient started on insulin drip  Transfer to [facility] ICU        Disposition  Final diagnoses:   Headache   DKA (diabetic ketoacidoses) ([facility] [address] )     Time reflects when diagnosis was documented in both MDM as applicable and the Disposition within this note     Time User Action Codes Description Comment    4/20/2021  5:46 PM Marj Europe [R51 9] Headache     4/20/2021  5:46 PM Derrell Roman Add [E11 10] DKA (diabetic ketoacidoses) [facility])       ED Disposition     ED Disposition Condition Date/Time Comment    Transfer to Another Facility-In Network  Tue Apr 20, 2021  5:46 PM Myles hCan should be transferred out to [address]        MD Documentation      Most Recent Value   Patient Condition  The patient has been stabilized such that within reasonable medical probability, no material deterioration of the patient condition or the condition of the unborn child(parveen) is likely to result from the transfer, An emergency transfer is being made prior to stabilization due to the need for definitive care and the benefit of transfer outweighs the risk   Reason for Transfer  Level of Care needed not available at this facility   Benefits of Transfer  Specialized equipment and/or services available at the receiving facility (Include comment)________________________   Risks of Transfer  Potential for delay in receiving treatment, Potential deterioration of medical condition, Loss of IV, Increased discomfort during transfer   Accepting Physician  Dr León Huntingdon Name, David Pollard   Sending MD Dr Starr Alejandro    Provider Certification  General risk, such as traffic hazards, adverse weather conditions, rough terrain or turbulence, possible failure of equipment (including vehicle or aircraft), or consequences of actions of persons outside the control of the transport personnel, Unanticipated needs of medical equipment and personnel during transport, Risk of worsening condition      RN Documentation      Most [address] [place] [address] Name, Deleonton      Follow-up Information    None         Discharge Medication List as of 4/20/2021  8:31 PM      CONTINUE these medications which have NOT CHANGED    Details   Accu-Chek FastClix Lancets MISC Use 6 a day and as directed , Normal      Admelog SoloStar 100 units/mL injection pen Inject 5 Units under the skin 3 (three) times a day before meals, Starting Wed 4/14/2021, Normal      Continuous Blood Gluc  (Dexcom G6 ) NERY Use as directed , Normal      Continuous Blood Gluc Sensor (Dexcom G6 Sensor) MISC 1 box=1 month supply or 3 sensors, use 1 sensor every 10 days  , Normal      Continuous Blood Gluc Transmit (Dexcom G6 Transmitter) MISC Transmitter change every 90 days  , Normal      Contour Next Test test strip Test up to 6 times a day , Normal      insulin glargine (Basaglar KwikPen) 100 units/mL injection pen Inject 55 Units under the skin daily, Starting Wed 4/14/2021, Normal      Insulin Pen Needle 31G X 5 MM MISC Inject under the skin daily at bedtime Use one a day or as directed , Starting Wed 4/14/2021, Until Fri 5/14/2021, Normal      Prenat-Fe Carbonyl-FA-Omega 3 (One-A-Day Womens Prenatal 1) 28-0 8-235 MG CAPS Take 1 tablet by mouth daily, Starting Sat 11/14/2020, No Print      aspirin (ECOTRIN LOW STRENGTH) 81 mg EC tablet Take 2 tablets (162 mg total) by mouth daily, Starting Fri 2/26/2021, Normal      Glucagon, rDNA, (Glucagon Emergency) 1 MG KIT Glucagon Emergency Kit (human-recomb) 1 mg solution for injection   GIVE 1 MG IN CASE OF LOW BLOOD SUGAR , Historical Med      Urine Glucose-Ketones Test STRP Test, Historical Med           No discharge procedures on file      PDMP Review     None          ED Provider  Electronically Signed by           Jaymie Sims DO  04/22/21 7416

## 2021-04-20 NOTE — EMTALA/ACUTE CARE TRANSFER
[facility] EMERGENCY DEPARTMENT  [address] [facility] [address]  Dept: [phone]      EMTALA TRANSFER CONSENT    NAME Jada Mercado                                         1990                              MRN [id number]    I have been informed of my rights regarding examination, treatment, and transfer   by Dr Marta Barney DO    Benefits:      Risks:        Consent for Transfer:  I acknowledge that my medical condition has been evaluated and explained to me by the emergency department physician or other qualified medical person and/or my attending physician, who has recommended that I be transferred to the service of    at    The above potential benefits of such transfer, the potential risks associated with such transfer, and the probable risks of not being transferred have been explained to me, and I fully understand them  The doctor has explained that, in my case, the benefits of transfer outweigh the risks  I agree to be transferred  I authorize the performance of emergency medical procedures and treatments upon me in both transit and upon arrival at the receiving facility  Additionally, I authorize the release of any and all medical records to the receiving facility and request they be transported with me, if possible  I understand that the safest mode of transportation during a medical emergency is an ambulance and that the Hospital advocates the use of this mode of transport  Risks of traveling to the receiving facility by car, including absence of medical control, life sustaining equipment, such as oxygen, and medical personnel has been explained to me and I fully understand them  (EUGENIA CORRECT BOX BELOW)  [  ]  I consent to the stated transfer and to be transported by ambulance/helicopter  [  ]  I consent to the stated transfer, but refuse transportation by ambulance and accept full responsibility for my transportation by car    I understand the risks of non-ambulance transfers and I exonerate the Hospital and its staff from any deterioration in my condition that results from this refusal     X___________________________________________    DATE  21  TIME________  Signature of patient or legally responsible individual signing on patient behalf           RELATIONSHIP TO PATIENT_________________________          Provider Certification    NAME Cas Gamboa                                         1990                              MRN [id number]    A medical screening exam was performed on the above named patient  Based on the examination:    Condition Necessitating Transfer The primary encounter diagnosis was Headache  A diagnosis of DKA (diabetic ketoacidoses) ([facility] ) was also pertinent to this visit  Patient Condition:      Reason for Transfer:      Transfer Requirements: Facility     · Space available and qualified personnel available for treatment as acknowledged by    · Agreed to accept transfer and to provide appropriate medical treatment as acknowledged by          · Appropriate medical records of the examination and treatment of the patient are provided at the time of transfer   [address] [facility], [address] _______  · Transfer will be performed by qualified personnel from    and appropriate transfer equipment as required, including the use of necessary and appropriate life support measures      Provider Certification: I have examined the patient and explained the following risks and benefits of being transferred/refusing transfer to the patient/family:         Based on these reasonable risks and benefits to the patient and/or the unborn child(parveen), and based upon the information available at the time of the patients examination, I certify that the medical benefits reasonably to be expected from the provision of appropriate medical treatments at another medical facility outweigh the increasing risks, if any, to the individuals medical condition, and in the case of labor to the unborn child, from effecting the transfer      X____________________________________________ DATE 04/20/21        TIME_______      ORIGINAL - SEND TO MEDICAL RECORDS   COPY - SEND WITH PATIENT DURING TRANSFER

## 2021-04-20 NOTE — ED NOTES
[facility] [address]  Dr Del Cid [facility]  [phone]     Steven Barahona, RN  04/20/21 1911

## 2021-04-21 LAB
ANION GAP SERPL CALCULATED.3IONS-SCNC: 10 MMOL/L (ref 4–13)
ANION GAP SERPL CALCULATED.3IONS-SCNC: 13 MMOL/L (ref 4–13)
ANION GAP SERPL CALCULATED.3IONS-SCNC: 14 MMOL/L (ref 4–13)
ANION GAP SERPL CALCULATED.3IONS-SCNC: 15 MMOL/L (ref 4–13)
ATRIAL RATE: 92 BPM
BASE EX.OXY STD BLDV CALC-SCNC: 97.4 % (ref 60–80)
BASE EXCESS BLDV CALC-SCNC: -10.8 MMOL/L
BUN SERPL-MCNC: 10 MG/DL (ref 5–25)
BUN SERPL-MCNC: 3 MG/DL (ref 5–25)
BUN SERPL-MCNC: 5 MG/DL (ref 5–25)
BUN SERPL-MCNC: 6 MG/DL (ref 5–25)
BUN SERPL-MCNC: 7 MG/DL (ref 5–25)
BUN SERPL-MCNC: 7 MG/DL (ref 5–25)
CALCIUM SERPL-MCNC: 6.7 MG/DL (ref 8.3–10.1)
CALCIUM SERPL-MCNC: 6.8 MG/DL (ref 8.3–10.1)
CALCIUM SERPL-MCNC: 6.8 MG/DL (ref 8.3–10.1)
CALCIUM SERPL-MCNC: 7 MG/DL (ref 8.3–10.1)
CALCIUM SERPL-MCNC: 7.4 MG/DL (ref 8.3–10.1)
CALCIUM SERPL-MCNC: 7.5 MG/DL (ref 8.3–10.1)
CF COMMENT: NORMAL
CFTR MUT ANL BLD/T: NORMAL
CHLORIDE SERPL-SCNC: 107 MMOL/L (ref 100–108)
CHLORIDE SERPL-SCNC: 107 MMOL/L (ref 100–108)
CHLORIDE SERPL-SCNC: 108 MMOL/L (ref 100–108)
CHLORIDE SERPL-SCNC: 108 MMOL/L (ref 100–108)
CHLORIDE SERPL-SCNC: 109 MMOL/L (ref 100–108)
CHLORIDE SERPL-SCNC: 109 MMOL/L (ref 100–108)
CO2 SERPL-SCNC: 13 MMOL/L (ref 21–32)
CO2 SERPL-SCNC: 13 MMOL/L (ref 21–32)
CO2 SERPL-SCNC: 14 MMOL/L (ref 21–32)
CO2 SERPL-SCNC: 15 MMOL/L (ref 21–32)
CO2 SERPL-SCNC: 16 MMOL/L (ref 21–32)
CO2 SERPL-SCNC: 20 MMOL/L (ref 21–32)
CREAT SERPL-MCNC: 0.42 MG/DL (ref 0.6–1.3)
CREAT SERPL-MCNC: 0.47 MG/DL (ref 0.6–1.3)
CREAT SERPL-MCNC: 0.5 MG/DL (ref 0.6–1.3)
CREAT SERPL-MCNC: 0.55 MG/DL (ref 0.6–1.3)
CREAT SERPL-MCNC: 0.57 MG/DL (ref 0.6–1.3)
CREAT SERPL-MCNC: 0.68 MG/DL (ref 0.6–1.3)
GFR SERPL CREATININE-BSD FRML MDRD: 119 ML/MIN/1.73SQ M
GFR SERPL CREATININE-BSD FRML MDRD: 126 ML/MIN/1.73SQ M
GFR SERPL CREATININE-BSD FRML MDRD: 127 ML/MIN/1.73SQ M
GFR SERPL CREATININE-BSD FRML MDRD: 131 ML/MIN/1.73SQ M
GFR SERPL CREATININE-BSD FRML MDRD: 134 ML/MIN/1.73SQ M
GFR SERPL CREATININE-BSD FRML MDRD: 139 ML/MIN/1.73SQ M
GLUCOSE SERPL-MCNC: 147 MG/DL (ref 65–140)
GLUCOSE SERPL-MCNC: 149 MG/DL (ref 65–140)
GLUCOSE SERPL-MCNC: 159 MG/DL (ref 65–140)
GLUCOSE SERPL-MCNC: 171 MG/DL (ref 65–140)
GLUCOSE SERPL-MCNC: 174 MG/DL (ref 65–140)
GLUCOSE SERPL-MCNC: 177 MG/DL (ref 65–140)
GLUCOSE SERPL-MCNC: 177 MG/DL (ref 65–140)
GLUCOSE SERPL-MCNC: 180 MG/DL (ref 65–140)
GLUCOSE SERPL-MCNC: 185 MG/DL (ref 65–140)
GLUCOSE SERPL-MCNC: 193 MG/DL (ref 65–140)
GLUCOSE SERPL-MCNC: 199 MG/DL (ref 65–140)
GLUCOSE SERPL-MCNC: 205 MG/DL (ref 65–140)
GLUCOSE SERPL-MCNC: 205 MG/DL (ref 65–140)
GLUCOSE SERPL-MCNC: 209 MG/DL (ref 65–140)
GLUCOSE SERPL-MCNC: 210 MG/DL (ref 65–140)
GLUCOSE SERPL-MCNC: 214 MG/DL (ref 65–140)
GLUCOSE SERPL-MCNC: 218 MG/DL (ref 65–140)
GLUCOSE SERPL-MCNC: 220 MG/DL (ref 65–140)
GLUCOSE SERPL-MCNC: 225 MG/DL (ref 65–140)
GLUCOSE SERPL-MCNC: 229 MG/DL (ref 65–140)
GLUCOSE SERPL-MCNC: 289 MG/DL (ref 65–140)
GLUCOSE SERPL-MCNC: 295 MG/DL (ref 65–140)
HCO3 BLDV-SCNC: 12.8 MMOL/L (ref 24–30)
MAGNESIUM SERPL-MCNC: 1.5 MG/DL (ref 1.6–2.6)
MAGNESIUM SERPL-MCNC: 1.7 MG/DL (ref 1.6–2.6)
MAGNESIUM SERPL-MCNC: 1.9 MG/DL (ref 1.6–2.6)
MAGNESIUM SERPL-MCNC: 2.5 MG/DL (ref 1.6–2.6)
O2 CT BLDV-SCNC: 17 ML/DL
P AXIS: 77 DEGREES
PCO2 BLDV: 23.1 MM HG (ref 42–50)
PH BLDV: 7.36 [PH] (ref 7.3–7.4)
PHOSPHATE SERPL-MCNC: 1.3 MG/DL (ref 2.7–4.5)
PHOSPHATE SERPL-MCNC: 2.1 MG/DL (ref 2.7–4.5)
PHOSPHATE SERPL-MCNC: 2.3 MG/DL (ref 2.7–4.5)
PHOSPHATE SERPL-MCNC: 2.5 MG/DL (ref 2.7–4.5)
PHOSPHATE SERPL-MCNC: 2.6 MG/DL (ref 2.7–4.5)
PHOSPHATE SERPL-MCNC: 6 MG/DL (ref 2.7–4.5)
PO2 BLDV: 160.2 MM HG (ref 35–45)
POTASSIUM SERPL-SCNC: 3.2 MMOL/L (ref 3.5–5.3)
POTASSIUM SERPL-SCNC: 3.4 MMOL/L (ref 3.5–5.3)
POTASSIUM SERPL-SCNC: 4.1 MMOL/L (ref 3.5–5.3)
POTASSIUM SERPL-SCNC: 4.2 MMOL/L (ref 3.5–5.3)
POTASSIUM SERPL-SCNC: 4.2 MMOL/L (ref 3.5–5.3)
POTASSIUM SERPL-SCNC: 5.5 MMOL/L (ref 3.5–5.3)
PR INTERVAL: 125 MS
QRS AXIS: 89 DEGREES
QRSD INTERVAL: 75 MS
QT INTERVAL: 367 MS
QTC INTERVAL: 454 MS
SODIUM SERPL-SCNC: 134 MMOL/L (ref 136–145)
SODIUM SERPL-SCNC: 136 MMOL/L (ref 136–145)
SODIUM SERPL-SCNC: 136 MMOL/L (ref 136–145)
SODIUM SERPL-SCNC: 137 MMOL/L (ref 136–145)
SODIUM SERPL-SCNC: 137 MMOL/L (ref 136–145)
SODIUM SERPL-SCNC: 139 MMOL/L (ref 136–145)
T WAVE AXIS: 72 DEGREES
VENTRICULAR RATE: 92 BPM

## 2021-04-21 PROCEDURE — 99358 PROLONG SERVICE W/O CONTACT: CPT | Performed by: OBSTETRICS & GYNECOLOGY

## 2021-04-21 PROCEDURE — 99232 SBSQ HOSP IP/OBS MODERATE 35: CPT | Performed by: INTERNAL MEDICINE

## 2021-04-21 PROCEDURE — 80048 BASIC METABOLIC PNL TOTAL CA: CPT | Performed by: NURSE PRACTITIONER

## 2021-04-21 PROCEDURE — 84100 ASSAY OF PHOSPHORUS: CPT | Performed by: NURSE PRACTITIONER

## 2021-04-21 PROCEDURE — 59025 FETAL NON-STRESS TEST: CPT | Performed by: OBSTETRICS & GYNECOLOGY

## 2021-04-21 PROCEDURE — 99255 IP/OBS CONSLTJ NEW/EST HI 80: CPT | Performed by: INTERNAL MEDICINE

## 2021-04-21 PROCEDURE — 82948 REAGENT STRIP/BLOOD GLUCOSE: CPT

## 2021-04-21 PROCEDURE — 93010 ELECTROCARDIOGRAM REPORT: CPT | Performed by: INTERNAL MEDICINE

## 2021-04-21 PROCEDURE — 83735 ASSAY OF MAGNESIUM: CPT | Performed by: NURSE PRACTITIONER

## 2021-04-21 PROCEDURE — 82805 BLOOD GASES W/O2 SATURATION: CPT | Performed by: NURSE PRACTITIONER

## 2021-04-21 PROCEDURE — 99233 SBSQ HOSP IP/OBS HIGH 50: CPT | Performed by: OBSTETRICS & GYNECOLOGY

## 2021-04-21 PROCEDURE — NC001 PR NO CHARGE: Performed by: OBSTETRICS & GYNECOLOGY

## 2021-04-21 RX ORDER — ACETAMINOPHEN 325 MG/1
650 TABLET ORAL EVERY 6 HOURS PRN
Status: DISCONTINUED | OUTPATIENT
Start: 2021-04-21 | End: 2021-04-26 | Stop reason: HOSPADM

## 2021-04-21 RX ORDER — POTASSIUM CHLORIDE 14.9 MG/ML
20 INJECTION INTRAVENOUS
Status: COMPLETED | OUTPATIENT
Start: 2021-04-21 | End: 2021-04-21

## 2021-04-21 RX ORDER — DEXTROSE AND SODIUM CHLORIDE 5; .45 G/100ML; G/100ML
125 INJECTION, SOLUTION INTRAVENOUS CONTINUOUS
Status: DISCONTINUED | OUTPATIENT
Start: 2021-04-21 | End: 2021-04-22

## 2021-04-21 RX ORDER — POTASSIUM CHLORIDE 20 MEQ/1
40 TABLET, EXTENDED RELEASE ORAL
Status: COMPLETED | OUTPATIENT
Start: 2021-04-21 | End: 2021-04-21

## 2021-04-21 RX ORDER — CALCIUM GLUCONATE 20 MG/ML
2 INJECTION, SOLUTION INTRAVENOUS ONCE
Status: COMPLETED | OUTPATIENT
Start: 2021-04-21 | End: 2021-04-21

## 2021-04-21 RX ORDER — MAGNESIUM SULFATE HEPTAHYDRATE 40 MG/ML
4 INJECTION, SOLUTION INTRAVENOUS ONCE
Status: COMPLETED | OUTPATIENT
Start: 2021-04-21 | End: 2021-04-21

## 2021-04-21 RX ORDER — ACETAMINOPHEN 160 MG/5ML
650 SUSPENSION, ORAL (FINAL DOSE FORM) ORAL EVERY 4 HOURS PRN
Status: DISCONTINUED | OUTPATIENT
Start: 2021-04-21 | End: 2021-04-21

## 2021-04-21 RX ADMIN — DEXTROSE AND SODIUM CHLORIDE 350 ML/HR: 5; .45 INJECTION, SOLUTION INTRAVENOUS at 17:16

## 2021-04-21 RX ADMIN — POTASSIUM CHLORIDE 20 MEQ: 14.9 INJECTION, SOLUTION INTRAVENOUS at 09:04

## 2021-04-21 RX ADMIN — POTASSIUM CHLORIDE 20 MEQ: 14.9 INJECTION, SOLUTION INTRAVENOUS at 14:42

## 2021-04-21 RX ADMIN — POTASSIUM CHLORIDE 40 MEQ: 1500 TABLET, EXTENDED RELEASE ORAL at 16:26

## 2021-04-21 RX ADMIN — POTASSIUM CHLORIDE 40 MEQ: 1500 TABLET, EXTENDED RELEASE ORAL at 15:07

## 2021-04-21 RX ADMIN — ACETAMINOPHEN 650 MG: 325 TABLET, FILM COATED ORAL at 15:34

## 2021-04-21 RX ADMIN — ACETAMINOPHEN 650 MG: 650 SUSPENSION ORAL at 06:44

## 2021-04-21 RX ADMIN — INSULIN LISPRO 5 UNITS: 100 INJECTION, SOLUTION INTRAVENOUS; SUBCUTANEOUS at 16:55

## 2021-04-21 RX ADMIN — POTASSIUM CHLORIDE 20 MEQ: 14.9 INJECTION, SOLUTION INTRAVENOUS at 10:55

## 2021-04-21 RX ADMIN — DEXTROSE AND SODIUM CHLORIDE 250 ML/HR: 5; .45 INJECTION, SOLUTION INTRAVENOUS at 06:35

## 2021-04-21 RX ADMIN — DEXTROSE AND SODIUM CHLORIDE 350 ML/HR: 5; .45 INJECTION, SOLUTION INTRAVENOUS at 13:56

## 2021-04-21 RX ADMIN — ACETAMINOPHEN 650 MG: 325 TABLET, FILM COATED ORAL at 21:13

## 2021-04-21 RX ADMIN — SODIUM PHOSPHATE, MONOBASIC, MONOHYDRATE 12 MMOL: 276; 142 INJECTION, SOLUTION INTRAVENOUS at 06:35

## 2021-04-21 RX ADMIN — CALCIUM GLUCONATE 2 G: 20 INJECTION, SOLUTION INTRAVENOUS at 10:41

## 2021-04-21 RX ADMIN — DEXTROSE, SODIUM CHLORIDE, AND POTASSIUM CHLORIDE 250 ML/HR: 5; .45; .3 INJECTION INTRAVENOUS at 03:05

## 2021-04-21 RX ADMIN — SODIUM CHLORIDE 10 UNITS/HR: 9 INJECTION, SOLUTION INTRAVENOUS at 18:55

## 2021-04-21 RX ADMIN — MAGNESIUM SULFATE HEPTAHYDRATE 4 G: 40 INJECTION, SOLUTION INTRAVENOUS at 06:27

## 2021-04-21 RX ADMIN — MAGNESIUM OXIDE TAB 400 MG (241.3 MG ELEMENTAL MG) 800 MG: 400 (241.3 MG) TAB at 22:46

## 2021-04-21 RX ADMIN — SODIUM PHOSPHATE, MONOBASIC, MONOHYDRATE 30 MMOL: 276; 142 INJECTION, SOLUTION INTRAVENOUS at 16:50

## 2021-04-21 RX ADMIN — SODIUM CHLORIDE 6.5 UNITS/HR: 9 INJECTION, SOLUTION INTRAVENOUS at 05:08

## 2021-04-21 RX ADMIN — DEXTROSE AND SODIUM CHLORIDE 350 ML/HR: 5; .45 INJECTION, SOLUTION INTRAVENOUS at 10:22

## 2021-04-21 NOTE — H&P
[address] [place] [address]  H&P- Dominique Martinez 1990, 36 y o  female MRN: [id number]  Unit/Bed#: ICU 11 Encounter: [id number]  Primary Care Provider: Keenan Quan DO   Date and time admitted to hospital: 4/20/2021  8:32 PM    * Diabetic ketoacidosis without coma associated with type 1 diabetes mellitus [facility])  Assessment & Plan  Lab Results   Component Value Date    HGBA1C 10 7 (H) 04/16/2021       Recent Labs     04/20/21  1623 04/20/21  1808 04/20/21 1917 04/20/21 2032   POCGLU 421* 437* 290* 272*       Blood Sugar Average: Last 72 hrs:     · Presented with nausea and headache which started this morning, patient stated she has not taken her insulin for approximately two days because she left it at a friends house  · Likely in the setting of noncompliance  · Fluid resuscitation per DKA protocol   · Insulin infusion per DKA protocol  · Monitor glucose closely  · Monitor BMP Q2 with potential for complication and difficult management secondary to pregnancy  · UA clean, no further infectious workup necessary  · Most recent Hgb A1C as above    Metabolic acidosis with increased anion gap and accumulation of organic acids  Assessment & Plan  · In the setting of DKA   · Plan as above     Pregnancy complicated by pre-existing type 1 diabetes, second trimester  Assessment & Plan  Lab Results   Component Value Date    HGBA1C 10 7 (H) 04/16/2021       Recent Labs     04/20/21  1623 04/20/21  1808 04/20/21 1917 04/20/21 2032   POCGLU 421* 437* 290* 272*       Blood Sugar Average: Last 72 hrs:     · Fetal maternal medicine and OB/GYN consulted and following closely, appreciate their input  · Plan for Q4 hour fetal monitoring per their recommendation overnight    Non-compliance with treatment  Assessment & Plan  · Encourage lifestyle modification   · Case management consult    -------------------------------------------------------------------------------------------------------------  Chief Complaint: nausea and headache    History of Present Illness   HX and PE limited by: n/a  Cheko Baker is a 36 y o  female with past medical history type 1 diabetes and currently 26 weeks pregnant who presented to [facility] with nausea and headache which started this morning  Patient states she has not taken her insulin for at least two days because she left it at a friends house and has been unable to get in touch with them  She does have a history of noncompliance and prior admissions for DKA  Patient was transferred to [facility] for fetal monitoring  Denies recent fever, chills, vomiting or diarrhea  DKA appears to be secondary to noncompliance  Placed in the ICU for monitoring and management of her DKA  History obtained from chart review and the patient   -------------------------------------------------------------------------------------------------------------  Dispo: Admit to Stepdown Level 1    Code Status: Level 1 - Full Code  --------------------------------------------------------------------------------------------------------------  Review of Systems   Constitutional: Negative for chills and fever  HENT: Negative  Eyes: Negative  Respiratory: Negative for chest tightness and shortness of breath  Cardiovascular: Negative for chest pain and leg swelling  Gastrointestinal: Negative for abdominal distention, nausea and vomiting  Endocrine: Negative  Genitourinary: Negative  Musculoskeletal: Negative  Skin: Negative  Allergic/Immunologic: Negative  Neurological: Negative  Hematological: Negative  Psychiatric/Behavioral: Negative  A 12-point, complete review of systems was reviewed and negative except as stated above     Physical Exam  Vitals signs and nursing note reviewed  Constitutional:       Appearance: Normal appearance  HENT:      Head: Normocephalic  Mouth/Throat:      Mouth: Mucous membranes are dry  Pharynx: Oropharynx is clear     Eyes:      Pupils: Pupils are equal, round, and reactive to light  Neck:      Musculoskeletal: Normal range of motion  Cardiovascular:      Rate and Rhythm: Normal rate and regular rhythm  Pulses: Normal pulses  Heart sounds: Normal heart sounds  Pulmonary:      Effort: Pulmonary effort is normal       Breath sounds: Normal breath sounds  Abdominal:      General: Bowel sounds are normal    Musculoskeletal: Normal range of motion  Skin:     General: Skin is warm and dry  Neurological:      General: No focal deficit present  Mental Status: She is alert and oriented to person, place, and time  --------------------------------------------------------------------------------------------------------------  Vitals:   Vitals:    04/20/21 2033 04/20/21 2035   BP: 119/64    Pulse: 102    Resp: (!) 25    Temp:  98 5 °F (36 9 °C)   TempSrc:  Temporal   SpO2: 100%    Weight:  68 3 kg (150 lb 9 2 oz)   Height:  4' 10" (1 473 m)     Temp  Min: 97 8 °F (36 6 °C)  Max: 98 5 °F (36 9 °C)  IBW (Ideal Body Weight): 40 9 kg  Height: 4' 10" (147 3 cm)  Body mass index is 31 47 kg/m²      Laboratory and Diagnostics:  Results from last 7 days   Lab Units 04/20/21 2101 04/20/21 1648 04/16/21  0754   WBC Thousand/uL 11 14* 11 34* 6 03   HEMOGLOBIN g/dL 11 3* 13 6 12 8   HEMATOCRIT % 33 8* 40 6 37 6   PLATELETS Thousands/uL 205 242 214   NEUTROS PCT %  --  89* 71   MONOS PCT %  --  2* 7     Results from last 7 days   Lab Units 04/20/21 2101 04/20/21 1648 04/16/21  0754   SODIUM mmol/L 136 129* 138   POTASSIUM mmol/L 4 2 5 2* 3 9   CHLORIDE mmol/L 104 97 105   CO2 mmol/L 11* 13* 24   ANION GAP mmol/L 21* 19* 9   BUN mg/dL 12 13 9   CREATININE mg/dL 0 61 0 66 0 47   CALCIUM mg/dL 7 4* 9 1 8 2*   GLUCOSE RANDOM mg/dL 239* 430*  --    ALT U/L 21 14 10   AST U/L 40 25 12*   ALK PHOS U/L 78 85 2 64 3   ALBUMIN g/dL 2 4* 3 6 3 3*   TOTAL BILIRUBIN mg/dL 0 41 0 52 0 34     Results from last 7 days   Lab Units 04/20/21  2101   MAGNESIUM mg/dL 1 6   PHOSPHORUS mg/dL 3 1               Results from last 7 days   Lab Units 21  2101   LACTIC ACID mmol/L 1 1     ABG:    VBG:  Results from last 7 days   Lab Units 21  2101   PH ALEXSANDER  7 244*   PCO2 ALEXSANDER mm Hg 18 9*   PO2 ALEXSANDER mm Hg 82 6*   HCO3 ALEXSANDER mmol/L 8 0*   BASE EXC ALEXSANDER mmol/L -17 2           Micro:  Results from last 7 days   Lab Units 21  0754   URINE CULTURE  20,000-29,000 cfu/ml        EKG: normal sinus rhythm  Imaging: I have personally reviewed pertinent reports     and I have personally reviewed pertinent films in PACS      Historical Information   Past Medical History:   Diagnosis Date    Diabetes mellitus ([facility] [address] )     uses kwiki pen     Diabetes mellitus type 1 ([facility] [address] )     High blood pressure     recently dx/ put on lisinopril     High cholesterol     Hypertension      no hypersion     Miscarriage     Recurrent pregnancy loss, antepartum condition or complication      Past Surgical History:   Procedure Laterality Date     SECTION  01/02/2014     X1    DILATION AND CURETTAGE OF UTERUS  04/10/2019    Missed AB     WISDOM TOOTH EXTRACTION       Social History   Social History     Substance and Sexual Activity   Alcohol Use Never    Frequency: Never    Comment: Alcohol intake:   None - As per [place]      Social History     Substance and Sexual Activity   Drug Use Never    Comment: Illicit drugs:   No  - As per [place]      Social History     Tobacco Use   Smoking Status Never Smoker   Smokeless Tobacco Never Used     Exercise History: non contributory  Family History:   Family History   Problem Relation Age of Onset    Diabetes Paternal Grandmother     No Known Problems Mother     No Known Problems Father     No Known Problems Sister     No Known Problems Brother     No Known Problems Daughter     No Known Problems Maternal Grandmother     No Known Problems Paternal Grandfather     No Known Problems Sister      Family history unknown and I have reviewed this patient's family history and commented on sigificant items within the HPI      Medications:  Current Facility-Administered Medications   Medication Dose Route Frequency    chlorhexidine (PERIDEX) 0 12 % oral rinse 15 mL  15 mL Swish & Spit Q12H [address]    dextrose 5 % and sodium chloride 0 45 % with KCl 40 mEq/L infusion (premix)  250 mL/hr Intravenous Continuous    insulin regular (HumuLIN R,NovoLIN R) 1 Units/mL in sodium chloride 0 9 % 100 mL infusion  0 1-30 Units/hr Intravenous Continuous    sodium chloride infusion 0 45 %  500 mL/hr Intravenous Continuous    Followed by   Authur Sever ON 2021] sodium chloride infusion 0 45 %  250 mL/hr Intravenous Continuous     Home medications:  Prior to Admission Medications   Prescriptions Last Dose Informant Patient Reported? Taking? Accu-Chek FastClix Lancets MISC Past Week at Unknown time  No Yes   Sig: Use 6 a day and as directed  Admelog SoloStar 100 units/mL injection pen Past Week at Unknown time  No Yes   Sig: Inject 5 Units under the skin 3 (three) times a day before meals   Continuous Blood Gluc  (Dexcom G6 ) NERY Past Week at Unknown time  No Yes   Sig: Use as directed  Continuous Blood Gluc Sensor (Dexcom G6 Sensor) MISC Past Week at Unknown time  No Yes   Si box=1 month supply or 3 sensors, use 1 sensor every 10 days  Continuous Blood Gluc Transmit (Dexcom G6 Transmitter) MISC Past Week at Unknown time  No Yes   Sig: Transmitter change every 90 days  Contour Next Test test strip Past Week at Unknown time  No Yes   Sig: Test up to 6 times a day  Glucagon, rDNA, (Glucagon Emergency) 1 MG KIT Not Taking at Unknown time Self Yes No   Sig: Glucagon Emergency Kit (human-recomb) 1 mg solution for injection   GIVE 1 MG IN CASE OF LOW BLOOD SUGAR  Insulin Pen Needle 31G X 5 MM MISC Past Week at Unknown time  No Yes   Sig: Inject under the skin daily at bedtime Use one a day or as directed     Prenat-Fe Carbonyl-FA-Omega 3 (One-A-Day Womens Prenatal 1) 28-0 8-235 MG CAPS 4/20/2021 at Unknown time Self No Yes   Sig: Take 1 tablet by mouth daily   Urine Glucose-Ketones Test STRP Unknown at Unknown time Self Yes No   Sig: Test   aspirin (ECOTRIN LOW STRENGTH) 81 mg EC tablet Not Taking at Unknown time  No No   Sig: Take 2 tablets (162 mg total) by mouth daily   Patient not taking: Reported on 4/14/2021   insulin glargine (Basaglar KwikPen) 100 units/mL injection pen Past Week at Unknown time  No Yes   Sig: Inject 55 Units under the skin daily      Facility-Administered Medications: None     Allergies:  No Known Allergies    ------------------------------------------------------------------------------------------------------------  Advance Directive and Living Will:      Power of :    POLST:    ------------------------------------------------------------------------------------------------------------  Anticipated Length of Stay is > 2 midnights    Care Time Delivered:   Upon my evaluation, this patient had a high probability of imminent or life-threatening deterioration due to type 1 diabetes complicated by DKA and current pregnancy, which required my direct attention, intervention, and personal management  I have personally provided 35 minutes (2030 to 2105) of critical care time, exclusive of procedures, teaching, family meetings, and any prior time recorded by providers other than myself  CECILIO Bowden        Portions of the record may have been created with voice recognition software  Occasional wrong word or "sound a like" substitutions may have occurred due to the inherent limitations of voice recognition software    Read the chart carefully and recognize, using context, where substitutions have occurred

## 2021-04-21 NOTE — ASSESSMENT & PLAN NOTE
Lab Results   Component Value Date    HGBA1C 10 7 (H) 04/16/2021       Recent Labs     04/20/21  1623 04/20/21  1808 04/20/21  1917 04/20/21 2032   POCGLU 421* 437* 290* 272*       Blood Sugar Average: Last 72 hrs:     · Presented with nausea and headache which started this morning, patient stated she has not taken her insulin for approximately two days because she left it at a friends house  · Likely in the setting of noncompliance  · Fluid resuscitation per DKA protocol   · Insulin infusion per DKA protocol  · Monitor glucose closely  · Monitor BMP Q2 with potential for complication and difficult management secondary to pregnancy  · UA clean, no further infectious workup necessary  · Most recent Hgb A1C as above

## 2021-04-21 NOTE — PLAN OF CARE
Problem: Potential for Falls  Goal: Patient will remain free of falls  Description: INTERVENTIONS:  - Assess patient frequently for physical needs  -  Identify cognitive and physical deficits and behaviors that affect risk of falls    -  [place] fall precautions as indicated by assessment   - Educate patient/family on patient safety including physical limitations  - Instruct patient to call for assistance with activity based on assessment  - Modify environment to reduce risk of injury  - Consider OT/PT consult to assist with strengthening/mobility  Outcome: Progressing     Problem: PAIN - ADULT  Goal: Verbalizes/displays adequate comfort level or baseline comfort level  Description: Interventions:  - Encourage patient to monitor pain and request assistance  - Assess pain using appropriate pain scale  - Administer analgesics based on type and severity of pain and evaluate response  - Implement non-pharmacological measures as appropriate and evaluate response  - Consider cultural and social influences on pain and pain management  - Notify physician/advanced practitioner if interventions unsuccessful or patient reports new pain  Outcome: Progressing     Problem: INFECTION - ADULT  Goal: Absence or prevention of progression during hospitalization  Description: INTERVENTIONS:  - Assess and monitor for signs and symptoms of infection  - Monitor lab/diagnostic results  - Monitor all insertion sites, i e  indwelling lines, tubes, and drains  - Monitor endotracheal if appropriate and nasal secretions for changes in amount and color  - [place] appropriate cooling/warming therapies per order  - Administer medications as ordered  - Instruct and encourage patient and family to use good hand hygiene technique  - Identify and instruct in appropriate isolation precautions for identified infection/condition  Outcome: Progressing     Problem: SAFETY ADULT  Goal: Patient will remain free of falls  Description: INTERVENTIONS:  - Assess patient frequently for physical needs  -  Identify cognitive and physical deficits and behaviors that affect risk of falls    -  [place] fall precautions as indicated by assessment   - Educate patient/family on patient safety including physical limitations  - Instruct patient to call for assistance with activity based on assessment  - Modify environment to reduce risk of injury  - Consider OT/PT consult to assist with strengthening/mobility  Outcome: Progressing  Goal: Maintain or return to baseline ADL function  Description: INTERVENTIONS:  -  Assess patient's ability to carry out ADLs; assess patient's baseline for ADL function and identify physical deficits which impact ability to perform ADLs (bathing, care of mouth/teeth, toileting, grooming, dressing, etc )  - Assess/evaluate cause of self-care deficits   - Assess range of motion  - Assess patient's mobility; develop plan if impaired  - Assess patient's need for assistive devices and provide as appropriate  - Encourage maximum independence but intervene and supervise when necessary  - Involve family in performance of ADLs  - Assess for home care needs following discharge   - Consider OT consult to assist with ADL evaluation and planning for discharge  - Provide patient education as appropriate  Outcome: Progressing  Goal: Maintain or return mobility status to optimal level  Description: INTERVENTIONS:  - Assess patient's baseline mobility status (ambulation, transfers, stairs, etc )    - Identify cognitive and physical deficits and behaviors that affect mobility  - Identify mobility aids required to assist with transfers and/or ambulation (gait belt, sit-to-stand, lift, walker, cane, etc )  - [place] fall precautions as indicated by assessment  - Record patient progress and toleration of activity level on Mobility SBAR; progress patient to next Phase/Stage  - Instruct patient to call for assistance with activity based on assessment  - Consider rehabilitation consult to assist with strengthening/weightbearing, etc   Outcome: Progressing     Problem: DISCHARGE PLANNING  Goal: Discharge to home or other facility with appropriate resources  Description: INTERVENTIONS:  - Identify barriers to discharge w/patient and caregiver  - Arrange for needed discharge resources and transportation as appropriate  - Identify discharge learning needs (meds, wound care, etc )  - Arrange for interpretive services to assist at discharge as needed  - Refer to Case Management Department for coordinating discharge planning if the patient needs post-hospital services based on physician/advanced practitioner order or complex needs related to functional status, cognitive ability, or social support system  Outcome: Progressing     Problem: Knowledge Deficit  Goal: Patient/family/caregiver demonstrates understanding of disease process, treatment plan, medications, and discharge instructions  Description: Complete learning assessment and assess knowledge base    Interventions:  - Provide teaching at level of understanding  - Provide teaching via preferred learning methods  Outcome: Progressing     Problem: CARDIOVASCULAR - ADULT  Goal: Maintains optimal cardiac output and hemodynamic stability  Description: INTERVENTIONS:  - Monitor I/O, vital signs and rhythm  - Monitor for S/S and trends of decreased cardiac output  - Administer and titrate ordered vasoactive medications to optimize hemodynamic stability  - Assess quality of pulses, skin color and temperature  - Assess for signs of decreased coronary artery perfusion  - Instruct patient to report change in severity of symptoms  Outcome: Progressing  Goal: Absence of cardiac dysrhythmias or at baseline rhythm  Description: INTERVENTIONS:  - Continuous cardiac monitoring, vital signs, obtain 12 lead EKG if ordered  - Administer antiarrhythmic and heart rate control medications as ordered  - Monitor electrolytes and administer replacement therapy as ordered  Outcome: Progressing     Problem: METABOLIC, FLUID AND ELECTROLYTES - ADULT  Goal: Electrolytes maintained within normal limits  Description: INTERVENTIONS:  - Monitor labs and assess patient for signs and symptoms of electrolyte imbalances  - Administer electrolyte replacement as ordered  - Monitor response to electrolyte replacements, including repeat lab results as appropriate  - Instruct patient on fluid and nutrition as appropriate  Outcome: Progressing  Goal: Fluid balance maintained  Description: INTERVENTIONS:  - Monitor labs   - Monitor I/O and WT  - Instruct patient on fluid and nutrition as appropriate  - Assess for signs & symptoms of volume excess or deficit  Outcome: Progressing  Goal: Glucose maintained within target range  Description: INTERVENTIONS:  - Monitor Blood Glucose as ordered  - Assess for signs and symptoms of hyperglycemia and hypoglycemia  - Administer ordered medications to maintain glucose within target range  - Assess nutritional intake and initiate nutrition service referral as needed  Outcome: Progressing     Problem: HEMATOLOGIC - ADULT  Goal: Maintains hematologic stability  Description: INTERVENTIONS  - Assess for signs and symptoms of bleeding or hemorrhage  - Monitor labs  - Administer supportive blood products/factors as ordered and appropriate  Outcome: Progressing

## 2021-04-21 NOTE — SOCIAL WORK
Pt can not be accepted through the complex care mgmt program for outpatient social work because she is not a [facility] patient, and not an [facility] patient  [facility] [facility]

## 2021-04-21 NOTE — CONSULTS
Consultation - Emmy Albrecht 36 y o  female MRN: [id number]    Unit/Bed#: ICU 11 Encounter: [id number]      Assessment/Plan     Assessment/Plan:    1  DKA:  Anion gap did resolve though is still slightly elevated  Bicarbonate is still slightly low as well  Electrolyte replacement per primary team   Consider alternative fluid resucitation if ok with OB and MFM if acidosis persistent  If not, continue current insulin gtt and monitor labs over time for resolution of DKA  If anion gap metabolic acidosis persists, consider nephrology consult for further considerations  Add humalog 5 units with each meal to try to prevent postprandial hyperglycemia while in insulin drip  Will continue to follow while inpatient, but DM management per MFM  Communicated with primary team     CC: Diabetes Consult    History of Present Illness     HPI: Emmy Albrecht is a 36y o  year old female with type 1 diabetes for about 9-10 years  She is on insulin at home  She is currently about 27 weeks pregnant and follows with MFM  For diabetes can pregnancy  She denies any polyuria, polydipsia, nocturia and blurry vision  She denies neuropathy, nephropathy and retinopathy  She denies any hypoglycemia  She is admitted to the hospital due to DKA when she noted headache and nausea  She has not used her insulin since Sunday prior to presenting to the hospital   The time of endocrine consultation she states she is feeling better  She had lunch and ate 100% of her tray per patient and overall tolerated this well  She has a mild headache still but otherwise is feeling better  Consults    Review of Systems   Constitutional: Negative for appetite change and fatigue  HENT: Negative for congestion and trouble swallowing  Eyes: Negative for visual disturbance  Respiratory: Negative for shortness of breath  Cardiovascular: Negative for palpitations and leg swelling  Gastrointestinal: Negative for abdominal pain, nausea and vomiting  Endocrine: Negative for polydipsia and polyuria  Genitourinary: Negative for difficulty urinating and frequency  Musculoskeletal: Negative for arthralgias  Skin: Negative for rash  Neurological: Negative for dizziness and weakness  Psychiatric/Behavioral: Negative for agitation and confusion         Historical Information   Past Medical History:   Diagnosis Date    Diabetes mellitus ([facility] [address] )     uses kwiki pen     Diabetes mellitus type 1 ([facility] [address] )     High blood pressure     recently dx/ put on lisinopril     High cholesterol     Hypertension      no hypersion     Miscarriage     Recurrent pregnancy loss, antepartum condition or complication      Past Surgical History:   Procedure Laterality Date     SECTION  01/02/2014     X1    DILATION AND CURETTAGE OF UTERUS  04/10/2019    Missed AB     WISDOM TOOTH EXTRACTION       Social History   Social History     Substance and Sexual Activity   Alcohol Use Never    Frequency: Never    Comment: Alcohol intake:   None - As per [place]      Social History     Substance and Sexual Activity   Drug Use Never    Comment: Illicit drugs:   No  - [place] per [place]      Social History     Tobacco Use   Smoking Status Never Smoker   Smokeless Tobacco Never Used     Family History:   Family History   Problem Relation Age of Onset    Diabetes Paternal Grandmother     No Known Problems Mother     No Known Problems Father     No Known Problems Sister     No Known Problems Brother     No Known Problems Daughter     No Known Problems Maternal Grandmother     No Known Problems Paternal Grandfather     No Known Problems Sister        Meds/Allergies   Current Facility-Administered Medications   Medication Dose Route Frequency Provider Last Rate Last Admin    acetaminophen (TYLENOL) tablet 650 mg  650 mg Oral Q6H PRN Janus Folds, CRNP        dextrose 5 % and sodium chloride 0 45 % infusion  350 mL/hr Intravenous Continuous Janus Folds, CRNP 350 mL/hr at 21 1356 350 mL/hr at 04/21/21 1356    insulin regular (HumuLIN R,NovoLIN R) 1 Units/mL in sodium chloride 0 9 % 100 mL infusion  0 1-30 Units/hr Intravenous Continuous CECILIO Lee 9 mL/hr at 04/21/21 1402 9 Units/hr at 04/21/21 1402    potassium chloride (K-DUR,KLOR-CON) CR tablet 40 mEq  40 mEq Oral Q1H CECILIO Brasher   40 mEq at 04/21/21 1507    potassium chloride 20 mEq IVPB (premix)  20 mEq Intravenous Q1H Paige Olivo MD   20 mEq at 04/21/21 1442     No Known Allergies    Objective   Vitals: Blood pressure 123/67, pulse 96, temperature 98 6 °F (37 °C), temperature source Temporal, resp  rate (!) 24, height 4' 10" (1 473 m), weight 68 3 kg (150 lb 9 2 oz), last menstrual period 10/09/2020, SpO2 100 %, unknown if currently breastfeeding  Intake/Output Summary (Last 24 hours) at 4/21/2021 1523  Last data filed at 4/21/2021 1442  Gross per 24 hour   Intake 6230 77 ml   Output 2300 ml   Net 3930 77 ml     Invasive Devices     Peripheral Intravenous Line            Peripheral IV 04/20/21 Left Wrist less than 1 day    Peripheral IV 04/20/21 Right Antecubital less than 1 day                Physical Exam  Physical Exam   Constitutional: Oriented to person, place, and time  Appears well-developed and well-nourished  No distress  HENT: Head: Normocephalic and atraumatic  Neck: Normal range of motion  Pulmonary/Chest: Effort normal    Musculoskeletal: Normal range of motion  Neurological: Alert and oriented to person, place, and time  Skin: No diaphoresis  Psychiatric: Normal mood and affect  Behavior is normal      The history was obtained from the review of the chart, patient      Lab Results:   Results from last 7 days   Lab Units 04/20/21 2101   HEMOGLOBIN A1C % 10 5*     Lab Results   Component Value Date    WBC 11 14 (H) 04/20/2021    HGB 11 3 (L) 04/20/2021    HCT 33 8 (L) 04/20/2021    MCV 97 04/20/2021     04/20/2021     Lab Results   Component Value Date/Time    BUN 3 (L) 04/21/2021 12:46 PM    BUN 17 05/04/2020 09:30 AM    K 3 2 (L) 04/21/2021 12:46 PM    K 4 1 05/04/2020 09:30 AM     04/21/2021 12:46 PM     05/04/2020 09:30 AM    CO2 16 (L) 04/21/2021 12:46 PM    CO2 5 (LL) 01/27/2021 12:27 PM    CO2 20 05/04/2020 09:30 AM    CREATININE 0 68 04/21/2021 12:46 PM    AST 40 04/20/2021 09:01 PM    AST 9 (L) 05/04/2020 09:30 AM    ALT 21 04/20/2021 09:01 PM    ALT 10 05/04/2020 09:30 AM    ALB 2 4 (L) 04/20/2021 09:01 PM    GLOB 2 1 05/04/2020 09:30 AM     No results for input(s): CHOL, HDL, LDL, TRIG, VLDL in the last 72 hours  No results found for: Blas Andino  POC Glucose (mg/dl)   Date Value   04/21/2021 225 (H)   04/21/2021 147 (H)   04/21/2021 174 (H)   04/21/2021 185 (H)   04/21/2021 180 (H)   04/21/2021 177 (H)   04/21/2021 171 (H)   04/21/2021 199 (H)   04/21/2021 193 (H)   04/21/2021 289 (H)       Imaging Studies: No imaging to review  Portions of the record may have been created with voice recognition software  Occasional wrong word or "sound a like" substitutions may have occurred due to the inherent limitations of voice recognition software  Read the chart carefully and recognize, using context, where substitutions have occurred

## 2021-04-21 NOTE — QUICK NOTE
Patient arrived to ICU via ambulance from [place] with DKA improving  OB team (Dr Mary Thompson, me, RN Antonio Teague) proceeded with brief bedside ultrasound to confirm viability  FHR was 141 by ultrasound, with active fetal movement noted  Pt denied ctxn, vb, LOF  Glucose on arrival to the ICU was trending down from 400's to 200's  Given patient's improving status, I discussed the monitoring plan with Dr Yocasta Dempsey  We agreed to proceed with 1 hour of monitoring now, and repeat 1 hour of monitoring every 4 hours  We will extend the monitoring duration if indicated  Blood pressure 119/64, pulse 102, temperature 98 5 °F (36 9 °C), temperature source Temporal, resp  rate (!) 25, height 4' 10" (1 473 m), weight 68 3 kg (150 lb 9 2 oz), last menstrual period 10/09/2020, SpO2 100 %, unknown if currently breastfeeding  Dr Yocasta Dempsey and I discussed the plan with the ICU team and provided ob support  We will continue to follow the patient with her ongoing care tonight  Tomorrow, Dr Nancy Butcher will be on service for MFM  For complete consultation, please see MFM consult note by Dr Mary Thompson

## 2021-04-21 NOTE — ASSESSMENT & PLAN NOTE
· Needs to improve compliance, particularly in the setting of pregnancy  · May benefit form diabetic counseling

## 2021-04-21 NOTE — ASSESSMENT & PLAN NOTE
Lab Results   Component Value Date    HGBA1C 10 5 (H) 04/20/2021       Recent Labs     04/20/21  1917 04/20/21 2032 04/20/21 2211 04/20/21  2300   POCGLU 290* 272* 241* 226*       Blood Sugar Average: Last 72 hrs:  · Treated for DKA per the DKA protocol  · Acidosis is persistent though her gap is closed, possibly related to bicarbonate wasting in the urine, mild hyperkalemia  · We will change her IVF's and continue to monitor her electrolytes closely  · Will continue IV insulin for now with a transition to basal/bolus dosing later today  · Our goal will be to maintain her blood glucose between 140 and 180  · Appreciate perinatology and OB/GYN's assistance with her care

## 2021-04-21 NOTE — ASSESSMENT & PLAN NOTE
Lab Results   Component Value Date    HGBA1C 10 5 (H) 04/20/2021       Recent Labs     04/20/21  1917 04/20/21 2032 04/20/21  2211 04/20/21  2300   POCGLU 290* 272* 241* 226*       Blood Sugar Average: Last 72 hrs:  · Treated for DKA per the DKA protocol  · Acidosis is persistent by her gap is closed  · Will continue to monitor her blood glucose closely  · Hopefully we can transition to basal/bolus dosing later today  · Would benefit form endocrinology involvement

## 2021-04-21 NOTE — ASSESSMENT & PLAN NOTE
Lab Results   Component Value Date    HGBA1C 10 7 (H) 04/16/2021       Recent Labs     04/20/21  1623 04/20/21  1808 04/20/21  1917 04/20/21 2032   POCGLU 421* 437* 290* 272*       Blood Sugar Average: Last 72 hrs:     · Fetal maternal medicine and OB/GYN consulted and following closely, appreciate their input  · Plan for Q4 hour fetal monitoring per their recommendation overnight

## 2021-04-21 NOTE — CONSULTS
Consultation - [facility]  Colin Ramírez 36 y o  female MRN: [id number]  Unit/Bed#: ICU 11 Encounter: [id number]      Consults    History of Present Illness   Physician Requesting Consult: Kaylyn Cordero MD  Reason for Consult / Principal Problem: DKA, pregnancy 27 weeks gestation  Subspeciality: Perinatology    HPI:  Colin Ramírez is a 36 y o   female with an VICKI of 2021, at 27w4d gestation who was transferred from [facility] due to DKA  She was unable to be transferred to the [facility] due to a bed shortage in the [facility] ICU  She initially presented to [facility] with complaints of a headache and nausea  Her initial blood glucose on admission was 421  She has not used her insulin since  after forgetting it at a friend's house  She has a known history of poorly controlled type I diabetes mellitus with an A1C of 10 6 in 2021, during her previous admission this pregnancy for DKA  Her current home regimen is Basaglar/Lantus 55 units daily at 0800, and Admelog 5 units before meals  Her current obstetrical history is also significant for chronic hypertension, for which she is currently not on any medications  Her prior delivery was a  section in 2014 due to "failure to progress in the first stage " She reports normal fetal movement and denies any contractions, vaginal bleeding, or loss of fluid  Review of Systems   Constitutional: Negative for chills, diaphoresis and fever  HENT: Negative  Eyes: Negative  Respiratory: Negative for cough, chest tightness and shortness of breath  Cardiovascular: Negative for chest pain and palpitations  Gastrointestinal: Positive for nausea  Negative for diarrhea and vomiting  Endocrine: Negative  Genitourinary: Negative for dysuria, frequency, urgency, vaginal bleeding and vaginal discharge  Musculoskeletal: Negative  Neurological: Negative for weakness and headaches  Hematological: Negative  Psychiatric/Behavioral: Negative        All systems reviewed are negative    PREGNANCY COMPLICATIONS: type I DM, CHTN, history of  section    OB History    Para Term  AB Living   4 1 1 0 1 1   SAB TAB Ectopic Multiple Live Births   1 0 0 0 1      # Outcome Date GA Lbr Bill/2nd Weight Sex Delivery Anes PTL Lv   4 Current            3 2018 8w0d    SAB      2 Term 14 40w0d  3629 g (8 lb) F CS-Classical Spinal N JAMILA      Complications: Failure to Progress in First Stage   1                 Baby complications/comments: none    Historical Information   Past Medical History:   Diagnosis Date    Diabetes mellitus [facility] [address] )     uses kwiki pen     Diabetes mellitus type 1 ([facility] [address] [facility]     High blood pressure     recently dx/ put on lisinopril     High cholesterol     Hypertension      no hypersion     Miscarriage     Recurrent pregnancy loss, antepartum condition or complication      Past Surgical History:   Procedure Laterality Date     SECTION  01/02/2014     X1    DILATION AND CURETTAGE OF UTERUS  04/10/2019    Missed AB     WISDOM TOOTH EXTRACTION       Social History   Social History     Substance and Sexual Activity   Alcohol Use Never    Frequency: Never    Comment: Alcohol intake:   None - As per [place]      Social History     Substance and Sexual Activity   Drug Use Never    Comment: Illicit drugs:   No  - As per [place]      Social History     Tobacco Use   Smoking Status Never Smoker   Smokeless Tobacco Never Used     Family History: non-contributory    Meds/Allergies      Medications Prior to Admission   Medication    Accu-Chek FastClix Lancets MISC    Admelog SoloStar 100 units/mL injection pen    Continuous Blood Gluc  (Dexcom G6 ) NERY    Continuous Blood Gluc Sensor (Dexcom G6 Sensor) MISC    Continuous Blood Gluc Transmit (Dexcom G6 Transmitter) MISC    Contour Next Test test strip    insulin glargine (Basaglar KwikPen) 100 units/mL injection pen    Insulin Pen Needle 31G X 5 MM MISC    Prenat-Fe Carbonyl-FA-Omega 3 (One-A-Day Womens Prenatal 1) 28-0 8-235 MG CAPS    aspirin (ECOTRIN LOW STRENGTH) 81 mg EC tablet    Glucagon, rDNA, (Glucagon Emergency) 1 MG KIT    Urine Glucose-Ketones Test STRP      No Known Allergies    OBJECTIVE:    Vitals: Blood pressure 119/64, pulse 102, temperature 98 5 °F (36 9 °C), temperature source Temporal, resp  rate (!) 25, height 4' 10" (1 473 m), weight 68 3 kg (150 lb 9 2 oz), last menstrual period 10/09/2020, SpO2 100 %, unknown if currently breastfeeding  Body mass index is 31 47 kg/m²  Physical Exam  Constitutional:       General: She is not in acute distress  Appearance: Normal appearance  She is not ill-appearing or toxic-appearing  HENT:      Head: Normocephalic and atraumatic  Eyes:      Conjunctiva/sclera: Conjunctivae normal    Cardiovascular:      Rate and Rhythm: Regular rhythm  Tachycardia present  Pulmonary:      Effort: Pulmonary effort is normal  No respiratory distress  Abdominal:      Tenderness: There is no abdominal tenderness  Comments: Gravid   Musculoskeletal: Normal range of motion  Skin:     General: Skin is warm and dry  Neurological:      Mental Status: She is alert and oriented to person, place, and time  Psychiatric:         Mood and Affect: Mood normal          Behavior: Behavior normal          Thought Content: Thought content normal          Fetal heart rate: Baseline 140 bpm, moderate variability  [place]: no contractions    Prenatal Labs:   HCT/HGB:   Lab Results   Component Value Date    HCT 33 8 (L) 2021    HGB 11 3 (L) 2021     Assessment/Plan     ASSESSMENT:   36 y o    with IUP at 27w4d  gestation with DKA  PLAN:  1   Pregnancy at 27 weeks gestation  - FHR and tocometry monitoring for 1 hour at 2130, 0130, 0530; will increase monitoring if indicated  - Patient poorly compliant with home regimen; case management consult in place  - Current home regimen: basaglar/Lantus 55 units at 8 AM; Admelog 5 units before meals    2  DKA  - Blood sugar 421 on admit, most recent 272  - Mgmt per ICU:   - Aggressive fluid resuscitation   - Insulin infusion   - Follow and replete electrolytes    3   Chronic hypertension  - BP 110s / 46s -60s      Jovany Flores MD  4/20/2021  9:42 PM

## 2021-04-21 NOTE — UTILIZATION REVIEW
Initial Clinical Review    Transfer from [place]  ED    Admission: Date/Time/Statement:   Admission Orders (From admission, onward)     Ordered        04/20/21 2040  Inpatient Admission  Once                   Orders Placed This Encounter   Procedures    Inpatient Admission     Standing Status:   Standing     Number of Occurrences:   1     Order Specific Question:   Level of Care     Answer:   Level 1 Stepdown [13]     Order Specific Question:   Estimated length of stay     Answer:   More than 2 Midnights     Order Specific Question:   Certification     Answer:   I certify that inpatient services are medically necessary for this patient for a duration of greater than two midnights  See H&P and MD Progress Notes for additional information about the patient's course of treatment  Initial Presentation:   36 Y O female initially presented to ED at  [facility] with nausea and headache which started the am of admission  Patient is 26 weeks pregnant and PMH is  DM1, hasn't  Taken insulin for past 2 days, noncompliance, chronic hypertension  and prior  DKA  Admissions  Transferred to [facility] for further care  Admit  Ip Stepdown LOC  With DKA, DM1, metabolic acidosis with increased anion gap, pregnancy and noncompliance and plan is   IVF,  Insulin drip,   OB  Consult,  Monitor labs,   And fetal monitoring  OB  Consult  ( 4/20)    Blood sugar on ICU arrival trending down from 400's  To 200's  Plan  FHT monitoring for 1 hour, then 1 hour of monitoring  Q 4 hrs  Patient is Sindy Marley now 27w/4d gestation  Poor compliance with home regimen  Date:    4/21              Day 2:   Remains in  [facility] unit  Anion gap closed on insulin drip  Continue IVF and insulin drip  Monitor electrolytes  Needs  To  Increase compliance  Would benefit from endocrine consult  Plan transfer to [facility]  Unit  Once  Medically  Stable      Additional Vital Signs:   04/21/21 1000  --  100  16  134/75  98  100 %  -- 04/21/21 0900  --  104  19  121/63  82  99 %  --   04/21/21 0858  --  104  19  116/62  79  99 %  --   04/21/21 0704  97 6 °F (36 4 °C)  --  --  --  --  --  --   04/21/21 0700  --  88  19  --  --  100 %  --   04/21/21 0600  --  88  18  116/64  80  99 %  --   04/21/21 0500  --  90  19  113/63  75  99 %  --   04/21/21 0400  98 °F (36 7 °C)  90  19  115/63  81  99 %  None (Room air)   04/21/21 0300  --  90  18  122/61  76  98 %  --   04/21/21 0200  --  90  18  114/67  80  98 %  --   04/21/21 0100  --  90  18  114/60  76  99 %  --   04/21/21 0000  --  90  17  128/61  79  99 %  None (Room air)   04/20/21 2300  --  94  19  121/61  80  98 %  --   04/20/21 2224  --  94  19  123/57  76  99 %  --   04/20/21 2200  --  92  18  --  --  100 %  --   04/20/21 2100  --  98  21  --  --  100 %  --   04/20/21 2035  98 5 °F (36 9 °C)  --  --  --  --  --  --   04/20/21 2033  --  102  25Abnormal   119/64  85  100 %  None (Room air)         Pertinent Labs/Diagnostic Test Results:       Results from last 7 days   Lab Units 04/20/21  2101 04/20/21  1648 04/16/21  0754   WBC Thousand/uL 11 14* 11 34* 6 03   HEMOGLOBIN g/dL 11 3* 13 6 12 8   HEMATOCRIT % 33 8* 40 6 37 6   PLATELETS Thousands/uL 205 242 214   NEUTROS ABS Thousands/µL  --  10 03* 4 28         Results from last 7 days   Lab Units 04/21/21  0804 04/21/21  0429 04/21/21  0230 04/21/21  0011 04/20/21  2101   SODIUM mmol/L 137 134* 136 136 136   POTASSIUM mmol/L 3 4* 5 5* 4 1 4 2 4 2   CHLORIDE mmol/L 109* 108 107 108 104   CO2 mmol/L 14* 13* 15* 13* 11*   ANION GAP mmol/L 14* 13 14* 15* 21*   BUN mg/dL 6 7 7 10 12   CREATININE mg/dL 0 50* 0 42* 0 47* 0 57* 0 61   EGFR ml/min/1 73sq m 131 139 134 126 123   CALCIUM mg/dL 7 0* 6 7* 6 8* 6 8* 7 4*   MAGNESIUM mg/dL 1 7 1 5* 1 5* 1 5* 1 6   PHOSPHORUS mg/dL 2 5* 2 1* 2 3* 2 6* 3 1     Results from last 7 days   Lab Units 04/20/21  2101 04/20/21  1648 04/16/21  0754   AST U/L 40 25 12*   ALT U/L 21 14 10   ALK PHOS U/L 78 85 2 64 3 TOTAL PROTEIN g/dL 6 3* 7 2 6 2*   ALBUMIN g/dL 2 4* 3 6 3 3*   TOTAL BILIRUBIN mg/dL 0 41 0 52 0 34     Results from last 7 days   Lab Units 04/21/21  1108 04/21/21  1011 04/21/21  0906 04/21/21  0804 04/21/21  0704 04/21/21  0602 04/21/21  0427 04/21/21  0419 04/21/21  0230 04/21/21  0100 04/21/21  0009 04/20/21  2300   POC GLUCOSE mg/dl 174* 185* 180* 177* 171* 199* 193* 289* 205* 214* 220* 226*     Results from last 7 days   Lab Units 04/21/21  0804 04/21/21  0429 04/21/21  0230 04/21/21  0011 04/20/21  2101 04/20/21  1648   GLUCOSE RANDOM mg/dL 177* 218* 209* 229* 239* 430*         Results from last 7 days   Lab Units 04/20/21  2101 04/16/21  0754   HEMOGLOBIN A1C % 10 5* 10 7*   EAG mg/dl 255 260     BETA-HYDROXYBUTYRATE   Date Value Ref Range Status   04/20/2021 5 3 (H) <0 6 mmol/L Final          Results from last 7 days   Lab Units 04/20/21  2101 04/20/21  1658   PH ALEXSANDER  7 244* 7 290*   PCO2 ALEXSANDER mm Hg 18 9* 25 6*   PO2 ALEXASNDER mm Hg 82 6* 44 8   HCO3 ALEXSANDER mmol/L 8 0* 12 0*   BASE EXC ALEXSANDER mmol/L -17 2 -12 7   O2 CONTENT ALEXSANDER ml/dL 16 0 15 5   O2 HGB, VENOUS % 93 0* 79 6             Results from last 7 days   Lab Units 04/20/21 2101   LACTIC ACID mmol/L 1 1               Results from last 7 days   Lab Units 04/20/21  1803 04/16/21  0754 04/14/21  1813   CLARITY UA  Clear Clear  --    COLOR UA  Yellow Yellow  --    SPEC GRAV UA  >=1 030 1 020  --    PH UA  5 5 6 0  --    GLUCOSE UA mg/dl 2+* 1+* 2+   KETONES UA mg/dl 80 (3+)* Negative  --    BLOOD UA  Negative Negative  --    PROTEIN UA mg/dl Negative Trace* trace   NITRITE UA  Negative Negative neg   BILIRUBIN UA  Negative Negative  --    UROBILINOGEN UA E U /dl 0 2 0 2  --    LEUKOCYTES UA  Negative Negative neg   WBC UA /hpf  --  1-2  --    RBC UA /hpf  --  0-1  --    BACTERIA UA /hpf  --  None Seen  --    EPITHELIAL CELLS WET PREP /hpf  --  Occasional  --    MUCUS THREADS   --  Occasional*  --    CREATININE UR mg/dL  --  143 0  --    PROTEIN UR mg/dL  --  40 --    PROT/CREAT RATIO UR   --  0 28*  --            Results from last 7 days   Lab Units 04/16/21  0754   URINE CULTURE  20,000-29,000 cfu/ml                Present on Admission:   Diabetic ketoacidosis without coma associated with type 1 diabetes mellitus ([facility] )   Metabolic acidosis with increased anion gap and accumulation of organic acids   Pregnancy complicated by pre-existing type 1 diabetes, second trimester      Admitting Diagnosis: DKA (diabetic ketoacidoses) ([facility] ) [E11 10]  Age/Sex: 36 y o  female  Admission Orders:  Scheduled Medications:  calcium gluconate, 2 g, Intravenous, Once  potassium chloride, 20 mEq, Intravenous, Q1H      Continuous IV Infusions:  dextrose 5 % and sodium chloride 0 45 %, 350 mL/hr, Intravenous, Continuous  insulin regular (HumuLIN R,NovoLIN R) infusion, 0 1-30 Units/hr, Intravenous, Continuous      PRN Meds:       IP CONSULT TO CASE MANAGEMENT  IP CONSULT TO PERINATOLOGY  IP CONSULT TO CASE MANAGEMENT  IP CONSULT TO ENDOCRINOLOGY     Neuro checks  Q 4 hrs  Dysphagia eval  Fall precautions    Network Utilization Review Department  ATTENTION: Please call with any questions or concerns to [phone] and carefully listen to the prompts so that you are directed to the right person  All voicemails are confidential   Marilee Kasper all requests for admission clinical reviews, approved or denied determinations and any other requests to dedicated fax number below belonging to the campus where the patient is receiving treatment   List of dedicated fax numbers for the Facilities:  [address] DENIALS (Administrative/Medical Necessity) [phone]   [address] (Maternity/NICU/Pediatrics) [phone]   [address] [place] [address] Dr Hipolito Babb 1277 [phone] [address] [address] [address]os [address] [phone]

## 2021-04-21 NOTE — SOCIAL WORK
Requested by OBGYN to see the pt in admission  Pt currently in the ICU for tx of DKA  The patients LOS is day 0  She is not a bundle  She is not a re-admission, however has been admitted to this facility a total of 3x so far for this pregnancy  Pt is a René Cleveland admitted for DKA  She follows with [address] for her [facility]  She is DMI, reports she was diagnosed approx 10yrs ago, at that time she did have VNA see her, she denies having VNA services since that time  She is unsure when I speak to her if she ever had difficulty in her prior pregnancy managing her diabetes  She reports that she left her medication at her friends home whom she had been staying with, and reports she did not have access to it when she needed it  I asked her where she is living and who she is currently living with and she reports she is living with her boyfriend, Samy Mcfadden, they do not live with anyone else  Confirmed address:  [address] [facility] [place]    I asked about her now 9yo daughter, she reports her daughter lives with her mother  I asked her if this was privately arranged or if she had any hx of CYS  She reports that she thinks it was arranged by CYS, she couldn't remember when, I asked if she had parental rights still and she said no, but also told me that her dghtr stays with her every weekend in her home  I asked if she still had any CYS involvement and she wasn't sure  Pt reports that she is currently enrolled in school at [employer], she has morning classes  Because of this she would be unlikely to have a visiting nurse, but will send a referral to see if anyone can see her atleast once  She is not eligible for NFP because she has already had a prior delivery  She drives  She has [facility] and [place]  She is applying for [facility] services  She reports she is getting items needed for the baby  Her PCP is Dr Tera Ferguson and she follows with [facility] endo       She uses [facility] [address] [facility] for rx needs  She denies any D&A use  She denies any MH diagnosis  When asked about potential housing insecurities (she did not have access to her insulin at a friends house) she denied - notified her if she has any concerns for housing during pregnancy to let us know so we can work to connect her to resources  She denied any intimate partner violence  Hx of CYS involvement with her 7yo daughter, unsure if they are still involved or not  She reports her partner will p/u at discharge  Would recommend VNA services at discharge, referrals sent  A referral was sent to the outpatient complex care management team to follow if able

## 2021-04-21 NOTE — PROGRESS NOTES
[facility]  Progress Note - Liat Flatter 1990, 36 y o  female MRN: [id number]  Unit/Bed#: ICU 11 Encounter: [id number]  Primary Care Provider: Alysia Knox DO   Date and time admitted to hospital: 4/20/2021  8:32 PM    * Diabetic ketoacidosis without coma associated with type 1 diabetes mellitus [facility])  Assessment & Plan  Lab Results   Component Value Date    HGBA1C 10 5 (H) 04/20/2021       Recent Labs     04/20/21 1917 04/20/21 2032 04/20/21 2211 04/20/21  2300   POCGLU 290* 272* 241* 226*       Blood Sugar Average: Last 72 hrs:  · Treated for DKA per the DKA protocol  · Acidosis is persistent by her gap is closed  · Will continue to monitor her blood glucose closely  · Hopefully we can transition to basal/bolus dosing later today  · Would benefit form endocrinology involvement     Metabolic acidosis with increased anion gap and accumulation of organic acids  Assessment & Plan  · In the setting of DKA   · Plan as above     Pregnancy complicated by pre-existing type 1 diabetes, second trimester  Assessment & Plan  Lab Results   Component Value Date    HGBA1C 10 5 (H) 04/20/2021       Recent Labs     04/20/21 1917 04/20/21 2032 04/20/21 2211 04/20/21  2300   POCGLU 290* 272* 241* 226*       Blood Sugar Average: Last 72 hrs:  · Treated for DKA per the DKA protocol  · Acidosis is persistent though her gap is closed, possibly related to bicarbonate wasting in the urine, mild hyperkalemia  · We will change her IVF's and continue to monitor her electrolytes closely  · Will continue IV insulin for now with a transition to basal/bolus dosing later today  · Our goal will be to maintain her blood glucose between 140 and 180  · Appreciate perinatology and OB/GYN's assistance with her care    Non-compliance with treatment  Assessment & Plan  · Needs to improve compliance, particularly in the setting of pregnancy  · May benefit form diabetic counseling    ----------------------------------------------------------------------------------------  HPI/24hr events: Events of admission are noted, no other events overnight    Disposition: Continue Critical Care   Code Status: Level 1 - Full Code  ---------------------------------------------------------------------------------------  SUBJECTIVE  Denies pain, feels a little better    Review of Systems  Review of systems was reviewed and negative unless stated above in HPI/24-hour events   ---------------------------------------------------------------------------------------  OBJECTIVE    Vitals   Vitals:    21 0200 21 0300 21 0400 21 0600   BP: 114/67 122/61 115/63    Pulse: 90 90 90    Resp: 18 18 19    Temp:   98 °F (36 7 °C)    TempSrc:   Temporal    SpO2: 98% 98% 99%    Weight:    68 3 kg (150 lb 9 2 oz)   Height:         Temp (24hrs), Av 2 °F (36 8 °C), Min:97 8 °F (36 6 °C), Max:98 5 °F (36 9 °C)  Current: Temperature: 98 °F (36 7 °C)          Respiratory:  SpO2 Device: O2 Device: None (Room air)       Invasive/non-invasive ventilation settings   Respiratory    Lab Data (Last 4 hours)    None         O2/Vent Data (Last 4 hours)    None                Physical Exam  HENT:      Head: Normocephalic  Nose: Nose normal       Mouth/Throat:      Mouth: Mucous membranes are moist    Eyes:      Pupils: Pupils are equal, round, and reactive to light  Neck:      Musculoskeletal: Neck supple  Cardiovascular:      Rate and Rhythm: Normal rate  Pulmonary:      Effort: Pulmonary effort is normal    Abdominal:      General: There is distension  Comments: Gravid uterus   Musculoskeletal:         General: No swelling  Skin:     General: Skin is warm and dry  Neurological:      General: No focal deficit present  Mental Status: She is alert  Mental status is at baseline           Laboratory and Diagnostics:  Results from last 7 days   Lab Units 21  0291 04/20/21  1648 04/16/21  0754   WBC Thousand/uL 11 14* 11 34* 6 03   HEMOGLOBIN g/dL 11 3* 13 6 12 8   HEMATOCRIT % 33 8* 40 6 37 6   PLATELETS Thousands/uL 205 242 214   NEUTROS PCT %  --  89* 71   MONOS PCT %  --  2* 7     Results from last 7 days   Lab Units 04/21/21  0429 04/21/21  0230 04/21/21  0011 04/20/21 2101 04/20/21  1648 04/16/21  0754   SODIUM mmol/L 134* 136 136 136 129* 138   POTASSIUM mmol/L 5 5* 4 1 4 2 4 2 5 2* 3 9   CHLORIDE mmol/L 108 107 108 104 97 105   CO2 mmol/L 13* 15* 13* 11* 13* 24   ANION GAP mmol/L 13 14* 15* 21* 19* 9   BUN mg/dL 7 7 10 12 13 9   CREATININE mg/dL 0 42* 0 47* 0 57* 0 61 0 66 0 47   CALCIUM mg/dL 6 7* 6 8* 6 8* 7 4* 9 1 8 2*   GLUCOSE RANDOM mg/dL 218* 209* 229* 239* 430*  --    ALT U/L  --   --   --  21 14 10   AST U/L  --   --   --  40 25 12*   ALK PHOS U/L  --   --   --  78 85 2 64 3   ALBUMIN g/dL  --   --   --  2 4* 3 6 3 3*   TOTAL BILIRUBIN mg/dL  --   --   --  0 41 0 52 0 34     Results from last 7 days   Lab Units 04/21/21  0429 04/21/21  0230 04/21/21  0011 04/20/21  2101   MAGNESIUM mg/dL 1 5* 1 5* 1 5* 1 6   PHOSPHORUS mg/dL 2 1* 2 3* 2 6* 3 1               Results from last 7 days   Lab Units 04/20/21  2101   LACTIC ACID mmol/L 1 1     ABG:    VBG:  Results from last 7 days   Lab Units 04/20/21  2101   PH ALEXSANDER  7 244*   PCO2 ALEXSANDER mm Hg 18 9*   PO2 ALEXSANDER mm Hg 82 6*   HCO3 ALEXSANDER mmol/L 8 0*   BASE EXC ALEXSANDER mmol/L -17 2           Micro  Results from last 7 days   Lab Units 04/16/21  0754   URINE CULTURE  20,000-29,000 cfu/ml        EKG:   Imaging: I have personally reviewed pertinent reports  and I have personally reviewed pertinent films in PACS    Intake and Output  I/O     None          Height and Weights   Height: 4' 10" (147 3 cm)  IBW (Ideal Body Weight): 40 9 kg  Body mass index is 31 47 kg/m²    Weight (last 2 days)     Date/Time   Weight    04/21/21 0600   68 3 (150 57)    04/20/21 2035   68 3 (150 57)                Nutrition       Diet Orders   (From admission, onward)             Start     Ordered    04/20/21 2036  Diet NPO  Diet effective now     Question Answer Comment   Diet Type NPO    RD to adjust diet per protocol? No        04/20/21 2040                  Active Medications  Scheduled Meds:  Current Facility-Administered Medications   Medication Dose Route Frequency Provider Last Rate    acetaminophen  650 mg Oral Q4H PRN Essie Severs, CRNP      chlorhexidine  15 mL Swish & Spit Q12H [facility] CECILIO Lee      dextrose 5 % and sodium chloride 0 45 %  250 mL/hr Intravenous Continuous Essie Severs, CRNP 250 mL/hr (04/21/21 5901)    insulin regular (HumuLIN R,NovoLIN R) infusion  0 1-30 Units/hr Intravenous Continuous CECILIO Lee 6 5 Units/hr (04/21/21 8510)    magnesium sulfate  4 g Intravenous Once Erick Gonzales MD      sodium chloride  250 mL/hr Intravenous Continuous Essie Severs, CRNP      sodium phosphate  12 mmol Intravenous Once Erick Gonzales MD 12 mmol (04/21/21 7231)     Continuous Infusions:  dextrose 5 % and sodium chloride 0 45 %, 250 mL/hr, Last Rate: 250 mL/hr (04/21/21 6764)  insulin regular (HumuLIN R,NovoLIN R) infusion, 0 1-30 Units/hr, Last Rate: 6 5 Units/hr (04/21/21 2938)  sodium chloride, 250 mL/hr      PRN Meds:   acetaminophen, 650 mg, Q4H PRN        Invasive Devices Review  Invasive Devices     Peripheral Intravenous Line            Peripheral IV 04/20/21 Left Wrist less than 1 day    Peripheral IV 04/20/21 Right Antecubital less than 1 day                Rationale for remaining devices:   ---------------------------------------------------------------------------------------  Advance Directive and Living Will:      Power of :    POLST:    ---------------------------------------------------------------------------------------  Care Time Delivered:         Essie Severs, CRNP      Portions of the record may have been created with voice recognition software    Occasional wrong word or "sound a like" substitutions may have occurred due to the inherent limitations of voice recognition software    Read the chart carefully and recognize, using context, where substitutions have occurred

## 2021-04-21 NOTE — PROGRESS NOTES
Progress Note - Maternal Fetal Medicine   Myles Chan 36 y o  female MRN: [id number]  Unit/Bed#: ICU 11 Encounter: [id number]    Assessment:  36 y o   at 27w5d admitted with DKA, uncontrolled T1DM  Hospital day 2    Plan:    Uncontrolled T1DM  Accuchecks (last 2 hours): 289, 193, 199  Continue management per ICU, including insulin drip, aggressive fluid resuscitation, correction of electrolyte abnormalities  Home regimen: Lantus 55U at 8am, admelog 5U before meals    Chronic hypertension  BP 110s-120s/50s-60s  Continue to closely monitor    IUP at 27 weeks  FHR and tocometry monitoring every 4 hours for 1 hour  Case management consult in given poor compliance with home regimen  Subjective/Objective   Chief Complaint:     Patient has no complaints this morning aside from a slight headache that began last night  Denies obstetric concerns including contractions, vaginal bleeding, leakage of fluid, decreased fetal movement  Denies shortness of breath, chest pain, chills, myalgia  Objective:     Vitals:   Temp:  [97 8 °F (36 6 °C)-98 5 °F (36 9 °C)] 98 °F (36 7 °C)  HR:  [] 90  Resp:  [17-25] 19  BP: (110-128)/(52-67) 115/63       Physical Exam  Constitutional:       General: She is not in acute distress  Appearance: She is well-developed  She is not diaphoretic  Cardiovascular:      Rate and Rhythm: Normal rate  Heart sounds: No murmur  No friction rub  No gallop  Pulmonary:      Effort: Pulmonary effort is normal  No respiratory distress  Breath sounds: No stridor  No wheezing, rhonchi or rales  Chest:      Chest wall: No tenderness  Abdominal:      General: There is no distension  Palpations: Abdomen is soft  Tenderness: There is no abdominal tenderness  There is no guarding or rebound  Neurological:      Mental Status: She is alert and oriented to person, place, and time         Fetal Assessment:  FHT: 130 / Moderate 6 - 25 bpm / 10x10, reactive  Madelia: absent    Lab, Imaging and other studies: I have personally reviewed pertinent reports        Lab Results   Component Value Date    WBC 11 14 (H) 04/20/2021    HGB 11 3 (L) 04/20/2021    HCT 33 8 (L) 04/20/2021    MCV 97 04/20/2021     04/20/2021       Lab Results   Component Value Date    GLUCOSE 364 (H) 01/27/2021    CALCIUM 6 7 (L) 04/21/2021    K 5 5 (H) 04/21/2021    CO2 13 (L) 04/21/2021     04/21/2021    BUN 7 04/21/2021    CREATININE 0 42 (L) 04/21/2021       Lab Results   Component Value Date    ALT 21 04/20/2021    AST 40 04/20/2021    ALKPHOS 78 04/20/2021       Luzma Jon MD  OBGYN, PGY-4  4/21/2021  5:58 AM

## 2021-04-21 NOTE — SOCIAL WORK
Pt accepted by [facility] for diabetic teaching at home, CM requested RN and medical SW to see patient at home  Discussed with patient earlier today of services, will discuss with her again prior to discharge

## 2021-04-21 NOTE — NURSING NOTE
NST performed in ICU starting at [address]  Fetal US showed a baseline of 140, with moderate variability, 15x15 accelerations, and no decelerations  Overall, it was a catergory I FHR strip  [place] was placed on patient, showing no contractions or irritability  NST was completed at 1915

## 2021-04-22 LAB
2ND TRIMESTER 4 SCREEN SERPL-IMP: NORMAL
AFP ADJ MOM SERPL: NORMAL
AFP INTERP AMN-IMP: NORMAL
AFP INTERP SERPL-IMP: NORMAL
AFP INTERP SERPL-IMP: NORMAL
AFP SERPL-MCNC: 290.5 NG/ML
AGE AT DELIVERY: 29.3 YR
ANION GAP SERPL CALCULATED.3IONS-SCNC: 13 MMOL/L (ref 4–13)
BASOPHILS # BLD AUTO: 0.02 THOUSANDS/ΜL (ref 0–0.1)
BASOPHILS NFR BLD AUTO: 0 % (ref 0–1)
BUN SERPL-MCNC: 3 MG/DL (ref 5–25)
CALCIUM SERPL-MCNC: 7.5 MG/DL (ref 8.3–10.1)
CHLORIDE SERPL-SCNC: 110 MMOL/L (ref 100–108)
CO2 SERPL-SCNC: 17 MMOL/L (ref 21–32)
CREAT SERPL-MCNC: 0.47 MG/DL (ref 0.6–1.3)
EOSINOPHIL # BLD AUTO: 0.02 THOUSAND/ΜL (ref 0–0.61)
EOSINOPHIL NFR BLD AUTO: 0 % (ref 0–6)
ERYTHROCYTE [DISTWIDTH] IN BLOOD BY AUTOMATED COUNT: 12.3 % (ref 11.6–15.1)
GA METHOD: NORMAL
GA: 27 WEEKS
GFR SERPL CREATININE-BSD FRML MDRD: 134 ML/MIN/1.73SQ M
GLUCOSE SERPL-MCNC: 112 MG/DL (ref 65–140)
GLUCOSE SERPL-MCNC: 117 MG/DL (ref 65–140)
GLUCOSE SERPL-MCNC: 148 MG/DL (ref 65–140)
GLUCOSE SERPL-MCNC: 160 MG/DL (ref 65–140)
GLUCOSE SERPL-MCNC: 164 MG/DL (ref 65–140)
GLUCOSE SERPL-MCNC: 182 MG/DL (ref 65–140)
GLUCOSE SERPL-MCNC: 191 MG/DL (ref 65–140)
GLUCOSE SERPL-MCNC: 194 MG/DL (ref 65–140)
GLUCOSE SERPL-MCNC: 197 MG/DL (ref 65–140)
GLUCOSE SERPL-MCNC: 198 MG/DL (ref 65–140)
GLUCOSE SERPL-MCNC: 70 MG/DL (ref 65–140)
GLUCOSE SERPL-MCNC: 88 MG/DL (ref 65–140)
HCT VFR BLD AUTO: 33.6 % (ref 34.8–46.1)
HGB BLD-MCNC: 11.4 G/DL (ref 11.5–15.4)
IDDM PATIENT QL: YES
IMM GRANULOCYTES # BLD AUTO: 0.04 THOUSAND/UL (ref 0–0.2)
IMM GRANULOCYTES NFR BLD AUTO: 1 % (ref 0–2)
LYMPHOCYTES # BLD AUTO: 1.03 THOUSANDS/ΜL (ref 0.6–4.47)
LYMPHOCYTES NFR BLD AUTO: 12 % (ref 14–44)
MAGNESIUM SERPL-MCNC: 2.1 MG/DL (ref 1.6–2.6)
MCH RBC QN AUTO: 32.4 PG (ref 26.8–34.3)
MCHC RBC AUTO-ENTMCNC: 33.9 G/DL (ref 31.4–37.4)
MCV RBC AUTO: 96 FL (ref 82–98)
MONOCYTES # BLD AUTO: 0.5 THOUSAND/ΜL (ref 0.17–1.22)
MONOCYTES NFR BLD AUTO: 6 % (ref 4–12)
MULTIPLE PREGNANCY: NO
NEURAL TUBE DEFECT RISK FETUS: NORMAL %
NEUTROPHILS # BLD AUTO: 7.07 THOUSANDS/ΜL (ref 1.85–7.62)
NEUTS SEG NFR BLD AUTO: 81 % (ref 43–75)
NRBC BLD AUTO-RTO: 0 /100 WBCS
PHOSPHATE SERPL-MCNC: 1.5 MG/DL (ref 2.7–4.5)
PLATELET # BLD AUTO: 187 THOUSANDS/UL (ref 149–390)
PMV BLD AUTO: 8.9 FL (ref 8.9–12.7)
POTASSIUM SERPL-SCNC: 4.2 MMOL/L (ref 3.5–5.3)
RBC # BLD AUTO: 3.52 MILLION/UL (ref 3.81–5.12)
SODIUM SERPL-SCNC: 140 MMOL/L (ref 136–145)
WBC # BLD AUTO: 8.68 THOUSAND/UL (ref 4.31–10.16)

## 2021-04-22 PROCEDURE — 84100 ASSAY OF PHOSPHORUS: CPT | Performed by: NURSE PRACTITIONER

## 2021-04-22 PROCEDURE — 99233 SBSQ HOSP IP/OBS HIGH 50: CPT | Performed by: OBSTETRICS & GYNECOLOGY

## 2021-04-22 PROCEDURE — 99233 SBSQ HOSP IP/OBS HIGH 50: CPT | Performed by: NURSE PRACTITIONER

## 2021-04-22 PROCEDURE — 82948 REAGENT STRIP/BLOOD GLUCOSE: CPT

## 2021-04-22 PROCEDURE — 85025 COMPLETE CBC W/AUTO DIFF WBC: CPT | Performed by: NURSE PRACTITIONER

## 2021-04-22 PROCEDURE — 80048 BASIC METABOLIC PNL TOTAL CA: CPT | Performed by: NURSE PRACTITIONER

## 2021-04-22 PROCEDURE — 83735 ASSAY OF MAGNESIUM: CPT | Performed by: NURSE PRACTITIONER

## 2021-04-22 PROCEDURE — NC001 PR NO CHARGE: Performed by: OBSTETRICS & GYNECOLOGY

## 2021-04-22 RX ORDER — BUTALBITAL, ACETAMINOPHEN AND CAFFEINE 50; 325; 40 MG/1; MG/1; MG/1
1 TABLET ORAL EVERY 4 HOURS PRN
Status: DISCONTINUED | OUTPATIENT
Start: 2021-04-22 | End: 2021-04-26 | Stop reason: HOSPADM

## 2021-04-22 RX ORDER — SODIUM CHLORIDE 9 MG/ML
50 INJECTION, SOLUTION INTRAVENOUS CONTINUOUS
Status: DISCONTINUED | OUTPATIENT
Start: 2021-04-22 | End: 2021-04-25

## 2021-04-22 RX ORDER — INSULIN GLARGINE 100 [IU]/ML
55 INJECTION, SOLUTION SUBCUTANEOUS EVERY 24 HOURS
Status: DISCONTINUED | OUTPATIENT
Start: 2021-04-22 | End: 2021-04-23

## 2021-04-22 RX ADMIN — DEXTROSE AND SODIUM CHLORIDE 350 ML/HR: 5; .45 INJECTION, SOLUTION INTRAVENOUS at 09:25

## 2021-04-22 RX ADMIN — BUTALBITAL, ACETAMINOPHEN, AND CAFFEINE 1 TABLET: 50; 325; 40 TABLET ORAL at 18:06

## 2021-04-22 RX ADMIN — BUTALBITAL, ACETAMINOPHEN, AND CAFFEINE 1 TABLET: 50; 325; 40 TABLET ORAL at 23:05

## 2021-04-22 RX ADMIN — ACETAMINOPHEN 650 MG: 325 TABLET, FILM COATED ORAL at 08:05

## 2021-04-22 RX ADMIN — DEXTROSE AND SODIUM CHLORIDE 350 ML/HR: 5; .45 INJECTION, SOLUTION INTRAVENOUS at 03:30

## 2021-04-22 RX ADMIN — INSULIN LISPRO 5 UNITS: 100 INJECTION, SOLUTION INTRAVENOUS; SUBCUTANEOUS at 08:12

## 2021-04-22 RX ADMIN — ACETAMINOPHEN 650 MG: 325 TABLET, FILM COATED ORAL at 13:34

## 2021-04-22 RX ADMIN — SODIUM CHLORIDE 125 ML/HR: 0.9 INJECTION, SOLUTION INTRAVENOUS at 23:05

## 2021-04-22 RX ADMIN — INSULIN LISPRO 1 UNITS: 100 INJECTION, SOLUTION INTRAVENOUS; SUBCUTANEOUS at 20:14

## 2021-04-22 RX ADMIN — DEXTROSE AND SODIUM CHLORIDE 350 ML/HR: 5; .45 INJECTION, SOLUTION INTRAVENOUS at 12:46

## 2021-04-22 RX ADMIN — DEXTROSE AND SODIUM CHLORIDE 350 ML/HR: 5; .45 INJECTION, SOLUTION INTRAVENOUS at 00:27

## 2021-04-22 RX ADMIN — INSULIN GLARGINE 55 UNITS: 100 INJECTION, SOLUTION SUBCUTANEOUS at 08:07

## 2021-04-22 NOTE — PROGRESS NOTES
Progress Note - Maternal Fetal Medicine   Juhi Moreno 36 y o  female MRN: [id number]  Unit/Bed#: ICU 11 Encounter: [id number]    Assessment:  36 y o   at 27w6d admitted with DKA, uncontrolled T1DM  Hospital day 3    Plan:    DKA, poorly controlled T1DM  - Accuchecks (since 2am): 198, 148, 160   - Appreciate care by ICU  If she continues to be medically stable this morning, transfer patient to L&D for further optimization  - Plan to re-start home regimen of Lantus 55U at 8am and humalog 5U before meals with continuation of insulin drip    Chronic hypertension  - -130s/60s-70s  - Continue to closely monitor    IUP at 27 weeks  - FHR and tocometry monitoring every 4 hours for 1 hour  - Appreciate assistance from case management given her poor compliance to insulin regimen at home    Subjective/Objective   Chief Complaint:     Patient has no complaints this morning   Denies obstetric concerns including contractions, vaginal bleeding, leakage of fluid, decreased fetal movement  Denies shortness of breath, chest pain, chills, myalgia  Objective:     Vitals:   Temp:  [97 3 °F (36 3 °C)-98 6 °F (37 °C)] 97 5 °F (36 4 °C)  HR:  [] 98  Resp:  [9-27] 26  BP: (116-139)/(61-88) 130/67       Physical Exam  Constitutional:       General: She is not in acute distress  Appearance: She is well-developed  She is not diaphoretic  Pulmonary:      Effort: Pulmonary effort is normal    Abdominal:      General: There is no distension  Palpations: Abdomen is soft  Tenderness: There is no abdominal tenderness  There is no guarding or rebound  Comments: Gravid   Neurological:      Mental Status: She is alert and oriented to person, place, and time  Fetal Assessment:  FHT: 135 / Moderate 6 - 25 bpm / 10x10, reactive  [place]: absent    Lab, Imaging and other studies: I have personally reviewed pertinent reports        Lab Results   Component Value Date    WBC 8 68 2021    HGB 11 4 (L) 04/22/2021    HCT 33 6 (L) 04/22/2021    MCV 96 04/22/2021     04/22/2021       Lab Results   Component Value Date    GLUCOSE 364 (H) 01/27/2021    CALCIUM 7 5 (L) 04/21/2021    K 4 2 04/21/2021    CO2 20 (L) 04/21/2021     (H) 04/21/2021    BUN 5 04/21/2021    CREATININE 0 55 (L) 04/21/2021       Lab Results   Component Value Date    ALT 21 04/20/2021    AST 40 04/20/2021    ALKPHOS 78 04/20/2021       Sridevi Burris MD  OBGYN, PGY-4  4/22/2021  6:42 AM

## 2021-04-22 NOTE — PROGRESS NOTES
[place] 50  Progress Note - Caio Nash 1990, 36 y o  female MRN: [id number]  Unit/Bed#: ICU 11 Encounter: [id number]  Primary Care Provider: Romario Kim DO   Date and time admitted to hospital: 4/20/2021  8:32 PM    * Diabetic ketoacidosis without coma associated with type 1 diabetes mellitus [facility])  Assessment & Plan  Lab Results   Component Value Date    HGBA1C 10 5 (H) 04/20/2021       Recent Labs     04/21/21  1245 04/21/21  1358 04/21/21  1540 04/21/21  1803   POCGLU 147* 225* 210* 295*       Blood Sugar Average: Last 72 hrs:  · Treated for DKA per the DKA protocol  · Acidosis is persistent, may be partially related to pregnancy?   · Will continue to monitor her blood glucose closely  · Endocrinology following, appreciate their recommendations  · Continue IV fluid resuscitation and critical care non dka insulin infusion  · 5 units humalog with meals    Metabolic acidosis with increased anion gap and accumulation of organic acids  Assessment & Plan  · In the setting of DKA   · Plan as above     Pregnancy complicated by pre-existing type 1 diabetes, second trimester  Assessment & Plan  Lab Results   Component Value Date    HGBA1C 10 5 (H) 04/20/2021       Recent Labs     04/21/21  1245 04/21/21  1358 04/21/21  1540 04/21/21  1803   POCGLU 147* 225* 210* 295*       Blood Sugar Average: Last 72 hrs:  · Treated for DKA per the DKA protocol  · Acidosis is persistent, possibly related to pregnancy  · Continue to monitor electrolytes  · Will continue IV insulin for now as well as with meal coverage per endocrinology recommendations  · Our goal will be to maintain her blood glucose between 140 and 180  · Appreciate perinatology and OB/GYN's assistance with her care    Non-compliance with treatment  Assessment & Plan  · Needs to improve compliance, particularly in the setting of pregnancy  · May benefit form diabetic counseling        ----------------------------------------------------------------------------------------  HPI/24hr events: No events overnight  Acidosis improved  Remains on critical care insulin infusion    Disposition: Transfer to Med-Surg   Code Status: Level 1 - Full Code  ---------------------------------------------------------------------------------------  SUBJECTIVE  No complaints    Review of Systems   All other systems reviewed and are negative  Review of systems was reviewed and negative unless stated above in HPI/24-hour events   ---------------------------------------------------------------------------------------  OBJECTIVE    Vitals   Vitals:    21 0000 21 0100 21 0200 21 0300   BP: 121/71  127/75 132/77   Pulse: 84 102 86 86   Resp: (!) 19 17   Temp: 97 5 °F (36 4 °C)      TempSrc: Temporal      SpO2: 99% 99% 99% 99%   Weight:       Height:         Temp (24hrs), Av 7 °F (36 5 °C), Min:97 3 °F (36 3 °C), Max:98 6 °F (37 °C)  Current: Temperature: 97 5 °F (36 4 °C)          Respiratory:  SpO2: SpO2: 99 %       Invasive/non-invasive ventilation settings   Respiratory    Lab Data (Last 4 hours)    None         O2/Vent Data (Last 4 hours)    None                Physical Exam  Vitals signs reviewed  HENT:      Head: Normocephalic and atraumatic  Nose: Nose normal       Mouth/Throat:      Mouth: Mucous membranes are moist    Eyes:      Extraocular Movements: Extraocular movements intact  Pupils: Pupils are equal, round, and reactive to light  Neck:      Musculoskeletal: Normal range of motion and neck supple  Cardiovascular:      Rate and Rhythm: Normal rate and regular rhythm  Pulses: Normal pulses  Heart sounds: Normal heart sounds  Pulmonary:      Effort: Pulmonary effort is normal       Breath sounds: Normal breath sounds  Abdominal:      General: Abdomen is flat  Bowel sounds are normal  There is no distension        Palpations: Abdomen is soft  Tenderness: There is no abdominal tenderness  Comments: Patient is 27 weeks pregnant     Musculoskeletal: Normal range of motion  General: No swelling  Skin:     General: Skin is warm and dry  Neurological:      General: No focal deficit present  Mental Status: She is alert and oriented to person, place, and time  Laboratory and Diagnostics:  Results from last 7 days   Lab Units 04/20/21  2101 04/20/21  1648 04/16/21  0754   WBC Thousand/uL 11 14* 11 34* 6 03   HEMOGLOBIN g/dL 11 3* 13 6 12 8   HEMATOCRIT % 33 8* 40 6 37 6   PLATELETS Thousands/uL 205 242 214   NEUTROS PCT %  --  89* 71   MONOS PCT %  --  2* 7     Results from last 7 days   Lab Units 04/21/21  2113 04/21/21  1246 04/21/21  0804 04/21/21  0429 04/21/21  0230 04/21/21  0011 04/20/21  2101 04/20/21  1648  04/16/21  0754   SODIUM mmol/L 139 137 137 134* 136 136 136 129*  --  138   POTASSIUM mmol/L 4 2 3 2* 3 4* 5 5* 4 1 4 2 4 2 5 2*  --  3 9   CHLORIDE mmol/L 109* 107 109* 108 107 108 104 97  --  105   CO2 mmol/L 20* 16* 14* 13* 15* 13* 11* 13*  --  24   ANION GAP mmol/L 10 14* 14* 13 14* 15* 21* 19*  --  9   BUN mg/dL 5 3* 6 7 7 10 12 13  --  9   CREATININE mg/dL 0 55* 0 68 0 50* 0 42* 0 47* 0 57* 0 61 0 66  --  0 47   CALCIUM mg/dL 7 5* 7 4* 7 0* 6 7* 6 8* 6 8* 7 4* 9 1  --  8 2*   GLUCOSE RANDOM mg/dL 159* 149* 177* 218* 209* 229* 239* 430*   < >  --    ALT U/L  --   --   --   --   --   --  21 14  --  10   AST U/L  --   --   --   --   --   --  40 25  --  12*   ALK PHOS U/L  --   --   --   --   --   --  78 85 2  --  64 3   ALBUMIN g/dL  --   --   --   --   --   --  2 4* 3 6  --  3 3*   TOTAL BILIRUBIN mg/dL  --   --   --   --   --   --  0 41 0 52  --  0 34    < > = values in this interval not displayed       Results from last 7 days   Lab Units 04/21/21  2113 04/21/21  1246 04/21/21  0804 04/21/21  0429 04/21/21  0230 04/21/21  0011 04/20/21  2101   MAGNESIUM mg/dL 1 9 2 5 1 7 1 5* 1 5* 1 5* 1 6 PHOSPHORUS mg/dL 6 0* 1 3* 2 5* 2 1* 2 3* 2 6* 3 1               Results from last 7 days   Lab Units 04/20/21  2101   LACTIC ACID mmol/L 1 1     ABG:    VBG:  Results from last 7 days   Lab Units 04/21/21  1339   PH ALEXSANDER  7 360   PCO2 ALEXSANDER mm Hg 23 1*   PO2 ALEXSANDER mm Hg 160 2*   HCO3 ALXESANDER mmol/L 12 8*   BASE EXC ALEXSANDER mmol/L -10 8           Micro  Results from last 7 days   Lab Units 04/16/21  0754   URINE CULTURE  20,000-29,000 cfu/ml        EKG: SR  Imaging: I have personally reviewed pertinent reports  Intake and Output  I/O       04/20 0701 - 04/21 0700 04/21 0701 - 04/22 0700    P  O   360    I V  (mL/kg) 2074 9 (30 4) 6215 3 (91)    IV Piggyback 1200 750    Total Intake(mL/kg) 3274 9 (47 9) 7325 3 (107 3)    Urine (mL/kg/hr) 800 6750 (4 1)    Total Output 800 6750    Net +2474 9 +575 3          Unmeasured Urine Occurrence 1 x           Height and Weights   Height: 4' 10" (147 3 cm)  IBW (Ideal Body Weight): 40 9 kg  Body mass index is 31 47 kg/m²  Weight (last 2 days)     Date/Time   Weight    04/21/21 0600   68 3 (150 57)    04/20/21 2035   68 3 (150 57)                Nutrition       Diet Orders   (From admission, onward)             Start     Ordered    04/21/21 1105  Diet [place]/CHO Controlled; Consistent Carbohydrate Diet Level 1 (4 carb servings/60 grams CHO/meal)  Diet effective now     Question Answer Comment   Diet Type [place]/CHO Controlled    [place]/CHO Controlled Consistent Carbohydrate Diet Level 1 (4 carb servings/60 grams CHO/meal)    RD to adjust diet per protocol?  No        04/21/21 1104                  Active Medications  Scheduled Meds:  Current Facility-Administered Medications   Medication Dose Route Frequency Provider Last Rate    acetaminophen  650 mg Oral Q6H PRN Xavier Liner, CRNP      dextrose 5 % and sodium chloride 0 45 %  350 mL/hr Intravenous Continuous Xavier Liner, CRNP 350 mL/hr (04/22/21 0330)    insulin lispro  5 Units Subcutaneous TID With Meals Xavier Liner, CRNP      insulin regular (HumuLIN R,NovoLIN R) infusion  0 3-21 Units/hr Intravenous Titrated CECILIO Ortega 2 Units/hr (04/22/21 0350)     Continuous Infusions:  dextrose 5 % and sodium chloride 0 45 %, 350 mL/hr, Last Rate: 350 mL/hr (04/22/21 0330)  insulin regular (HumuLIN R,NovoLIN R) infusion, 0 3-21 Units/hr, Last Rate: 2 Units/hr (04/22/21 0350)      PRN Meds:   acetaminophen, 650 mg, Q6H PRN        Invasive Devices Review  Invasive Devices     Peripheral Intravenous Line            Peripheral IV 04/20/21 Right Antecubital 1 day                Rationale for remaining devices:   ---------------------------------------------------------------------------------------  Advance Directive and Living Will:      Power of :    POLST:    ---------------------------------------------------------------------------------------  Care Time Delivered:   No Critical Care time spent       CECILIO Paredes      Portions of the record may have been created with voice recognition software  Occasional wrong word or "sound a like" substitutions may have occurred due to the inherent limitations of voice recognition software    Read the chart carefully and recognize, using context, where substitutions have occurred

## 2021-04-22 NOTE — PLAN OF CARE
Problem: Potential for Falls  Goal: Patient will remain free of falls  Description: INTERVENTIONS:  - Assess patient frequently for physical needs  -  Identify cognitive and physical deficits and behaviors that affect risk of falls    -  [place] fall precautions as indicated by assessment   - Educate patient/family on patient safety including physical limitations  - Instruct patient to call for assistance with activity based on assessment  - Modify environment to reduce risk of injury  - Consider OT/PT consult to assist with strengthening/mobility  Outcome: Progressing     Problem: PAIN - ADULT  Goal: Verbalizes/displays adequate comfort level or baseline comfort level  Description: Interventions:  - Encourage patient to monitor pain and request assistance  - Assess pain using appropriate pain scale  - Administer analgesics based on type and severity of pain and evaluate response  - Implement non-pharmacological measures as appropriate and evaluate response  - Consider cultural and social influences on pain and pain management  - Notify physician/advanced practitioner if interventions unsuccessful or patient reports new pain  Outcome: Progressing     Problem: INFECTION - ADULT  Goal: Absence or prevention of progression during hospitalization  Description: INTERVENTIONS:  - Assess and monitor for signs and symptoms of infection  - Monitor lab/diagnostic results  - Monitor all insertion sites, i e  indwelling lines, tubes, and drains  - Monitor endotracheal if appropriate and nasal secretions for changes in amount and color  - [place] appropriate cooling/warming therapies per order  - Administer medications as ordered  - Instruct and encourage patient and family to use good hand hygiene technique  - Identify and instruct in appropriate isolation precautions for identified infection/condition  Outcome: Progressing     Problem: SAFETY ADULT  Goal: Patient will remain free of falls  Description: INTERVENTIONS:  - Assess patient frequently for physical needs  -  Identify cognitive and physical deficits and behaviors that affect risk of falls    -  [place] fall precautions as indicated by assessment   - Educate patient/family on patient safety including physical limitations  - Instruct patient to call for assistance with activity based on assessment  - Modify environment to reduce risk of injury  - Consider OT/PT consult to assist with strengthening/mobility  Outcome: Progressing  Goal: Maintain or return to baseline ADL function  Description: INTERVENTIONS:  -  Assess patient's ability to carry out ADLs; assess patient's baseline for ADL function and identify physical deficits which impact ability to perform ADLs (bathing, care of mouth/teeth, toileting, grooming, dressing, etc )  - Assess/evaluate cause of self-care deficits   - Assess range of motion  - Assess patient's mobility; develop plan if impaired  - Assess patient's need for assistive devices and provide as appropriate  - Encourage maximum independence but intervene and supervise when necessary  - Involve family in performance of ADLs  - Assess for home care needs following discharge   - Consider OT consult to assist with ADL evaluation and planning for discharge  - Provide patient education as appropriate  Outcome: Progressing  Goal: Maintain or return mobility status to optimal level  Description: INTERVENTIONS:  - Assess patient's baseline mobility status (ambulation, transfers, stairs, etc )    - Identify cognitive and physical deficits and behaviors that affect mobility  - Identify mobility aids required to assist with transfers and/or ambulation (gait belt, sit-to-stand, lift, walker, cane, etc )  - [place] fall precautions as indicated by assessment  - Record patient progress and toleration of activity level on Mobility SBAR; progress patient to next Phase/Stage  - Instruct patient to call for assistance with activity based on assessment  - Consider rehabilitation consult to assist with strengthening/weightbearing, etc   Outcome: Progressing     Problem: DISCHARGE PLANNING  Goal: Discharge to home or other facility with appropriate resources  Description: INTERVENTIONS:  - Identify barriers to discharge w/patient and caregiver  - Arrange for needed discharge resources and transportation as appropriate  - Identify discharge learning needs (meds, wound care, etc )  - Arrange for interpretive services to assist at discharge as needed  - Refer to Case Management Department for coordinating discharge planning if the patient needs post-hospital services based on physician/advanced practitioner order or complex needs related to functional status, cognitive ability, or social support system  Outcome: Progressing     Problem: Knowledge Deficit  Goal: Patient/family/caregiver demonstrates understanding of disease process, treatment plan, medications, and discharge instructions  Description: Complete learning assessment and assess knowledge base    Interventions:  - Provide teaching at level of understanding  - Provide teaching via preferred learning methods  Outcome: Progressing     Problem: CARDIOVASCULAR - ADULT  Goal: Maintains optimal cardiac output and hemodynamic stability  Description: INTERVENTIONS:  - Monitor I/O, vital signs and rhythm  - Monitor for S/S and trends of decreased cardiac output  - Administer and titrate ordered vasoactive medications to optimize hemodynamic stability  - Assess quality of pulses, skin color and temperature  - Assess for signs of decreased coronary artery perfusion  - Instruct patient to report change in severity of symptoms  Outcome: Progressing  Goal: Absence of cardiac dysrhythmias or at baseline rhythm  Description: INTERVENTIONS:  - Continuous cardiac monitoring, vital signs, obtain 12 lead EKG if ordered  - Administer antiarrhythmic and heart rate control medications as ordered  - Monitor electrolytes and administer replacement therapy as ordered  Outcome: Progressing     Problem: METABOLIC, FLUID AND ELECTROLYTES - ADULT  Goal: Electrolytes maintained within normal limits  Description: INTERVENTIONS:  - Monitor labs and assess patient for signs and symptoms of electrolyte imbalances  - Administer electrolyte replacement as ordered  - Monitor response to electrolyte replacements, including repeat lab results as appropriate  - Instruct patient on fluid and nutrition as appropriate  Outcome: Progressing  Goal: Fluid balance maintained  Description: INTERVENTIONS:  - Monitor labs   - Monitor I/O and WT  - Instruct patient on fluid and nutrition as appropriate  - Assess for signs & symptoms of volume excess or deficit  Outcome: Progressing  Goal: Glucose maintained within target range  Description: INTERVENTIONS:  - Monitor Blood Glucose as ordered  - Assess for signs and symptoms of hyperglycemia and hypoglycemia  - Administer ordered medications to maintain glucose within target range  - Assess nutritional intake and initiate nutrition service referral as needed  Outcome: Progressing     Problem: HEMATOLOGIC - ADULT  Goal: Maintains hematologic stability  Description: INTERVENTIONS  - Assess for signs and symptoms of bleeding or hemorrhage  - Monitor labs  - Administer supportive blood products/factors as ordered and appropriate  Outcome: Progressing

## 2021-04-22 NOTE — PLAN OF CARE
Problem: Potential for Falls  Goal: Patient will remain free of falls  Description: INTERVENTIONS:  - Assess patient frequently for physical needs  -  Identify cognitive and physical deficits and behaviors that affect risk of falls    -  [place] fall precautions as indicated by assessment   - Educate patient/family on patient safety including physical limitations  - Instruct patient to call for assistance with activity based on assessment  - Modify environment to reduce risk of injury  - Consider OT/PT consult to assist with strengthening/mobility  Outcome: Progressing     Problem: PAIN - ADULT  Goal: Verbalizes/displays adequate comfort level or baseline comfort level  Description: Interventions:  - Encourage patient to monitor pain and request assistance  - Assess pain using appropriate pain scale  - Administer analgesics based on type and severity of pain and evaluate response  - Implement non-pharmacological measures as appropriate and evaluate response  - Consider cultural and social influences on pain and pain management  - Notify physician/advanced practitioner if interventions unsuccessful or patient reports new pain  Outcome: Progressing     Problem: INFECTION - ADULT  Goal: Absence or prevention of progression during hospitalization  Description: INTERVENTIONS:  - Assess and monitor for signs and symptoms of infection  - Monitor lab/diagnostic results  - Monitor all insertion sites, i e  indwelling lines, tubes, and drains  - Monitor endotracheal if appropriate and nasal secretions for changes in amount and color  - [place] appropriate cooling/warming therapies per order  - Administer medications as ordered  - Instruct and encourage patient and family to use good hand hygiene technique  - Identify and instruct in appropriate isolation precautions for identified infection/condition  Outcome: Progressing     Problem: SAFETY ADULT  Goal: Patient will remain free of falls  Description: INTERVENTIONS:  - Assess patient frequently for physical needs  -  Identify cognitive and physical deficits and behaviors that affect risk of falls    -  [place] fall precautions as indicated by assessment   - Educate patient/family on patient safety including physical limitations  - Instruct patient to call for assistance with activity based on assessment  - Modify environment to reduce risk of injury  - Consider OT/PT consult to assist with strengthening/mobility  Outcome: Progressing  Goal: Maintain or return to baseline ADL function  Description: INTERVENTIONS:  -  Assess patient's ability to carry out ADLs; assess patient's baseline for ADL function and identify physical deficits which impact ability to perform ADLs (bathing, care of mouth/teeth, toileting, grooming, dressing, etc )  - Assess/evaluate cause of self-care deficits   - Assess range of motion  - Assess patient's mobility; develop plan if impaired  - Assess patient's need for assistive devices and provide as appropriate  - Encourage maximum independence but intervene and supervise when necessary  - Involve family in performance of ADLs  - Assess for home care needs following discharge   - Consider OT consult to assist with ADL evaluation and planning for discharge  - Provide patient education as appropriate  Outcome: Progressing  Goal: Maintain or return mobility status to optimal level  Description: INTERVENTIONS:  - Assess patient's baseline mobility status (ambulation, transfers, stairs, etc )    - Identify cognitive and physical deficits and behaviors that affect mobility  - Identify mobility aids required to assist with transfers and/or ambulation (gait belt, sit-to-stand, lift, walker, cane, etc )  - [place] fall precautions as indicated by assessment  - Record patient progress and toleration of activity level on Mobility SBAR; progress patient to next Phase/Stage  - Instruct patient to call for assistance with activity based on assessment  - Consider rehabilitation consult to assist with strengthening/weightbearing, etc   Outcome: Progressing     Problem: Knowledge Deficit  Goal: Patient/family/caregiver demonstrates understanding of disease process, treatment plan, medications, and discharge instructions  Description: Complete learning assessment and assess knowledge base    Interventions:  - Provide teaching at level of understanding  - Provide teaching via preferred learning methods  Outcome: Progressing     Problem: CARDIOVASCULAR - ADULT  Goal: Maintains optimal cardiac output and hemodynamic stability  Description: INTERVENTIONS:  - Monitor I/O, vital signs and rhythm  - Monitor for S/S and trends of decreased cardiac output  - Administer and titrate ordered vasoactive medications to optimize hemodynamic stability  - Assess quality of pulses, skin color and temperature  - Assess for signs of decreased coronary artery perfusion  - Instruct patient to report change in severity of symptoms  Outcome: Progressing  Goal: Absence of cardiac dysrhythmias or at baseline rhythm  Description: INTERVENTIONS:  - Continuous cardiac monitoring, vital signs, obtain 12 lead EKG if ordered  - Administer antiarrhythmic and heart rate control medications as ordered  - Monitor electrolytes and administer replacement therapy as ordered  Outcome: Progressing     Problem: METABOLIC, FLUID AND ELECTROLYTES - ADULT  Goal: Electrolytes maintained within normal limits  Description: INTERVENTIONS:  - Monitor labs and assess patient for signs and symptoms of electrolyte imbalances  - Administer electrolyte replacement as ordered  - Monitor response to electrolyte replacements, including repeat lab results as appropriate  - Instruct patient on fluid and nutrition as appropriate  Outcome: Progressing  Goal: Fluid balance maintained  Description: INTERVENTIONS:  - Monitor labs   - Monitor I/O and WT  - Instruct patient on fluid and nutrition as appropriate  - Assess for signs & symptoms of volume excess or deficit  Outcome: Progressing  Goal: Glucose maintained within target range  Description: INTERVENTIONS:  - Monitor Blood Glucose as ordered  - Assess for signs and symptoms of hyperglycemia and hypoglycemia  - Administer ordered medications to maintain glucose within target range  - Assess nutritional intake and initiate nutrition service referral as needed  Outcome: Progressing     Problem: HEMATOLOGIC - ADULT  Goal: Maintains hematologic stability  Description: INTERVENTIONS  - Assess for signs and symptoms of bleeding or hemorrhage  - Monitor labs  - Administer supportive blood products/factors as ordered and appropriate  Outcome: Progressing

## 2021-04-22 NOTE — ASSESSMENT & PLAN NOTE
Lab Results   Component Value Date    HGBA1C 10 5 (H) 04/20/2021       Recent Labs     04/21/21  1245 04/21/21  1358 04/21/21  1540 04/21/21  1803   POCGLU 147* 225* 210* 295*       Blood Sugar Average: Last 72 hrs:  · Treated for DKA per the DKA protocol  · Acidosis is persistent, possibly related to pregnancy  · Continue to monitor electrolytes  · Will continue IV insulin for now as well as with meal coverage per endocrinology recommendations  · Our goal will be to maintain her blood glucose between 140 and 180  · Appreciate perinatology and OB/GYN's assistance with her care

## 2021-04-22 NOTE — NURSING NOTE
NST performed from [facility] to [facility] [id number]  Fetal baseline was 135 with moderate variability  [address] showed 1 contraction within the 30 minutes that lasted for 60 seconds, but patient did not feel it   15X15 accelerations noted with no decelerations  Category 1 FHR strip  Dr Kelli Snider reviewed

## 2021-04-22 NOTE — DISCHARGE SUMMARY
Discharge Summary - Melania Landers 36 y o  female MRN: [id number]    Unit/Bed#: L&D 328-01 Encounter: [id number]    Admission Date: 2021     Discharge Date: 2021    Admitting Attending: Dr Meme Walker  Discharge Attending: Dr Alexsander Tello ([facility])    Diagnosis:   Pregnancy at 27w4d  Uncontrolled type 1 diabetes  Diabetic ketoacidosis on admission  History of prior low-transverse  section    Discharge Diagnosis:  Pregnancy at 28w3d  Uncontrolled type 1 diabetes  Diabetic ketoacidosis, resolved  History of prior low-transverse  section     Consulting physicians:  ICU  Endocrine  Case management    Hospital Course:     Patient is a 74-year-old  at 32 weeks who was admitted to [facility] for treatment of diabetic ketoacidosis without coma associated with uncontrolled type 1 diabetes  Her outpatient insulin regimen consisted of 55 units Lantus at 8am and 5 units of Admelog before meals  She was unable to take insulin for approximately 2 days prior to admission since she left her insulin at her friend's house  Her HbA1c on admission was 10 7, anion gap 19, Accuchecks in the 400s  She was given aggressive IV fluid resuscitation and was started on a insulin drip  Her electrolytes were repleted  On hospital day 3, she was transferred to Labor and delivery for further diabetic management  She was restarted on her home regimen and continued on insulin drip  On hospital day 4, insulin drip was discontinued  Her home regimen was slowly titrated for optimal glycemic control  Case management was consulted for assistance with housing options and home nursing care  On hospital day 7, her insulin regimen was titrated to 60U lantus at 8AM and 8U humalog with meals  Given hypoglycemic episodes on hospital day 6, she was not deemed stable for discharge given risk of hypoglycemia to herself and her fetus   However, she decided to leave against medical advice because she wanted to return to school  Prior to discharge, case management contacted every available VNA service in the area  As the patient is ambulatory, not homebound, and goes to school from 9 to 4 Monday through Friday, she does not qualify for services  She knows that she is always welcome back at the hospital, and should follow up with diabetic education and her private doctor, Dr Conrad Majano closely  Discharge plannin) Please check your sugars before meals, 2 hours after meals, and at 3AM    2) Your insulin regimen: 60U lantus at 8am, 8U admelog before every meal   3) If your blood sugar is below 90, please eat a snack containing 15g carbohydrate and check your sugar 15 minutes later  4) Please see your OBGYN (Dr Betsy Ayala) weekly for a prenatal visit  5) [facility] will be seeing you at home for diabetic teaching and management  6) Please continue working with the [facility] Diabetes in Pregnancy program    Hilda Bansal [address] [place] [address] Gynecology PGY-1  2021  3:11 PM

## 2021-04-23 LAB
ANION GAP SERPL CALCULATED.3IONS-SCNC: 9 MMOL/L (ref 4–13)
BUN SERPL-MCNC: 4 MG/DL (ref 5–25)
CALCIUM SERPL-MCNC: 7.4 MG/DL (ref 8.3–10.1)
CHLORIDE SERPL-SCNC: 108 MMOL/L (ref 100–108)
CO2 SERPL-SCNC: 22 MMOL/L (ref 21–32)
CREAT SERPL-MCNC: 0.44 MG/DL (ref 0.6–1.3)
GFR SERPL CREATININE-BSD FRML MDRD: 137 ML/MIN/1.73SQ M
GLUCOSE SERPL-MCNC: 122 MG/DL (ref 65–140)
GLUCOSE SERPL-MCNC: 139 MG/DL (ref 65–140)
GLUCOSE SERPL-MCNC: 141 MG/DL (ref 65–140)
GLUCOSE SERPL-MCNC: 148 MG/DL (ref 65–140)
GLUCOSE SERPL-MCNC: 151 MG/DL (ref 65–140)
GLUCOSE SERPL-MCNC: 158 MG/DL (ref 65–140)
GLUCOSE SERPL-MCNC: 168 MG/DL (ref 65–140)
GLUCOSE SERPL-MCNC: 187 MG/DL (ref 65–140)
GLUCOSE SERPL-MCNC: 216 MG/DL (ref 65–140)
MAGNESIUM SERPL-MCNC: 1.8 MG/DL (ref 1.6–2.6)
PHOSPHATE SERPL-MCNC: 1.8 MG/DL (ref 2.7–4.5)
POTASSIUM SERPL-SCNC: 3.9 MMOL/L (ref 3.5–5.3)
SODIUM SERPL-SCNC: 139 MMOL/L (ref 136–145)

## 2021-04-23 PROCEDURE — 80048 BASIC METABOLIC PNL TOTAL CA: CPT | Performed by: OBSTETRICS & GYNECOLOGY

## 2021-04-23 PROCEDURE — NC001 PR NO CHARGE: Performed by: OBSTETRICS & GYNECOLOGY

## 2021-04-23 PROCEDURE — 84100 ASSAY OF PHOSPHORUS: CPT | Performed by: OBSTETRICS & GYNECOLOGY

## 2021-04-23 PROCEDURE — 83735 ASSAY OF MAGNESIUM: CPT | Performed by: OBSTETRICS & GYNECOLOGY

## 2021-04-23 PROCEDURE — 82948 REAGENT STRIP/BLOOD GLUCOSE: CPT

## 2021-04-23 RX ORDER — INSULIN GLARGINE 100 [IU]/ML
70 INJECTION, SOLUTION SUBCUTANEOUS EVERY 24 HOURS
Status: DISCONTINUED | OUTPATIENT
Start: 2021-04-24 | End: 2021-04-25

## 2021-04-23 RX ORDER — INSULIN GLARGINE 100 [IU]/ML
65 INJECTION, SOLUTION SUBCUTANEOUS EVERY 24 HOURS
Status: DISCONTINUED | OUTPATIENT
Start: 2021-04-23 | End: 2021-04-23

## 2021-04-23 RX ADMIN — INSULIN LISPRO 8 UNITS: 100 INJECTION, SOLUTION INTRAVENOUS; SUBCUTANEOUS at 17:50

## 2021-04-23 RX ADMIN — BUTALBITAL, ACETAMINOPHEN, AND CAFFEINE 1 TABLET: 50; 325; 40 TABLET ORAL at 07:57

## 2021-04-23 RX ADMIN — BUTALBITAL, ACETAMINOPHEN, AND CAFFEINE 1 TABLET: 50; 325; 40 TABLET ORAL at 15:23

## 2021-04-23 RX ADMIN — INSULIN GLARGINE 65 UNITS: 100 INJECTION, SOLUTION SUBCUTANEOUS at 08:19

## 2021-04-23 RX ADMIN — INSULIN LISPRO 8 UNITS: 100 INJECTION, SOLUTION INTRAVENOUS; SUBCUTANEOUS at 12:58

## 2021-04-23 RX ADMIN — SODIUM CHLORIDE 125 ML/HR: 0.9 INJECTION, SOLUTION INTRAVENOUS at 20:32

## 2021-04-23 RX ADMIN — INSULIN LISPRO 8 UNITS: 100 INJECTION, SOLUTION INTRAVENOUS; SUBCUTANEOUS at 08:17

## 2021-04-23 RX ADMIN — BUTALBITAL, ACETAMINOPHEN, AND CAFFEINE 1 TABLET: 50; 325; 40 TABLET ORAL at 19:53

## 2021-04-23 NOTE — PROGRESS NOTES
While taking patients blood sugar she wanted to know if she was going to be able to go home today  RN told her that she didn't think that was going to be possible, she wasn't seen by everyone she needed to see yet and her blood sugars are still high and not quite where they need to be just yet    The patient replied back "my blood sugars are high?"   RN stated "Yes, that's why you are in the hospital remember?"

## 2021-04-23 NOTE — PROGRESS NOTES
Progress Note - Maternal Fetal Medicine   Margaret Look 36 y o  female MRN: [id number]  Unit/Bed#: L&D 328-01 Encounter: [id number]    Assessment:  36 y o  Yves Jimenez at 28w0d admitted with DKA, uncontrolled T1DM  Hospital day 4    Plan:    DKA, poorly controlled T1DM  - Accuchecks (since 2am): 122, 139  - Re-started on home regimen of Lantus 55U at 8am and humalog 5U before meals 4/22/21  Insulin drip discontinued, currently on SSI  - Will need assistance from CM to make sure she obtains new supply of insulin before discharge (current insulin is at friend's house)    Chronic hypertension  - -120s/60s-70s  - Continue to closely monitor    IUP at 27 weeks  - NST TID  - Will give Tdap prior to discharge      Subjective/Objective   Chief Complaint:     Patient has no complaints this morning   Denies obstetric concerns including contractions, vaginal bleeding, leakage of fluid, decreased fetal movement  Denies shortness of breath, chest pain, chills, myalgia  Objective:     Vitals:   Temp:  [98 3 °F (36 8 °C)-98 6 °F (37 °C)] 98 6 °F (37 °C)  HR:  [83-97] 83  Resp:  [16-18] 18  BP: (118-128)/(63-71) 122/71       Physical Exam  Constitutional:       General: She is not in acute distress  Appearance: She is well-developed  She is not diaphoretic  Pulmonary:      Effort: Pulmonary effort is normal    Abdominal:      General: There is no distension  Palpations: Abdomen is soft  Tenderness: There is no abdominal tenderness  There is no guarding or rebound  Comments: Gravid   Neurological:      Mental Status: She is alert and oriented to person, place, and time  Fetal Assessment:  FHT: 120 / Moderate 6 - 25 bpm / 10x10, reactive  [place]: absent    Lab, Imaging and other studies: I have personally reviewed pertinent reports        Lab Results   Component Value Date    WBC 8 68 04/22/2021    HGB 11 4 (L) 04/22/2021    HCT 33 6 (L) 04/22/2021    MCV 96 04/22/2021     04/22/2021       Lab Results   Component Value Date    GLUCOSE 364 (H) 01/27/2021    CALCIUM 7 5 (L) 04/22/2021    K 4 2 04/22/2021    CO2 17 (L) 04/22/2021     (H) 04/22/2021    BUN 3 (L) 04/22/2021    CREATININE 0 47 (L) 04/22/2021       Lab Results   Component Value Date    ALT 21 04/20/2021    AST 40 04/20/2021    ALKPHOS 78 04/20/2021       Radha Negrete MD  OBGYN, PGY-4  4/23/2021  6:28 AM

## 2021-04-23 NOTE — QUICK NOTE
Blood sugars reviewed  NO evidence of hypoglycemia  Post prandiel glucose values improving but still not near goal   Will increase humalog to 10 units with each meal and Lantus to 70 units in AM       Appreciate Case management seeing patient  I would advise continued inpatient management until improvement and patient has [facility]
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
## 2021-04-23 NOTE — PLAN OF CARE
Problem: Potential for Falls  Goal: Patient will remain free of falls  Description: INTERVENTIONS:  - Assess patient frequently for physical needs  -  Identify cognitive and physical deficits and behaviors that affect risk of falls    -  [place] fall precautions as indicated by assessment   - Educate patient/family on patient safety including physical limitations  - Instruct patient to call for assistance with activity based on assessment  - Modify environment to reduce risk of injury  - Consider OT/PT consult to assist with strengthening/mobility  Outcome: Progressing     Problem: PAIN - ADULT  Goal: Verbalizes/displays adequate comfort level or baseline comfort level  Description: Interventions:  - Encourage patient to monitor pain and request assistance  - Assess pain using appropriate pain scale  - Administer analgesics based on type and severity of pain and evaluate response  - Implement non-pharmacological measures as appropriate and evaluate response  - Consider cultural and social influences on pain and pain management  - Notify physician/advanced practitioner if interventions unsuccessful or patient reports new pain  Outcome: Progressing     Problem: INFECTION - ADULT  Goal: Absence or prevention of progression during hospitalization  Description: INTERVENTIONS:  - Assess and monitor for signs and symptoms of infection  - Monitor lab/diagnostic results  - Monitor all insertion sites, i e  indwelling lines, tubes, and drains  - Monitor endotracheal if appropriate and nasal secretions for changes in amount and color  - [place] appropriate cooling/warming therapies per order  - Administer medications as ordered  - Instruct and encourage patient and family to use good hand hygiene technique  - Identify and instruct in appropriate isolation precautions for identified infection/condition  Outcome: Progressing     Problem: SAFETY ADULT  Goal: Patient will remain free of falls  Description: INTERVENTIONS:  - Assess patient frequently for physical needs  -  Identify cognitive and physical deficits and behaviors that affect risk of falls    -  [place] fall precautions as indicated by assessment   - Educate patient/family on patient safety including physical limitations  - Instruct patient to call for assistance with activity based on assessment  - Modify environment to reduce risk of injury  - Consider OT/PT consult to assist with strengthening/mobility  Outcome: Progressing     Problem: DISCHARGE PLANNING  Goal: Discharge to home or other facility with appropriate resources  Description: INTERVENTIONS:  - Identify barriers to discharge w/patient and caregiver  - Arrange for needed discharge resources and transportation as appropriate  - Identify discharge learning needs (meds, wound care, etc )  - Arrange for interpretive services to assist at discharge as needed  - Refer to Case Management Department for coordinating discharge planning if the patient needs post-hospital services based on physician/advanced practitioner order or complex needs related to functional status, cognitive ability, or social support system  Outcome: Progressing     Problem: CARDIOVASCULAR - ADULT  Goal: Maintains optimal cardiac output and hemodynamic stability  Description: INTERVENTIONS:  - Monitor I/O, vital signs and rhythm  - Monitor for S/S and trends of decreased cardiac output  - Administer and titrate ordered vasoactive medications to optimize hemodynamic stability  - Assess quality of pulses, skin color and temperature  - Assess for signs of decreased coronary artery perfusion  - Instruct patient to report change in severity of symptoms  Outcome: Progressing  Goal: Absence of cardiac dysrhythmias or at baseline rhythm  Description: INTERVENTIONS:  - Continuous cardiac monitoring, vital signs, obtain 12 lead EKG if ordered  - Administer antiarrhythmic and heart rate control medications as ordered  - Monitor electrolytes and administer replacement therapy as ordered  Outcome: Progressing     Problem: METABOLIC, FLUID AND ELECTROLYTES - ADULT  Goal: Electrolytes maintained within normal limits  Description: INTERVENTIONS:  - Monitor labs and assess patient for signs and symptoms of electrolyte imbalances  - Administer electrolyte replacement as ordered  - Monitor response to electrolyte replacements, including repeat lab results as appropriate  - Instruct patient on fluid and nutrition as appropriate  Outcome: Progressing  Goal: Fluid balance maintained  Description: INTERVENTIONS:  - Monitor labs   - Monitor I/O and WT  - Instruct patient on fluid and nutrition as appropriate  - Assess for signs & symptoms of volume excess or deficit  Outcome: Progressing     Problem: HEMATOLOGIC - ADULT  Goal: Maintains hematologic stability  Description: INTERVENTIONS  - Assess for signs and symptoms of bleeding or hemorrhage  - Monitor labs  - Administer supportive blood products/factors as ordered and appropriate  Outcome: Progressing

## 2021-04-24 LAB
GLUCOSE SERPL-MCNC: 106 MG/DL (ref 65–140)
GLUCOSE SERPL-MCNC: 111 MG/DL (ref 65–140)
GLUCOSE SERPL-MCNC: 137 MG/DL (ref 65–140)
GLUCOSE SERPL-MCNC: 159 MG/DL (ref 65–140)
GLUCOSE SERPL-MCNC: 61 MG/DL (ref 65–140)
GLUCOSE SERPL-MCNC: 69 MG/DL (ref 65–140)
GLUCOSE SERPL-MCNC: 69 MG/DL (ref 65–140)
GLUCOSE SERPL-MCNC: 72 MG/DL (ref 65–140)

## 2021-04-24 PROCEDURE — NC001 PR NO CHARGE: Performed by: OBSTETRICS & GYNECOLOGY

## 2021-04-24 PROCEDURE — 82948 REAGENT STRIP/BLOOD GLUCOSE: CPT

## 2021-04-24 RX ADMIN — ACETAMINOPHEN 650 MG: 325 TABLET, FILM COATED ORAL at 13:52

## 2021-04-24 RX ADMIN — INSULIN GLARGINE 70 UNITS: 100 INJECTION, SOLUTION SUBCUTANEOUS at 08:18

## 2021-04-24 RX ADMIN — SODIUM CHLORIDE 50 ML/HR: 0.9 INJECTION, SOLUTION INTRAVENOUS at 23:22

## 2021-04-24 RX ADMIN — BUTALBITAL, ACETAMINOPHEN, AND CAFFEINE 1 TABLET: 50; 325; 40 TABLET ORAL at 08:17

## 2021-04-24 RX ADMIN — SODIUM CHLORIDE 125 ML/HR: 0.9 INJECTION, SOLUTION INTRAVENOUS at 05:06

## 2021-04-24 NOTE — PROGRESS NOTES
Progress Note - Maternal Fetal Medicine   Jordy Fried 36 y o  female MRN: [id number]  Unit/Bed#: L&D 328-01 Encounter: [id number]    Assessment:  36 y o  Italo Prajapati at 28w1d admitted with DKA, uncontrolled T1DM  Hospital day 5     Plan:     DKA, poorly controlled T1DM  Glucose range:  in past 24 hours  - Current regimen: Lantus 70U at 8am and humalog 10U before meals, starting this morning  At this time, will continue regimen and observe for glycemic control  - Will need assistance from CM to make sure she obtains new supply of insulin before discharge (current insulin is at friend's house)     Chronic hypertension  - -120s/60s-70s  - Continue to closely monitor     IUP at 28 weeks  - NST TID  - Will give Tdap today    Dispo: inpatient given continued glycemic titration    Subjective/Objective   Subjective:     Contractions: no  Loss of fluid: no  Vaginal bleeding: no  Fetal movement: no    Pain: no  Tolerating PO: yes  Voiding: yes  Ambulating: yes  Headaches: no  Visual changes: no  Chest pain: no  Shortness of breath: no  Nausea: no  Vomiting/Diarrhea: no  Dysuria: no  Leg pain: no    Objective:     Vitals:   Temp:  [98 2 °F (36 8 °C)-98 6 °F (37 °C)] 98 2 °F (36 8 °C)  HR:  [76-92] 91  Resp:  [16-17] 16  BP: (108-121)/(63-73) 109/67       Physical Exam  Constitutional:       General: She is not in acute distress  Appearance: Normal appearance  She is not ill-appearing  HENT:      Head: Normocephalic and atraumatic  Eyes:      General: No scleral icterus  Conjunctiva/sclera: Conjunctivae normal    Neck:      Musculoskeletal: Normal range of motion  Cardiovascular:      Rate and Rhythm: Normal rate and regular rhythm  Heart sounds: Normal heart sounds  No murmur  No friction rub  No gallop  Pulmonary:      Effort: Pulmonary effort is normal  No respiratory distress  Breath sounds: Normal breath sounds  No wheezing, rhonchi or rales     Abdominal:      General: There is no distension  Palpations: Abdomen is soft  Tenderness: There is no abdominal tenderness  There is no guarding or rebound  Comments: gravid   Musculoskeletal: Normal range of motion  Right lower leg: No edema  Left lower leg: No edema  Skin:     General: Skin is warm and dry  Neurological:      General: No focal deficit present  Mental Status: She is alert  Mental status is at baseline  Psychiatric:         Mood and Affect: Mood normal          Behavior: Behavior normal            Fetal Assessment:  FHT: 130 / Moderate 6 - 25 bpm / Accelerations present, no decelerations, reactive  [place]: none    Lab, Imaging and other studies: I have personally reviewed pertinent reports        Lab Results   Component Value Date    WBC 8 68 04/22/2021    HGB 11 4 (L) 04/22/2021    HCT 33 6 (L) 04/22/2021    MCV 96 04/22/2021     04/22/2021       Lab Results   Component Value Date    GLUCOSE 364 (H) 01/27/2021    CALCIUM 7 4 (L) 04/23/2021    K 3 9 04/23/2021    CO2 22 04/23/2021     04/23/2021    BUN 4 (L) 04/23/2021    CREATININE 0 44 (L) 04/23/2021       Lab Results   Component Value Date    ALT 21 04/20/2021    AST 40 04/20/2021    ALKPHOS 78 04/20/2021       Bridger Hammond MD  OBGYN, R-2  4/24/2021  8:07 AM

## 2021-04-24 NOTE — PLAN OF CARE
Problem: Potential for Falls  Goal: Patient will remain free of falls  Description: INTERVENTIONS:  - Assess patient frequently for physical needs  -  Identify cognitive and physical deficits and behaviors that affect risk of falls    -  [place] fall precautions as indicated by assessment   - Educate patient/family on patient safety including physical limitations  - Instruct patient to call for assistance with activity based on assessment  - Modify environment to reduce risk of injury  - Consider OT/PT consult to assist with strengthening/mobility  Outcome: Progressing     Problem: PAIN - ADULT  Goal: Verbalizes/displays adequate comfort level or baseline comfort level  Description: Interventions:  - Encourage patient to monitor pain and request assistance  - Assess pain using appropriate pain scale  - Administer analgesics based on type and severity of pain and evaluate response  - Implement non-pharmacological measures as appropriate and evaluate response  - Consider cultural and social influences on pain and pain management  - Notify physician/advanced practitioner if interventions unsuccessful or patient reports new pain  Outcome: Progressing     Problem: INFECTION - ADULT  Goal: Absence or prevention of progression during hospitalization  Description: INTERVENTIONS:  - Assess and monitor for signs and symptoms of infection  - Monitor lab/diagnostic results  - Monitor all insertion sites, i e  indwelling lines, tubes, and drains  - Monitor endotracheal if appropriate and nasal secretions for changes in amount and color  - [place] appropriate cooling/warming therapies per order  - Administer medications as ordered  - Instruct and encourage patient and family to use good hand hygiene technique  - Identify and instruct in appropriate isolation precautions for identified infection/condition  Outcome: Progressing     Problem: SAFETY ADULT  Goal: Patient will remain free of falls  Description: INTERVENTIONS:  - Assess patient frequently for physical needs  -  Identify cognitive and physical deficits and behaviors that affect risk of falls    -  [place] fall precautions as indicated by assessment   - Educate patient/family on patient safety including physical limitations  - Instruct patient to call for assistance with activity based on assessment  - Modify environment to reduce risk of injury  - Consider OT/PT consult to assist with strengthening/mobility  Outcome: Progressing  Goal: Maintain or return to baseline ADL function  Description: INTERVENTIONS:  -  Assess patient's ability to carry out ADLs; assess patient's baseline for ADL function and identify physical deficits which impact ability to perform ADLs (bathing, care of mouth/teeth, toileting, grooming, dressing, etc )  - Assess/evaluate cause of self-care deficits   - Assess range of motion  - Assess patient's mobility; develop plan if impaired  - Assess patient's need for assistive devices and provide as appropriate  - Encourage maximum independence but intervene and supervise when necessary  - Involve family in performance of ADLs  - Assess for home care needs following discharge   - Consider OT consult to assist with ADL evaluation and planning for discharge  - Provide patient education as appropriate  Outcome: Progressing  Goal: Maintain or return mobility status to optimal level  Description: INTERVENTIONS:  - Assess patient's baseline mobility status (ambulation, transfers, stairs, etc )    - Identify cognitive and physical deficits and behaviors that affect mobility  - Identify mobility aids required to assist with transfers and/or ambulation (gait belt, sit-to-stand, lift, walker, cane, etc )  - [place] fall precautions as indicated by assessment  - Record patient progress and toleration of activity level on Mobility SBAR; progress patient to next Phase/Stage  - Instruct patient to call for assistance with activity based on assessment  - Consider rehabilitation consult to assist with strengthening/weightbearing, etc   Outcome: Progressing     Problem: DISCHARGE PLANNING  Goal: Discharge to home or other facility with appropriate resources  Description: INTERVENTIONS:  - Identify barriers to discharge w/patient and caregiver  - Arrange for needed discharge resources and transportation as appropriate  - Identify discharge learning needs (meds, wound care, etc )  - Arrange for interpretive services to assist at discharge as needed  - Refer to Case Management Department for coordinating discharge planning if the patient needs post-hospital services based on physician/advanced practitioner order or complex needs related to functional status, cognitive ability, or social support system  Outcome: Progressing     Problem: Knowledge Deficit  Goal: Patient/family/caregiver demonstrates understanding of disease process, treatment plan, medications, and discharge instructions  Description: Complete learning assessment and assess knowledge base    Interventions:  - Provide teaching at level of understanding  - Provide teaching via preferred learning methods  Outcome: Progressing     Problem: CARDIOVASCULAR - ADULT  Goal: Maintains optimal cardiac output and hemodynamic stability  Description: INTERVENTIONS:  - Monitor I/O, vital signs and rhythm  - Monitor for S/S and trends of decreased cardiac output  - Administer and titrate ordered vasoactive medications to optimize hemodynamic stability  - Assess quality of pulses, skin color and temperature  - Assess for signs of decreased coronary artery perfusion  - Instruct patient to report change in severity of symptoms  Outcome: Progressing  Goal: Absence of cardiac dysrhythmias or at baseline rhythm  Description: INTERVENTIONS:  - Continuous cardiac monitoring, vital signs, obtain 12 lead EKG if ordered  - Administer antiarrhythmic and heart rate control medications as ordered  - Monitor electrolytes and administer replacement therapy as ordered  Outcome: Progressing     Problem: METABOLIC, FLUID AND ELECTROLYTES - ADULT  Goal: Electrolytes maintained within normal limits  Description: INTERVENTIONS:  - Monitor labs and assess patient for signs and symptoms of electrolyte imbalances  - Administer electrolyte replacement as ordered  - Monitor response to electrolyte replacements, including repeat lab results as appropriate  - Instruct patient on fluid and nutrition as appropriate  Outcome: Progressing  Goal: Fluid balance maintained  Description: INTERVENTIONS:  - Monitor labs   - Monitor I/O and WT  - Instruct patient on fluid and nutrition as appropriate  - Assess for signs & symptoms of volume excess or deficit  Outcome: Progressing  Goal: Glucose maintained within target range  Description: INTERVENTIONS:  - Monitor Blood Glucose as ordered  - Assess for signs and symptoms of hyperglycemia and hypoglycemia  - Administer ordered medications to maintain glucose within target range  - Assess nutritional intake and initiate nutrition service referral as needed  Outcome: Progressing     Problem: HEMATOLOGIC - ADULT  Goal: Maintains hematologic stability  Description: INTERVENTIONS  - Assess for signs and symptoms of bleeding or hemorrhage  - Monitor labs  - Administer supportive blood products/factors as ordered and appropriate  Outcome: Progressing

## 2021-04-24 NOTE — NURSING NOTE
Glucose taken at 1332 was taken after patient ate her lunch  Patient educated on the importance of having her blood sugars checked prior to meal  Patient was educated with breakfast and prior to her ordering lunch  Insulin was given  Importance of pre meal blood sugar checks stressed to patient

## 2021-04-24 NOTE — SOCIAL WORK
GINNY met with the patient at bedside -     She acknowledges there is housing concern, she reports she and FOB are better now and she can return, however, also reports that this is not the first time this has happened to her before  She reports that her 7yo is in the custody of her mother, and has been for about 2-3yrs, was placed through [facility], no current active case  She reports the reason for placement for her daughter was due to housing issues  Discussed for the safety of herself, her baby and for trying to avoid another adverse outcome with CYS with this child, we should work on alternative housing  She agree's that if given the opportunity to get into a housing program, she would take that over going back home with FOB  She currently is in school m-f from [phone], 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

## 2021-04-25 PROBLEM — Z3A.28 28 WEEKS GESTATION OF PREGNANCY: Status: ACTIVE | Noted: 2021-04-25

## 2021-04-25 LAB
GLUCOSE SERPL-MCNC: 100 MG/DL (ref 65–140)
GLUCOSE SERPL-MCNC: 101 MG/DL (ref 65–140)
GLUCOSE SERPL-MCNC: 110 MG/DL (ref 65–140)
GLUCOSE SERPL-MCNC: 139 MG/DL (ref 65–140)
GLUCOSE SERPL-MCNC: 47 MG/DL (ref 65–140)
GLUCOSE SERPL-MCNC: 51 MG/DL (ref 65–140)
GLUCOSE SERPL-MCNC: 51 MG/DL (ref 65–140)
GLUCOSE SERPL-MCNC: 55 MG/DL (ref 65–140)
GLUCOSE SERPL-MCNC: 66 MG/DL (ref 65–140)
GLUCOSE SERPL-MCNC: 74 MG/DL (ref 65–140)

## 2021-04-25 PROCEDURE — 59025 FETAL NON-STRESS TEST: CPT | Performed by: OBSTETRICS & GYNECOLOGY

## 2021-04-25 PROCEDURE — NC001 PR NO CHARGE: Performed by: OBSTETRICS & GYNECOLOGY

## 2021-04-25 PROCEDURE — 82948 REAGENT STRIP/BLOOD GLUCOSE: CPT

## 2021-04-25 PROCEDURE — 99232 SBSQ HOSP IP/OBS MODERATE 35: CPT | Performed by: OBSTETRICS & GYNECOLOGY

## 2021-04-25 RX ORDER — INSULIN GLARGINE 100 [IU]/ML
65 INJECTION, SOLUTION SUBCUTANEOUS EVERY 24 HOURS
Status: DISCONTINUED | OUTPATIENT
Start: 2021-04-25 | End: 2021-04-26

## 2021-04-25 RX ADMIN — BUTALBITAL, ACETAMINOPHEN, AND CAFFEINE 1 TABLET: 50; 325; 40 TABLET ORAL at 07:20

## 2021-04-25 RX ADMIN — INSULIN GLARGINE 65 UNITS: 100 INJECTION, SOLUTION SUBCUTANEOUS at 07:46

## 2021-04-25 NOTE — PROGRESS NOTES
3am BS was 55 and was reported to Dr Kanu Gonzales  Pt given peanut butter and crackers as instructed by Dr Kanu Gonzales which pt was pleased with

## 2021-04-25 NOTE — PROGRESS NOTES
Post meal BS 47, juice, crackers, and peanut butter given to patient  Patient is asymptomatic   Will recheck BS

## 2021-04-25 NOTE — PROGRESS NOTES
Progress Note - Maternal Fetal Medicine   Leon Coyne 36 y o  female MRN: [id number]  Unit/Bed#: L&D 328-01 Encounter: [id number]    Assessment:  36 y o   at 28w2d admitted with DKA, uncontrolled T1DM  Hospital day 6     Plan:     DKA, poorly controlled T1DM  Glucose range:  in past 24 hours, with asymptomatic hypoglycemia to 55 this morning  Will decrease lantus to 65U at 8am  - Current regimen: Lantus 65U at 8am and humalog 10U before meals  - Will need assistance from CM to make sure she obtains new supply of insulin before discharge (current insulin is at friend's house)  - Consider further dietary nutrition counseling  -Noncompliance primarily related to housing instability and may also be limited by her low health literacy as well  Home nursing care has been set up by Case Management  Will ensure she has her insulin at discharge (to fill at [facility]), and Case Management also provided information regarding housing resources      Chronic hypertension  - -110s/50s-60s  - Continue to closely monitor     IUP at 28 weeks  - NST TID, reactive  - Will give Tdap prior to discharge, ordered     Dispo: inpatient given continued glycemic titration due to the severe maternal and fetal risks of DKA and hypoglycemia    Subjective/Objective   Subjective:   Maria Dolores Echols has no new concerns this morning  She is feeling well  Contractions: no  Loss of fluid: no  Vaginal bleeding: no  Fetal movement: present    Pain: no  Tolerating PO: yes  Voiding: yes  Ambulating: yes  Headaches: no  Visual changes: no  Chest pain: no  Shortness of breath: no  Nausea: no  Vomiting/Diarrhea: no  Dysuria: no  Leg pain: no    Objective:     Vitals:   Temp:  [98 2 °F (36 8 °C)-98 9 °F (37 2 °C)] 98 6 °F (37 °C)  HR:  [75-91] 75  Resp:  [16-18] 18  BP: (109-113)/(57-67) 111/57       Physical Exam  Constitutional:       General: She is not in acute distress  Appearance: Normal appearance  She is not toxic-appearing     HENT: Head: Normocephalic  Eyes:      General: No scleral icterus  Conjunctiva/sclera: Conjunctivae normal    Neck:      Musculoskeletal: Normal range of motion  Cardiovascular:      Rate and Rhythm: Normal rate  Pulses: Normal pulses  Pulmonary:      Effort: Pulmonary effort is normal  No respiratory distress  Abdominal:      General: There is no distension  Palpations: Abdomen is soft  Tenderness: There is no abdominal tenderness  There is no guarding  Comments: gravid   Musculoskeletal: Normal range of motion  Right lower leg: No edema  Left lower leg: No edema  Skin:     General: Skin is warm and dry  Neurological:      General: No focal deficit present  Mental Status: She is alert  Mental status is at baseline  Fetal Assessment:  FHT: 120 / Moderate 6 - 25 bpm / Accelerations present, no decelerations, reactive  [place]: One contraction noted    Lab, Imaging and other studies:     Lab Results   Component Value Date    WBC 8 68 2021    HGB 11 4 (L) 2021    HCT 33 6 (L) 2021    MCV 96 2021     2021       Lab Results   Component Value Date    GLUCOSE 364 (H) 2021    CALCIUM 7 4 (L) 2021    K 3 9 2021    CO2 22 2021     2021    BUN 4 (L) 2021    CREATININE 0 44 (L) 2021       Lab Results   Component Value Date    ALT 21 2021    AST 40 2021    ALKPHOS 78 2021       Epishoshana Larson MD  OBGYN, R-2  2021  3:33 AM    MFM Attending: Patient discussed in multi-disciplinary board rounds, and also seen/evaluated by myself  I have reviewed resident plan of care, and agree  In summary: Emmy Albrecht is a 36y o  year old  28w2d      Her current problems include:  Patient Active Problem List   Diagnosis    Pre-existing type 1 diabetes mellitus with hyperglycemia during pregnancy in second trimester ([address] )    Hyperlipidemia, mixed    Chronic hypertension affecting pregnancy    Genetic screening    History of classical  section    Diabetic ketoacidosis without coma associated with type 1 diabetes mellitus (HCC)    Metabolic acidosis with increased anion gap and accumulation of organic acids    Shortness of breath    Noncompliance with diabetes treatment    Elevated hemoglobin A1c    Uterine synechiae    Non-compliance with treatment    Pregnancy complicated by pre-existing type 1 diabetes, second trimester     Fetal monitoring was reviewed and shows: baseline 115, reactive, no decels, toco irregular contractions  Recommendations are as follows: Glycemic control improving, but she had some asymptomatic hypoglycemia this morning, so lantus dose reduced from 70 to 65 units this AM  Continue 10 U nutritional insulin  She needs to go home tomorrow because she has school, I anticipate this will be fine, as long as we have addressed some of her barriers to compliance, like housing instability, lack of medication, and established home nursing  Greatly appreciate case management input  Isa Chris MD    Evaluation and Management:   Time spent face-to-face with Kong Paci as well as in floor time coordinating care was 30 minutes

## 2021-04-26 ENCOUNTER — OB ABSTRACT (OUTPATIENT)
Dept: OBGYN CLINIC | Facility: CLINIC | Age: 31
End: 2021-04-26

## 2021-04-26 VITALS
OXYGEN SATURATION: 97 % | WEIGHT: 161 LBS | HEART RATE: 86 BPM | SYSTOLIC BLOOD PRESSURE: 116 MMHG | HEIGHT: 58 IN | BODY MASS INDEX: 33.8 KG/M2 | RESPIRATION RATE: 18 BRPM | TEMPERATURE: 97.9 F | DIASTOLIC BLOOD PRESSURE: 54 MMHG

## 2021-04-26 LAB
CLINICAL INFO: NORMAL
ETHNIC BACKGROUND STATED: NORMAL
GENE MUT TESTED BLD/T: NORMAL
GENERAL COMMENTS:: NORMAL
GLUCOSE SERPL-MCNC: 125 MG/DL (ref 65–140)
GLUCOSE SERPL-MCNC: 129 MG/DL (ref 65–140)
GLUCOSE SERPL-MCNC: 40 MG/DL (ref 65–140)
GLUCOSE SERPL-MCNC: 41 MG/DL (ref 65–140)
GLUCOSE SERPL-MCNC: 64 MG/DL (ref 65–140)
GLUCOSE SERPL-MCNC: 87 MG/DL (ref 65–140)
LAB DIRECTOR NAME PROVIDER: NORMAL
REASON FOR REFERRAL (NARRATIVE): NORMAL
REF LAB TEST METHOD: NORMAL
SL AMB DISCLAIMER: NORMAL
SL AMB GENETIC COUNSELOR: NORMAL
SMN1 GENE MUT ANL BLD/T: NORMAL
SPECIMEN SOURCE: NORMAL

## 2021-04-26 PROCEDURE — 59025 FETAL NON-STRESS TEST: CPT | Performed by: OBSTETRICS & GYNECOLOGY

## 2021-04-26 PROCEDURE — 82948 REAGENT STRIP/BLOOD GLUCOSE: CPT

## 2021-04-26 PROCEDURE — NC001 PR NO CHARGE: Performed by: OBSTETRICS & GYNECOLOGY

## 2021-04-26 PROCEDURE — 99232 SBSQ HOSP IP/OBS MODERATE 35: CPT | Performed by: OBSTETRICS & GYNECOLOGY

## 2021-04-26 RX ORDER — INSULIN LISPRO 100 U/ML
8 INJECTION, SOLUTION SUBCUTANEOUS
Qty: 15 ML | Refills: 2 | Status: SHIPPED | OUTPATIENT
Start: 2021-04-26 | End: 2021-06-26 | Stop reason: HOSPADM

## 2021-04-26 RX ORDER — INSULIN GLARGINE 100 [IU]/ML
60 INJECTION, SOLUTION SUBCUTANEOUS EVERY 24 HOURS
Status: DISCONTINUED | OUTPATIENT
Start: 2021-04-26 | End: 2021-04-26 | Stop reason: HOSPADM

## 2021-04-26 RX ORDER — INSULIN GLARGINE 100 [IU]/ML
60 INJECTION, SOLUTION SUBCUTANEOUS DAILY
Qty: 16.5 ML | Refills: 2 | Status: SHIPPED | OUTPATIENT
Start: 2021-04-26 | End: 2021-06-26 | Stop reason: HOSPADM

## 2021-04-26 RX ADMIN — INSULIN GLARGINE 60 UNITS: 100 INJECTION, SOLUTION SUBCUTANEOUS at 08:08

## 2021-04-26 NOTE — UTILIZATION REVIEW
Continued Stay Review    Date: 2021                          Current Patient Class: inpatient   Current Level of Care: med surg     HPI:31 y o  female initially admitted on  transferred & Inpatient;  presented to ED at  [facility] with nausea and headache   27w5d pregnant ;  PMH is  DM1,  uncontrolled T1DM,  hasn't  taken insulin for past 2 days, noncompliance, chronic hypertension  and prior  DKA  Admissions  Transferred to [facility] for further care  Admit  Ip Stepdown LOC  With DKA, DM1, metabolic acidosis with increased anion gap, pregnancy and noncompliance and plan is   IVF,  Insulin drip,   OB  Consult    Assessment/Plan:  at 28w2d admitted with DKA, uncontrolled T1DM  Hospital day 6  DKA, poorly controlled T1DM Glucose range:  in past 24 hours, with asymptomatic hypoglycemia to 55 this morning  Will decrease lantus to 65U at 8am  Will need assistance from CM to make sure she obtains new supply of insulin before discharge  Noncompliance primarily related to housing instability and may also be limited by her low health literacy as well  Home nursing care has been set up by Case Management  Will ensure she has her insulin at discharge   Cont NST TID      3:09 am RN Note  3am BS was 55 and was reported to Dr Cony Alejo  Pt given peanut butter and crackers as instructed by    9:55 am RN note Post meal BS 47, juice, crackers, and peanut butter given to patient  Patient is asymptomatic   Will recheck BS  Vital Signs:   21 1735  --  99  --  120/56  --  --  --  --   21 1734  99 °F (37 2 °C)  --  --  --  --  --  --  --   21 0725  97 8 °F (36 6 °C)  84  17  110/55  --  --  --  Lying       Pertinent Labs/Diagnostic Results:       Results from last 7 days   Lab Units 21  0558 21  2101 21  1648   WBC Thousand/uL 8 68 11 14* 11 34*   HEMOGLOBIN g/dL 11 4* 11 3* 13 6   HEMATOCRIT % 33 6* 33 8* 40 6   PLATELETS Thousands/uL 187 205 242   NEUTROS ABS Thousands/µL 7 07  --  10 03*         Results from last 7 days   Lab Units 04/23/21  0650 04/22/21  0558 04/21/21  2113 04/21/21  1246 04/21/21  0804   SODIUM mmol/L 139 140 139 137 137   POTASSIUM mmol/L 3 9 4 2 4 2 3 2* 3 4*   CHLORIDE mmol/L 108 110* 109* 107 109*   CO2 mmol/L 22 17* 20* 16* 14*   ANION GAP mmol/L 9 13 10 14* 14*   BUN mg/dL 4* 3* 5 3* 6   CREATININE mg/dL 0 44* 0 47* 0 55* 0 68 0 50*   EGFR ml/min/1 73sq m 137 134 127 119 131   CALCIUM mg/dL 7 4* 7 5* 7 5* 7 4* 7 0*   MAGNESIUM mg/dL 1 8 2 1 1 9 2 5 1 7   PHOSPHORUS mg/dL 1 8* 1 5* 6 0* 1 3* 2 5*     Results from last 7 days   Lab Units 04/20/21 2101 04/20/21  1648   AST U/L 40 25   ALT U/L 21 14   ALK PHOS U/L 78 85 2   TOTAL PROTEIN g/dL 6 3* 7 2   ALBUMIN g/dL 2 4* 3 6   TOTAL BILIRUBIN mg/dL 0 41 0 52     Results from last 7 days   Lab Units 04/25/21  2251 04/25/21  1933 04/25/21  1732 04/25/21  1620 04/25/21  1341 04/25/21  1140   POC GLUCOSE mg/dl 139 51* 74 51* 100 66     Results from last 7 days   Lab Units 04/23/21  0650 04/22/21  0558 04/21/21  2113 04/21/21  1246 04/21/21  0804 04/21/21  0429 04/21/21  0230 04/21/21  0011 04/20/21  2101 04/20/21  1648   GLUCOSE RANDOM mg/dL 148* 191* 159* 149* 177* 218* 209* 229* 239* 430*         Results from last 7 days   Lab Units 04/20/21 2101   HEMOGLOBIN A1C % 10 5*   EAG mg/dl 255     BETA-HYDROXYBUTYRATE   Date Value Ref Range Status   04/20/2021 5 3 (H) <0 6 mmol/L Final          Results from last 7 days   Lab Units 04/21/21  1339 04/20/21  2101 04/20/21  1658   PH ALEXSANDER  7 360 7 244* 7 290*   PCO2 ALEXSANDER mm Hg 23 1* 18 9* 25 6*   PO2 ALEXSANDER mm Hg 160 2* 82 6* 44 8   HCO3 ALEXSANDER mmol/L 12 8* 8 0* 12 0*   BASE EXC ALEXSANDER mmol/L -10 8 -17 2 -12 7   O2 CONTENT ALEXSANDER ml/dL 17 0 16 0 15 5   O2 HGB, VENOUS % 97 4* 93 0* 79 6       Results from last 7 days   Lab Units 04/20/21  2101   LACTIC ACID mmol/L 1 1     Results from last 7 days   Lab Units 04/20/21  1803   CLARITY UA  Clear   COLOR UA  Yellow   SPEC GRAV UA  >=1 030   PH UA  5 5   GLUCOSE UA mg/dl 2+*   KETONES UA mg/dl 80 (3+)*   BLOOD UA  Negative   PROTEIN UA mg/dl Negative   NITRITE UA  Negative   BILIRUBIN UA  Negative   UROBILINOGEN UA E U /dl 0 2   LEUKOCYTES UA  Negative       Medications:   Scheduled Medications:  insulin glargine, 60 Units, Subcutaneous, Q24H  insulin lispro, 8 Units, Subcutaneous, TID With Meals  tetanus-diphtheria-acellular pertussis, 0 5 mL, Intramuscular, Once    Continuous IV Infusions:     PRN Meds:  acetaminophen, 650 mg, Oral, Q6H PRN  butalbital-acetaminophen-caffeine, 1 tablet, Oral, Q4H PRN    Discharge Plan: tbd  Network Utilization Review Department  ATTENTION: Please call with any questions or concerns to [phone] and carefully listen to the prompts so that you are directed to the right person  All voicemails are confidential   Dulce Hudson all requests for admission clinical reviews, approved or denied determinations and any other requests to dedicated fax number below belonging to the campus where the patient is receiving treatment   List of dedicated fax numbers for the Facilities:  [address] [place] [address] DENIALS (Administrative/Medical Necessity) [phone]   [address] (Maternity/NICU/Pediatrics) [phone]   [address] Hipolito Babb [phone] [place] [address] Emily Hoyos [address] [facility] [phone]

## 2021-04-26 NOTE — PLAN OF CARE
Problem: Potential for Falls  Goal: Patient will remain free of falls  Description: INTERVENTIONS:  - Assess patient frequently for physical needs  -  Identify cognitive and physical deficits and behaviors that affect risk of falls    -  [place] fall precautions as indicated by assessment   - Educate patient/family on patient safety including physical limitations  - Instruct patient to call for assistance with activity based on assessment  - Modify environment to reduce risk of injury  - Consider OT/PT consult to assist with strengthening/mobility  Outcome: Progressing     Problem: PAIN - ADULT  Goal: Verbalizes/displays adequate comfort level or baseline comfort level  Description: Interventions:  - Encourage patient to monitor pain and request assistance  - Assess pain using appropriate pain scale  - Administer analgesics based on type and severity of pain and evaluate response  - Implement non-pharmacological measures as appropriate and evaluate response  - Consider cultural and social influences on pain and pain management  - Notify physician/advanced practitioner if interventions unsuccessful or patient reports new pain  Outcome: Progressing     Problem: INFECTION - ADULT  Goal: Absence or prevention of progression during hospitalization  Description: INTERVENTIONS:  - Assess and monitor for signs and symptoms of infection  - Monitor lab/diagnostic results  - Monitor all insertion sites, i e  indwelling lines, tubes, and drains  - Monitor endotracheal if appropriate and nasal secretions for changes in amount and color  - [place] appropriate cooling/warming therapies per order  - Administer medications as ordered  - Instruct and encourage patient and family to use good hand hygiene technique  - Identify and instruct in appropriate isolation precautions for identified infection/condition  Outcome: Progressing     Problem: SAFETY ADULT  Goal: Patient will remain free of falls  Description: INTERVENTIONS:  - Assess patient frequently for physical needs  -  Identify cognitive and physical deficits and behaviors that affect risk of falls    -  [place] fall precautions as indicated by assessment   - Educate patient/family on patient safety including physical limitations  - Instruct patient to call for assistance with activity based on assessment  - Modify environment to reduce risk of injury  - Consider OT/PT consult to assist with strengthening/mobility  Outcome: Progressing  Goal: Maintain or return to baseline ADL function  Description: INTERVENTIONS:  -  Assess patient's ability to carry out ADLs; assess patient's baseline for ADL function and identify physical deficits which impact ability to perform ADLs (bathing, care of mouth/teeth, toileting, grooming, dressing, etc )  - Assess/evaluate cause of self-care deficits   - Assess range of motion  - Assess patient's mobility; develop plan if impaired  - Assess patient's need for assistive devices and provide as appropriate  - Encourage maximum independence but intervene and supervise when necessary  - Involve family in performance of ADLs  - Assess for home care needs following discharge   - Consider OT consult to assist with ADL evaluation and planning for discharge  - Provide patient education as appropriate  Outcome: Progressing  Goal: Maintain or return mobility status to optimal level  Description: INTERVENTIONS:  - Assess patient's baseline mobility status (ambulation, transfers, stairs, etc )    - Identify cognitive and physical deficits and behaviors that affect mobility  - Identify mobility aids required to assist with transfers and/or ambulation (gait belt, sit-to-stand, lift, walker, cane, etc )  - [place] fall precautions as indicated by assessment  - Record patient progress and toleration of activity level on Mobility SBAR; progress patient to next Phase/Stage  - Instruct patient to call for assistance with activity based on assessment  - Consider rehabilitation consult to assist with strengthening/weightbearing, etc   Outcome: Progressing     Problem: DISCHARGE PLANNING  Goal: Discharge to home or other facility with appropriate resources  Description: INTERVENTIONS:  - Identify barriers to discharge w/patient and caregiver  - Arrange for needed discharge resources and transportation as appropriate  - Identify discharge learning needs (meds, wound care, etc )  - Arrange for interpretive services to assist at discharge as needed  - Refer to Case Management Department for coordinating discharge planning if the patient needs post-hospital services based on physician/advanced practitioner order or complex needs related to functional status, cognitive ability, or social support system  Outcome: Progressing     Problem: Knowledge Deficit  Goal: Patient/family/caregiver demonstrates understanding of disease process, treatment plan, medications, and discharge instructions  Description: Complete learning assessment and assess knowledge base    Interventions:  - Provide teaching at level of understanding  - Provide teaching via preferred learning methods  Outcome: Progressing     Problem: CARDIOVASCULAR - ADULT  Goal: Maintains optimal cardiac output and hemodynamic stability  Description: INTERVENTIONS:  - Monitor I/O, vital signs and rhythm  - Monitor for S/S and trends of decreased cardiac output  - Administer and titrate ordered vasoactive medications to optimize hemodynamic stability  - Assess quality of pulses, skin color and temperature  - Assess for signs of decreased coronary artery perfusion  - Instruct patient to report change in severity of symptoms  Outcome: Progressing  Goal: Absence of cardiac dysrhythmias or at baseline rhythm  Description: INTERVENTIONS:  - Continuous cardiac monitoring, vital signs, obtain 12 lead EKG if ordered  - Administer antiarrhythmic and heart rate control medications as ordered  - Monitor electrolytes and administer replacement therapy as ordered  Outcome: Progressing     Problem: METABOLIC, FLUID AND ELECTROLYTES - ADULT  Goal: Electrolytes maintained within normal limits  Description: INTERVENTIONS:  - Monitor labs and assess patient for signs and symptoms of electrolyte imbalances  - Administer electrolyte replacement as ordered  - Monitor response to electrolyte replacements, including repeat lab results as appropriate  - Instruct patient on fluid and nutrition as appropriate  Outcome: Progressing  Goal: Fluid balance maintained  Description: INTERVENTIONS:  - Monitor labs   - Monitor I/O and WT  - Instruct patient on fluid and nutrition as appropriate  - Assess for signs & symptoms of volume excess or deficit  Outcome: Progressing  Goal: Glucose maintained within target range  Description: INTERVENTIONS:  - Monitor Blood Glucose as ordered  - Assess for signs and symptoms of hyperglycemia and hypoglycemia  - Administer ordered medications to maintain glucose within target range  - Assess nutritional intake and initiate nutrition service referral as needed  Outcome: Progressing     Problem: HEMATOLOGIC - ADULT  Goal: Maintains hematologic stability  Description: INTERVENTIONS  - Assess for signs and symptoms of bleeding or hemorrhage  - Monitor labs  - Administer supportive blood products/factors as ordered and appropriate  Outcome: Progressing
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
## 2021-04-26 NOTE — DISCHARGE INSTRUCTIONS
Instructions:    1) Please check your sugars before meals, 2 hours after meals, and at 3AM    2) Your insulin regimen: 60U lantus at 8am, 8U admelog before every meal   3) If your blood sugar is below 90, please eat a snack containing 15g carbohydrate  4) Please see your OBGYN (Dr Hetal Cartagena) weekly for a prenatal visit  5) Please continue working with the [facility] Diabetes in Pregnancy program      Diabetic Ketoacidosis   WHAT YOU SHOULD KNOW:   Diabetic ketoacidosis (DKA) is a life-threatening condition that happens when diabetes is not controlled  Your blood sugar level becomes dangerously high because your body does not have enough insulin  Insulin is a hormone that helps your body take sugar out of your blood and use it for energy  The lack of insulin forces your body to use fat instead of sugar for energy  As fats are broken down, they leave chemicals called ketones that build up in your blood  Ketones are dangerous at high levels  INSTRUCTIONS:   Medicines:   · Insulin:  Insulin decreases the amount of sugar in your blood  It helps your body move the sugar into your cells, where it is needed for energy  · Take your medicine as directed  Call your healthcare provider if you think your medicine is not helping or if you have side effects  Tell him if you are allergic to any medicine  Keep a list of the medicines, vitamins, and herbs you take  Include the amounts, and when and why you take them  Bring the list or the pill bottles to follow-up visits  Carry your medicine list with you in case of an emergency  Follow up with your diabetes specialist or healthcare provider as directed: You may need blood tests many times a year to check the function of your pancreas  Write down your questions so you remember to ask them during your visits  Prevent diabetic ketoacidosis: The best way to prevent DKA is to control your diabetes   Ask your healthcare provider or diabetes specialist for more information on managing your diabetes  The following may help decrease your risk for DKA:  · Check your blood sugar levels: You may need to check your blood sugar level at least 3 times each day  Follow instructions about when and how often to check during the day  If your blood sugar level is too high, give yourself insulin as directed by your healthcare provider  He can show you how to use a blood glucose monitor to check your levels  · Check for ketones: Follow instructions about when you should check your blood or urine for ketones  Your healthcare provider may give you a machine to check your blood ketones  Urine ketones can be checked with sticks you dip in your urine  Do not exercise if you have ketones in your urine or blood  · Know how to manage sick days:  When you are sick, you may not eat as much as you normally would  You may need to change the amount of insulin you give yourself  You will need to check your blood sugar level more often  You may also need to check for ketones  Make a plan with your healthcare provider about how to manage your diabetes when you are sick  · Know how to treat DKA: If you have signs of DKA, drink more liquids that do not contain sugar, such as water and diet soda  Take your insulin as directed by your healthcare provider  · Call your diabetes team when needed:  Ask your healthcare provider about a diabetes team that you can call for help  Call the team if your blood sugar level is high, or you have ketones in your blood or urine  The team is available for any questions or concerns you have about your diabetes  Contact your healthcare provider if:   · Your blood sugar level is lower or higher than your healthcare provider says it should be  · You have ketones in your blood or urine  · You have a fever or chills       · You are more thirsty than usual      · You are urinating more often than usual      · You have questions or concerns about your condition or care   Return to the emergency department if:   · You have fruity, sweet breath  · You have severe, new stomach pain and are vomiting  · You are more drowsy than usual      · You begin to breathe fast, or are short of breath  · You become weak and confused  · You have a seizure  © 2014 [address] is for End User's use only and may not be sold, redistributed or otherwise used for commercial purposes  All illustrations and images included in CareNotes® are the copyrighted property of [employer] , [employer]  or Moses Cabrera  The above information is an  only  It is not intended as medical advice for individual conditions or treatments  Talk to your doctor, nurse or pharmacist before following any medical regimen to see if it is safe and effective for you

## 2021-04-26 NOTE — PROGRESS NOTES
Pt fasting blood sugar 40  Dr Curtis Gayle aware, apple juice given to pt  Will recheck blood sugar in 1 hour   Pt denies any low blood sugar symptoms

## 2021-04-26 NOTE — PROGRESS NOTES
Progress Note - Maternal Fetal Medicine   Chan Hunter 36 y o  female MRN: [id number]  Unit/Bed#: L&D 328-01 Encounter: [id number]    Assessment:  36 y o   at 28w3d admitted with DKA, uncontrolled T1DM  Hospital day 7    Plan:    DKA, poorly controlled T1DM  - Last 24 hours: preprandial 60-100s  Postprandial 40s-60s  - Given hypoglycemic episodes, will change regimen to lantus 60U at 8am and humalog 8U before meals  - Appreciate assistance from CM in addressing housing instability, lack of insulin medication, and need for home nursing  Chronic hypertension  - BP 110s-120s/50s-60s  - Continue to closely monitor    IUP at 27 weeks  - NST TID  - Tdap ordered, to be given prior to discharge    Disposition planning: continued inpatient management until optimal glycemic control achieved    Subjective/Objective   Chief Complaint:     Patient has no complaints this morning  Denies obstetric concerns including contractions, vaginal bleeding, leakage of fluid, decreased fetal movement  Denies shortness of breath, chest pain, chills, myalgia  Objective:     Vitals:   Temp:  [97 8 °F (36 6 °C)-99 °F (37 2 °C)] 98 3 °F (36 8 °C)  HR:  [] 91  Resp:  [16-17] 16  BP: (110-120)/(55-68) 111/68       Physical Exam  Constitutional:       General: She is not in acute distress  Appearance: She is well-developed  She is not diaphoretic  Pulmonary:      Effort: Pulmonary effort is normal    Abdominal:      General: There is no distension  Palpations: Abdomen is soft  Tenderness: There is no abdominal tenderness  There is no guarding or rebound  Comments: Gravid   Neurological:      Mental Status: She is alert and oriented to person, place, and time  Fetal Assessment:  FHT: 120 / Moderate 6 - 25 bpm / 10x10, reactive  [place]: absent    Lab, Imaging and other studies: I have personally reviewed pertinent reports        Lab Results   Component Value Date    WBC 8 68 2021    HGB 11 4 (L) 04/22/2021    HCT 33 6 (L) 04/22/2021    MCV 96 04/22/2021     04/22/2021       Lab Results   Component Value Date    GLUCOSE 364 (H) 01/27/2021    CALCIUM 7 4 (L) 04/23/2021    K 3 9 04/23/2021    CO2 22 04/23/2021     04/23/2021    BUN 4 (L) 04/23/2021    CREATININE 0 44 (L) 04/23/2021       Lab Results   Component Value Date    ALT 21 04/20/2021    AST 40 04/20/2021    ALKPHOS 78 04/20/2021       Ric Elizalde MD  OBGYN, PGY-4  4/26/2021  6:35 AM

## 2021-04-26 NOTE — SOCIAL WORK
[facility] notified this CM they can not see past 5pm, CM notified them that patient is in classes from 9-4 on Monday through Friday, they then stated that they can not see the patient if she is going to classes because she is not meeting homebound criteria and it will be an un-billable service  No other VNA services were able to accept either due to non contracted with her insurance company, or due to homebound status  CM did notify the patient at bedside, unfortunately there are no services to set her up with in regards to VNA d/t her homebound status and in regards to opt care management due to her not being seen at a [facility] center and not having an [employer] PCP

## 2021-04-28 ENCOUNTER — ROUTINE PRENATAL (OUTPATIENT)
Dept: PERINATAL CARE | Facility: OTHER | Age: 31
End: 2021-04-28
Payer: COMMERCIAL

## 2021-04-28 VITALS
WEIGHT: 150 LBS | HEART RATE: 89 BPM | SYSTOLIC BLOOD PRESSURE: 104 MMHG | HEIGHT: 58 IN | DIASTOLIC BLOOD PRESSURE: 65 MMHG | BODY MASS INDEX: 31.49 KG/M2

## 2021-04-28 DIAGNOSIS — J45.52 POORLY CONTROLLED SEVERE PERSISTENT ASTHMA WITH STATUS ASTHMATICUS: ICD-10-CM

## 2021-04-28 DIAGNOSIS — O24.013 TYPE 1 DIABETES MELLITUS DURING PREGNANCY IN THIRD TRIMESTER: Primary | ICD-10-CM

## 2021-04-28 DIAGNOSIS — Z3A.28 28 WEEKS GESTATION OF PREGNANCY: ICD-10-CM

## 2021-04-28 PROCEDURE — 93325 DOPPLER ECHO COLOR FLOW MAPG: CPT | Performed by: OBSTETRICS & GYNECOLOGY

## 2021-04-28 PROCEDURE — 76816 OB US FOLLOW-UP PER FETUS: CPT | Performed by: OBSTETRICS & GYNECOLOGY

## 2021-04-28 PROCEDURE — 76825 ECHO EXAM OF FETAL HEART: CPT | Performed by: OBSTETRICS & GYNECOLOGY

## 2021-04-28 PROCEDURE — 76827 ECHO EXAM OF FETAL HEART: CPT | Performed by: OBSTETRICS & GYNECOLOGY

## 2021-04-28 NOTE — PROGRESS NOTES
Patient had to leave right after US for court appt  Reviewed chart  She has had 3 admissions for dka  Recent admission 4/20-4/26/21  Hbg aic 10 5 on 4/20/21  TSH 2 6 on 4/16/21  Nml baseline preelclamptic labs 4/16/21  Nml ekg 4/21/21  Ultrasound showed normal fetal growth and fluid and a normal fetal echo  The prior missed anatomy was reviewed but was not completed due to fetal position  Recommend starting fetal testing at 30 weeks due to her poor blood sugar control  Would prefer visits be in [place] so diabetes education can see her weekly as well       Alice Acevedo MD

## 2021-04-28 NOTE — LETTER
May 1, 2021     [facility], [address] [place]    Patient: Dominique Martinez   YOB: 1990   Date of Visit: 4/28/2021       Dear Dr Concepcion Matos:    Thank you for referring Dominique Martinez to me for evaluation  Below are my notes for this consultation  If you have questions, please do not hesitate to call me  I look forward to following your patient along with you  Sincerely,        Claudine Montanez MD        CC: No Recipients  Claudine Montanez MD  5/1/2021  5:52 PM  Sign when Signing Visit  Patient had to leave right after US for court appt  Reviewed chart  She has had 3 admissions for dka  Recent admission 4/20-4/26/21  Hbg aic 10 5 on 4/20/21  TSH 2 6 on 4/16/21  Nml baseline preelclamptic labs 4/16/21  Nml ekg 4/21/21  Ultrasound showed normal fetal growth and fluid and a normal fetal echo  The prior missed anatomy was reviewed but was not completed due to fetal position  Recommend starting fetal testing at 30 weeks due to her poor blood sugar control  Would prefer visits be in [place] so diabetes education can see her weekly as well       Claudine Montanez MD

## 2021-04-29 ENCOUNTER — TELEPHONE (OUTPATIENT)
Dept: PERINATAL CARE | Facility: CLINIC | Age: 31
End: 2021-04-29

## 2021-04-29 NOTE — UTILIZATION REVIEW
Notification of Discharge   This is a Notification of Discharge from our facility [address]  Please be advised that this patient has been discharge from our facility  Below you will find the admission and discharge date and time including the patients disposition  UTILIZATION REVIEW CONTACT:  Steffanie Mahmood  Utilization   Network Utilization Review Department  Phone: [phone] x carefully listen to the prompts  All voicemails are confidential   Email: [email or web address]     PHYSICIAN ADVISORY SERVICES:  FOR [facility] REVIEW - MEDICAL NECESSITY DENIAL  Phone: [phone]  Fax: [phone]  Email: [email or web address]     PRESENTATION DATE: 4/20/2021  8:32 PM  OBERVATION ADMISSION DATE:   INPATIENT ADMISSION DATE: 4/20/21 2032   DISCHARGE DATE: 4/26/2021  4:58 PM  DISPOSITION: Left against medical advice or discontinued care Left against medical advice or discontinued care      IMPORTANT INFORMATION:  Send all requests for admission clinical reviews, approved or denied determinations and any other requests to dedicated fax number below belonging to the campus where the patient is receiving treatment   List of dedicated fax numbers:  [address] DENIALS (Administrative/Medical Necessity) [phone]   [address] [place] [address] (Maternity/NICU/Pediatrics) [phone]   Arabella Krishna [phone]   [facility] Delay [phone]   [facility] [phone]   Tamika Wilkinson [facility] [address] [place] [address] [phone]   [facility]  [phone]   [address] [place] [address], [facility]  [address] [facility] [address] [facility] [phone]

## 2021-04-29 NOTE — TELEPHONE ENCOUNTER
I've talked to [facility] to discuss her reporting her blood sugars and pt stated that her  Meter was at her friends house and she will be picking it up which she did  She stated that she will start checking her blood sugars on 4/28 she also stated that she talking her insulin as instructed  I will follow up with her today

## 2021-04-30 ENCOUNTER — TELEPHONE (OUTPATIENT)
Dept: PERINATAL CARE | Facility: CLINIC | Age: 31
End: 2021-04-30

## 2021-04-30 NOTE — TELEPHONE ENCOUNTER
I spoke with Rosa Leon today to obtain her blood sugars and pt stated that she only checked her sugars twice on 4/29 @9am 335 and @1pm 129  I did stress the important's of checking her sugars 4x per day and she understood that she did ask about [employer] again and a rep from [employer] will be reaching out to her early next week to there assessment      Thank you

## 2021-05-01 PROBLEM — R06.02 SHORTNESS OF BREATH: Status: RESOLVED | Noted: 2021-01-27 | Resolved: 2021-05-01

## 2021-05-01 PROBLEM — E87.2 METABOLIC ACIDOSIS WITH INCREASED ANION GAP AND ACCUMULATION OF ORGANIC ACIDS: Status: RESOLVED | Noted: 2021-01-27 | Resolved: 2021-05-01

## 2021-05-01 PROBLEM — E10.10 DIABETIC KETOACIDOSIS WITHOUT COMA ASSOCIATED WITH TYPE 1 DIABETES MELLITUS (HCC): Status: RESOLVED | Noted: 2021-01-27 | Resolved: 2021-05-01

## 2021-05-01 PROBLEM — E87.20 METABOLIC ACIDOSIS WITH INCREASED ANION GAP AND ACCUMULATION OF ORGANIC ACIDS: Status: RESOLVED | Noted: 2021-01-27 | Resolved: 2021-05-01

## 2021-05-01 PROBLEM — O24.013 TYPE 1 DIABETES MELLITUS DURING PREGNANCY IN THIRD TRIMESTER: Status: ACTIVE | Noted: 2021-04-07

## 2021-05-01 PROBLEM — Z13.79 GENETIC SCREENING: Status: RESOLVED | Noted: 2021-01-17 | Resolved: 2021-05-01

## 2021-05-05 ENCOUNTER — ROUTINE PRENATAL (OUTPATIENT)
Dept: OBGYN CLINIC | Facility: CLINIC | Age: 31
End: 2021-05-05
Payer: COMMERCIAL

## 2021-05-05 VITALS
BODY MASS INDEX: 30.64 KG/M2 | SYSTOLIC BLOOD PRESSURE: 122 MMHG | TEMPERATURE: 97.8 F | HEIGHT: 58 IN | WEIGHT: 146 LBS | DIASTOLIC BLOOD PRESSURE: 64 MMHG

## 2021-05-05 DIAGNOSIS — E10.65 PRE-EXISTING TYPE 1 DIABETES MELLITUS WITH HYPERGLYCEMIA DURING PREGNANCY IN SECOND TRIMESTER (HCC): ICD-10-CM

## 2021-05-05 DIAGNOSIS — Z3A.29 29 WEEKS GESTATION OF PREGNANCY: Primary | ICD-10-CM

## 2021-05-05 DIAGNOSIS — O24.012 PRE-EXISTING TYPE 1 DIABETES MELLITUS WITH HYPERGLYCEMIA DURING PREGNANCY IN SECOND TRIMESTER (HCC): ICD-10-CM

## 2021-05-05 DIAGNOSIS — O24.013 TYPE 1 DIABETES MELLITUS DURING PREGNANCY IN THIRD TRIMESTER: ICD-10-CM

## 2021-05-05 PROCEDURE — 90471 IMMUNIZATION ADMIN: CPT | Performed by: OBSTETRICS & GYNECOLOGY

## 2021-05-05 PROCEDURE — 90715 TDAP VACCINE 7 YRS/> IM: CPT | Performed by: OBSTETRICS & GYNECOLOGY

## 2021-05-05 PROCEDURE — 99213 OFFICE O/P EST LOW 20 MIN: CPT | Performed by: OBSTETRICS & GYNECOLOGY

## 2021-05-05 NOTE — PROGRESS NOTES
Patient seen evaluated denies any vaginal bleeding ruptures of membrane or contraction, having good fetal movement, ultrasound results discussed with patient, patient has another admission to the hospital secondary to DKA, important of controlling blood sugar discussed with patient, patient have her lab work done after her last visit results discussed with patient, aware she missed part of the blood work secondary to the gestational age, patient is thinking about possible  versus repeat  both explained and discussed with patient in details risk and benefit, she will let us know her decision next visit if desire repeat  we can schedule at the time of the next visit,  MFM will be called tomorrow to schedule appointment at 30 weeks as recommended for NST biophysical profile as well as nutrition consult,  precaution given fetal kick count discuss sign and symptom of preeclampsia review, Tdap given today, to return to office in 2 weeks

## 2021-05-06 DIAGNOSIS — R73.09 ABNORMAL GLUCOSE LEVEL: ICD-10-CM

## 2021-05-06 DIAGNOSIS — Z34.93 THIRD TRIMESTER PREGNANCY: Primary | ICD-10-CM

## 2021-05-26 ENCOUNTER — TELEPHONE (OUTPATIENT)
Dept: PERINATAL CARE | Facility: CLINIC | Age: 31
End: 2021-05-26

## 2021-05-26 NOTE — TELEPHONE ENCOUNTER
October 24, 2017      Taz Jones MD  [address] [facility] [address]           [facility]  [address]  [place] [address]  Phone: [phone]  Fax: [phone]          Patient: Kaiden Duane Garner   MR Number: [id number]   YOB: 2015   Date of Visit: 2017       Dear Dr. Taz Jones:    Thank you for referring Lucian Sue to me for evaluation. Attached you will find relevant portions of my assessment and plan of care.    If you have questions, please do not hesitate to call me. I look forward to following Lucian Sue along with you.    Sincerely,    Shellie Stewart PA-C    Enclosure  CC:  No Recipients    If you would like to receive this communication electronically, please contact [email or web address] or [phone] to request more information on [facility] access.    For providers and/or their staff who would like to refer a patient to [facility], please contact us through our one-stop-shop provider referral line, [facility], at [phone].    If you feel you have received this communication in error or would no longer like to receive these types of communications, please e-mail [email or web address]          Patient called to reschedule appointment for 5/27  She declined all available appointments until 6/22, due to requesting an 8am appointment

## 2021-05-27 ENCOUNTER — TELEPHONE (OUTPATIENT)
Dept: PERINATAL CARE | Facility: CLINIC | Age: 31
End: 2021-05-27

## 2021-05-27 NOTE — TELEPHONE ENCOUNTER
Spoke with pt to discuss her reporting her blood sugars and pt stated that she's not checking her sugars I explained the imports of her checking her pt understood and stated that she wants to continue with the program but she can't promise that she will check her sugars  Pt agreed to start checking her sugars again and she will report her blood sugars in 1 week      Thank you

## 2021-06-07 ENCOUNTER — OFFICE VISIT (OUTPATIENT)
Dept: PERINATAL CARE | Facility: CLINIC | Age: 31
End: 2021-06-07
Payer: COMMERCIAL

## 2021-06-07 ENCOUNTER — ROUTINE PRENATAL (OUTPATIENT)
Dept: PERINATAL CARE | Facility: CLINIC | Age: 31
End: 2021-06-07
Payer: COMMERCIAL

## 2021-06-07 VITALS
WEIGHT: 159.4 LBS | HEART RATE: 84 BPM | SYSTOLIC BLOOD PRESSURE: 116 MMHG | DIASTOLIC BLOOD PRESSURE: 84 MMHG | BODY MASS INDEX: 33.46 KG/M2 | HEIGHT: 58 IN

## 2021-06-07 VITALS — BODY MASS INDEX: 33.46 KG/M2 | HEIGHT: 58 IN | WEIGHT: 159.4 LBS

## 2021-06-07 DIAGNOSIS — R73.09 ELEVATED HEMOGLOBIN A1C: ICD-10-CM

## 2021-06-07 DIAGNOSIS — O24.013 PRE-EXISTING TYPE 1 DIABETES MELLITUS WITH HYPERGLYCEMIA DURING PREGNANCY IN THIRD TRIMESTER (HCC): Primary | ICD-10-CM

## 2021-06-07 DIAGNOSIS — E10.65 PRE-EXISTING TYPE 1 DIABETES MELLITUS WITH HYPERGLYCEMIA DURING PREGNANCY IN THIRD TRIMESTER (HCC): Primary | ICD-10-CM

## 2021-06-07 DIAGNOSIS — O10.919 CHRONIC HYPERTENSION AFFECTING PREGNANCY: ICD-10-CM

## 2021-06-07 DIAGNOSIS — Z3A.34 34 WEEKS GESTATION OF PREGNANCY: ICD-10-CM

## 2021-06-07 DIAGNOSIS — Z91.19 NONCOMPLIANCE WITH DIABETES TREATMENT: ICD-10-CM

## 2021-06-07 DIAGNOSIS — O40.3XX0 POLYHYDRAMNIOS AFFECTING PREGNANCY IN THIRD TRIMESTER: ICD-10-CM

## 2021-06-07 PROCEDURE — 99214 OFFICE O/P EST MOD 30 MIN: CPT | Performed by: NURSE PRACTITIONER

## 2021-06-07 PROCEDURE — 76815 OB US LIMITED FETUS(S): CPT | Performed by: OBSTETRICS & GYNECOLOGY

## 2021-06-07 PROCEDURE — 59025 FETAL NON-STRESS TEST: CPT | Performed by: OBSTETRICS & GYNECOLOGY

## 2021-06-07 NOTE — LETTER
NST sleeve cover sheet    Patient name: Esha Chanel  : 1990  MRN: [id number]    VICKI: Estimated Date of Delivery: 21    Obstetrician: Dilip Rothman    Reason(s) for testing:  Pre-gestational diabetic,  CHTN    __________________________________________      Testing frequency:    __X_ 2x/wk  ___ 1x/wk  ___ Dopplers  ___ BPP?       Last growth scan: __________________________________________

## 2021-06-07 NOTE — PROGRESS NOTES
Non-Stress Testing:    Non-Stress test, equipment, procedure, and expected outcomes reviewed  Reviewed fetal kick counts and when to call OB  Sera Ascencio Verified patient understanding of fetal kick counts with teach back method  Patient reports feeling daily fetal movements  Patient has no questions or concerns  Dr Ge Curiel reviewed NST prior to completion of appointment  Dr Ge Curiel requests add on TREVOR and dopplers  Sonographers aware of add on ultrasounds

## 2021-06-07 NOTE — LETTER
June 8, 2021     Yesenia Grossman, [address] [place]    Patient: Jessenia Zee   YOB: 1990   Date of Visit: 6/7/2021       Dear Dr Melanie Blackwell:    Thank you for referring Ethyl Clear to me for evaluation  Below are my notes for this consultation  If you have questions, please do not hesitate to call me  I look forward to following your patient along with you  Sincerely,        Gagandeep Harry MD        CC: No Recipients  Gagandeep Harry MD  6/8/2021 12:00 AM  Sign when Signing Visit   Patient non compliant with care of her diabetes  Was here today to meet with diabetes and it was realized she has not started fetal testing nor has had follow-up growth scan  NST is reactive and reassuring  Her TREVOR is 32  BPP 10/10  Umbilical Dopplers were normal  She has irregular contractions  Patient was put in an office for a visit with me to discuss poor compliance with care and risks of poor control of her diabetes on pregnancy  and that I recommend delivery after 37 weeks because of her poor compliance and increased risk for stillbirth  She left without being seen  Recommend she continue twice weekly NSTs and weekly TREVOR's and will add a growth scan to her next TREVOR       Gagandeep Harry MD

## 2021-06-07 NOTE — PATIENT INSTRUCTIONS
-Check A1c, CMP and urine for protein/creatinine ratio   -Be consistent with taking Lantus daily and Admelog before breakfast, lunch and dinner   -Unable to make adjustments due to lack of glucose data    -Test glucose fasting; before meals; 1 or 2 hours post start of each meal; before driving and with hypoglycemia  -Try to follow GDM diet with 3 meals and 3 snacks including recommended combination of carb, protein and fat per meal/snack  Minimum of total carbohydrates of 175 grams    -Carbohydrates always needs to be paired with protein    -No more than 8-10 hours of fasting during sleep hours  -Report glucose readings at minimum once a week  -Stay in close contact with diabetes education team   -Glucose goals fasting 60-90; before meals and overnight ; 1 hour post meal 140 or less; 2 hours post   -Always have glucose available to treat hypoglycemia; use 15 by 15 rule  -Continue follow up with OB and MFM as recommended  -NST twice a week and TREVOR weekly  -Fetal growth every 4 weeks or as recommended  -Very important to maintain tight glucose control during pregnancy to decrease risk factors including fetal macrosomia; birth injury; risk of ; polyhydramnios; pre-term labor; pre-eclampsia;  hypoglycemia; jaundice and stillbirth

## 2021-06-07 NOTE — Clinical Note
Please call patient to set up visits at [facility] so she can see diabetes Education each visit      William Rodríguez MD

## 2021-06-07 NOTE — ASSESSMENT & PLAN NOTE
-No recent A1c, last A1c 10 5%  -A1c goal is <6% with minimal hypoglycemia   -Check A1c, CMP and urine for protein/creatinine ratio  -Today presents to visit with no glucose meter or readings for review   -Last US with fetal echo done 4/28/21    -No recent NST or TREVOR noted  -Discussed with Dr Miryam Caraballo, NST requested       Lab Results   Component Value Date    HGBA1C 10 5 (H) 04/20/2021

## 2021-06-07 NOTE — Clinical Note
This patient still has classical  as part of her problem list   Did you find an operative report that confirms she had a low-transverse  so that the problem list can be corrected?     Janie Du

## 2021-06-07 NOTE — PATIENT INSTRUCTIONS
Nonstress Test for Pregnancy   WHAT YOU NEED TO KNOW:   What do I need to know about a nonstress test?  A nonstress test measures your baby's heart rate and movements  Nonstress means that no stress will be placed on your baby during the test    How do I prepare for a nonstress test?  Your healthcare provider will talk to you about how to prepare for this test  He may tell you to eat and drink plenty of fluids before your test  If you smoke, you may be asked not to smoke within 2 hours before the test  He will also tell you what medicines to take or not take on the day of your test    What will happen during a nonstress test?  You may be asked to lie down or recline back for the test on a bed  One or two belts with sensors will be placed around your abdomen  Your baby's heart rate will be recorded with a machine  If your baby does not move, your baby may be asleep  Your healthcare provider may make a noise near your abdomen to try to wake your baby  The test usually takes about 20 minutes, but can take longer if your baby needs to be awakened  What do I need to know about the test results? Your baby will be expected to move at least twice for a certain amount of time  Your baby's heart rate will be expected to go up by a certain number of beats per minute during movement  If your baby does not move as expected, the test may need to be repeated or you may need other tests  CARE AGREEMENT:   You have the right to help plan your care  Learn about your health condition and how it may be treated  Discuss treatment options with your healthcare providers to decide what care you want to receive  You always have the right to refuse treatment  The above information is an  only  It is not intended as medical advice for individual conditions or treatments  Talk to your doctor, nurse or pharmacist before following any medical regimen to see if it is safe and effective for you    © Copyright [address] [place] [address] Drive Information is for End User's use only and may not be sold, redistributed or otherwise used for commercial purposes  All illustrations and images included in CareNotes® are the copyrighted property of A D A M , Inc  or [address] [place] [address] Counts in Pregnancy   AMBULATORY CARE:   Kick counts  measure how much your baby is moving in your womb  A kick from your baby can be felt as a twist, turn, swish, roll, or jab  It is common to feel your baby kicking at 26 to 28 weeks of pregnancy  You may feel your baby kick as early as 20 weeks of pregnancy  You may want to start counting at 28 weeks  Contact your healthcare provider immediately if:   · You feel a change in the number of kicks or movements of your baby  · You feel fewer than 10 kicks within 2 hours  · You have questions or concerns about your baby's movements  Why measure kick counts:  Your baby's movement may provide information about your baby's health  He or she may move less, or not at all, if there are problems  Your baby may move less if he or she is not getting enough oxygen or nutrition from the placenta  Do not smoke while you are pregnant  Smoking decreases the amount of oxygen that gets to your baby  Talk to your healthcare provider if you need help to quit smoking  Tell your healthcare provider as soon as you feel a change in your baby's movements  When to measure kick counts:   · Measure kick counts at the same time every day  · Measure kick counts when your baby is awake and most active  Your baby may be most active in the evening  How to measure kick counts:  Check that your baby is awake before you measure kick counts  You can wake up your baby by lightly pushing on your belly, walking, or drinking something cold  Your healthcare provider may tell you different ways to measure kick counts  You may be told to do the following:  · Use a chart or clock to keep track of the time you start and finish counting       · Sit in a chair or lie on your left side  · Place your hands on the largest part of your belly  · Count until you reach 10 kicks  Write down how much time it takes to count 10 kicks  · It may take 30 minutes to 2 hours to count 10 kicks  It should not take more than 2 hours to count 10 kicks  Follow up with your healthcare provider as directed:  Write down your questions so you remember to ask them during your visits  © Copyright [address] Information is for End User's use only and may not be sold, redistributed or otherwise used for commercial purposes  All illustrations and images included in CareNotes® are the copyrighted property of A D A M , Inc  or [facility] [address]   The above information is an  only  It is not intended as medical advice for individual conditions or treatments  Talk to your doctor, nurse or pharmacist before following any medical regimen to see if it is safe and effective for you

## 2021-06-07 NOTE — PROGRESS NOTES
Assessment/Plan:    Pre-existing type 1 diabetes mellitus with hyperglycemia during pregnancy in third trimester ([facility] )  -No recent A1c, last A1c 10 5%  -A1c goal is <6% with minimal hypoglycemia   -Check A1c, CMP and urine for protein/creatinine ratio  -Today presents to visit with no glucose meter or readings for review   -Last US with fetal echo done 4/28/21    -No recent NST or TREVOR noted  -Discussed with Dr Miryam Caraballo, NST requested  Lab Results   Component Value Date    HGBA1C 10 5 (H) 04/20/2021        Diagnoses and all orders for this visit:    Pre-existing type 1 diabetes mellitus with hyperglycemia during pregnancy in third trimester ([facility] )  -     Hemoglobin A1C; Standing  -     Comprehensive metabolic panel; Future  -     Cancel: Protein / creatinine ratio, urine  -     Hemoglobin A1C  -     Protein / creatinine ratio, urine    Noncompliance with diabetes treatment  -     Hemoglobin A1C; Standing  -     Comprehensive metabolic panel; Future  -     Cancel: Protein / creatinine ratio, urine  -     Hemoglobin A1C  -     Protein / creatinine ratio, urine    Elevated hemoglobin A1c  -     Hemoglobin A1C; Standing  -     Comprehensive metabolic panel; Future  -     Cancel: Protein / creatinine ratio, urine  -     Hemoglobin A1C  -     Protein / creatinine ratio, urine    34 weeks gestation of pregnancy  -     Hemoglobin A1C; Standing  -     Comprehensive metabolic panel; Future  -     Cancel: Protein / creatinine ratio, urine  -     Hemoglobin A1C  -     Protein / creatinine ratio, urine      -Check A1c, CMP and urine for protein/creatinine ratio   -Be consistent with taking Lantus daily and Admelog before breakfast, lunch and dinner   -Unable to make adjustments due to lack of glucose data    -Test glucose fasting; before meals; 1 or 2 hours post start of each meal; before driving and with hypoglycemia     -Try to follow GDM diet with 3 meals and 3 snacks including recommended combination of carb, protein and fat per meal/snack  Minimum of total carbohydrates of 175 grams    -Carbohydrates always needs to be paired with protein    -No more than 8-10 hours of fasting during sleep hours  -Report glucose readings at minimum once a week  -Stay in close contact with diabetes education team   -Glucose goals fasting 60-90; before meals and overnight ; 1 hour post meal 140 or less; 2 hours post   -Always have glucose available to treat hypoglycemia; use 15 by 15 rule  -Continue follow up with OB and MFM as recommended  -NST twice a week and TREVOR weekly  -Fetal growth every 4 weeks or as recommended  -Very important to maintain tight glucose control during pregnancy to decrease risk factors including fetal macrosomia; birth injury; risk of ; polyhydramnios; pre-term labor; pre-eclampsia;  hypoglycemia; jaundice and stillbirth  Subjective:      Patient ID: Jada Mercado is a 36 y o  female  34 3/7 weeks gestation T1DM who presents today for visit without glucose meter or glucose readings  History of DKA and non-compliant with diabetes regimen  Reports not testing glucose, taking Lantus 60 units daily at 8 AM and Admelog 8 units before meals  Concerns regarding if patient is confusing long and quick acting insulin given stating basaglar then switched to using Admelog with meals  Made aware of importance of checking glucose and taking insulin  Eats 2 meals a day with 2 to 3 snacks a day  Works 6 PM to 5:30 AM 3 times a week  Informed risk of having a fetal demise  NST completed, pending TREVOR and dopplers ordered per Dr Bakari Lara  In office finger stick 122 and another glucose meter given       The following portions of the patient's history were reviewed and updated as appropriate: allergies, current medications, past family history, past medical history, past social history, past surgical history and problem list     No Known Allergies  Current Outpatient Medications on File Prior to Visit Medication Sig Dispense Refill    Accu-Chek FastClix Lancets MISC Use 6 a day and as directed  102 each 2    Admelog SoloStar 100 units/mL injection pen Inject 8 Units under the skin 3 (three) times a day before meals 15 mL 2    Glucagon, rDNA, (Glucagon Emergency) 1 MG KIT Glucagon Emergency Kit (human-recomb) 1 mg solution for injection   GIVE 1 MG IN CASE OF LOW BLOOD SUGAR   insulin glargine (Basaglar KwikPen) 100 units/mL injection pen Inject 60 Units under the skin daily 16 5 mL 2    Prenat-Fe Carbonyl-FA-Omega 3 (One-A-Day Womens Prenatal 1) 28-0 8-235 MG CAPS Take 1 tablet by mouth daily  0    Urine Glucose-Ketones Test STRP Test      aspirin (ECOTRIN LOW STRENGTH) 81 mg EC tablet Take 2 tablets (162 mg total) by mouth daily (Patient not taking: Reported on 4/14/2021) 60 tablet 5    Continuous Blood Gluc  (Dexcom G6 ) NERY Use as directed  (Patient not taking: Reported on 6/7/2021) 1 Device 0    Continuous Blood Gluc Sensor (Dexcom G6 Sensor) MISC 1 box=1 month supply or 3 sensors, use 1 sensor every 10 days  (Patient not taking: Reported on 6/7/2021) 1 each 12    Continuous Blood Gluc Transmit (Dexcom G6 Transmitter) MISC Transmitter change every 90 days  (Patient not taking: Reported on 6/7/2021) 1 each 3    Contour Next Test test strip Test up to 6 times a day  (Patient not taking: Reported on 6/7/2021) 150 each 2    Insulin Pen Needle 31G X 5 MM MISC Inject under the skin daily at bedtime Use one a day or as directed  100 each 1     No current facility-administered medications on file prior to visit  Review of Systems   Constitutional: Negative for fever  Eyes: Negative for visual disturbance  Respiratory: Negative for cough and shortness of breath  Cardiovascular: Negative for chest pain and palpitations  Gastrointestinal: Negative for nausea and vomiting  Endocrine: Negative for polydipsia, polyphagia and polyuria     Genitourinary: Negative for difficulty urinating and vaginal bleeding  Neurological: Negative for headaches  Objective:        Component Value Date/Time    HGBA1C 10 5 (H) 04/20/2021 2101    HGBA1C 10 7 (H) 04/16/2021 0754    HGBA1C 13 9 (H) 05/04/2020 0930      Ht 4' 10" (1 473 m)   Wt 72 3 kg (159 lb 6 4 oz)   LMP 10/09/2020 (Exact Date)   BMI 33 31 kg/m²          Physical Exam  HENT:      Head: Normocephalic  Eyes:      Conjunctiva/sclera: Conjunctivae normal    Pulmonary:      Effort: Pulmonary effort is normal    Musculoskeletal: Normal range of motion  Neurological:      Mental Status: She is alert and oriented to person, place, and time  Psychiatric:         Mood and Affect: Mood normal          Behavior: Behavior normal          Thought Content: Thought content normal          Judgment: Judgment normal            Greater than 35 minutes spent reviewing chart; speaking with patient; discussing case with Dr Jac Francois; plan of care and charting

## 2021-06-08 ENCOUNTER — TELEPHONE (OUTPATIENT)
Dept: PERINATAL CARE | Facility: CLINIC | Age: 31
End: 2021-06-08

## 2021-06-08 PROBLEM — O40.3XX0 POLYHYDRAMNIOS AFFECTING PREGNANCY IN THIRD TRIMESTER: Status: ACTIVE | Noted: 2021-06-08

## 2021-06-08 NOTE — TELEPHONE ENCOUNTER
Patient did not check out for her follow up  She needs an appointment with Kimmy Mijares and nst/sukhjinder's  I called her number, unable to leave message  No voicemail set up

## 2021-06-08 NOTE — PROGRESS NOTES
Patient non compliant with care of her diabetes  Was here today to meet with diabetes and it was realized she has not started fetal testing nor has had follow-up growth scan  NST is reactive and reassuring  Her TREVOR is 32  BPP 10/10  Umbilical Dopplers were normal  She has irregular contractions  Patient was put in an office for a visit with me to discuss poor compliance with care and risks of poor control of her diabetes on pregnancy  and that I recommend delivery after 37 weeks because of her poor compliance and increased risk for stillbirth  She left without being seen  Recommend she continue twice weekly NSTs and weekly TREVOR's and will add a growth scan to her next TREVOR       Trinidad Frances MD

## 2021-06-10 PROBLEM — Z98.891 HISTORY OF CLASSICAL CESAREAN SECTION: Status: RESOLVED | Noted: 2021-01-17 | Resolved: 2021-06-10

## 2021-06-11 ENCOUNTER — ROUTINE PRENATAL (OUTPATIENT)
Dept: OBGYN CLINIC | Facility: CLINIC | Age: 31
End: 2021-06-11
Payer: COMMERCIAL

## 2021-06-11 ENCOUNTER — TELEPHONE (OUTPATIENT)
Dept: PERINATAL CARE | Facility: CLINIC | Age: 31
End: 2021-06-11

## 2021-06-11 VITALS
HEIGHT: 58 IN | SYSTOLIC BLOOD PRESSURE: 118 MMHG | BODY MASS INDEX: 32.95 KG/M2 | DIASTOLIC BLOOD PRESSURE: 80 MMHG | WEIGHT: 157 LBS

## 2021-06-11 DIAGNOSIS — E10.65 PRE-EXISTING TYPE 1 DIABETES MELLITUS WITH HYPERGLYCEMIA DURING PREGNANCY IN THIRD TRIMESTER (HCC): ICD-10-CM

## 2021-06-11 DIAGNOSIS — O24.013 PRE-EXISTING TYPE 1 DIABETES MELLITUS WITH HYPERGLYCEMIA DURING PREGNANCY IN THIRD TRIMESTER (HCC): ICD-10-CM

## 2021-06-11 DIAGNOSIS — Z34.83 ENCOUNTER FOR SUPERVISION OF NORMAL PREGNANCY IN MULTIGRAVIDA IN THIRD TRIMESTER: Primary | ICD-10-CM

## 2021-06-11 DIAGNOSIS — Z36.9 ENCOUNTER FOR ANTENATAL SCREENING: ICD-10-CM

## 2021-06-11 PROBLEM — Z3A.34 34 WEEKS GESTATION OF PREGNANCY: Status: RESOLVED | Noted: 2021-04-25 | Resolved: 2021-06-11

## 2021-06-11 PROCEDURE — 99213 OFFICE O/P EST LOW 20 MIN: CPT | Performed by: PHYSICIAN ASSISTANT

## 2021-06-11 PROCEDURE — 87150 DNA/RNA AMPLIFIED PROBE: CPT | Performed by: PHYSICIAN ASSISTANT

## 2021-06-11 NOTE — PROGRESS NOTES
Assessment     Pregnancy 35 and 0/7 weeks     Plan     28-week labs reviewed, abnormal  Stressed kick counts  Follow up in 1 Week  GBS done  Stressed the need to f/u with blood sugars and  testing at [facility]  Patient is to call them today to schedule visits  Discussed labor precautions  Delivery planning at next visit with Dr Rylan Kumar  Naye Foster is a 36 y o  female being seen today for her obstetrical visit  She is at 35w0d gestation  Patient reports no bleeding, no contractions, no cramping and no leaking  Fetal movement: normal  Patient states she is doing well overall  She is on insulin, but does not check her blood sugars  States she is trying to get a continuous blood sugar monitor, but was denied  Most recent Hgb A1c was 10 5  Was seen by [facility] on , but left before counseling completed  Should be having twice weekly NSTs/weekly TREVOR  TREVOR on  was 32  Per Dr Laury Lundborg, delivery recommended at 37 wks due to poor blood sugar control  Patient denies HA, n/v, reflux, abdominal pain, and swelling  Menstrual History:  OB History        4    Para   1    Term   1       0    AB   1    Living   1       SAB   1    TAB   0    Ectopic   0    Multiple   0    Live Births   1                Menarche age: N/A  Patient's last menstrual period was 10/09/2020 (exact date)  The following portions of the patient's history were reviewed and updated as appropriate: allergies, current medications, past family history, past medical history, past social history, past surgical history and problem list     Review of Systems  Pertinent items are noted in HPI       Objective     /80 (BP Location: Left arm)   Ht 4' 10" (1 473 m)   Wt 71 2 kg (157 lb)   LMP 10/09/2020 (Exact Date)   BMI 32 81 kg/m²   FHT:  142 BPM   Uterine Size: size greater than dates   Presentation: cephalic

## 2021-06-11 NOTE — TELEPHONE ENCOUNTER
Patient stated she does not have any blood sugars to report "I forget a lot of times"  Reinforced the importance to check her blood sugars fasting, pre-meals and post meals especially with type 1 diabetes, pregnant and on insulin therapy  Encouraged patient to set a reminder to check her blood sugars  Encouraged to check sugars and report on Monday  Patient agreed to plan and verbalized understanding

## 2021-06-13 LAB — GP B STREP DNA SPEC QL NAA+PROBE: POSITIVE

## 2021-06-14 ENCOUNTER — TELEPHONE (OUTPATIENT)
Dept: PERINATAL CARE | Facility: CLINIC | Age: 31
End: 2021-06-14

## 2021-06-14 NOTE — TELEPHONE ENCOUNTER
Called patient to schedule follow up appointment, per staff message:    [address] PHONE - sent [facility] [place] [facility] request to patient to call back to schedule at [phone]

## 2021-06-15 ENCOUNTER — TELEPHONE (OUTPATIENT)
Dept: PERINATAL CARE | Facility: CLINIC | Age: 31
End: 2021-06-15

## 2021-06-15 NOTE — TELEPHONE ENCOUNTER
Pt returned my call to discuss her blood sugars and [employer] supplies  Pt stated that she's not checking her blood sugars and  due to her not  checking  her blood sugars we are not able to order [employer] for her because they require at least 30 days of blood sugars which she don't have  Pt agreed to start checking her sugars and I will call her on 6/18 for numbers  Pt also stated that she's  taking her insulin as directed      Thank you

## 2021-06-22 ENCOUNTER — HOSPITAL ENCOUNTER (INPATIENT)
Facility: HOSPITAL | Age: 31
LOS: 4 days | Discharge: HOME | DRG: 540 | End: 2021-06-26
Attending: OBSTETRICS & GYNECOLOGY | Admitting: OBSTETRICS & GYNECOLOGY
Payer: COMMERCIAL

## 2021-06-22 ENCOUNTER — ANESTHESIA EVENT (INPATIENT)
Dept: LABOR AND DELIVERY | Facility: HOSPITAL | Age: 31
DRG: 540 | End: 2021-06-22
Payer: COMMERCIAL

## 2021-06-22 ENCOUNTER — ULTRASOUND (OUTPATIENT)
Dept: PERINATAL CARE | Facility: OTHER | Age: 31
End: 2021-06-22
Payer: COMMERCIAL

## 2021-06-22 VITALS
WEIGHT: 153.2 LBS | HEART RATE: 88 BPM | BODY MASS INDEX: 32.16 KG/M2 | HEIGHT: 58 IN | DIASTOLIC BLOOD PRESSURE: 77 MMHG | SYSTOLIC BLOOD PRESSURE: 118 MMHG

## 2021-06-22 DIAGNOSIS — Z91.19 NONCOMPLIANCE WITH DIABETES TREATMENT: ICD-10-CM

## 2021-06-22 DIAGNOSIS — O24.013 TYPE 1 DIABETES MELLITUS DURING PREGNANCY IN THIRD TRIMESTER: Primary | ICD-10-CM

## 2021-06-22 DIAGNOSIS — E10.65 PRE-EXISTING TYPE 1 DIABETES MELLITUS WITH HYPERGLYCEMIA DURING PREGNANCY IN THIRD TRIMESTER (HCC): Primary | ICD-10-CM

## 2021-06-22 DIAGNOSIS — O24.013 PRE-EXISTING TYPE 1 DIABETES MELLITUS WITH HYPERGLYCEMIA DURING PREGNANCY IN THIRD TRIMESTER (HCC): Primary | ICD-10-CM

## 2021-06-22 DIAGNOSIS — O34.219 PREVIOUS CESAREAN SECTION COMPLICATING PREGNANCY: ICD-10-CM

## 2021-06-22 DIAGNOSIS — Z98.891 S/P REPEAT LOW TRANSVERSE C-SECTION: ICD-10-CM

## 2021-06-22 PROBLEM — Z91.199 NONCOMPLIANCE WITH DIABETES TREATMENT: Status: ACTIVE | Noted: 2021-06-22

## 2021-06-22 LAB
ABO GROUP BLD: NORMAL
ALBUMIN SERPL BCP-MCNC: 2.4 G/DL (ref 3.5–5)
ALP SERPL-CCNC: 193 U/L (ref 46–116)
ALT SERPL W P-5'-P-CCNC: 15 U/L (ref 12–78)
AMPHETAMINES SERPL QL SCN: NEGATIVE
ANION GAP SERPL CALCULATED.3IONS-SCNC: 14 MMOL/L (ref 4–13)
AST SERPL W P-5'-P-CCNC: 20 U/L (ref 5–45)
BACTERIA UR QL AUTO: NORMAL /HPF
BARBITURATES UR QL: NEGATIVE
BASE EXCESS BLDCOA CALC-SCNC: -14.8 MMOL/L (ref 3–11)
BASE EXCESS BLDCOV CALC-SCNC: -14.9 MMOL/L (ref 1–9)
BENZODIAZ UR QL: NEGATIVE
BETA-HYDROXYBUTYRATE: 0.1 MMOL/L
BILIRUB SERPL-MCNC: 0.31 MG/DL (ref 0.2–1)
BILIRUB UR QL STRIP: NEGATIVE
BLD GP AB SCN SERPL QL: NEGATIVE
BUN SERPL-MCNC: 9 MG/DL (ref 5–25)
CALCIUM ALBUM COR SERPL-MCNC: 10.2 MG/DL (ref 8.3–10.1)
CALCIUM SERPL-MCNC: 8.9 MG/DL (ref 8.3–10.1)
CHLORIDE SERPL-SCNC: 102 MMOL/L (ref 100–108)
CLARITY UR: CLEAR
CO2 SERPL-SCNC: 20 MMOL/L (ref 21–32)
COCAINE UR QL: NEGATIVE
COLOR UR: YELLOW
CREAT SERPL-MCNC: 0.8 MG/DL (ref 0.6–1.3)
CREAT UR-MCNC: 23 MG/DL
ERYTHROCYTE [DISTWIDTH] IN BLOOD BY AUTOMATED COUNT: 12.6 % (ref 11.6–15.1)
GFR SERPL CREATININE-BSD FRML MDRD: 100 ML/MIN/1.73SQ M
GLUCOSE SERPL-MCNC: 110 MG/DL (ref 65–140)
GLUCOSE SERPL-MCNC: 170 MG/DL (ref 65–140)
GLUCOSE SERPL-MCNC: 293 MG/DL (ref 65–140)
GLUCOSE SERPL-MCNC: 311 MG/DL (ref 65–140)
GLUCOSE SERPL-MCNC: 89 MG/DL (ref 65–140)
GLUCOSE UR STRIP-MCNC: ABNORMAL MG/DL
HCO3 BLDCOA-SCNC: 19.8 MMOL/L (ref 17.3–27.3)
HCO3 BLDCOV-SCNC: 15.8 MMOL/L (ref 12.2–28.6)
HCT VFR BLD AUTO: 38.7 % (ref 34.8–46.1)
HGB BLD-MCNC: 12.9 G/DL (ref 11.5–15.4)
HGB UR QL STRIP.AUTO: NEGATIVE
KETONES UR STRIP-MCNC: NEGATIVE MG/DL
LEUKOCYTE ESTERASE UR QL STRIP: ABNORMAL
MCH RBC QN AUTO: 29.3 PG (ref 26.8–34.3)
MCHC RBC AUTO-ENTMCNC: 33.3 G/DL (ref 31.4–37.4)
MCV RBC AUTO: 88 FL (ref 82–98)
METHADONE UR QL: NEGATIVE
NITRITE UR QL STRIP: NEGATIVE
NON-SQ EPI CELLS URNS QL MICRO: NORMAL /HPF
O2 CT VFR BLDCOA CALC: 2.7 ML/DL
OPIATES UR QL SCN: NEGATIVE
OXYCODONE+OXYMORPHONE UR QL SCN: NEGATIVE
OXYHGB MFR BLDCOA: 14.1 %
OXYHGB MFR BLDCOV: 65.6 %
PCO2 BLDCOA: 102.1 MM[HG] (ref 30–60)
PCO2 BLDCOV: 57.2 MM HG (ref 27–43)
PCP UR QL: NEGATIVE
PH BLDCOA: 6.91 [PH] (ref 7.23–7.43)
PH BLDCOV: 7.06 [PH] (ref 7.19–7.49)
PH UR STRIP.AUTO: 6 [PH]
PLATELET # BLD AUTO: 223 THOUSANDS/UL (ref 149–390)
PMV BLD AUTO: 9.7 FL (ref 8.9–12.7)
PO2 BLDCOA: 14.3 MM HG (ref 5–25)
PO2 BLDCOV: 33.1 MM HG (ref 15–45)
POTASSIUM SERPL-SCNC: 3.9 MMOL/L (ref 3.5–5.3)
PROT SERPL-MCNC: 6.6 G/DL (ref 6.4–8.2)
PROT UR STRIP-MCNC: NEGATIVE MG/DL
PROT UR-MCNC: 6 MG/DL
PROT/CREAT UR: 0.26 MG/G{CREAT} (ref 0–0.1)
RBC # BLD AUTO: 4.41 MILLION/UL (ref 3.81–5.12)
RBC #/AREA URNS AUTO: NORMAL /HPF
RH BLD: POSITIVE
SAO2 % BLDCOV: 13.1 ML/DL
SODIUM SERPL-SCNC: 136 MMOL/L (ref 136–145)
SP GR UR STRIP.AUTO: 1.01 (ref 1–1.03)
SPECIMEN EXPIRATION DATE: NORMAL
THC UR QL: NEGATIVE
UROBILINOGEN UR QL STRIP.AUTO: 0.2 E.U./DL
WBC # BLD AUTO: 8.67 THOUSAND/UL (ref 4.31–10.16)
WBC #/AREA URNS AUTO: NORMAL /HPF

## 2021-06-22 PROCEDURE — 4A1HXCZ MONITORING OF PRODUCTS OF CONCEPTION, CARDIAC RATE, EXTERNAL APPROACH: ICD-10-PCS | Performed by: OBSTETRICS & GYNECOLOGY

## 2021-06-22 PROCEDURE — C9290 INJ, BUPIVACAINE LIPOSOME: HCPCS | Performed by: ANESTHESIOLOGY

## 2021-06-22 PROCEDURE — 86901 BLOOD TYPING SEROLOGIC RH(D): CPT | Performed by: OBSTETRICS & GYNECOLOGY

## 2021-06-22 PROCEDURE — 82570 ASSAY OF URINE CREATININE: CPT | Performed by: OBSTETRICS & GYNECOLOGY

## 2021-06-22 PROCEDURE — 76816 OB US FOLLOW-UP PER FETUS: CPT | Performed by: OBSTETRICS & GYNECOLOGY

## 2021-06-22 PROCEDURE — 82948 REAGENT STRIP/BLOOD GLUCOSE: CPT

## 2021-06-22 PROCEDURE — NC001 PR NO CHARGE: Performed by: OBSTETRICS & GYNECOLOGY

## 2021-06-22 PROCEDURE — 81001 URINALYSIS AUTO W/SCOPE: CPT | Performed by: OBSTETRICS & GYNECOLOGY

## 2021-06-22 PROCEDURE — 99214 OFFICE O/P EST MOD 30 MIN: CPT | Performed by: OBSTETRICS & GYNECOLOGY

## 2021-06-22 PROCEDURE — 94762 N-INVAS EAR/PLS OXIMTRY CONT: CPT

## 2021-06-22 PROCEDURE — 84156 ASSAY OF PROTEIN URINE: CPT | Performed by: OBSTETRICS & GYNECOLOGY

## 2021-06-22 PROCEDURE — 86900 BLOOD TYPING SEROLOGIC ABO: CPT | Performed by: OBSTETRICS & GYNECOLOGY

## 2021-06-22 PROCEDURE — 80053 COMPREHEN METABOLIC PANEL: CPT | Performed by: OBSTETRICS & GYNECOLOGY

## 2021-06-22 PROCEDURE — 76819 FETAL BIOPHYS PROFIL W/O NST: CPT | Performed by: OBSTETRICS & GYNECOLOGY

## 2021-06-22 PROCEDURE — 94760 N-INVAS EAR/PLS OXIMETRY 1: CPT

## 2021-06-22 PROCEDURE — 86592 SYPHILIS TEST NON-TREP QUAL: CPT | Performed by: OBSTETRICS & GYNECOLOGY

## 2021-06-22 PROCEDURE — 88307 TISSUE EXAM BY PATHOLOGIST: CPT | Performed by: PATHOLOGY

## 2021-06-22 PROCEDURE — 99254 IP/OBS CNSLTJ NEW/EST MOD 60: CPT | Performed by: INTERNAL MEDICINE

## 2021-06-22 PROCEDURE — 85027 COMPLETE CBC AUTOMATED: CPT | Performed by: OBSTETRICS & GYNECOLOGY

## 2021-06-22 PROCEDURE — 80307 DRUG TEST PRSMV CHEM ANLYZR: CPT | Performed by: OBSTETRICS & GYNECOLOGY

## 2021-06-22 PROCEDURE — 59514 CESAREAN DELIVERY ONLY: CPT | Performed by: OBSTETRICS & GYNECOLOGY

## 2021-06-22 PROCEDURE — 82010 KETONE BODYS QUAN: CPT | Performed by: OBSTETRICS & GYNECOLOGY

## 2021-06-22 PROCEDURE — 82805 BLOOD GASES W/O2 SATURATION: CPT | Performed by: OBSTETRICS & GYNECOLOGY

## 2021-06-22 PROCEDURE — 86850 RBC ANTIBODY SCREEN: CPT | Performed by: OBSTETRICS & GYNECOLOGY

## 2021-06-22 PROCEDURE — 99213 OFFICE O/P EST LOW 20 MIN: CPT

## 2021-06-22 RX ORDER — OXYCODONE HYDROCHLORIDE AND ACETAMINOPHEN 5; 325 MG/1; MG/1
1 TABLET ORAL EVERY 4 HOURS PRN
Status: CANCELLED | OUTPATIENT
Start: 2021-06-22

## 2021-06-22 RX ORDER — SUCCINYLCHOLINE/SOD CL,ISO/PF 100 MG/5ML
SYRINGE (ML) INTRAVENOUS AS NEEDED
Status: DISCONTINUED | OUTPATIENT
Start: 2021-06-22 | End: 2021-06-22

## 2021-06-22 RX ORDER — PROMETHAZINE HYDROCHLORIDE 25 MG/ML
12.5 INJECTION, SOLUTION INTRAMUSCULAR; INTRAVENOUS ONCE AS NEEDED
Status: DISCONTINUED | OUTPATIENT
Start: 2021-06-22 | End: 2021-06-22

## 2021-06-22 RX ORDER — LABETALOL 20 MG/4 ML (5 MG/ML) INTRAVENOUS SYRINGE
10
Status: DISCONTINUED | OUTPATIENT
Start: 2021-06-22 | End: 2021-06-22

## 2021-06-22 RX ORDER — SODIUM CHLORIDE, SODIUM LACTATE, POTASSIUM CHLORIDE, CALCIUM CHLORIDE 600; 310; 30; 20 MG/100ML; MG/100ML; MG/100ML; MG/100ML
125 INJECTION, SOLUTION INTRAVENOUS CONTINUOUS
Status: DISCONTINUED | OUTPATIENT
Start: 2021-06-22 | End: 2021-06-22

## 2021-06-22 RX ORDER — ONDANSETRON 2 MG/ML
4 INJECTION INTRAMUSCULAR; INTRAVENOUS ONCE AS NEEDED
Status: DISCONTINUED | OUTPATIENT
Start: 2021-06-22 | End: 2021-06-22

## 2021-06-22 RX ORDER — OXYCODONE HYDROCHLORIDE AND ACETAMINOPHEN 5; 325 MG/1; MG/1
2 TABLET ORAL EVERY 4 HOURS PRN
Status: CANCELLED | OUTPATIENT
Start: 2021-06-22

## 2021-06-22 RX ORDER — HYDROMORPHONE HCL/PF 1 MG/ML
0.2 SYRINGE (ML) INJECTION
Status: DISCONTINUED | OUTPATIENT
Start: 2021-06-22 | End: 2021-06-22

## 2021-06-22 RX ORDER — METOCLOPRAMIDE HYDROCHLORIDE 5 MG/ML
INJECTION INTRAMUSCULAR; INTRAVENOUS AS NEEDED
Status: DISCONTINUED | OUTPATIENT
Start: 2021-06-22 | End: 2021-06-22

## 2021-06-22 RX ORDER — SIMETHICONE 80 MG
80 TABLET,CHEWABLE ORAL 4 TIMES DAILY PRN
Status: DISCONTINUED | OUTPATIENT
Start: 2021-06-22 | End: 2021-06-26 | Stop reason: HOSPADM

## 2021-06-22 RX ORDER — INSULIN GLARGINE 100 [IU]/ML
18 INJECTION, SOLUTION SUBCUTANEOUS
Status: DISCONTINUED | OUTPATIENT
Start: 2021-06-22 | End: 2021-06-22

## 2021-06-22 RX ORDER — HYDROMORPHONE HCL 110MG/55ML
PATIENT CONTROLLED ANALGESIA SYRINGE INTRAVENOUS AS NEEDED
Status: DISCONTINUED | OUTPATIENT
Start: 2021-06-22 | End: 2021-06-22

## 2021-06-22 RX ORDER — PROPOFOL 10 MG/ML
INJECTION, EMULSION INTRAVENOUS AS NEEDED
Status: DISCONTINUED | OUTPATIENT
Start: 2021-06-22 | End: 2021-06-22

## 2021-06-22 RX ORDER — DIAPER,BRIEF,INFANT-TODD,DISP
1 EACH MISCELLANEOUS DAILY PRN
Status: DISCONTINUED | OUTPATIENT
Start: 2021-06-22 | End: 2021-06-26 | Stop reason: HOSPADM

## 2021-06-22 RX ORDER — DIPHENHYDRAMINE HCL 25 MG
25 TABLET ORAL EVERY 6 HOURS PRN
Status: DISCONTINUED | OUTPATIENT
Start: 2021-06-22 | End: 2021-06-26 | Stop reason: HOSPADM

## 2021-06-22 RX ORDER — CEFAZOLIN SODIUM 1 G/50ML
1000 SOLUTION INTRAVENOUS ONCE
Status: COMPLETED | OUTPATIENT
Start: 2021-06-22 | End: 2021-06-22

## 2021-06-22 RX ORDER — HYDRALAZINE HYDROCHLORIDE 20 MG/ML
5 INJECTION INTRAMUSCULAR; INTRAVENOUS
Status: DISCONTINUED | OUTPATIENT
Start: 2021-06-22 | End: 2021-06-22

## 2021-06-22 RX ORDER — DEXAMETHASONE SODIUM PHOSPHATE 10 MG/ML
INJECTION, SOLUTION INTRAMUSCULAR; INTRAVENOUS AS NEEDED
Status: DISCONTINUED | OUTPATIENT
Start: 2021-06-22 | End: 2021-06-22

## 2021-06-22 RX ORDER — OXYTOCIN/RINGER'S LACTATE 30/500 ML
PLASTIC BAG, INJECTION (ML) INTRAVENOUS CONTINUOUS PRN
Status: DISCONTINUED | OUTPATIENT
Start: 2021-06-22 | End: 2021-06-22

## 2021-06-22 RX ORDER — METHYLERGONOVINE MALEATE 0.2 MG/ML
INJECTION INTRAVENOUS AS NEEDED
Status: DISCONTINUED | OUTPATIENT
Start: 2021-06-22 | End: 2021-06-22

## 2021-06-22 RX ORDER — FENTANYL CITRATE 50 UG/ML
INJECTION, SOLUTION INTRAMUSCULAR; INTRAVENOUS AS NEEDED
Status: DISCONTINUED | OUTPATIENT
Start: 2021-06-22 | End: 2021-06-22

## 2021-06-22 RX ORDER — LIDOCAINE HYDROCHLORIDE 10 MG/ML
INJECTION, SOLUTION EPIDURAL; INFILTRATION; INTRACAUDAL; PERINEURAL AS NEEDED
Status: DISCONTINUED | OUTPATIENT
Start: 2021-06-22 | End: 2021-06-22

## 2021-06-22 RX ORDER — INSULIN GLARGINE 100 [IU]/ML
18 INJECTION, SOLUTION SUBCUTANEOUS ONCE
Status: DISCONTINUED | OUTPATIENT
Start: 2021-06-22 | End: 2021-06-22

## 2021-06-22 RX ORDER — METOCLOPRAMIDE HYDROCHLORIDE 5 MG/ML
10 INJECTION INTRAMUSCULAR; INTRAVENOUS ONCE AS NEEDED
Status: DISCONTINUED | OUTPATIENT
Start: 2021-06-22 | End: 2021-06-22

## 2021-06-22 RX ORDER — DEXTROSE MONOHYDRATE 50 MG/ML
75 INJECTION, SOLUTION INTRAVENOUS CONTINUOUS
Status: DISPENSED | OUTPATIENT
Start: 2021-06-22 | End: 2021-06-23

## 2021-06-22 RX ORDER — ALBUTEROL SULFATE 2.5 MG/3ML
2.5 SOLUTION RESPIRATORY (INHALATION) ONCE AS NEEDED
Status: DISCONTINUED | OUTPATIENT
Start: 2021-06-22 | End: 2021-06-22

## 2021-06-22 RX ORDER — INSULIN GLARGINE 100 [IU]/ML
18 INJECTION, SOLUTION SUBCUTANEOUS
Status: DISCONTINUED | OUTPATIENT
Start: 2021-06-23 | End: 2021-06-22

## 2021-06-22 RX ORDER — ONDANSETRON 2 MG/ML
4 INJECTION INTRAMUSCULAR; INTRAVENOUS EVERY 8 HOURS PRN
Status: DISCONTINUED | OUTPATIENT
Start: 2021-06-22 | End: 2021-06-26 | Stop reason: HOSPADM

## 2021-06-22 RX ORDER — MIDAZOLAM HYDROCHLORIDE 2 MG/2ML
INJECTION, SOLUTION INTRAMUSCULAR; INTRAVENOUS AS NEEDED
Status: DISCONTINUED | OUTPATIENT
Start: 2021-06-22 | End: 2021-06-22

## 2021-06-22 RX ORDER — KETAMINE HYDROCHLORIDE 50 MG/ML
INJECTION, SOLUTION, CONCENTRATE INTRAMUSCULAR; INTRAVENOUS AS NEEDED
Status: DISCONTINUED | OUTPATIENT
Start: 2021-06-22 | End: 2021-06-22

## 2021-06-22 RX ORDER — LABETALOL 20 MG/4 ML (5 MG/ML) INTRAVENOUS SYRINGE
20 ONCE
Status: COMPLETED | OUTPATIENT
Start: 2021-06-22 | End: 2021-06-22

## 2021-06-22 RX ORDER — ONDANSETRON 2 MG/ML
INJECTION INTRAMUSCULAR; INTRAVENOUS AS NEEDED
Status: DISCONTINUED | OUTPATIENT
Start: 2021-06-22 | End: 2021-06-22

## 2021-06-22 RX ORDER — SODIUM CHLORIDE 9 MG/ML
125 INJECTION, SOLUTION INTRAVENOUS CONTINUOUS
Status: DISCONTINUED | OUTPATIENT
Start: 2021-06-22 | End: 2021-06-23

## 2021-06-22 RX ORDER — HYDROMORPHONE HCL/PF 1 MG/ML
0.5 SYRINGE (ML) INJECTION
Status: DISCONTINUED | OUTPATIENT
Start: 2021-06-22 | End: 2021-06-22

## 2021-06-22 RX ORDER — ACETAMINOPHEN 325 MG/1
650 TABLET ORAL EVERY 4 HOURS PRN
Status: DISCONTINUED | OUTPATIENT
Start: 2021-06-22 | End: 2021-06-26 | Stop reason: HOSPADM

## 2021-06-22 RX ORDER — CALCIUM CARBONATE 200(500)MG
1000 TABLET,CHEWABLE ORAL DAILY PRN
Status: DISCONTINUED | OUTPATIENT
Start: 2021-06-22 | End: 2021-06-26 | Stop reason: HOSPADM

## 2021-06-22 RX ORDER — DOCUSATE SODIUM 100 MG/1
100 CAPSULE, LIQUID FILLED ORAL 2 TIMES DAILY
Status: DISCONTINUED | OUTPATIENT
Start: 2021-06-22 | End: 2021-06-26 | Stop reason: HOSPADM

## 2021-06-22 RX ORDER — INSULIN GLARGINE 100 [IU]/ML
18 INJECTION, SOLUTION SUBCUTANEOUS
Status: DISCONTINUED | OUTPATIENT
Start: 2021-06-22 | End: 2021-06-23

## 2021-06-22 RX ORDER — FENTANYL CITRATE/PF 50 MCG/ML
25 SYRINGE (ML) INJECTION
Status: DISCONTINUED | OUTPATIENT
Start: 2021-06-22 | End: 2021-06-22

## 2021-06-22 RX ORDER — BUPIVACAINE HYDROCHLORIDE 2.5 MG/ML
INJECTION, SOLUTION EPIDURAL; INFILTRATION; INTRACAUDAL
Status: DISCONTINUED | OUTPATIENT
Start: 2021-06-22 | End: 2021-06-22

## 2021-06-22 RX ORDER — IBUPROFEN 600 MG/1
600 TABLET ORAL EVERY 4 HOURS PRN
Status: DISCONTINUED | OUTPATIENT
Start: 2021-06-22 | End: 2021-06-26 | Stop reason: HOSPADM

## 2021-06-22 RX ORDER — OXYTOCIN/RINGER'S LACTATE 30/500 ML
62.5 PLASTIC BAG, INJECTION (ML) INTRAVENOUS CONTINUOUS
Status: ACTIVE | OUTPATIENT
Start: 2021-06-22 | End: 2021-06-23

## 2021-06-22 RX ADMIN — INSULIN GLARGINE 18 UNITS: 100 INJECTION, SOLUTION SUBCUTANEOUS at 22:02

## 2021-06-22 RX ADMIN — DEXAMETHASONE SODIUM PHOSPHATE 4 MG: 10 INJECTION, SOLUTION INTRAMUSCULAR; INTRAVENOUS at 14:37

## 2021-06-22 RX ADMIN — LABETALOL 20 MG/4 ML (5 MG/ML) INTRAVENOUS SYRINGE 20 MG: at 16:11

## 2021-06-22 RX ADMIN — Medication 250 MILLI-UNITS/MIN: at 14:32

## 2021-06-22 RX ADMIN — Medication 62.5 MILLI-UNITS/MIN: at 15:45

## 2021-06-22 RX ADMIN — FENTANYL CITRATE 100 MCG: 50 INJECTION, SOLUTION INTRAMUSCULAR; INTRAVENOUS at 14:35

## 2021-06-22 RX ADMIN — Medication 100 MG: at 14:28

## 2021-06-22 RX ADMIN — METOCLOPRAMIDE HYDROCHLORIDE 10 MG: 5 INJECTION INTRAMUSCULAR; INTRAVENOUS at 14:20

## 2021-06-22 RX ADMIN — MIDAZOLAM HYDROCHLORIDE 2 MG: 1 INJECTION, SOLUTION INTRAMUSCULAR; INTRAVENOUS at 14:34

## 2021-06-22 RX ADMIN — CEFAZOLIN SODIUM 1000 MG: 1 SOLUTION INTRAVENOUS at 14:16

## 2021-06-22 RX ADMIN — HYDROMORPHONE HYDROCHLORIDE 0.5 MG: 2 INJECTION INTRAMUSCULAR; INTRAVENOUS; SUBCUTANEOUS at 14:52

## 2021-06-22 RX ADMIN — BUPIVACAINE HYDROCHLORIDE 10 ML: 2.5 INJECTION, SOLUTION EPIDURAL; INFILTRATION; INTRACAUDAL at 15:23

## 2021-06-22 RX ADMIN — PROPOFOL 150 MG: 10 INJECTION, EMULSION INTRAVENOUS at 14:28

## 2021-06-22 RX ADMIN — HYDROMORPHONE HYDROCHLORIDE 2 MG: 2 INJECTION INTRAMUSCULAR; INTRAVENOUS; SUBCUTANEOUS at 14:34

## 2021-06-22 RX ADMIN — FENTANYL CITRATE 25 MCG: 50 INJECTION INTRAMUSCULAR; INTRAVENOUS at 16:26

## 2021-06-22 RX ADMIN — KETAMINE HYDROCHLORIDE 50 MG: 50 INJECTION INTRAMUSCULAR; INTRAVENOUS at 14:28

## 2021-06-22 RX ADMIN — SODIUM CHLORIDE, SODIUM LACTATE, POTASSIUM CHLORIDE, AND CALCIUM CHLORIDE 125 ML/HR: .6; .31; .03; .02 INJECTION, SOLUTION INTRAVENOUS at 16:32

## 2021-06-22 RX ADMIN — METHYLERGONOVINE MALEATE 0.2 MG: 0.2 INJECTION, SOLUTION INTRAMUSCULAR; INTRAVENOUS at 14:34

## 2021-06-22 RX ADMIN — ONDANSETRON 4 MG: 2 INJECTION INTRAMUSCULAR; INTRAVENOUS at 14:37

## 2021-06-22 RX ADMIN — SODIUM CHLORIDE, SODIUM LACTATE, POTASSIUM CHLORIDE, AND CALCIUM CHLORIDE: .6; .31; .03; .02 INJECTION, SOLUTION INTRAVENOUS at 14:06

## 2021-06-22 RX ADMIN — FENTANYL CITRATE 25 MCG: 50 INJECTION INTRAMUSCULAR; INTRAVENOUS at 16:16

## 2021-06-22 RX ADMIN — Medication: at 17:03

## 2021-06-22 RX ADMIN — DEXTROSE 75 ML/HR: 5 SOLUTION INTRAVENOUS at 19:45

## 2021-06-22 RX ADMIN — SODIUM CHLORIDE, SODIUM LACTATE, POTASSIUM CHLORIDE, AND CALCIUM CHLORIDE 1000 ML: .6; .31; .03; .02 INJECTION, SOLUTION INTRAVENOUS at 14:00

## 2021-06-22 RX ADMIN — ONDANSETRON 4 MG: 2 INJECTION INTRAMUSCULAR; INTRAVENOUS at 21:56

## 2021-06-22 RX ADMIN — LIDOCAINE HYDROCHLORIDE 50 MG: 10 INJECTION, SOLUTION EPIDURAL; INFILTRATION; INTRACAUDAL at 14:28

## 2021-06-22 RX ADMIN — BUPIVACAINE 20 ML: 13.3 INJECTION, SUSPENSION, LIPOSOMAL INFILTRATION at 15:30

## 2021-06-22 NOTE — LETTER
June 22, 2021     [facility], [address] [facility] [place]    Patient: Milton Quezada   YOB: 1990   Date of Visit: 6/22/2021       Dear Dr Albina Giraldo:    I sent  Milton Quezada to L&D for delivery  Below are my notes for this consultation  If you have questions, please do not hesitate to call me            Sincerely,        Ewa Cristina MD        CC: Shameka Mcgill MD

## 2021-06-22 NOTE — H&P
[address] 36 y o  female MRN: [id number]  Unit/Bed#: LD TRIAGE  Encounter: [id number]    Assessment: 36 y o   at 36w4d admitted for RLTCS in the setting of Cat II tracing, BPP 2/10   FHT: Category 2 heart tracing 135bpm, minimal variability, no accels, intermittent decels  Vertex confirmed by U/S  GBS status: positive       Plan:   · Admit  · CBC, RPR, Type & Screen  · General Anesthesia  · RLTCS  · Antibiotics (2g Ancef)  · IV fluids  · NPO    Dr Yamileth Jackson aware      Chief Complaint: sent over from [facility] for BPP 2/10    HPI: Samia Leyva is a 36 y o  Ruiz Emily with an VICKI of 2021, Alternate VICKI Entry at 36w4d who is being admitted for repeat  in the setting of a BPP of 2/8 (2 points for TREVOR, with no tone breathing or movement noted)  This pregnancy is complicated by uncontrolled type 1 diabetes and chronic hypertension  Her hemoglobin A1c was 10 5  She has had 2 prior admissions for DKA during this pregnancy  Patient is on insulin however she does not take it regularly and does not check her blood sugars at home  She is not currently on medication for her hypertension, she has been normotensive throughout pregnancy  Patient was sent straight from  Center over to Labor and delivery however patient did not arrive at Labor and delivery until a few hours later  Patient's obstetrical history is notable for 1 prior  section at 40 weeks gestation for failure to progress at [facility] in   She denies having uterine contractions, has no LOF, and reports no VB  She states she has felt good FM      Patient Active Problem List   Diagnosis    Pre-existing type 1 diabetes mellitus with hyperglycemia during pregnancy in third trimester ([facility] [address] )    Hyperlipidemia, mixed    Chronic hypertension affecting pregnancy    Elevated hemoglobin A1c    Uterine synechiae    Type 1 diabetes mellitus during pregnancy in third trimester    Polyhydramnios affecting pregnancy in third trimester    Noncompliance with diabetes treatment       Baby complications/comments: none    Review of Systems   Constitutional: Negative for chills and fever  Respiratory: Negative for cough, shortness of breath and wheezing  Cardiovascular: Negative for chest pain  Gastrointestinal: Negative for abdominal pain, nausea and vomiting  Genitourinary: Negative for dysuria, hematuria, urgency, vaginal bleeding and vaginal discharge  Neurological: Negative for dizziness, weakness, light-headedness and headaches         OB History    Para Term  AB Living   4 1 1 0 1 1   SAB TAB Ectopic Multiple Live Births   1 0 0 0 1      # Outcome Date GA Lbr Bill/2nd Weight Sex Delivery Anes PTL Lv   4 Current            3 2018 8w0d    SAB      2 Term 14 40w0d  3629 g (8 lb) F CS-Classical Spinal N JAMILA      Complications: Failure to Progress in First Stage   1                 Past Medical History:   Diagnosis Date    Diabetes mellitus ([facility] [address] )     uses kwiki pen     Diabetes mellitus type 1 ([facility] [address] )     High blood pressure     recently dx/ put on lisinopril     High cholesterol     Hypertension      no hypersion     Miscarriage     Recurrent pregnancy loss, antepartum condition or complication        Past Surgical History:   Procedure Laterality Date     SECTION  01/02/2014     X1    DILATION AND CURETTAGE OF UTERUS  04/10/2019    Missed AB     WISDOM TOOTH EXTRACTION         Social History     Tobacco Use    Smoking status: Never Smoker    Smokeless tobacco: Never Used   Substance Use Topics    Alcohol use: Never     Comment: Alcohol intake:   None - As per [place]        No Known Allergies    Medications Prior to Admission   Medication    Prenat-Fe Carbonyl-FA-Omega 3 (One-A-Day Womens Prenatal 1) 28-0 8-235 MG CAPS    Accu-Chek FastClix Lancets MISC    Admelog SoloStar 100 units/mL injection pen    Continuous Blood Gluc  (Dexcom G6 ) Vero Champion  Continuous Blood Gluc Sensor (Dexcom G6 Sensor) MISC    Continuous Blood Gluc Transmit (Dexcom G6 Transmitter) MISC    Contour Next Test test strip    Glucagon, rDNA, (Glucagon Emergency) 1 MG KIT    insulin glargine (Basaglar KwikPen) 100 units/mL injection pen    Insulin Pen Needle 31G X 5 MM MISC    Urine Glucose-Ketones Test STRP       Objective:  HR:  [88] 88  BP: (118)/(77) 118/77  There is no height or weight on file to calculate BMI  Physical Exam:  Physical Exam  Constitutional:       Appearance: She is well-developed  Cardiovascular:      Rate and Rhythm: Normal rate and regular rhythm  Heart sounds: Normal heart sounds  No murmur heard  No friction rub  No gallop  Pulmonary:      Effort: Pulmonary effort is normal  No respiratory distress  Breath sounds: No wheezing  Abdominal:      Palpations: Abdomen is soft  Tenderness: There is no abdominal tenderness  Musculoskeletal:         General: No tenderness  Neurological:      Mental Status: She is alert and oriented to person, place, and time  Vitals reviewed  FHT:   135bpm  Minimal variability  No accelerations  Intermittent decelerations    TOCO:    No contractions    Lab Results   Component Value Date    WBC 8 68 04/22/2021    HGB 11 4 (L) 04/22/2021    HCT 33 6 (L) 04/22/2021     04/22/2021     Lab Results   Component Value Date    K 3 9 04/23/2021     04/23/2021    CO2 22 04/23/2021    BUN 4 (L) 04/23/2021    CREATININE 0 44 (L) 04/23/2021    GLUCOSE 364 (H) 01/27/2021    AST 40 04/20/2021    ALT 21 04/20/2021     Prenatal Labs: Reviewed      Blood type: A Positive  Antibody: Negative  GBS: positive  HIV:negative  Rubella: Immune  VDRL/RPR: Non reactive  HBsAg: Negative  Chlamydia: Negative  Gonorrhea: Negative  Type 1 diabetic  Platelets: f/u CBC  Hgb: f/u CBC  >2 Midnights  INPATIENT     Signature/Title:  Misael Kinney MD  Date: 6/22/2021  Time: 2:06 PM no

## 2021-06-22 NOTE — PATIENT INSTRUCTIONS
Please go to Labor and Delivery now for evaluation  The address of [address] [place] [address] is [address] [facility], [facility] [address]  Go to the [facility]

## 2021-06-22 NOTE — PLAN OF CARE
Problem: BIRTH - VAGINAL/ SECTION  Goal: Fetal and maternal status remain reassuring during the birth process  Description: INTERVENTIONS:  - Monitor vital signs  - Monitor fetal heart rate  - Monitor uterine activity  - Monitor labor progression (vaginal delivery)  - DVT prophylaxis  - Antibiotic prophylaxis  Outcome: Completed  Goal: Emotionally satisfying birthing experience for mother/fetus  Description: Interventions:  - Assess, plan, implement and evaluate the nursing care given to the patient in labor  - Advocate the philosophy that each childbirth experience is a unique experience and support the family's chosen level of involvement and control during the labor process   - Actively participate in both the patient's and family's teaching of the birth process  - Consider cultural, Sabianism and age-specific factors and plan care for the patient in labor  Outcome: Completed

## 2021-06-22 NOTE — ANESTHESIA POSTPROCEDURE EVALUATION
Post-Op Assessment Note    CV Status:  Stable  Pain Score: 0    Pain management: adequate     Mental Status:  Alert and awake   Hydration Status:  Euvolemic   PONV Controlled:  Controlled   Airway Patency:  Patent  Airway: intubated      Post Op Vitals Reviewed: Yes      Staff: CRNA         No complications documented      BP   183/87   Temp      Pulse  96   Resp   18   SpO2   100 on 6L

## 2021-06-22 NOTE — ANESTHESIA PREPROCEDURE EVALUATION
Procedure:   SECTION () REPEAT (N/A Uterus)    Relevant Problems   ANESTHESIA (within normal limits)      CARDIO   (+) Chronic hypertension affecting pregnancy   (+) Hyperlipidemia, mixed      ENDO   (+) Pre-existing type 1 diabetes mellitus with hyperglycemia during pregnancy in third trimester (HCC)   (+) Type 1 diabetes mellitus during pregnancy in third trimester      Other   (+) Elevated hemoglobin A1c   (+) Noncompliance with diabetes treatment   (+) Polyhydramnios affecting pregnancy in third trimester        Physical Exam    Airway    Mallampati score: II  TM Distance: >3 FB  Neck ROM: full     Dental   No notable dental hx     Cardiovascular  Rhythm: regular, Rate: normal, Cardiovascular exam normal    Pulmonary  Pulmonary exam normal Breath sounds clear to auscultation,     Other Findings        Anesthesia Plan  ASA Score- 3 Emergent    Anesthesia Type- general and regional with ASA Monitors  Additional Monitors:   Airway Plan:     Comment: Patient had a full meal about 30 mins ago, hence the need for GETA  Plan Factors-    Chart reviewed  Existing labs reviewed  Patient summary reviewed  Patient is not a current smoker  Induction- intravenous  Postoperative Plan- Plan for postoperative opioid use  Planned trial extubation    Informed Consent- Anesthetic plan and risks discussed with patient  I personally reviewed this patient with the CRNA  Discussed and agreed on the Anesthesia Plan with the CRNA  Hillary Jurado

## 2021-06-22 NOTE — OP NOTE
OPERATIVE REPORT  PATIENT NAME: Panda Castanon    :  1990  MRN: [id number]  Pt Location: AN L&D OR ROOM 02    SURGERY DATE: 2021    Surgeon(s) and Role:     * Bette Su MD - Primary     * Lizabeth Martinez MD - Assisting     * Mercy Aden MD - Assisting     * Jazmin Alejo MD - Observing    Preop Diagnosis:  Non-reassuring fetal heart tones, delivered, current hospitalization [O76]  Previous  section complicating pregnancy [E70 070]    Post-Op Diagnosis Codes:     * Non-reassuring fetal heart tones, delivered, current hospitalization [O76]     * Previous  section complicating pregnancy [A82 735]    Procedure(s) (LRB):   SECTION () REPEAT (N/A)    Specimen(s):  ID Type Source Tests Collected by Time Destination   1 :  Tissue (Placenta on Hold) OB Only Placenta TISSUE EXAM OB (PLACENTA) ONLY Bette Su MD 2021 1438    A :  Cord Blood Cord BLOOD GAS, VENOUS, CORD, BLOOD GAS, ARTERIAL, CORD Bette Su MD 2021 1433        Quantitative Blood Loss:   485ml    Drains:  Urethral Catheter Double-lumen;Non-latex;Straight-tip 16 Fr  (Active)   Goal for Removal Remove POD#1 21 1425   Site Assessment Clean;Skin intact 21 1425   Collection Container Standard drainage bag 21 1425   Number of days: 0       [REMOVED] NG/OG/Enteral Tube Orogastric 18 Fr Center mouth (Removed)   Number of days: 0       Anesthesia Type:   * Spinal anesthesia *    Operative Indications:  Non-reassuring fetal heart tones, delivered, current hospitalization [O76]  Previous  section complicating pregnancy [C83 426]  Category 2 fetal heart tracing    Operative Findings:  Maternal Findings:  Normal uterus  Normal tubes and ovaries bilaterally  No adhesions  No difficulty noted from skin to delivery     Findings:  Viable female weighing 6lbs 7 5oz;  Apgar scores of 3 at one minute and 8 at five minutes     Clear amniotic fluid  Normal placenta with 3-vessel cord    Arterial and Venous Gases:  Umbilical Cord Venous Blood Gas:  Results from last 7 days   Lab Units 21  1433   PH COV  7 058*   PCO2 COV mm HG 57 2*   HCO3 COV mmol/L 15 8   BASE EXC COV mmol/L -14 9*   O2 CT CD VB mL/dL 13 1   O2 HGB, VENOUS CORD % 90 9     Umbilical Cord Arterial Blood Gas:  Results from last 7 days   Lab Units 21  1433   PH COA  6 906*   PCO2 COA  102 1*   PO2 COA mm HG 14 3   HCO3 COA mmol/L 19 8   BASE EXC COA mmol/L -14 8*   O2 CONTENT CORD ART ml/dl 2 7   O2 HGB, ARTERIAL CORD % 14 6       Complications:   None    Procedure Details   The patient was seen prior to the procedure  Risks, benefits, possible complications, alternate treatment options, and expected outcomes were discussed with the patient  The patient agreed with the proposed plan and gave informed consent for a repeat  section in setting of concern for her fetal acidemia with BPP of 2/8 and category 2 fetal heart tracing in triage  The patient was taken to the [facility] Operating Room at 1414  For infection prophylaxis, she received 1g ancef  preoperatively  Fetal heart tones in the OR were assessed and noted to be within normal limits and a Maciel catheter and SCDs were placed  The abdomen was prepped with Chloraprep, the vagina was prepped with Betadine, and following appropriate drying time, the patient was draped in the usual sterile manner  A Time Out was held and the above information confirmed  The patient was identified as Rc Hess and the procedure verified as a  Delivery for repeat low-transverse  section  She then received general anesthesia  A Pfannenstiel incision was made and carried down through the underlying subcutaneous tissue to the fascia using a scalpel  Rectus fascia was then incised  We then proceeded in Yaron-Canchola fashion  All anatomic layers were well-demarcated      The rectus muscles were  and the peritoneum was identified, entered, and extended longitudinally with blunt dissection  The bladder blade was inserted  A low transverse uterine incision was made with the scalpel and extended laterally with blunt dissection  The amnion was entered   The fetal head was palpated, elevated, and delivered through the uterine incision followed by the body without difficulty  Baby had spontaneous cry with good color and tone  The umbilical cord was clamped and cut  The infant was handed off to the  providers  Pitocin infusion was begun  Arterial and venous cord gases, cord blood, and a segment of umbilical cord were obtained for evaluation  The placenta delivered spontaneously with uterine fundal massage and appeared normal  The uterus was exteriorized and curretted with a moist lap sponge  Uterus, tubes and ovaries appeared normal   The uterine incision was closed with a running locked suture of 0 Vicryl  A second layer of the same suture was used to imbricate the first   Hemostasis was noted to be excellent  Normal saline solution was used to irrigate the posterior culdesac  The uterus was returned to the abdomen  The gutters were inspected and cleared of all clots and debris with irrigation and moist lap sponges  The fascia was closed with a running suture of 0 Vicryl  Subcutaneous tissues were closed with 2-0 Plain Gut  The skin was closed in a subcuticular fashion with 4-0 Monocryl  Sterile dressing was applied  An abdominal binder was then placed  The patient appeared to tolerate the procedure very well  Lap sponge, needle, and instrument counts were correct x2  The patient's fundus was palpated and the uterus was expressed  She received a TAPS block from anesthesia  She was then cleaned and transferred to her postpartum recovery room in stable condition and her infant went to the NICU  Patient Disposition:  PACU     Attending Attestation: Dr Herman Cushing, MD was present for the entire procedure    Frandy Blair Kayli Evans MD  OB/GYN PGY-2  6/22/2021 3:49 PM

## 2021-06-22 NOTE — DISCHARGE SUMMARY
Discharge Summary - OB/GYN   Nicci Falcon 36 y o  female MRN: [id number]  Unit/Bed#: LD PACU- Encounter: [id number]      Admission Date: 2021     Discharge Date: 21    Admitting Diagnosis:   1  Pregnancy at 36w4d  2  Type 1 diabetes (uncontrolled)  3  Chronic hypertension    Discharge Diagnosis:   Same, delivered      Procedures: repeat  section, low transverse incision    Delivering Attending: Stefany Ferrer MD  Discharge Attending: [facility] Course:     Nicci Falcon is a 36 y o   at 36w4d wks who was initially admitted for a category 2 fetal heart tracing in the setting of a BPP of 2/8  She then proceeded to go to the OR for repeat  section  She delivered a viable female  on 21 at [phone]  Weight 6lbs 7 5oz via repeat  section, low transverse incision  Apgars were 3 (1 min) and 8 (5 min)   was transferred to  nursery  Patient tolerated the procedure well and was transferred to recovery in stable condition  Her post-operative course was complicated by uncontrolled T1DM  Endocrinology was consulted postpartum and we greatly appreciate their recommendations for postpartum blood sugars  On day of discharge they saw her and said that the patient eating her meals at inconsistent times of the day  She has low and high blood glucose, so they recommend continuing Lantus 14 q h s  and increasing lispro to 5 units t i d  A  c  They recommended monitor her blood glucose at home and follow closely with endocrinology for dose adjustment  She has been thoroughly counseled by multiple doctors and providers in all specialties  Unfortunately patient does not seem to be adherent and has a known history of forgetting her insulin, for example, she was hospitalized for DKA after leaving her insulin at her friend's house    We attempted to set up visiting nurse for her, but she declined VNA services    Her chronic hypertension was well controlled postpartum, she did not require any medications     Preoperative hemaglobin was 12 9, postoperative was 10 3  Her postoperative pain was well controlled with oral analgesics  She was seen by neuropsychology and psychiatry prior to discharge  Neuropsychology assessment was that she has a mild intellectual disability    On day of discharge, she was ambulating and able to reasonably perform all ADLs  She was voiding and had appropriate bowel function  Pain was well controlled  She was discharged home on post-operative day #3 without complications  Patient was instructed to follow up with her OB as an outpatient and was given appropriate warnings to call provider if she develops signs of infection or uncontrolled pain  Complications: none apparent    Condition at discharge: fair     Discharge instructions/Information to patient and family:   See after visit summary for information provided to patient and family  Provisions for Follow-Up Care:  See after visit summary for information related to follow-up care and any pertinent home health orders  Disposition: See After Visit Summary for discharge disposition information  Planned Readmission: No, but given chronic nature of her T1DM and non-adherence it is not unlikely    Discharge Medications: For a complete list of the patient's medications, please refer to her med rec  Birdena Epley [address], DO  Obstetrics & Gynecology PGY-2  6/25/2021  8:32 AM

## 2021-06-22 NOTE — LETTER
June 22, 2021     Angelique Sinha, [address] [facility]  [place]    Patient: Óscar Andino   YOB: 1990   Date of Visit: 6/22/2021     Dear Dr Aylin Sosa:    I sent  Óscar Andino to L&D for delivery  Below are my notes for this consultation  If you have questions, please do not hesitate to call me  Sincerely,        Lorrie Gabriel MD        CC: Lindajo Alpers, MD Paris Perna, MD  6/22/2021  8:56 AM  Sign when Signing Visit  [address]: Ms Isaacs He was seen today at 36w4d for fetal growth assessment ultrasound  See ultrasound report under "OB Procedures" tab  Please don't hesitate to contact our office with any concerns or questions    Lorrie Gabriel MD

## 2021-06-22 NOTE — CONSULTS
Endocrine Initial Consultation           *Date*: 2021     *Time*: 5:15 PM       Medical Decision Making:      Impression  1  S/p  delivery  2  Type 1 DM  3  HTN  4  HLD      Recommendations:  ?  It seems she has had poor glycemic control during her pregnancy, unclear if she has good insight into her disease process in terms of the diabetes  Unclear if she took her lantus this morning prior to arrival  BG now 89  Agree with reduction of lantus dose to 18 units qHS and algorithm 2 SSI for now  Will start D5W at 75cc/hr to prevent overnight hypoglycemia  Marilu ROSSI  Endocrinology     ? Reason for Endocrine Consult/Chief Complaint: DM management     History of Present Illness:   Mrs Jackelyn Solorio is a 36year old female who presented to the hospital and is now s/p  delivery of baby  She has a history of type 1 diabetes diagnosed at age 25  She cannot tell me what her pre-pregnancy doses of types of insulins she was on  She thinks during the pregnancy she was taking lantus 60 units qAM and admelog 8 units TID AC  She had sub-optimal glycemic control during the pregnancy  ?  PMH-DM1, HTN, HLD  PSH-D&C,   FHx-father's side of family has diabetes   SHx-neg x 2, works at [employer] and confirmed findings of H&P dated on admission  Review of Systems:     Review of Systems   Constitutional: Negative for appetite change, chills, diaphoresis, fatigue, fever and unexpected weight change  HENT: Negative for congestion, ear pain, hearing loss, rhinorrhea, sinus pressure, sinus pain, sore throat, trouble swallowing and voice change  Eyes: Negative for photophobia, redness and visual disturbance  Respiratory: Negative for apnea, cough, chest tightness, shortness of breath, wheezing and stridor  Cardiovascular: Negative for chest pain, palpitations and leg swelling     Gastrointestinal: Negative for abdominal distention, abdominal pain, constipation, diarrhea, nausea and vomiting  Endocrine: Negative for cold intolerance, heat intolerance, polydipsia, polyphagia and polyuria  Genitourinary: Negative for difficulty urinating, dysuria, flank pain, frequency, hematuria and urgency  Musculoskeletal: Negative for arthralgias, back pain, gait problem, joint swelling and myalgias  Skin: Negative for color change, pallor, rash and wound  Allergic/Immunologic: Negative for immunocompromised state  Neurological: Negative for dizziness, tremors, syncope, weakness, light-headedness and headaches  Hematological: Negative for adenopathy  Does not bruise/bleed easily  Psychiatric/Behavioral: Negative for confusion and sleep disturbance  The patient is not nervous/anxious  ?      Patient History:      Past Medical History:   Diagnosis Date    Diabetes mellitus ([facility] )     uses kwiki pen     Diabetes mellitus type 1 ([facility] )     High blood pressure     recently dx/ put on lisinopril     High cholesterol     Hypertension      no hypersion     Miscarriage     Recurrent pregnancy loss, antepartum condition or complication      Past Surgical History:   Procedure Laterality Date     SECTION  01/02/2014     X1    DILATION AND CURETTAGE OF UTERUS  04/10/2019    Missed AB     WISDOM TOOTH EXTRACTION       Social History     Socioeconomic History    Marital status: Legally      Spouse name: Not on file    Number of children: 1    Years of education: 15    Highest education level: High school graduate   Occupational History    Occupation:     Tobacco Use    Smoking status: Never Smoker    Smokeless tobacco: Never Used   Vaping Use    Vaping Use: Never used   Substance and Sexual Activity    Alcohol use: Never     Comment: Alcohol intake:   None - As per [employer] Drug use: Never     Comment: Illicit drugs:   No  - As per [employer] Sexual activity: Yes     Partners: Male     Birth control/protection: None   Other Topics Concern    Not on file   Social History Narrative    · Most recent tobacco use screenin2019      · Do you currently or have you served in the [employer]:   No      · Were you activated, into active duty, as a member of the [employer] or as a Reservist:   No      · Caffeine intake:   None      · Occupation:   manufacturing      · Sexual orientation:   Heterosexual      · General stress level:   Low      · Live alone or with others:   with others      · Diet:   Diabetic      · Single or multi-level home/work:   multi level home      · Exercise level:   None      · Seat belts used routinely:   Yes      · Advance directive: Yes     · Sunscreen used routinely:   Yes      · Presence of domestic violence:   No      · Do you feel safe at home:   Yes     - As per [employer]      Social Determinants of Health     Financial Resource Strain: Medium Risk    Difficulty of Paying Living Expenses: Somewhat hard   Food Insecurity: Food Insecurity Present    Worried About Running Out of Food in the Last Year: Sometimes true    [place] of Food in the Last Year: Sometimes true   Transportation Needs: No Transportation Needs    Lack of Transportation (Medical): No    Lack of Transportation (Non-Medical):  No   Physical Activity: Inactive    Days of Exercise per Week: 0 days    Minutes of Exercise per Session: 0 min   Stress: No Stress Concern Present    Feeling of Stress : Not at all   Social Connections: Socially Isolated    Frequency of Communication with Friends and Family: Once a week    Frequency of Social Gatherings with Friends and Family: Once a week    Attends Taoism Services: Never    Active Member of Clubs or Organizations: No    Attends Club or Organization Meetings: Never    Marital Status: Living with partner   Intimate Partner Violence: Not At Risk    Fear of Current or Ex-Partner: No    Emotionally Abused: No    Physically Abused: No    Sexually Abused: No     Family History Problem Relation Age of Onset    Diabetes Paternal Grandmother     No Known Problems Mother     No Known Problems Father     No Known Problems Sister     No Known Problems Brother     No Known Problems Daughter     No Known Problems Maternal Grandmother     No Known Problems Paternal Grandfather     No Known Problems Sister          Current Medications: At the time this note was written these were the medications the patient was on       Current Facility-Administered Medications   Medication Dose Route Frequency Provider Last Rate Last Admin    albuterol inhalation solution 2 5 mg  2 5 mg Nebulization Once PRN Sarah Dean MD        fentaNYL (SUBLIMAZE) injection 25 mcg  25 mcg Intravenous Q5 Min PRN Sarah Dean MD   25 mcg at 06/22/21 1626    hydrALAZINE (APRESOLINE) injection 5 mg  5 mg Intravenous Q20 Min PRN Sarah Dean MD        HYDROmorphone (DILAUDID) 1 mg/mL 50 mL PCA   Intravenous Continuous Malik Garrido MD   New Bag at 06/22/21 1703    HYDROmorphone (DILAUDID) injection 0 2 mg  0 2 mg Intravenous Q5 Min PRN Sarah Dean MD        HYDROmorphone (DILAUDID) injection 0 5 mg  0 5 mg Intravenous Q5 Min PRN Sarah Dean MD        insulin glargine (LANTUS) subcutaneous injection 18 Units 0 18 mL  18 Units Subcutaneous HS Malik Garrido MD        insulin lispro (HumaLOG) 100 units/mL subcutaneous injection 1-5 Units  1-5 Units Subcutaneous 4 times day MD Kanu Saab [START ON 6/23/2021] insulin lispro (HumaLOG) 100 units/mL subcutaneous injection 5 Units  5 Units Subcutaneous Daily With Breakfast MD Kanu Saab [facility] ON 6/23/2021] insulin lispro (HumaLOG) 100 units/mL subcutaneous injection 5 Units  5 Units Subcutaneous Daily With Lunch Malik Garrido MD        insulin lispro (HumaLOG) 100 units/mL subcutaneous injection 5 Units  5 Units Subcutaneous Daily With Javier Nagy MD        ipratropium (ATROVENT) 0 02 % inhalation solution 0 5 mg 0 5 mg Nebulization Once PRN Reggie Pinzon MD        Labetalol HCl (NORMODYNE) injection 10 mg  10 mg Intravenous Q10 Min PRN Reggie Pinzon MD        lactated ringers infusion  125 mL/hr Intravenous Continuous Louie Snyder  mL/hr at 06/22/21 1632 125 mL/hr at 06/22/21 1632    metoclopramide (REGLAN) injection 10 mg  10 mg Intravenous Once PRN Reggie Pinzon MD        ondansetron (ZOFRAN) injection 4 mg  4 mg Intravenous Once PRN Reggie Pinzon MD        oxytocin (PITOCIN) 30 Units in lactated ringers 500 mL infusion  62 5 albina-units/min Intravenous Continuous Gala Lara MD 62 5 mL/hr at 06/22/21 1545 62 5 albina-units/min at 06/22/21 1545    promethazine (PHENERGAN) injection 12 5 mg  12 5 mg Intravenous Once PRN Reggie Pinzon MD            Allergies: Patient has no known allergies  Physical Exam:      Vital Signs:   /83 (BP Location: Left arm)   Pulse 69   Temp 99 2 °F (37 3 °C) (Oral)   Resp 18   LMP 10/09/2020 (Exact Date)   SpO2 100%     Physical Exam  Vitals reviewed  Constitutional:       General: She is not in acute distress  Appearance: Normal appearance  She is not ill-appearing, toxic-appearing or diaphoretic  HENT:      Head: Normocephalic and atraumatic  Right Ear: External ear normal       Left Ear: External ear normal       Nose: Nose normal    Eyes:      General: No scleral icterus  Extraocular Movements: Extraocular movements intact  Conjunctiva/sclera: Conjunctivae normal    Neck:      Comments: No thyromegaly or nodules  Cardiovascular:      Rate and Rhythm: Normal rate and regular rhythm  Heart sounds: Normal heart sounds  No murmur heard  No friction rub  No gallop  Pulmonary:      Effort: Pulmonary effort is normal  No respiratory distress  Breath sounds: Normal breath sounds  No stridor  No wheezing, rhonchi or rales  Musculoskeletal:         General: No swelling  Normal range of motion        Cervical back: Normal range of motion and neck supple  No tenderness  Lymphadenopathy:      Cervical: No cervical adenopathy  Skin:     General: Skin is warm and dry  Coloration: Skin is not pale  Findings: No erythema or rash  Neurological:      General: No focal deficit present  Mental Status: She is alert and oriented to person, place, and time  Psychiatric:         Mood and Affect: Mood normal          Behavior: Behavior normal          Thought Content:  Thought content normal          Judgment: Judgment normal             Labs and Imaging:          Recent Results (from the past 24 hour(s))   Fingerstick Glucose (POCT)    Collection Time: 06/22/21  1:57 PM   Result Value Ref Range    POC Glucose 311 (H) 65 - 140 mg/dl   Type and screen    Collection Time: 06/22/21  1:58 PM   Result Value Ref Range    ABO Grouping A     Rh Factor Positive     Antibody Screen Negative     Specimen Expiration Date [id number]    CBC    Collection Time: 06/22/21  1:58 PM   Result Value Ref Range    WBC 8 67 4 31 - 10 16 Thousand/uL    RBC 4 41 3 81 - 5 12 Million/uL    Hemoglobin 12 9 11 5 - 15 4 g/dL    Hematocrit 38 7 34 8 - 46 1 %    MCV 88 82 - 98 fL    MCH 29 3 26 8 - 34 3 pg    MCHC 33 3 31 4 - 37 4 g/dL    RDW 12 6 11 6 - 15 1 %    Platelets 466 426 - 892 Thousands/uL    MPV 9 7 8 9 - 12 7 fL   Rapid drug screen, urine    Collection Time: 06/22/21  2:03 PM   Result Value Ref Range    Amph/Meth UR Negative Negative    Barbiturate Ur Negative Negative    Benzodiazepine Urine Negative Negative    Cocaine Urine Negative Negative    Methadone Urine Negative Negative    Opiate Urine Negative Negative    PCP Ur Negative Negative    THC Urine Negative Negative    Oxycodone Urine Negative Negative   UA w Reflex to Microscopic w Reflex to Culture    Collection Time: 06/22/21  2:04 PM    Specimen: Urine, Clean Catch   Result Value Ref Range    Color, UA Yellow     Clarity, UA Clear     Specific [place], UA 1 010 1 003 - 1 030    pH, UA 6 0 4 5, 5 0, 5 5, 6 0, 6 5, 7 0, 7 5, 8 0    Leukocytes, UA (A) Negative     Elevated glucose may cause decreased leukocyte values   See urine microscopic for [facility] result/    Nitrite, UA Negative Negative    Protein, UA Negative Negative mg/dl    Glucose, UA >=1000 (1%) (A) Negative mg/dl    Ketones, UA Negative Negative mg/dl    Urobilinogen, UA 0 2 0 2, 1 0 E U /dl E U /dl    Bilirubin, UA Negative Negative    Blood, UA Negative Negative   Urine Microscopic    Collection Time: 06/22/21  2:04 PM   Result Value Ref Range    RBC, UA 0-1 None Seen, 0-1, 1-2, 2-4, 0-5 /hpf    WBC, UA 0-1 None Seen, 0-1, 1-2, 0-5, 2-4 /hpf    Epithelial Cells Occasional None Seen, Occasional /hpf    Bacteria, UA Occasional None Seen, Occasional /hpf   Comprehensive metabolic panel    Collection Time: 06/22/21  2:15 PM   Result Value Ref Range    Sodium 136 136 - 145 mmol/L    Potassium 3 9 3 5 - 5 3 mmol/L    Chloride 102 100 - 108 mmol/L    CO2 20 (L) 21 - 32 mmol/L    ANION GAP 14 (H) 4 - 13 mmol/L    BUN 9 5 - 25 mg/dL    Creatinine 0 80 0 60 - 1 30 mg/dL    Glucose 293 (H) 65 - 140 mg/dL    Calcium 8 9 8 3 - 10 1 mg/dL    Corrected Calcium 10 2 (H) 8 3 - 10 1 mg/dL    AST 20 5 - 45 U/L    ALT 15 12 - 78 U/L    Alkaline Phosphatase 193 (H) 46 - 116 U/L    Total Protein 6 6 6 4 - 8 2 g/dL    Albumin 2 4 (L) 3 5 - 5 0 g/dL    Total Bilirubin 0 31 0 20 - 1 00 mg/dL    eGFR 100 ml/min/1 73sq m   Blood gas, venous, cord    Collection Time: 06/22/21  2:33 PM   Result Value Ref Range    pH, Cord Alexandre 7 058 (L) 7 190 - 7 490    pCO2, Cord Alexandre 57 2 (H) 27 0 - 43 0 mm HG    pO2, Cord Alexandre 33 1 15 0 - 45 0 mm HG    HCO3, Cord Alexandre 15 8 12 2 - 28 6 mmol/L    Base Exc, Cord Alexandre -14 9 (L) 1 0 - 9 0 mmol/L    O2 Cont, Cord Alexandre 13 1 mL/dL    O2 HGB,VENOUS CORD 65 6 %   Blood gas, arterial, cord    Collection Time: 06/22/21  2:33 PM   Result Value Ref Range    pH, Cord Art 6 906 (LL) 7 230 - 7 430    pCO2, Cord Art 102 1 (H) 30 0 - 60 0    pO2, Cord Art 14 3 5 0 - 25 0 mm HG    HCO3, Cord Art 19 8 17 3 - 27 3 mmol/L    Base Exc, Cord Art -14 8 (L) 3 0 - 11 0 mmol/L    O2 Content, Cord Art 2 7 ml/dl    O2 Hgb, Arterial Cord 14 1 %   Fingerstick Glucose (POCT)    Collection Time: 06/22/21  3:51 PM   Result Value Ref Range    POC Glucose 170 (H) 65 - 140 mg/dl   Protein / creatinine ratio, urine    Collection Time: 06/22/21  4:22 PM   Result Value Ref Range    Creatinine, Ur 23 0 mg/dL    Protein Urine Random 6 mg/dL    Prot/Creat Ratio, Ur 0 26 (H) 0 00 - 0 10

## 2021-06-22 NOTE — DISCHARGE INSTRUCTIONS
Self Care After Delivery   AMBULATORY CARE:   The postpartum period  is the period of time from delivery to about 6 weeks  During this time you may experience many physical and emotional changes  It is important to understand what is normal and when you need to call your healthcare provider  It is also important to know how to care for yourself during this time  Call your local emergency number (911 in the [address], located within [facility] [facility]) for any of the following:   · You see or hear things that are not there, or have thoughts of harming yourself or your baby  · You soak through 1 pad in 15 minutes, have blurry vision, clammy or pale skin, and feel faint  · You faint or lose consciousness  · You have trouble breathing  · You cough up blood  · Your  incision comes apart  Seek care immediately if:   · Your heart is beating faster than usual     · You have a bad headache or changes in your vision  · Your episiotomy or  incision is red, swollen, bleeding, or draining pus  · You have severe abdominal pain  Call your doctor or obstetrician if:   · Your leg is painful, red, and larger than usual     · You soak through 1 or more pads in an hour, or pass blood clots larger than a quarter from your vagina  · You have a fever  · You have new or worsening pain in your abdomen or vagina  · You continue to have depression 1 to 2 weeks after you deliver  · You have trouble sleeping  · You have foul-smelling discharge from your vagina  · You have pain or burning when you urinate  · You do not have a bowel movement for 3 days or more  · You have nausea or are vomiting  · You have hard lumps or red streaks over your breasts  · You have cracked nipples or bleed from your nipples  · You have questions or concerns about your condition or care  Physical changes:   The following are normal changes after you give birth:  · Pain in the area between your anus and vagina    · Breast pain    · Constipation or hemorrhoids    · Hot or cold flashes    · Vaginal bleeding or discharge    · Mild to moderate abdominal cramping    · Difficulty controlling bowel movements or urine    Emotional changes:  A drop in hormone levels after you deliver may cause changes in your emotions  You may feel irritable, sad, or anxious  You may cry easily or for no reason  You may also feel depressed  Depression that continues can be a sign of postpartum depression, a condition that can be treated  Treatment may include talk therapy, medicines, or both  Healthcare providers will ask how you are feeling and if you have any depression  These talks can happen during appointments for your medical care and for your baby's care, such as well child visits  Providers can help you find ways to care for yourself and your baby  Talk to your providers about the following:  · When emotional changes or depression started, and if it is getting worse over time    · Problems you are having with daily activities, sleep, or caring for your baby    · If anything makes you feel worse, or makes you feel better    · Feeling that you are not bonding with your baby the way you want    · Any problems your baby has with sleeping or feeding    · Your baby is fussy or cries a lot    · Support you have from friends, family, or others    Breast care for breastfeeding mothers: You may have sore breasts for 3 to 6 days after you give birth  This happens as your milk begins to fill your breasts  You may also have sore breasts if you do not breastfeed frequently  Do the following to care for your breasts:  · Apply a moist, warm, compress to your breast as directed  This may help soothe your breasts  Make sure the washcloth is not too hot before you apply it to your breast     · Nurse your baby or pump your milk frequently  This may prevent clogged milk ducts  Ask your healthcare provider how often to nurse or pump      · Massage your breasts as directed  This may help increase your milk flow  Gently rub your breasts in a circular motion before you breastfeed  You may need to gently squeeze your breast or nipple to help release milk  You can also use a breast pump to help release milk from your breast     · Wash your breasts with warm water only  Do not put soap on your nipples  Soap may cause your nipples to become dry  · Apply lanolin cream to your nipples as directed  Lanolin cream may add moisture to your skin and prevent nipple dryness  Always  wash off lanolin cream with warm water before you breastfeed  · Place pads in your bra  Your nipples may leak milk when you are not breastfeeding  You can place pads inside of your bra to help prevent leaking onto your clothing  Ask your healthcare provider where to purchase bra pads  · Get breastfeeding support if needed  Healthcare providers can answer questions about breastfeeding and provide you with support  Ask your healthcare provider who you can contact if you need breastfeeding support  Breast care for non-breastfeeding mothers:  Milk will fill your breasts even if you bottle feed your baby  Do the following to help stop your milk from filling your breasts and causing pain:  · Wear a bra with support at all times  A sports bra or a tight-fitting bra will help stop your milk from coming in  · Apply ice on each breast for 15 to 20 minutes every hour or as directed  Use an ice pack, or put crushed ice in a plastic bag  Cover it with a towel before you apply it to your breast  Ice helps your milk ducts shrink  · Keep your breasts away from warm water  Warm water will make it easier for milk to fill your breasts  Stand with your breasts away from warm water in the shower  · Limit how much you touch your breasts  This will prevent them from filling with milk  Perineum care: Your perineum is the area between your rectum and vagina   It is normal to have swelling and pain in this area after you give birth  If you had an episiotomy, your healthcare provider may give you special instructions  · Clean your perineum after you use the bathroom  This may prevent infection and help with healing  Use a spray bottle with warm water to clean your perineum  You may also gently spray warm water against your perineum when you urinate  Always wipe front to back  · Take a sitz bath as directed  A sitz bath may help relieve swelling and pain  Fill your bath tub or bucket with water up to your hips and sit in the water  Use cold water for 2 days after you deliver  Then use warm water  Ask your healthcare provider for more information about a sitz bath  · Apply ice packs for the first 24 hours or as directed  Use a plastic glove filled with ice or buy an ice pack  Wrap the ice pack or plastic glove in a small towel or wash cloth  Place the ice pack on your perineum for 20 minutes at a time  · Sit on a donut-shaped pillow  This may relieve pressure on your perineum when you sit  · Use wipes that contain medicine or take pills as directed  Your healthcare provider may tell you to use witch hazel pads  You can place witch hazel pads in the refrigerator before you apply them to your perineum  Your provider may also tell you to take NSAIDs  Ask him or her how often to take pills or use the wipes  · Do not go swimming or take tub baths for 4 to 6 weeks or as directed  This will help prevent an infection in your vagina or uterus  Bowel and bladder care: It may take 3 to 5 days to have a bowel movement after you deliver your baby  You can do the following to prevent or manage constipation, and get control of your bowel or bladder:  · Take stool softeners as directed  A stool softener is medicine that will make your bowel movements softer  This may prevent or relieve constipation  A stool softener may also make bowel movements less painful  · Drink plenty of liquids    Ask how much liquid to drink each day and which liquids are best for you  Liquids may help prevent constipation  · Eat foods high in fiber  Examples include fruits, vegetables, grains, beans, and lentils  Ask your healthcare provider how much fiber you need each day  Fiber may prevent constipation  · Do Kegel exercises as directed  Kegel exercises will help strengthen the muscles that control bowel movements and urination  Ask your healthcare provider for more information on Kegel exercises  · Apply cold compresses or medicine to hemorrhoids as directed  This may relieve swelling and pain  Your healthcare provider may tell you to apply ice or wipes that contain medicine to your hemorrhoids  He or she may also tell you to use a sitz bath  Ask your provider for more information on how to manage hemorrhoids  Nutrition:  Good nutrition is important in the postpartum period  It will help you return to a healthy weight, increase your energy levels, and prevent constipation  It will also help you get enough nutrients and calories if you are going to breastfeed your baby  · Eat a variety of healthy foods  Healthy foods include fruits, vegetables, whole-grain breads, low-fat dairy products, beans, lean meats, and fish  You may need 500 to 700 extra calories each day if you breastfeed your baby  You may also need extra protein  · Limit foods with added sugar and high amounts of fat  These foods are high in calories and low in healthy nutrients  Read food labels so you know how much sugar and fat is in the food you want to eat  · Drink 8 to 10 glasses of water per day  Water will help you make plenty of milk for your baby  It will also help prevent constipation  Drink a glass of water every time you breastfeed your baby  · Take vitamins as directed  Ask your healthcare provider what vitamins you need  · Limit caffeine and alcohol if you are breastfeeding    Caffeine and alcohol can get into your breast milk  Caffeine and alcohol can make your baby fussy  They can also interfere with your baby's sleep  Ask your healthcare provider if you can drink alcohol or caffeine  Rest and sleep: You may feel very tired in the postpartum period  Enough sleep will help you heal and give you energy to care for your baby  The following may help you get sleep and rest:  · Nap when your baby naps  Your baby may nap several times during the day  Get rest during this time  · Limit visitors  Many people may want to see you and your baby  Ask friends or family to visit on different days  This will give you time to rest     · Do not plan too much for one day  Put off household chores so that you have time to rest  Gradually do more each day  · Ask for help from family, friends, or neighbors  Ask them to help you with laundry, cleaning, or errands  Also ask someone to watch the baby while you take a nap or relax  Ask your partner to help with the care of your baby  Pump some of your breast milk so your partner can feed your baby during the night  Exercise after delivery:  Wait until your healthcare provider says it is okay to exercise  Exercise can help you lose weight, increase your energy levels, and manage your mood  It can also prevent constipation and blood clots  Start with gentle exercises such as walking  Do more as you have more energy  You may need to avoid abdominal exercises for 1 to 2 weeks after you deliver  Talk to your healthcare provider about an exercise plan that is right for you  Sexual activity after delivery:   · Do not have sex until your healthcare provider says it is okay  You may need to wait 4 to 6 weeks before you have sex  This may prevent infection and allow time to heal     · Your menstrual cycle may begin as soon as 3 weeks after you deliver  Your period may be delayed if you breastfeed your baby  You can become pregnant before you get your first postpartum period   Talk to your healthcare provider about birth control that is right for you  Some types of birth control are not safe during breastfeeding  For support and more information:  Join a support group for new mothers  Ask for help from family and friends with chores, errands, and care of your baby  · Office of Women's Health,  Department of Health and Human Services  [address], [address] [facility]  [place] [address]  [address], [address] [facility] [address]  Phone: [phone]  Web Address: www womenshealth gov  · [facility] Postpartum [address] , [address]  [address] , [address] [facility] [address]  Web Address: anchor.travel be  [employer]/pregnancy/postpartum-care  aspx  Follow up with your doctor or obstetrician as directed: You will need to follow up within 2 to 6 weeks of delivery  Write down your questions so you remember to ask them at your visits  © Copyright [address] Information is for End User's use only and may not be sold, redistributed or otherwise used for commercial purposes  All illustrations and images included in CareNotes® are the copyrighted property of [employer]  or [facility] Yogesh Sanchez   The above information is an  only  It is not intended as medical advice for individual conditions or treatments  Talk to your doctor, nurse or pharmacist before following any medical regimen to see if it is safe and effective for you

## 2021-06-22 NOTE — PLAN OF CARE
Problem: PAIN - ADULT  Goal: Verbalizes/displays adequate comfort level or baseline comfort level  Description: Interventions:  - Encourage patient to monitor pain and request assistance  - Assess pain using appropriate pain scale  - Administer analgesics based on type and severity of pain and evaluate response  - Implement non-pharmacological measures as appropriate and evaluate response  - Consider cultural and social influences on pain and pain management  - Notify physician/advanced practitioner if interventions unsuccessful or patient reports new pain  Outcome: Progressing     Problem: INFECTION - ADULT  Goal: Absence or prevention of progression during hospitalization  Description: INTERVENTIONS:  - Assess and monitor for signs and symptoms of infection  - Monitor lab/diagnostic results  - Monitor all insertion sites, i e  indwelling lines, tubes, and drains  - Monitor endotracheal if appropriate and nasal secretions for changes in amount and color  - Donie appropriate cooling/warming therapies per order  - Administer medications as ordered  - Instruct and encourage patient and family to use good hand hygiene technique  - Identify and instruct in appropriate isolation precautions for identified infection/condition  Outcome: Progressing  Goal: Absence of fever/infection during neutropenic period  Description: INTERVENTIONS:  - Monitor WBC    Outcome: Progressing     Problem: SAFETY ADULT  Goal: Patient will remain free of falls  Description: INTERVENTIONS:  - Educate patient/family on patient safety including physical limitations  - Instruct patient to call for assistance with activity   - Consult OT/PT to assist with strengthening/mobility   - Keep Call bell within reach  - Keep bed low and locked with side rails adjusted as appropriate  - Keep care items and personal belongings within reach  - Initiate and maintain comfort rounds  - Make Fall Risk Sign visible to staff  - Offer Toileting every 2-3 Hours, in advance of need  - Initiate/Maintain alarm  - Obtain necessary fall risk management equipment  - Apply yellow socks and bracelet for high fall risk patients  - Consider moving patient to room near nurses station  Outcome: Progressing  Goal: Maintain or return to baseline ADL function  Description: INTERVENTIONS:  -  Assess patient's ability to carry out ADLs; assess patient's baseline for ADL function and identify physical deficits which impact ability to perform ADLs (bathing, care of mouth/teeth, toileting, grooming, dressing, etc )  - Assess/evaluate cause of self-care deficits   - Assess range of motion  - Assess patient's mobility; develop plan if impaired  - Assess patient's need for assistive devices and provide as appropriate  - Encourage maximum independence but intervene and supervise when necessary  - Involve family in performance of ADLs  - Assess for home care needs following discharge   - Consider OT consult to assist with ADL evaluation and planning for discharge  - Provide patient education as appropriate  Outcome: Progressing     Problem: Knowledge Deficit  Goal: Patient/family/caregiver demonstrates understanding of disease process, treatment plan, medications, and discharge instructions  Description: Complete learning assessment and assess knowledge base    Interventions:  - Provide teaching at level of understanding  - Provide teaching via preferred learning methods  Outcome: Progressing     Problem: DISCHARGE PLANNING  Goal: Discharge to home or other facility with appropriate resources  Description: INTERVENTIONS:  - Identify barriers to discharge w/patient and caregiver  - Arrange for needed discharge resources and transportation as appropriate  - Identify discharge learning needs (meds, wound care, etc )  - Arrange for interpretive services to assist at discharge as needed  - Refer to Case Management Department for coordinating discharge planning if the patient needs post-hospital services based on physician/advanced practitioner order or complex needs related to functional status, cognitive ability, or social support system  Outcome: Progressing     Problem: POSTPARTUM  Goal: Experiences normal postpartum course  Description: INTERVENTIONS:  - Monitor maternal vital signs  - Assess uterine involution and lochia  Outcome: Progressing  Goal: Appropriate maternal -  bonding  Description: INTERVENTIONS:  - Identify family support  - Assess for appropriate maternal/infant bonding   -Encourage maternal/infant bonding opportunities  - Referral to  or  as needed  Outcome: Progressing  Goal: Establishment of infant feeding pattern  Description: INTERVENTIONS:  - Assess breast/bottle feeding  - Refer to lactation as needed  Outcome: Progressing  Goal: Incision(s), wounds(s) or drain site(s) healing without S/S of infection  Description: INTERVENTIONS  - Assess and document dressing, incision, wound bed, drain sites and surrounding tissue  - Provide patient and family education  - Perform skin care/dressing changes every   Outcome: Progressing

## 2021-06-22 NOTE — ANESTHESIA PROCEDURE NOTES
Peripheral Block    Patient location during procedure: OR  Start time: 6/22/2021 3:23 PM  Reason for block: at surgeon's request and post-op pain management  Staffing  Performed: anesthesiologist   Anesthesiologist: Dulce Herrera MD  Resident/CRNA: Milla Tavarez CRNA  Preanesthetic Checklist  Completed: patient identified, IV checked, site marked, risks and benefits discussed, surgical consent, monitors and equipment checked, pre-op evaluation and timeout performed  Peripheral Block  Patient position: supine  Prep: ChloraPrep  Patient monitoring: heart rate, cardiac monitor, continuous pulse ox and frequent blood pressure checks  Block type: TAP  Laterality: bilateral  Injection technique: single-shot  Procedures: ultrasound guided, Ultrasound guidance required for the procedure to increase accuracy and safety of medication placement and decrease risk of complications    Ultrasound permanent image savedbupivacaine (MARCAINE) 0 25 % perineural infiltration, 10 mL (10 cc exparel)  Needle  Needle type: pencil-tip   Needle gauge: 22 G  Needle length: 5 cm  Needle localization: anatomical landmarks and ultrasound guidance  Needle insertion depth: 3 cm  Assessment  Injection assessment: incremental injection, negative aspiration for heme and no paresthesia on injection  Paresthesia pain: none  Heart rate change: no  Slow fractionated injection: yes  Post-procedure:  site cleaned  patient tolerated the procedure well with no immediate complications

## 2021-06-22 NOTE — PROGRESS NOTES
[address]: Ms Scar Gramajo was seen today at 36w4d for fetal growth assessment ultrasound  See ultrasound report under "OB Procedures" tab  Please don't hesitate to contact our office with any concerns or questions    Ruthy Ge MD

## 2021-06-23 LAB
ALBUMIN SERPL BCP-MCNC: 2.7 G/DL (ref 3.5–5)
ALP SERPL-CCNC: 206 U/L (ref 46–116)
ALT SERPL W P-5'-P-CCNC: 13 U/L (ref 12–78)
ANION GAP SERPL CALCULATED.3IONS-SCNC: 11 MMOL/L (ref 4–13)
AST SERPL W P-5'-P-CCNC: 22 U/L (ref 5–45)
BASOPHILS # BLD AUTO: 0.03 THOUSANDS/ΜL (ref 0–0.1)
BASOPHILS NFR BLD AUTO: 0 % (ref 0–1)
BILIRUB SERPL-MCNC: 0.39 MG/DL (ref 0.2–1)
BUN SERPL-MCNC: 10 MG/DL (ref 5–25)
CALCIUM ALBUM COR SERPL-MCNC: 10 MG/DL (ref 8.3–10.1)
CALCIUM SERPL-MCNC: 9 MG/DL (ref 8.3–10.1)
CHLORIDE SERPL-SCNC: 107 MMOL/L (ref 100–108)
CO2 SERPL-SCNC: 19 MMOL/L (ref 21–32)
CREAT SERPL-MCNC: 0.82 MG/DL (ref 0.6–1.3)
EOSINOPHIL # BLD AUTO: 0.03 THOUSAND/ΜL (ref 0–0.61)
EOSINOPHIL NFR BLD AUTO: 0 % (ref 0–6)
ERYTHROCYTE [DISTWIDTH] IN BLOOD BY AUTOMATED COUNT: 13.2 % (ref 11.6–15.1)
GFR SERPL CREATININE-BSD FRML MDRD: 97 ML/MIN/1.73SQ M
GLUCOSE SERPL-MCNC: 103 MG/DL (ref 65–140)
GLUCOSE SERPL-MCNC: 119 MG/DL (ref 65–140)
GLUCOSE SERPL-MCNC: 136 MG/DL (ref 65–140)
GLUCOSE SERPL-MCNC: 193 MG/DL (ref 65–140)
GLUCOSE SERPL-MCNC: 206 MG/DL (ref 65–140)
GLUCOSE SERPL-MCNC: 255 MG/DL (ref 65–140)
GLUCOSE SERPL-MCNC: 288 MG/DL (ref 65–140)
GLUCOSE SERPL-MCNC: 66 MG/DL (ref 65–140)
GLUCOSE SERPL-MCNC: 74 MG/DL (ref 65–140)
HCT VFR BLD AUTO: 32.6 % (ref 34.8–46.1)
HGB BLD-MCNC: 10.3 G/DL (ref 11.5–15.4)
IMM GRANULOCYTES # BLD AUTO: 0.03 THOUSAND/UL (ref 0–0.2)
IMM GRANULOCYTES NFR BLD AUTO: 0 % (ref 0–2)
LYMPHOCYTES # BLD AUTO: 1.77 THOUSANDS/ΜL (ref 0.6–4.47)
LYMPHOCYTES NFR BLD AUTO: 17 % (ref 14–44)
MCH RBC QN AUTO: 29.3 PG (ref 26.8–34.3)
MCHC RBC AUTO-ENTMCNC: 31.6 G/DL (ref 31.4–37.4)
MCV RBC AUTO: 93 FL (ref 82–98)
MONOCYTES # BLD AUTO: 0.63 THOUSAND/ΜL (ref 0.17–1.22)
MONOCYTES NFR BLD AUTO: 6 % (ref 4–12)
NEUTROPHILS # BLD AUTO: 8.04 THOUSANDS/ΜL (ref 1.85–7.62)
NEUTS SEG NFR BLD AUTO: 77 % (ref 43–75)
NRBC BLD AUTO-RTO: 0 /100 WBCS
PLATELET # BLD AUTO: 210 THOUSANDS/UL (ref 149–390)
PMV BLD AUTO: 9.3 FL (ref 8.9–12.7)
POTASSIUM SERPL-SCNC: 4 MMOL/L (ref 3.5–5.3)
PROT SERPL-MCNC: 6.6 G/DL (ref 6.4–8.2)
RBC # BLD AUTO: 3.51 MILLION/UL (ref 3.81–5.12)
RPR SER QL: NORMAL
SODIUM SERPL-SCNC: 137 MMOL/L (ref 136–145)
WBC # BLD AUTO: 10.53 THOUSAND/UL (ref 4.31–10.16)

## 2021-06-23 PROCEDURE — 82948 REAGENT STRIP/BLOOD GLUCOSE: CPT

## 2021-06-23 PROCEDURE — 99024 POSTOP FOLLOW-UP VISIT: CPT | Performed by: OBSTETRICS & GYNECOLOGY

## 2021-06-23 PROCEDURE — 99232 SBSQ HOSP IP/OBS MODERATE 35: CPT | Performed by: INTERNAL MEDICINE

## 2021-06-23 PROCEDURE — 94760 N-INVAS EAR/PLS OXIMETRY 1: CPT

## 2021-06-23 PROCEDURE — 85025 COMPLETE CBC W/AUTO DIFF WBC: CPT | Performed by: OBSTETRICS & GYNECOLOGY

## 2021-06-23 RX ORDER — OXYCODONE HYDROCHLORIDE 5 MG/1
5 TABLET ORAL EVERY 6 HOURS PRN
Status: DISCONTINUED | OUTPATIENT
Start: 2021-06-23 | End: 2021-06-26 | Stop reason: HOSPADM

## 2021-06-23 RX ORDER — INSULIN GLARGINE 100 [IU]/ML
15 INJECTION, SOLUTION SUBCUTANEOUS
Status: DISCONTINUED | OUTPATIENT
Start: 2021-06-23 | End: 2021-06-23

## 2021-06-23 RX ORDER — INSULIN GLARGINE 100 [IU]/ML
16 INJECTION, SOLUTION SUBCUTANEOUS
Status: DISCONTINUED | OUTPATIENT
Start: 2021-06-23 | End: 2021-06-24

## 2021-06-23 RX ORDER — OXYCODONE HYDROCHLORIDE 10 MG/1
10 TABLET ORAL EVERY 6 HOURS PRN
Status: DISCONTINUED | OUTPATIENT
Start: 2021-06-23 | End: 2021-06-26 | Stop reason: HOSPADM

## 2021-06-23 RX ADMIN — SODIUM CHLORIDE 125 ML/HR: 0.9 INJECTION, SOLUTION INTRAVENOUS at 00:00

## 2021-06-23 RX ADMIN — IBUPROFEN 600 MG: 600 TABLET, FILM COATED ORAL at 16:39

## 2021-06-23 RX ADMIN — ENOXAPARIN SODIUM 40 MG: 40 INJECTION SUBCUTANEOUS at 09:23

## 2021-06-23 RX ADMIN — IBUPROFEN 600 MG: 600 TABLET, FILM COATED ORAL at 12:27

## 2021-06-23 RX ADMIN — INSULIN LISPRO 2 UNITS: 100 INJECTION, SOLUTION INTRAVENOUS; SUBCUTANEOUS at 20:27

## 2021-06-23 RX ADMIN — OXYCODONE HYDROCHLORIDE 5 MG: 5 TABLET ORAL at 13:15

## 2021-06-23 RX ADMIN — OXYCODONE HYDROCHLORIDE 5 MG: 5 TABLET ORAL at 19:50

## 2021-06-23 RX ADMIN — INSULIN GLARGINE 16 UNITS: 100 INJECTION, SOLUTION SUBCUTANEOUS at 22:37

## 2021-06-23 RX ADMIN — DOCUSATE SODIUM 100 MG: 100 CAPSULE, LIQUID FILLED ORAL at 17:52

## 2021-06-23 RX ADMIN — ACETAMINOPHEN 650 MG: 325 TABLET, FILM COATED ORAL at 15:35

## 2021-06-23 RX ADMIN — SODIUM CHLORIDE 125 ML/HR: 0.9 INJECTION, SOLUTION INTRAVENOUS at 08:42

## 2021-06-23 RX ADMIN — SIMETHICONE 80 MG: 80 TABLET, CHEWABLE ORAL at 13:20

## 2021-06-23 RX ADMIN — DOCUSATE SODIUM 100 MG: 100 CAPSULE, LIQUID FILLED ORAL at 09:23

## 2021-06-23 NOTE — PLAN OF CARE
Problem: PAIN - ADULT  Goal: Verbalizes/displays adequate comfort level or baseline comfort level  Description: Interventions:  - Encourage patient to monitor pain and request assistance  - Assess pain using appropriate pain scale  - Administer analgesics based on type and severity of pain and evaluate response  - Implement non-pharmacological measures as appropriate and evaluate response  - Consider cultural and social influences on pain and pain management  - Notify physician/advanced practitioner if interventions unsuccessful or patient reports new pain  Outcome: Progressing     Problem: INFECTION - ADULT  Goal: Absence or prevention of progression during hospitalization  Description: INTERVENTIONS:  - Assess and monitor for signs and symptoms of infection  - Monitor lab/diagnostic results  - Monitor all insertion sites, i e  indwelling lines, tubes, and drains  - Monitor endotracheal if appropriate and nasal secretions for changes in amount and color  - [place] appropriate cooling/warming therapies per order  - Administer medications as ordered  - Instruct and encourage patient and family to use good hand hygiene technique  - Identify and instruct in appropriate isolation precautions for identified infection/condition  Outcome: Progressing  Goal: Absence of fever/infection during neutropenic period  Description: INTERVENTIONS:  - Monitor WBC    Outcome: Progressing     Problem: SAFETY ADULT  Goal: Patient will remain free of falls  Description: INTERVENTIONS:  - Educate patient/family on patient safety including physical limitations  - Instruct patient to call for assistance with activity   - Consult OT/PT to assist with strengthening/mobility   - Keep Call bell within reach  - Keep bed low and locked with side rails adjusted as appropriate  - Keep care items and personal belongings within reach  - Initiate and maintain comfort rounds  - Make Fall Risk Sign visible to staff  - Apply yellow socks and bracelet for high fall risk patients  - Consider moving patient to room near nurses station  Outcome: Progressing  Goal: Maintain or return to baseline ADL function  Description: INTERVENTIONS:  -  Assess patient's ability to carry out ADLs; assess patient's baseline for ADL function and identify physical deficits which impact ability to perform ADLs (bathing, care of mouth/teeth, toileting, grooming, dressing, etc )  - Assess/evaluate cause of self-care deficits   - Assess range of motion  - Assess patient's mobility; develop plan if impaired  - Assess patient's need for assistive devices and provide as appropriate  - Encourage maximum independence but intervene and supervise when necessary  - Involve family in performance of ADLs  - Assess for home care needs following discharge   - Consider OT consult to assist with ADL evaluation and planning for discharge  - Provide patient education as appropriate  Outcome: Progressing  Goal: Maintains/Returns to pre admission functional level  Description: INTERVENTIONS:  - Perform BMAT or MOVE assessment daily    - Set and communicate daily mobility goal to care team and patient/family/caregiver  - Collaborate with rehabilitation services on mobility goals if consulted  - Out of bed for toileting  - Record patient progress and toleration of activity level   Outcome: Progressing     Problem: Knowledge Deficit  Goal: Patient/family/caregiver demonstrates understanding of disease process, treatment plan, medications, and discharge instructions  Description: Complete learning assessment and assess knowledge base    Interventions:  - Provide teaching at level of understanding  - Provide teaching via preferred learning methods  Outcome: Progressing     Problem: DISCHARGE PLANNING  Goal: Discharge to home or other facility with appropriate resources  Description: INTERVENTIONS:  - Identify barriers to discharge w/patient and caregiver  - Arrange for needed discharge resources and transportation as appropriate  - Identify discharge learning needs (meds, wound care, etc )  - Arrange for interpretive services to assist at discharge as needed  - Refer to Case Management Department for coordinating discharge planning if the patient needs post-hospital services based on physician/advanced practitioner order or complex needs related to functional status, cognitive ability, or social support system  Outcome: Progressing     Problem: POSTPARTUM  Goal: Experiences normal postpartum course  Description: INTERVENTIONS:  - Monitor maternal vital signs  - Assess uterine involution and lochia  Outcome: Progressing  Goal: Appropriate maternal -  bonding  Description: INTERVENTIONS:  - Identify family support  - Assess for appropriate maternal/infant bonding   -Encourage maternal/infant bonding opportunities  - Referral to  or  as needed  Outcome: Progressing  Goal: Establishment of infant feeding pattern  Description: INTERVENTIONS:  - Assess breast/bottle feeding  - Refer to lactation as needed  Outcome: Progressing  Goal: Incision(s), wounds(s) or drain site(s) healing without S/S of infection  Description: INTERVENTIONS  - Assess and document dressing, incision, wound bed, drain sites and surrounding tissue  - Provide patient and family education  - Perform skin care/dressing changes every   Outcome: Progressing     Problem: Nutrition/Hydration-ADULT  Goal: Nutrient/Hydration intake appropriate for improving, restoring or maintaining nutritional needs  Description: Monitor and assess patient's nutrition/hydration status for malnutrition  Collaborate with interdisciplinary team and initiate plan and interventions as ordered  Monitor patient's weight and dietary intake as ordered or per policy  Utilize nutrition screening tool and intervene as necessary  Determine patient's food preferences and provide high-protein, high-caloric foods as appropriate       INTERVENTIONS:  - Monitor oral intake, urinary output, labs, and treatment plans  - Assess nutrition and hydration status and recommend course of action  - Evaluate amount of meals eaten  - Assist patient with eating if necessary   - Allow adequate time for meals  - Recommend/ encourage appropriate diets, oral nutritional supplements, and vitamin/mineral supplements  - Order, calculate, and assess calorie counts as needed  - Recommend, monitor, and adjust tube feedings and TPN/PPN based on assessed needs  - Assess need for intravenous fluids  - Provide specific nutrition/hydration education as appropriate  - Include patient/family/caregiver in decisions related to nutrition  Outcome: Progressing

## 2021-06-23 NOTE — PROGRESS NOTES
Progress Note - OB/GYN   Caio Nash 36 y o  female MRN: [id number]  Unit/Bed#:  310-01 Encounter: [id number]    Assessment:  Post partum Day #1 s/p RLTCS (stat, GETA), stable, baby in NICU    Plan:  1) Postoperative state   , Hgb 12 9 --> 10 3   Vitals WNL, Lochia WNL   Adequate UOP --> pepe removed, f/u VT   DVT ppx: Lovenox 40 mg sc qd to start today   Will transition from dilaudid PCA to PO meds today   2) Uncontrolled T1DM   Lantus 18u qhs, SSI#2   Endocrinology following    D5W @ 75cc to prevent hypoglycemia   BGs 89, 110, 66 overnight  3) Chronic hypertension   Bps 109-137/65-82   Asymptomatic   4) Continue routine post partum care   Encourage ambulation   Encourage breastfeeding   Anticipate discharge POD#3     Subjective/Objective   Chief Complaint:     Post delivery  Patient is doing well  Lochia WNL  Pain well controlled  Denies headaches, vision changes, RUQ pain, lower extremity pain/tenderness   Patient denies feeling hypoglycemic - dizziness, lightheadedness, n/v    Subjective:     Pain: yes, cramping, improved with meds  Tolerating PO: yes  Voiding: yes  Flatus: no  BM: no  Ambulating: no  Chest pain: no  Shortness of breath: no  Leg pain: no  Lochia: minimal    Objective:     Vitals: /65 (BP Location: Left arm)   Pulse 80   Temp 97 9 °F (36 6 °C) (Oral)   Resp 18   Ht 4' 10" (1 473 m)   Wt 69 5 kg (153 lb 3 2 oz)   LMP 10/09/2020 (Exact Date)   SpO2 97%   Breastfeeding No   BMI 32 02 kg/m²     I/O       06/21 0701 - 06/22 0700 06/22 0701 - 06/23 0700    I V  (mL/kg)  1805 6 (26)    Total Intake(mL/kg)  1805 6 (26)    Urine (mL/kg/hr)  1025    Blood  485    Total Output  1510    Net  +295 6                  Lab Results   Component Value Date    WBC 10 53 (H) 06/23/2021    HGB 10 3 (L) 06/23/2021    HCT 32 6 (L) 06/23/2021    MCV 93 06/23/2021     06/23/2021       Physical Exam:     Gen: AAOx3, NAD  CV: RRR  Lungs: CTA b/l  Abd: Soft, non-tender, non-distended, no rebound or guarding  Uterine fundus firm and non-tender, at the level of the umbilicus   Incision c/d/i  Ext: Non tender    Lizette Iverson MD  6/23/2021  6:25 AM

## 2021-06-23 NOTE — PROGRESS NOTES
Progress Note - Myles Chan 36 y o  female MRN: [id number]    Unit/Bed#: -01 Encounter: [id number]      CC: diabetes f/u    Subjective:   Myles Chan is a 36y o  year old female with type 1 diabetes, now s/p delivery by  section  Feels well and has a good appetite  No complaints  Objective:     Vitals: Blood pressure 113/80, pulse 92, temperature 98 2 °F (36 8 °C), temperature source Oral, resp  rate 16, height 4' 10" (1 473 m), weight 69 5 kg (153 lb 3 2 oz), last menstrual period 10/09/2020, SpO2 97 %, not currently breastfeeding  ,Body mass index is 32 02 kg/m²  Intake/Output Summary (Last 24 hours) at 2021 1300  Last data filed at 2021 1154  Gross per 24 hour   Intake 1818 2 ml   Output 1910 ml   Net -91 8 ml       Physical Exam:  General Appearance: awake, appears stated age and cooperative  Head: Normocephalic, without obvious abnormality, atraumatic  Extremities: moves all extremities  Skin: Skin color and temperature normal    Pulm: no labored breathing    Lab, Imaging and other studies: I have personally reviewed pertinent reports  POC Glucose (mg/dl)   Date Value   2021 119   2021 103   2021 74   2021 66   2021 110   2021 89   2021 170 (H)   2021 311 (H)   2021 129   2021 41 (L)       Impressions:    -Type 1 diabetes  - S/P   - Hypertension  - Hyperlipidemia    Recommendations:    During pregnancy, patient was taking Lantus 60 units q a m  And admelog 8 units t i d  A c  Her glycemic control was sub optimal  Unclear pre-pregnancy regimen  She has had multiple episodes of hypoglycemia during this admission  Her Lantus was decreased to 18 units q h s     She was also given D5 water at 75 cc/h to prevent further hypoglycemia; this was stopped this morning  Currently, she is on Lantus 18 units q h s  And correctional insulin algorithm 2 with meals    Fating blood glucose was low to 66 mg/dl, likely due in part to the effect of the Lantus 60 units she received the prior day  We recommend decreasing Lantus to 16 units q h s  And continue correctional insulin algorithm 2 with meals for now  Continue glucose monitoring  Portions of the record may have been created with voice recognition software

## 2021-06-23 NOTE — PLAN OF CARE
Problem: PAIN - ADULT  Goal: Verbalizes/displays adequate comfort level or baseline comfort level  Description: Interventions:  - Encourage patient to monitor pain and request assistance  - Assess pain using appropriate pain scale  - Administer analgesics based on type and severity of pain and evaluate response  - Implement non-pharmacological measures as appropriate and evaluate response  - Consider cultural and social influences on pain and pain management  - Notify physician/advanced practitioner if interventions unsuccessful or patient reports new pain  Outcome: Progressing     Problem: INFECTION - ADULT  Goal: Absence or prevention of progression during hospitalization  Description: INTERVENTIONS:  - Assess and monitor for signs and symptoms of infection  - Monitor lab/diagnostic results  - Monitor all insertion sites, i e  indwelling lines, tubes, and drains  - Monitor endotracheal if appropriate and nasal secretions for changes in amount and color  - [place] appropriate cooling/warming therapies per order  - Administer medications as ordered  - Instruct and encourage patient and family to use good hand hygiene technique  - Identify and instruct in appropriate isolation precautions for identified infection/condition  Outcome: Progressing  Goal: Absence of fever/infection during neutropenic period  Description: INTERVENTIONS:  - Monitor WBC    Outcome: Progressing     Problem: SAFETY ADULT  Goal: Patient will remain free of falls  Description: INTERVENTIONS:  - Educate patient/family on patient safety including physical limitations  - Instruct patient to call for assistance with activity   - Consult OT/PT to assist with strengthening/mobility   - Keep Call bell within reach  - Keep bed low and locked with side rails adjusted as appropriate  - Keep care items and personal belongings within reach  - Initiate and maintain comfort rounds  - Make Fall Risk Sign visible to staff  - Offer Toileting every  Hours, in advance of need  - Initiate/Maintain alarm  - Obtain necessary fall risk management equipment:   - Apply yellow socks and bracelet for high fall risk patients  - Consider moving patient to room near nurses station  Outcome: Progressing  Goal: Maintain or return to baseline ADL function  Description: INTERVENTIONS:  -  Assess patient's ability to carry out ADLs; assess patient's baseline for ADL function and identify physical deficits which impact ability to perform ADLs (bathing, care of mouth/teeth, toileting, grooming, dressing, etc )  - Assess/evaluate cause of self-care deficits   - Assess range of motion  - Assess patient's mobility; develop plan if impaired  - Assess patient's need for assistive devices and provide as appropriate  - Encourage maximum independence but intervene and supervise when necessary  - Involve family in performance of ADLs  - Assess for home care needs following discharge   - Consider OT consult to assist with ADL evaluation and planning for discharge  - Provide patient education as appropriate  Outcome: Progressing  Goal: Maintains/Returns to pre admission functional level  Description: INTERVENTIONS:  - Perform BMAT or MOVE assessment daily    - Set and communicate daily mobility goal to care team and patient/family/caregiver  - Collaborate with rehabilitation services on mobility goals if consulted  - Perform Range of Motion  times a day  - Reposition patient every  hours    - Dangle patient  times a day  - Stand patient  times a day  - Ambulate patient  times a day  - Out of bed to chair  times a day   - Out of bed for meals  times a day  - Out of bed for toileting  - Record patient progress and toleration of activity level   Outcome: Progressing     Problem: Knowledge Deficit  Goal: Patient/family/caregiver demonstrates understanding of disease process, treatment plan, medications, and discharge instructions  Description: Complete learning assessment and assess knowledge base   Interventions:  - Provide teaching at level of understanding  - Provide teaching via preferred learning methods  Outcome: Progressing     Problem: DISCHARGE PLANNING  Goal: Discharge to home or other facility with appropriate resources  Description: INTERVENTIONS:  - Identify barriers to discharge w/patient and caregiver  - Arrange for needed discharge resources and transportation as appropriate  - Identify discharge learning needs (meds, wound care, etc )  - Arrange for interpretive services to assist at discharge as needed  - Refer to Case Management Department for coordinating discharge planning if the patient needs post-hospital services based on physician/advanced practitioner order or complex needs related to functional status, cognitive ability, or social support system  Outcome: Progressing     Problem: POSTPARTUM  Goal: Experiences normal postpartum course  Description: INTERVENTIONS:  - Monitor maternal vital signs  - Assess uterine involution and lochia  Outcome: Progressing  Goal: Appropriate maternal -  bonding  Description: INTERVENTIONS:  - Identify family support  - Assess for appropriate maternal/infant bonding   -Encourage maternal/infant bonding opportunities  - Referral to  or  as needed  Outcome: Progressing  Goal: Establishment of infant feeding pattern  Description: INTERVENTIONS:  - Assess breast/bottle feeding  - Refer to lactation as needed  Outcome: Progressing  Goal: Incision(s), wounds(s) or drain site(s) healing without S/S of infection  Description: INTERVENTIONS  - Assess and document dressing, incision, wound bed, drain sites and surrounding tissue  - Provide patient and family education  - Perform skin care/dressing changes every   Outcome: Progressing     Problem: Nutrition/Hydration-ADULT  Goal: Nutrient/Hydration intake appropriate for improving, restoring or maintaining nutritional needs  Description: Monitor and assess patient's nutrition/hydration status for malnutrition  Collaborate with interdisciplinary team and initiate plan and interventions as ordered  Monitor patient's weight and dietary intake as ordered or per policy  Utilize nutrition screening tool and intervene as necessary  Determine patient's food preferences and provide high-protein, high-caloric foods as appropriate       INTERVENTIONS:  - Monitor oral intake, urinary output, labs, and treatment plans  - Assess nutrition and hydration status and recommend course of action  - Evaluate amount of meals eaten  - Assist patient with eating if necessary   - Allow adequate time for meals  - Recommend/ encourage appropriate diets, oral nutritional supplements, and vitamin/mineral supplements  - Order, calculate, and assess calorie counts as needed  - Recommend, monitor, and adjust tube feedings and TPN/PPN based on assessed needs  - Assess need for intravenous fluids  - Provide specific nutrition/hydration education as appropriate  - Include patient/family/caregiver in decisions related to nutrition  Outcome: Progressing

## 2021-06-24 LAB
GLUCOSE SERPL-MCNC: 168 MG/DL (ref 65–140)
GLUCOSE SERPL-MCNC: 176 MG/DL (ref 65–140)
GLUCOSE SERPL-MCNC: 288 MG/DL (ref 65–140)
GLUCOSE SERPL-MCNC: 310 MG/DL (ref 65–140)
GLUCOSE SERPL-MCNC: 63 MG/DL (ref 65–140)
GLUCOSE SERPL-MCNC: 71 MG/DL (ref 65–140)

## 2021-06-24 PROCEDURE — 99024 POSTOP FOLLOW-UP VISIT: CPT | Performed by: OBSTETRICS & GYNECOLOGY

## 2021-06-24 PROCEDURE — 82948 REAGENT STRIP/BLOOD GLUCOSE: CPT

## 2021-06-24 RX ORDER — INSULIN GLARGINE 100 [IU]/ML
14 INJECTION, SOLUTION SUBCUTANEOUS
Status: DISCONTINUED | OUTPATIENT
Start: 2021-06-24 | End: 2021-06-26 | Stop reason: HOSPADM

## 2021-06-24 RX ADMIN — ACETAMINOPHEN 650 MG: 325 TABLET, FILM COATED ORAL at 17:44

## 2021-06-24 RX ADMIN — DOCUSATE SODIUM 100 MG: 100 CAPSULE, LIQUID FILLED ORAL at 17:41

## 2021-06-24 RX ADMIN — INSULIN LISPRO 2 UNITS: 100 INJECTION, SOLUTION INTRAVENOUS; SUBCUTANEOUS at 08:56

## 2021-06-24 RX ADMIN — OXYCODONE HYDROCHLORIDE 5 MG: 5 TABLET ORAL at 22:53

## 2021-06-24 RX ADMIN — INSULIN LISPRO 2 UNITS: 100 INJECTION, SOLUTION INTRAVENOUS; SUBCUTANEOUS at 13:23

## 2021-06-24 RX ADMIN — IBUPROFEN 600 MG: 600 TABLET, FILM COATED ORAL at 00:49

## 2021-06-24 RX ADMIN — OXYCODONE HYDROCHLORIDE 5 MG: 5 TABLET ORAL at 06:24

## 2021-06-24 RX ADMIN — INSULIN LISPRO 1 UNITS: 100 INJECTION, SOLUTION INTRAVENOUS; SUBCUTANEOUS at 13:22

## 2021-06-24 RX ADMIN — INSULIN GLARGINE 14 UNITS: 100 INJECTION, SOLUTION SUBCUTANEOUS at 22:54

## 2021-06-24 RX ADMIN — OXYCODONE HYDROCHLORIDE 10 MG: 10 TABLET ORAL at 13:21

## 2021-06-24 RX ADMIN — DOCUSATE SODIUM 100 MG: 100 CAPSULE, LIQUID FILLED ORAL at 08:55

## 2021-06-24 RX ADMIN — INSULIN LISPRO 1 UNITS: 100 INJECTION, SOLUTION INTRAVENOUS; SUBCUTANEOUS at 08:56

## 2021-06-24 RX ADMIN — ENOXAPARIN SODIUM 40 MG: 40 INJECTION SUBCUTANEOUS at 08:55

## 2021-06-24 RX ADMIN — INSULIN LISPRO 2 UNITS: 100 INJECTION, SOLUTION INTRAVENOUS; SUBCUTANEOUS at 19:02

## 2021-06-24 RX ADMIN — INSULIN LISPRO 3 UNITS: 100 INJECTION, SOLUTION INTRAVENOUS; SUBCUTANEOUS at 17:52

## 2021-06-24 RX ADMIN — IBUPROFEN 600 MG: 600 TABLET, FILM COATED ORAL at 14:55

## 2021-06-24 RX ADMIN — INSULIN LISPRO 3 UNITS: 100 INJECTION, SOLUTION INTRAVENOUS; SUBCUTANEOUS at 23:25

## 2021-06-24 NOTE — PLAN OF CARE
Problem: PAIN - ADULT  Goal: Verbalizes/displays adequate comfort level or baseline comfort level  Description: Interventions:  - Encourage patient to monitor pain and request assistance  - Assess pain using appropriate pain scale  - Administer analgesics based on type and severity of pain and evaluate response  - Implement non-pharmacological measures as appropriate and evaluate response  - Consider cultural and social influences on pain and pain management  - Notify physician/advanced practitioner if interventions unsuccessful or patient reports new pain  Outcome: Progressing     Problem: INFECTION - ADULT  Goal: Absence or prevention of progression during hospitalization  Description: INTERVENTIONS:  - Assess and monitor for signs and symptoms of infection  - Monitor lab/diagnostic results  - Monitor all insertion sites, i e  indwelling lines, tubes, and drains  - Monitor endotracheal if appropriate and nasal secretions for changes in amount and color  - [place] appropriate cooling/warming therapies per order  - Administer medications as ordered  - Instruct and encourage patient and family to use good hand hygiene technique  - Identify and instruct in appropriate isolation precautions for identified infection/condition  Outcome: Progressing  Goal: Absence of fever/infection during neutropenic period  Description: INTERVENTIONS:  - Monitor WBC    Outcome: Progressing     Problem: SAFETY ADULT  Goal: Patient will remain free of falls  Description: INTERVENTIONS:  - Educate patient/family on patient safety including physical limitations  - Instruct patient to call for assistance with activity   - Consult OT/PT to assist with strengthening/mobility   - Keep Call bell within reach  - Keep bed low and locked with side rails adjusted as appropriate  - Keep care items and personal belongings within reach  - Initiate and maintain comfort rounds  - Make Fall Risk Sign visible to staff  - Offer Toileting   - Obtain necessary fall risk management equipment:  - Apply yellow socks and bracelet for high fall risk patients  - Consider moving patient to room near nurses station  Outcome: Progressing  Goal: Maintain or return to baseline ADL function  Description: INTERVENTIONS:  -  Assess patient's ability to carry out ADLs; assess patient's baseline for ADL function and identify physical deficits which impact ability to perform ADLs (bathing, care of mouth/teeth, toileting, grooming, dressing, etc )  - Assess/evaluate cause of self-care deficits   - Assess range of motion  - Assess patient's mobility; develop plan if impaired  - Assess patient's need for assistive devices and provide as appropriate  - Encourage maximum independence but intervene and supervise when necessary  - Involve family in performance of ADLs  - Assess for home care needs following discharge   - Consider OT consult to assist with ADL evaluation and planning for discharge  - Provide patient education as appropriate  Outcome: Progressing  Goal: Maintains/Returns to pre admission functional level  Description: INTERVENTIONS:  - Perform BMAT or MOVE assessment daily    - Set and communicate daily mobility goal to care team and patient/family/caregiver  - Collaborate with rehabilitation services on mobility goals if consulted  - Ambulate patient   - Out of bed to chair    - Out of bed for meals   - Out of bed for toileting  - Record patient progress and toleration of activity level   Outcome: Progressing     Problem: Knowledge Deficit  Goal: Patient/family/caregiver demonstrates understanding of disease process, treatment plan, medications, and discharge instructions  Description: Complete learning assessment and assess knowledge base    Interventions:  - Provide teaching at level of understanding  - Provide teaching via preferred learning methods  Outcome: Progressing     Problem: DISCHARGE PLANNING  Goal: Discharge to home or other facility with appropriate resources  Description: INTERVENTIONS:  - Identify barriers to discharge w/patient and caregiver  - Arrange for needed discharge resources and transportation as appropriate  - Identify discharge learning needs (meds, wound care, etc )  - Arrange for interpretive services to assist at discharge as needed  - Refer to Case Management Department for coordinating discharge planning if the patient needs post-hospital services based on physician/advanced practitioner order or complex needs related to functional status, cognitive ability, or social support system  Outcome: Progressing     Problem: POSTPARTUM  Goal: Experiences normal postpartum course  Description: INTERVENTIONS:  - Monitor maternal vital signs  - Assess uterine involution and lochia  Outcome: Progressing  Goal: Appropriate maternal -  bonding  Description: INTERVENTIONS:  - Identify family support  - Assess for appropriate maternal/infant bonding   -Encourage maternal/infant bonding opportunities  - Referral to  or  as needed  Outcome: Progressing  Goal: Establishment of infant feeding pattern  Description: INTERVENTIONS:  - Assess breast/bottle feeding  - Refer to lactation as needed  Outcome: Progressing  Goal: Incision(s), wounds(s) or drain site(s) healing without S/S of infection  Description: INTERVENTIONS  - Assess and document dressing, incision, wound bed, drain sites and surrounding tissue  - Provide patient and family education  - Perform skin care/dressing changes   Outcome: Progressing     Problem: Nutrition/Hydration-ADULT  Goal: Nutrient/Hydration intake appropriate for improving, restoring or maintaining nutritional needs  Description: Monitor and assess patient's nutrition/hydration status for malnutrition  Collaborate with interdisciplinary team and initiate plan and interventions as ordered  Monitor patient's weight and dietary intake as ordered or per policy   Utilize nutrition screening tool and intervene as necessary  Determine patient's food preferences and provide high-protein, high-caloric foods as appropriate       INTERVENTIONS:  - Monitor oral intake, urinary output, labs, and treatment plans  - Assess nutrition and hydration status and recommend course of action  - Evaluate amount of meals eaten  - Assist patient with eating if necessary   - Allow adequate time for meals  - Recommend/ encourage appropriate diets, oral nutritional supplements, and vitamin/mineral supplements  - Order, calculate, and assess calorie counts as needed  - Recommend, monitor, and adjust tube feedings and TPN/PPN based on assessed needs  - Assess need for intravenous fluids  - Provide specific nutrition/hydration education as appropriate  - Include patient/family/caregiver in decisions related to nutrition  Outcome: Progressing

## 2021-06-24 NOTE — CASE MANAGEMENT
Consult(s):  NICU assessment;      · Delivery method/date:   c/s on 6/22/2021  · Age: Gestational age 37w2d  · Admitted to NICU for     SW CM met w/MOB @ bedside with introduction and for assessment  MOB provided the following information:      · Baby's name/gender: Minoo Fraction  · Mother of baby: Milagro Foster [phone]  · Father of baby/SO: Martin Lott [phone]  · Other Legal Guardian(s) for baby: N/A  · Alternate emergency contact: [facility]) [facility] [phone]  · Other children: MOB 9yo sister (in MGM's custody); FOB: 3 other children  · Lives with: MOB/FOB  · Support System: Limited support system; MOB reports FOB and her mother as supports   · Baby Supplies:  Reports having baby items   · Bottle or Breast Feeding: Reports bottle feeding  · Breast Pump if breast feeding: N/A  · Government Assistance Programs: [place]/WIC  · Work/School:  Work   · Transportation:  Reports having transportation  · Pediatrician:  Unsure   · [address] Hx or Treatment: MOB denies   · Substance Abuse:   · Community Referrals, C&Y, VNA:  Prior C&Y involvement; MOB has custody of 8yo due to housing insecurity per MOB  · Insurance for baby: MOB made aware she will need to call [employer] to add baby  · POA/LW: N/A  · NICU Resources and packet:  Provided to MOB   · [address]:  Referral sent      CM reviewed discharge planning process including the following: identifying caregivers at home, preference for d/c planning needs, availability of [facility] Med[facility] to Bed program, availability of treatment team to discuss questions or concerns patient and/or family may have regarding diagnosis, plan of care, old or new medications and discharge planning   CM will continue to follow for care coordination and update assessment as appropriate  MOB denies any other CM needs at this time  Encouraged family to contact CM as needed  No other CM needs noted for d/c home when medically cleared   CM dept will follow infant in NICU through dc

## 2021-06-24 NOTE — PROGRESS NOTES
Progress Note - OB/GYN   Cas Gamboa 36 y o  female MRN: [id number]  Unit/Bed#: -01 Encounter: [id number]    Assessment:  Postop Day #2 s/p repeat LTCS  Case manageBaby in NICU      Plan:  1) T1DM   Episode of hypoglycemia overnight to 63   Current regimen: Lantus 16U at bedtime, Humalog 2U TID with meals, SSI #2   Fingersticks to be taken pre-meals, at bedtime and at 2am   Continue to monitor   Endocrinology following and continued recs appreciated   Poor insight: FU case management consult  2) Postoperative care   Meeting milestones   Encourage ambulation   Encourage breastfeeding   DVT ppx: Lovenox 40mg daily    Anticipate discharge POD3 or 4  3) Chronic HTN   No severe range blood pressures   Not currently on meds    Subjective/Objective   Chief Complaint:   Gas pain    Subjective:   Pain: incisional and gas pain  Tolerating PO: yes  Voiding: yes  Flatus: yes  Bowel Movement: no  Ambulating: yes  Chest pain: no  Shortness of breath: no  Leg pain: no  Lochia: minimal  Infant feeding: trying to pump but no production at this time      Objective:   Vitals: /68   Pulse 81   Temp 98 °F (36 7 °C) (Oral)   Resp 16   Ht 4' 10" (1 473 m)   Wt 69 5 kg (153 lb 3 2 oz)   LMP 10/09/2020 (Exact Date)   SpO2 99%   Breastfeeding Unknown   BMI 32 02 kg/m²       Intake/Output Summary (Last 24 hours) at 6/24/2021 0602  Last data filed at 6/23/2021 1330  Gross per 24 hour   Intake 12 6 ml   Output 1000 ml   Net -987 4 ml       Physical Exam:     General: alert and oriented x 3, in no apparent distress  Cardiovascular: regular rate   Pulmonary: normal effort  Abdomen: soft, non-tender, non-distended, no rebound or guarding  Pelvis: Uterine fundus firm and non-tender, +2 cm above the umbilicus   Incision: steri strips applied, dry and intact  Extremities: Nontender    Lab Results   Component Value Date    WBC 10 53 (H) 06/23/2021    HGB 10 3 (L) 06/23/2021    HCT 32 6 (L) 06/23/2021    MCV 93 06/23/2021    PLT 210 06/23/2021         Brigida Vazquez MD  6/24/2021  6:02 AM

## 2021-06-24 NOTE — LACTATION NOTE
CONSULT - LACTATION  Akira Hager 36 y o  female MRN: [id number]    [facility] TELLO L&D Room / Bed: / 310-01 Encounter: [id number]    Maternal Information     MOTHER:  N/A  Maternal Age: This patient's mother is not on file  OB History: This patient's mother is not on file  Previouse breast reduction surgery? No    Lactation history:   Has patient previously breast fed: No   How long had patient previously breast fed:     Previous breast feeding complications: This patient's mother is not on file  Birth information:  YOB: 1990   Time of birth:     Sex: female   Delivery type:     Birth Weight: No birth weight on file  Percent of Weight Change: Birth weight not on file     Gestational Age: <None>   [unfilled]    Assessment     Breast and nipple assessment: small, conical shaped breasts with everted nipples; wider space between breasts     Assessment: no clinical assessment    Feeding assessment: Rosa Leon is not compliant with pumping  Education provided  LATCH:  Latch: Audible Swallowing:     Type of Nipple: Everted (After stimulation)   Comfort (Breast/Nipple): Soft/non-tender   Hold (Positioning):     LATCH Score: 4          Feeding recommendations:  pump every 2-3 hours  Rosa Leon called for nipple cream due to tenderness after initial pump of the day  Pumping education provided  Lanolin and small flanges provided  Mom wants a medela max flow - order sent to   NICU pumping log and additional education provided  Rosa Leon presented with flat affect during education  Encouraged to call lactation to assist with any information she did not understand  Met with mother  Provided mother with Ready, Set, Baby booklet  Discussed Skin to Skin contact an benefits to mom and baby  Talked about the delay of the first bath until baby has adjusted  Spoke about the benefits of rooming in   Feeding on cue and what that means for recognizing infant's hunger  Avoidance of pacifiers for the first month discussed  Talked about exclusive breastfeeding for the first 6 months  Positioning and latch reviewed as well as showing images of other feeding positions  Discussed the properties of a good latch in any position  Reviewed hand/manual expression  Discussed s/s that baby is getting enough milk and some s/s that breastfeeding dyad may need further help  Gave information on common concerns, what to expect the first few weeks after delivery, preparing for other caregivers, and how partners can help  Resources for support also provided  Information on hand expression given  Discussed benefits of knowing how to manually express breast including stimulating milk supply, softening nipple for latch and evacuating breast in the event of engorgement  Mom provided with and discussed RBS, Hand expression/2nd night handout and increase supply for NICU baby  Reviewed pumping log and expectations for pumping output in the first week  Reviewed cycle pumping and appropriate pump settings, as well as pumping for 10-15 min 8-12 times per day  Enc Mom to discussed putting baby to the breast with the NICU team when baby is medically stable to do so  Enc her to call for lactation support as needed throughout her stay  Encouraged parents to call for assistance, questions, and concerns about breastfeeding  Extension provided      Carmen Donahue, [address] 6/24/2021 3:36 PM
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
## 2021-06-24 NOTE — QUICK NOTE
Chart reviewed  Patient is currently on Lantus 16 units q h s  And lispro 2 units t i d  A c  and ISS  She had an episode low glucose  To 63 mg/dL at 2:00 a m  [place] Postprandial glucose 168 mg/dL  We recommend decreasing Lantus to 14 units q h s  Continue lispro 2 units t i d  A c  Use correctional insulin as needed

## 2021-06-24 NOTE — PLAN OF CARE
Problem: PAIN - ADULT  Goal: Verbalizes/displays adequate comfort level or baseline comfort level  Description: Interventions:  - Encourage patient to monitor pain and request assistance  - Assess pain using appropriate pain scale  - Administer analgesics based on type and severity of pain and evaluate response  - Implement non-pharmacological measures as appropriate and evaluate response  - Consider cultural and social influences on pain and pain management  - Notify physician/advanced practitioner if interventions unsuccessful or patient reports new pain  Outcome: Progressing     Problem: INFECTION - ADULT  Goal: Absence or prevention of progression during hospitalization  Description: INTERVENTIONS:  - Assess and monitor for signs and symptoms of infection  - Monitor lab/diagnostic results  - Monitor all insertion sites, i e  indwelling lines, tubes, and drains  - Monitor endotracheal if appropriate and nasal secretions for changes in amount and color  - [place] appropriate cooling/warming therapies per order  - Administer medications as ordered  - Instruct and encourage patient and family to use good hand hygiene technique  - Identify and instruct in appropriate isolation precautions for identified infection/condition  Outcome: Progressing  Goal: Absence of fever/infection during neutropenic period  Description: INTERVENTIONS:  - Monitor WBC    Outcome: Progressing     Problem: SAFETY ADULT  Goal: Patient will remain free of falls  Description: INTERVENTIONS:  - Educate patient/family on patient safety including physical limitations  - Instruct patient to call for assistance with activity   - Consult OT/PT to assist with strengthening/mobility   - Keep Call bell within reach  - Keep bed low and locked with side rails adjusted as appropriate  - Keep care items and personal belongings within reach  - Initiate and maintain comfort rounds  - Make Fall Risk Sign visible to staff  -**  - Apply yellow socks and bracelet for high fall risk patients  - Consider moving patient to room near nurses station  Outcome: Progressing  Goal: Maintain or return to baseline ADL function  Description: INTERVENTIONS:  -  Assess patient's ability to carry out ADLs; assess patient's baseline for ADL function and identify physical deficits which impact ability to perform ADLs (bathing, care of mouth/teeth, toileting, grooming, dressing, etc )  - Assess/evaluate cause of self-care deficits   - Assess range of motion  - Assess patient's mobility; develop plan if impaired  - Assess patient's need for assistive devices and provide as appropriate  - Encourage maximum independence but intervene and supervise when necessary  - Involve family in performance of ADLs  - Assess for home care needs following discharge   - Consider OT consult to assist with ADL evaluation and planning for discharge  - Provide patient education as appropriate  Outcome: Progressing  Goal: Maintains/Returns to pre admission functional level  Description: INTERVENTIONS:  - Perform BMAT or MOVE assessment daily    - Set and communicate daily mobility goal to care team and patient/family/caregiver  - Problem: Knowledge Deficit  Goal: Patient/family/caregiver demonstrates understanding of disease process, treatment plan, medications, and discharge instructions  Description: Complete learning assessment and assess knowledge base    Interventions:  - Provide teaching at level of understanding  - Provide teaching via preferred learning methods  Outcome: Progressing     Problem: DISCHARGE PLANNING  Goal: Discharge to home or other facility with appropriate resources  Description: INTERVENTIONS:  - Identify barriers to discharge w/patient and caregiver  - Arrange for needed discharge resources and transportation as appropriate  - Identify discharge learning needs (meds, wound care, etc )  - Arrange for interpretive services to assist at discharge as needed  - Refer to Case Management Department for coordinating discharge planning if the patient needs post-hospital services based on physician/advanced practitioner order or complex needs related to functional status, cognitive ability, or social support system  Outcome: Progressing     Problem: POSTPARTUM  Goal: Experiences normal postpartum course  Description: INTERVENTIONS:  - Monitor maternal vital signs  - Assess uterine involution and lochia  Outcome: Progressing  Goal: Appropriate maternal -  bonding  Description: INTERVENTIONS:  - Identify family support  - Assess for appropriate maternal/infant bonding   -Encourage maternal/infant bonding opportunities  - Referral to  or  as needed  Outcome: Progressing  Goal: Establishment of infant feeding pattern  Description: INTERVENTIONS:  - Assess breast/bottle feeding  - Refer to lactation as needed  Outcome: Progressing  Goal: Incision(s), wounds(s) or drain site(s) healing without S/S of infection  Description: INTERVENTIONS  - Assess and document dressing, incision, wound bed, drain sites and surrounding tissue  - Provide patient and family education  -   Problem: Nutrition/Hydration-ADULT  Goal: Nutrient/Hydration intake appropriate for improving, restoring or maintaining nutritional needs  Description: Monitor and assess patient's nutrition/hydration status for malnutrition  Collaborate with interdisciplinary team and initiate plan and interventions as ordered  Monitor patient's weight and dietary intake as ordered or per policy  Utilize nutrition screening tool and intervene as necessary  Determine patient's food preferences and provide high-protein, high-caloric foods as appropriate       INTERVENTIONS:  - Monitor oral intake, urinary output, labs, and treatment plans  - Assess nutrition and hydration status and recommend course of action  - Evaluate amount of meals eaten  - Assist patient with eating if necessary   - Allow adequate time for meals  - Recommend/ encourage appropriate diets, oral nutritional supplements, and vitamin/mineral supplements  - Order, calculate, and assess calorie counts as needed  - Recommend, monitor, and adjust tube feedings and TPN/PPN based on assessed needs  - Assess need for intravenous fluids  - Provide specific nutrition/hydration education as appropriate  - Include patient/family/caregiver in decisions related to nutrition  Outcome: Progressing     Problem: INFECTION - ADULT  Goal: Absence or prevention of progression during hospitalization  Description: INTERVENTIONS:  - Assess and monitor for signs and symptoms of infection  - Monitor lab/diagnostic results  - Monitor all insertion sites, i e  indwelling lines, tubes, and drains  - Monitor endotracheal if appropriate and nasal secretions for changes in amount and color  - [place] appropriate cooling/warming therapies per order  - Administer medications as ordered  - Instruct and encourage patient and family to use good hand hygiene technique  - Identify and instruct in appropriate isolation precautions for identified infection/condition  Outcome: Progressing     Problem: DISCHARGE PLANNING  Goal: Discharge to home or other facility with appropriate resources  Description: INTERVENTIONS:  - Identify barriers to discharge w/patient and caregiver  - Arrange for needed discharge resources and transportation as appropriate  - Identify discharge learning needs (meds, wound care, etc )  - Arrange for interpretive services to assist at discharge as needed  - Refer to Case Management Department for coordinating discharge planning if the patient needs post-hospital services based on physician/advanced practitioner order or complex needs related to functional status, cognitive ability, or social support system  Outcome: Progressing     Problem: POSTPARTUM  Goal: Experiences normal postpartum course  Description: INTERVENTIONS:  - Monitor maternal vital signs  - Assess uterine involution and lochia  Outcome: Progressing     Problem: Nutrition/Hydration-ADULT  Goal: Nutrient/Hydration intake appropriate for improving, restoring or maintaining nutritional needs  Description: Monitor and assess patient's nutrition/hydration status for malnutrition  Collaborate with interdisciplinary team and initiate plan and interventions as ordered  Monitor patient's weight and dietary intake as ordered or per policy  Utilize nutrition screening tool and intervene as necessary  Determine patient's food preferences and provide high-protein, high-caloric foods as appropriate       INTERVENTIONS:  - Monitor oral intake, urinary output, labs, and treatment plans  - Assess nutrition and hydration status and recommend course of action  - Evaluate amount of meals eaten  - Assist patient with eating if necessary   - Allow adequate time for meals  - Recommend/ encourage appropriate diets, oral nutritional supplements, and vitamin/mineral supplements  - Order, calculate, and assess calorie counts as needed  - Recommend, monitor, and adjust tube feedings and TPN/PPN based on assessed needs  - Assess need for intravenous fluids  - Provide specific nutrition/hydration education as appropriate  - Include patient/family/caregiver in decisions related to nutrition  Outcome: Progressing

## 2021-06-25 LAB
GLUCOSE SERPL-MCNC: 106 MG/DL (ref 65–140)
GLUCOSE SERPL-MCNC: 123 MG/DL (ref 65–140)
GLUCOSE SERPL-MCNC: 253 MG/DL (ref 65–140)
GLUCOSE SERPL-MCNC: 254 MG/DL (ref 65–140)
GLUCOSE SERPL-MCNC: 292 MG/DL (ref 65–140)
GLUCOSE SERPL-MCNC: 413 MG/DL (ref 65–140)

## 2021-06-25 PROCEDURE — 82948 REAGENT STRIP/BLOOD GLUCOSE: CPT

## 2021-06-25 PROCEDURE — NC001 PR NO CHARGE: Performed by: OBSTETRICS & GYNECOLOGY

## 2021-06-25 PROCEDURE — 99024 POSTOP FOLLOW-UP VISIT: CPT | Performed by: OBSTETRICS & GYNECOLOGY

## 2021-06-25 PROCEDURE — 99221 1ST HOSP IP/OBS SF/LOW 40: CPT | Performed by: PSYCHIATRY & NEUROLOGY

## 2021-06-25 PROCEDURE — 99232 SBSQ HOSP IP/OBS MODERATE 35: CPT | Performed by: INTERNAL MEDICINE

## 2021-06-25 RX ADMIN — OXYCODONE HYDROCHLORIDE 10 MG: 10 TABLET ORAL at 16:14

## 2021-06-25 RX ADMIN — INSULIN GLARGINE 14 UNITS: 100 INJECTION, SOLUTION SUBCUTANEOUS at 23:53

## 2021-06-25 RX ADMIN — ENOXAPARIN SODIUM 40 MG: 40 INJECTION SUBCUTANEOUS at 08:03

## 2021-06-25 RX ADMIN — DOCUSATE SODIUM 100 MG: 100 CAPSULE, LIQUID FILLED ORAL at 08:03

## 2021-06-25 RX ADMIN — ACETAMINOPHEN 650 MG: 325 TABLET, FILM COATED ORAL at 08:02

## 2021-06-25 RX ADMIN — INSULIN LISPRO 2 UNITS: 100 INJECTION, SOLUTION INTRAVENOUS; SUBCUTANEOUS at 23:51

## 2021-06-25 RX ADMIN — DOCUSATE SODIUM 100 MG: 100 CAPSULE, LIQUID FILLED ORAL at 20:05

## 2021-06-25 RX ADMIN — IBUPROFEN 600 MG: 600 TABLET, FILM COATED ORAL at 02:50

## 2021-06-25 RX ADMIN — INSULIN LISPRO 5 UNITS: 100 INJECTION, SOLUTION INTRAVENOUS; SUBCUTANEOUS at 20:58

## 2021-06-25 NOTE — PLAN OF CARE
Problem: PAIN - ADULT  Goal: Verbalizes/displays adequate comfort level or baseline comfort level  Description: Interventions:  - Encourage patient to monitor pain and request assistance  - Assess pain using appropriate pain scale  - Administer analgesics based on type and severity of pain and evaluate response  - Implement non-pharmacological measures as appropriate and evaluate response  - Consider cultural and social influences on pain and pain management  - Notify physician/advanced practitioner if interventions unsuccessful or patient reports new pain  Outcome: Progressing     Problem: INFECTION - ADULT  Goal: Absence or prevention of progression during hospitalization  Description: INTERVENTIONS:  - Assess and monitor for signs and symptoms of infection  - Monitor lab/diagnostic results  - Monitor all insertion sites, i e  indwelling lines, tubes, and drains  - Monitor endotracheal if appropriate and nasal secretions for changes in amount and color  - [place] appropriate cooling/warming therapies per order  - Administer medications as ordered  - Instruct and encourage patient and family to use good hand hygiene technique  - Identify and instruct in appropriate isolation precautions for identified infection/condition  Outcome: Progressing  Goal: Absence of fever/infection during neutropenic period  Description: INTERVENTIONS:  - Monitor WBC    Outcome: Progressing     Problem: SAFETY ADULT  Goal: Patient will remain free of falls  Description: INTERVENTIONS:  - Educate patient/family on patient safety including physical limitations  - Instruct patient to call for assistance with activity   - Consult OT/PT to assist with strengthening/mobility   - Keep Call bell within reach  - Keep bed low and locked with side rails adjusted as appropriate  - Keep care items and personal belongings within reach  - Initiate and maintain comfort rounds  - Make Fall Risk Sign visible to staff  - Offer Toileting   - Obtain necessary fall risk management equipment:   - Apply yellow socks and bracelet for high fall risk patients  - Consider moving patient to room near nurses station  Outcome: Progressing  Goal: Maintain or return to baseline ADL function  Description: INTERVENTIONS:  -  Assess patient's ability to carry out ADLs; assess patient's baseline for ADL function and identify physical deficits which impact ability to perform ADLs (bathing, care of mouth/teeth, toileting, grooming, dressing, etc )  - Assess/evaluate cause of self-care deficits   - Assess range of motion  - Assess patient's mobility; develop plan if impaired  - Assess patient's need for assistive devices and provide as appropriate  - Encourage maximum independence but intervene and supervise when necessary  - Involve family in performance of ADLs  - Assess for home care needs following discharge   - Consider OT consult to assist with ADL evaluation and planning for discharge  - Provide patient education as appropriate  Outcome: Progressing  Goal: Maintains/Returns to pre admission functional level  Description: INTERVENTIONS:  - Perform BMAT or MOVE assessment daily    - Set and communicate daily mobility goal to care team and patient/family/caregiver  - Collaborate with rehabilitation services on mobility goals if consulted  - Perform Range of Motion  - Reposition patient every  - Dangle patient   - Stand patient  - Ambulate patient   - Out of bed to chair    - Out of bed for meals   - Out of bed for toileting  - Record patient progress and toleration of activity level   Outcome: Progressing     Problem: Knowledge Deficit  Goal: Patient/family/caregiver demonstrates understanding of disease process, treatment plan, medications, and discharge instructions  Description: Complete learning assessment and assess knowledge base    Interventions:  - Provide teaching at level of understanding  - Provide teaching via preferred learning methods  Outcome: Progressing Problem: DISCHARGE PLANNING  Goal: Discharge to home or other facility with appropriate resources  Description: INTERVENTIONS:  - Identify barriers to discharge w/patient and caregiver  - Arrange for needed discharge resources and transportation as appropriate  - Identify discharge learning needs (meds, wound care, etc )  - Arrange for interpretive services to assist at discharge as needed  - Refer to Case Management Department for coordinating discharge planning if the patient needs post-hospital services based on physician/advanced practitioner order or complex needs related to functional status, cognitive ability, or social support system  Outcome: Progressing     Problem: POSTPARTUM  Goal: Experiences normal postpartum course  Description: INTERVENTIONS:  - Monitor maternal vital signs  - Assess uterine involution and lochia  Outcome: Progressing  Goal: Appropriate maternal -  bonding  Description: INTERVENTIONS:  - Identify family support  - Assess for appropriate maternal/infant bonding   -Encourage maternal/infant bonding opportunities  - Referral to  or  as needed  Outcome: Progressing  Goal: Establishment of infant feeding pattern  Description: INTERVENTIONS:  - Assess breast/bottle feeding  - Refer to lactation as needed  Outcome: Progressing  Goal: Incision(s), wounds(s) or drain site(s) healing without S/S of infection  Description: INTERVENTIONS  - Assess and document dressing, incision, wound bed, drain sites and surrounding tissue  - Provide patient and family education  Outcome: Progressing     Problem: Nutrition/Hydration-ADULT  Goal: Nutrient/Hydration intake appropriate for improving, restoring or maintaining nutritional needs  Description: Monitor and assess patient's nutrition/hydration status for malnutrition  Collaborate with interdisciplinary team and initiate plan and interventions as ordered    Monitor patient's weight and dietary intake as ordered or per policy  Utilize nutrition screening tool and intervene as necessary  Determine patient's food preferences and provide high-protein, high-caloric foods as appropriate       INTERVENTIONS:  - Monitor oral intake, urinary output, labs, and treatment plans  - Assess nutrition and hydration status and recommend course of action  - Evaluate amount of meals eaten  - Assist patient with eating if necessary   - Allow adequate time for meals  - Recommend/ encourage appropriate diets, oral nutritional supplements, and vitamin/mineral supplements  - Order, calculate, and assess calorie counts as needed  - Recommend, monitor, and adjust tube feedings and TPN/PPN based on assessed needs  - Assess need for intravenous fluids  - Provide specific nutrition/hydration education as appropriate  - Include patient/family/caregiver in decisions related to nutrition  Outcome: Progressing

## 2021-06-25 NOTE — PROGRESS NOTES
Progress Note - OB/GYN   Travis Haney 36 y o  female MRN: [id number]  Unit/Bed#: -01 Encounter: [id number]    Assessment:  Postop Day #3 s/p repeat LTCS   Baby in NICU for blood sugars    Plan:  1) T1DM   Episode of hypoglycemia overnight to 63   Current regimen: Lantus 16U --> 14U at bedtime, Lispro 2U TID with meals, SSI #2   Fingersticks to be taken pre-meals, at bedtime and at 2am   Continue to monitor   Endocrinology following and continued recs appreciated  2) Postoperative care   Meeting milestones   Encourage ambulation   Encourage pumping as baby in NICU   DVT ppx: Lovenox 40mg daily   3) Chronic HTN   No severe range blood pressures   Not currently on meds  4) Capacity evaluation   Neuropsychology does not come to [facility]   Must be seen as outpatient per nursing staff yesterday   Will contact them today to see if they can do telemedicine  5) Disposition: Possible discharge today if stable from endocrine perspective    Subjective/Objective   Chief Complaint:   Gas pain    Subjective:   Pain: incisional and gas pain  Tolerating PO: yes  Voiding: yes  Flatus: yes  Bowel Movement: no  Ambulating: yes  Chest pain: no  Shortness of breath: no  Leg pain: no  Lochia: minimal  Infant feeding: trying to pump but no production at this time      Objective:   Vitals: /72 (BP Location: Left arm)   Pulse 77   Temp 98 4 °F (36 9 °C) (Oral)   Resp 18   Ht 4' 10" (1 473 m)   Wt 69 5 kg (153 lb 3 2 oz)   LMP 10/09/2020 (Exact Date)   SpO2 100%   Breastfeeding Yes   BMI 32 02 kg/m²     No intake or output data in the 24 hours ending 06/25/21 0646    Physical Exam:     General: alert and oriented x 3, in no apparent distress  Cardiovascular: regular rate   Pulmonary: normal effort  Abdomen: soft, non-tender, non-distended, no rebound or guarding  Pelvis: Uterine fundus firm and non-tender, at the umbilicus   Incision: steri strips applied, dry and intact  Extremities: Nontender    Lab Results   Component Value Date    WBC 10 53 (H) 06/23/2021    HGB 10 3 (L) 06/23/2021    HCT 32 6 (L) 06/23/2021    MCV 93 06/23/2021     06/23/2021         Sky Weems DO  6/25/2021  6:46 AM

## 2021-06-25 NOTE — CASE MANAGEMENT
SW offered [facility] for DM management to include teaching  MOB declined at this time  Encouraged [facility] to contact SW as needed

## 2021-06-25 NOTE — CASE MANAGEMENT
Breast pump consult: [place] order placed via [employer] for Medela Max Flow pump for room delivery  Pump subsequently delivered to pt @ bedside to facilitate discharge  SW following

## 2021-06-25 NOTE — QUICK NOTE
Attempted to reach out to [facility] on call doctor/provider to see if they can do telehealth, no one in tiger connect role, unable to see through [facility]  Attempted to reach out to on call psych resident/attending, no one in any of the tiger connect roles  Called [phone] to place consult, received page back from [facility] provider with Nargis Jennings     D/w Dr Eun Bansal 92, DO  Obstetrics & Gynecology PGY-1  6/25/2021  11:21 AM

## 2021-06-25 NOTE — TELEMEDICINE
TeleConsultation - [address] [facility] 36 y o  female MRN: [id number]  Unit/Bed#: -01 Encounter: [id number]      REQUIRED DOCUMENTATION:     1  This service was provided via Telemedicine  2  Provider located at [facility]  3  TeleMed provider: Antonia Fountain  4  Identify all parties in room with patient during tele consult:  Patient  5  After connecting through televideo, patient was identified by name and date of birth and assistant checked wristband  Patient was then informed that this was a Telemedicine visit and that the exam was being conducted confidentially over secure lines  My office door was closed  No one else was in the room  Patient acknowledged consent and understanding of privacy and security of the Telemedicine visit, and gave us permission to have the assistant stay in the room in order to assist with the history and to conduct the exam   I informed the patient that I have reviewed their record in Epic and presented the opportunity for them to ask any questions regarding the visit today  The patient agreed to participate  Assessment/Plan     Assessment:  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 versus the rope hard to disorder versus intellectual disability    Plan:   Neuropsychological testing on outpatient basis to better clarify the patient's difficulty with recall  A trial with antidepressant such as Zoloft or Prozac might be beneficial to see if there is improvement seen  The patient's Harvey with me however as to whether she intends to breastfeed but seems to be waited in that direction so this will need to be a conversation with patient and the child's pediatrician to select an antidepressant that the pediatrician feels most comfortable with if the child is breast fed      Chief Complaint:  I do not know what asked to see me in     History of Present Illness     Reason for Consult / Principal Problem:  Has serves difficulty recalling important medical education regarding her health issues  Patient is a 36 y o  female who recently delivered a viable female  on 2021 after a high risk pregnancy complicated by several is is a DKA secondary to poor compliance by the patient  Staff has observed that although the patient has been thoroughly educated regarding management of her diabetes, she frequently reports that she has not received this education  Past psychiatric history:  The patient denies any prior psychiatric history  Social history:  Patient lives with her boyfriend who is the father of her baby  She says she worked in the [employer] up until week ago  Boyfriend worker's also at the same [employer]  She states she has a 9year-old daughter whom her mother has custody  She is very vague as to why she lost custody simply saying that she lost her apartment  In general the patient is poor historian  Family history:  The patient denies any psychiatric family history  Substance use history:  Patient denies any use of alcohol or other substances  Mental status examination:  The patient was alert and well oriented in all spheres  Affect in general was blunted  She made good eye contact  There tended to be a delayed response to questions meds she seems to be searching for the appropriate response  She denies suicidal homicidal ideation and any psychotic features including hallucinations  She denies that she has ever had suicidal homicidal ideation  Sensorium is clear  Thought process is logical linear although did appear to be slowed  Thought content was relevant to the conversation  When asked about it the patient acknowledged that she had been in DKA several times during the pregnancy  When asked why this was happening she says she with frequent check blood sugar  I explained to the patient had been asked to see her due to the difficulty she has recalling important education remains she her health problems    I asked her if this difficulty recalling this information was frustrating to her, she admitted that it was frustrating  She appeared to be open to receiving help for this to include medication  When I asked if she would be breastfeeding she responded maybe a little vehicle  Inpatient consult to Psychiatry  Consult performed by: Bindu Albarran  Consult ordered by: Tricia Palma DO            Past Medical History:   Diagnosis Date    Diabetes mellitus ([facility] [address] )     uses kwiki pen     Diabetes mellitus type 1 ([facility] [address] )     High blood pressure     recently dx/ put on lisinopril     High cholesterol     Hypertension      no hypersion     Miscarriage     Recurrent pregnancy loss, antepartum condition or complication        Medical Review Of Systems:  Review of Systems    Meds/Allergies   all current active meds have been reviewed  No Known Allergies    Objective   Vital signs in last 24 hours:  Temp:  [97 9 °F (36 6 °C)-98 7 °F (37 1 °C)] 97 9 °F (36 6 °C)  HR:  [77-93] 93  Resp:  [18] 18  BP: (118-129)/(60-76) 129/76    No intake or output data in the 24 hours ending 06/25/21 1549      Lab Results:  Reviewed  Imaging Studies:  Reviewed  EKG, Pathology, and Other Studies:  Reviewed    Code Status: Level 1 - Full Code  Advance Directive and Living Will:      Power of :    POLST:      Counseling / Coordination of Care  Total floor / unit time spent today 30 minutes  Greater than 50% of total time was spent with the patient and / or family counseling and / or coordination of care  A description of the counseling / coordination of care:  Chart review, patient evaluation, coordination with staff
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
## 2021-06-25 NOTE — PROGRESS NOTES
Progress Note - Travis Haney 36 y o  female MRN: [id number]    Unit/Bed#: -01 Encounter: [id number]      CC: diabetes f/u    Subjective:   Travis Haney is a 36y o  year old female with type 1 diabetes who is POD 3 s/p   Feels well and has a good appetite  No hypoglycemia  Objective:     Vitals: Blood pressure 129/76, pulse 93, temperature 97 9 °F (36 6 °C), temperature source Oral, resp  rate 18, height 4' 10" (1 473 m), weight 69 5 kg (153 lb 3 5 oz), last menstrual period 10/09/2020, SpO2 98 %, currently breastfeeding  ,Body mass index is 32 02 kg/m²  No intake or output data in the 24 hours ending 21 1115    Physical Exam:  General Appearance: awake, appears stated age  Head: Normocephalic, without obvious abnormality, atraumatic  Extremities: moves all extremities  Skin: Skin color and temperature normal    Pulm: no labored breathing    Lab, Imaging and other studies: I have personally reviewed pertinent reports  POC Glucose (mg/dl)   Date Value   2021 123   2021 254 (H)   2021 310 (H)   2021 288 (H)   2021 168 (H)   2021 176 (H)   2021 71   2021 63 (L)   2021 206 (H)   2021 255 (H)       Impression:    -Type 1 diabetes on long-term insulin therapy  She has postprandial hyperglycemia correlating with increased p o  Intake     -Status post  delivery    Recommendations:    Continue Lantus to 14 units q h s  And increase lispro to 4 units t i d  A c  Use correctional insulin as needed and continue glucose monitoring  Portions of the record may have been created with voice recognition software

## 2021-06-26 VITALS
HEART RATE: 81 BPM | DIASTOLIC BLOOD PRESSURE: 68 MMHG | TEMPERATURE: 98.2 F | OXYGEN SATURATION: 98 % | WEIGHT: 153.22 LBS | RESPIRATION RATE: 18 BRPM | HEIGHT: 58 IN | SYSTOLIC BLOOD PRESSURE: 119 MMHG | BODY MASS INDEX: 32.16 KG/M2

## 2021-06-26 LAB
GLUCOSE SERPL-MCNC: 192 MG/DL (ref 65–140)
GLUCOSE SERPL-MCNC: 72 MG/DL (ref 65–140)

## 2021-06-26 PROCEDURE — NC001 PR NO CHARGE: Performed by: OBSTETRICS & GYNECOLOGY

## 2021-06-26 PROCEDURE — 82948 REAGENT STRIP/BLOOD GLUCOSE: CPT

## 2021-06-26 RX ORDER — IBUPROFEN 600 MG/1
600 TABLET ORAL EVERY 6 HOURS PRN
Qty: 30 TABLET | Refills: 0
Start: 2021-06-26 | End: 2022-03-10 | Stop reason: HOSPADM

## 2021-06-26 RX ADMIN — ACETAMINOPHEN 650 MG: 325 TABLET, FILM COATED ORAL at 18:03

## 2021-06-26 RX ADMIN — INSULIN LISPRO 1 UNITS: 100 INJECTION, SOLUTION INTRAVENOUS; SUBCUTANEOUS at 14:52

## 2021-06-26 RX ADMIN — IBUPROFEN 600 MG: 600 TABLET, FILM COATED ORAL at 01:38

## 2021-06-26 RX ADMIN — OXYCODONE HYDROCHLORIDE 5 MG: 5 TABLET ORAL at 16:35

## 2021-06-26 RX ADMIN — DIPHENHYDRAMINE HCL 25 MG: 25 TABLET, COATED ORAL at 01:39

## 2021-06-26 RX ADMIN — OXYCODONE HYDROCHLORIDE 10 MG: 10 TABLET ORAL at 05:58

## 2021-06-26 RX ADMIN — DOCUSATE SODIUM 100 MG: 100 CAPSULE, LIQUID FILLED ORAL at 18:03

## 2021-06-26 RX ADMIN — ENOXAPARIN SODIUM 40 MG: 40 INJECTION SUBCUTANEOUS at 08:11

## 2021-06-26 RX ADMIN — ACETAMINOPHEN 650 MG: 325 TABLET, FILM COATED ORAL at 08:11

## 2021-06-26 RX ADMIN — DOCUSATE SODIUM 100 MG: 100 CAPSULE, LIQUID FILLED ORAL at 08:11

## 2021-06-26 NOTE — PLAN OF CARE
Problem: PAIN - ADULT  Goal: Verbalizes/displays adequate comfort level or baseline comfort level  Description: Interventions:  - Encourage patient to monitor pain and request assistance  - Assess pain using appropriate pain scale  - Administer analgesics based on type and severity of pain and evaluate response  - Implement non-pharmacological measures as appropriate and evaluate response  - Consider cultural and social influences on pain and pain management  - Notify physician/advanced practitioner if interventions unsuccessful or patient reports new pain  Outcome: Progressing     Problem: INFECTION - ADULT  Goal: Absence or prevention of progression during hospitalization  Description: INTERVENTIONS:  - Assess and monitor for signs and symptoms of infection  - Monitor lab/diagnostic results  - Monitor all insertion sites, i e  indwelling lines, tubes, and drains  - Monitor endotracheal if appropriate and nasal secretions for changes in amount and color  - [place] appropriate cooling/warming therapies per order  - Administer medications as ordered  - Instruct and encourage patient and family to use good hand hygiene technique  - Identify and instruct in appropriate isolation precautions for identified infection/condition  Outcome: Progressing  Goal: Absence of fever/infection during neutropenic period  Description: INTERVENTIONS:  - Monitor WBC    Outcome: Progressing     Problem: SAFETY ADULT  Goal: Patient will remain free of falls  Description: INTERVENTIONS:  - Educate patient/family on patient safety including physical limitations  - Instruct patient to call for assistance with activity   - Consult OT/PT to assist with strengthening/mobility   - Keep Call bell within reach  - Keep bed low and locked with side rails adjusted as appropriate  - Keep care items and personal belongings within reach  - Initiate and maintain comfort rounds  - Make Fall Risk Sign visible to staff  - Offer Toileting  - Obtain necessary fall risk management equipment:   - Apply yellow socks and bracelet for high fall risk patients  - Consider moving patient to room near nurses station  Outcome: Progressing  Goal: Maintain or return to baseline ADL function  Description: INTERVENTIONS:  -  Assess patient's ability to carry out ADLs; assess patient's baseline for ADL function and identify physical deficits which impact ability to perform ADLs (bathing, care of mouth/teeth, toileting, grooming, dressing, etc )  - Assess/evaluate cause of self-care deficits   - Assess range of motion  - Assess patient's mobility; develop plan if impaired  - Assess patient's need for assistive devices and provide as appropriate  - Encourage maximum independence but intervene and supervise when necessary  - Involve family in performance of ADLs  - Assess for home care needs following discharge   - Consider OT consult to assist with ADL evaluation and planning for discharge  - Provide patient education as appropriate  Outcome: Progressing  Goal: Maintains/Returns to pre admission functional level  Description: INTERVENTIONS:  - Perform BMAT or MOVE assessment daily    - Set and communicate daily mobility goal to care team and patient/family/caregiver  - Collaborate with rehabilitation services on mobility goals if consulted  - Reposition patient   - Stand patient   - Ambulate patient   - Out of bed to chair   - Out of bed for meals   - Out of bed for toileting  - Record patient progress and toleration of activity level   Outcome: Progressing     Problem: Knowledge Deficit  Goal: Patient/family/caregiver demonstrates understanding of disease process, treatment plan, medications, and discharge instructions  Description: Complete learning assessment and assess knowledge base    Interventions:  - Provide teaching at level of understanding  - Provide teaching via preferred learning methods  Outcome: Progressing     Problem: DISCHARGE PLANNING  Goal: Discharge to home or other facility with appropriate resources  Description: INTERVENTIONS:  - Identify barriers to discharge w/patient and caregiver  - Arrange for needed discharge resources and transportation as appropriate  - Identify discharge learning needs (meds, wound care, etc )  - Arrange for interpretive services to assist at discharge as needed  - Refer to Case Management Department for coordinating discharge planning if the patient needs post-hospital services based on physician/advanced practitioner order or complex needs related to functional status, cognitive ability, or social support system  Outcome: Progressing     Problem: POSTPARTUM  Goal: Experiences normal postpartum course  Description: INTERVENTIONS:  - Monitor maternal vital signs  - Assess uterine involution and lochia  Outcome: Progressing  Goal: Appropriate maternal -  bonding  Description: INTERVENTIONS:  - Identify family support  - Assess for appropriate maternal/infant bonding   -Encourage maternal/infant bonding opportunities  - Referral to  or  as needed  Outcome: Progressing  Goal: Establishment of infant feeding pattern  Description: INTERVENTIONS:  - Assess breast/bottle feeding  - Refer to lactation as needed  Outcome: Progressing  Goal: Incision(s), wounds(s) or drain site(s) healing without S/S of infection  Description: INTERVENTIONS  - Assess and document dressing, incision, wound bed, drain sites and surrounding tissue  - Provide patient and family education  Outcome: Progressing     Problem: Nutrition/Hydration-ADULT  Goal: Nutrient/Hydration intake appropriate for improving, restoring or maintaining nutritional needs  Description: Monitor and assess patient's nutrition/hydration status for malnutrition  Collaborate with interdisciplinary team and initiate plan and interventions as ordered  Monitor patient's weight and dietary intake as ordered or per policy   Utilize nutrition screening tool and intervene as necessary  Determine patient's food preferences and provide high-protein, high-caloric foods as appropriate       INTERVENTIONS:  - Monitor oral intake, urinary output, labs, and treatment plans  - Assess nutrition and hydration status and recommend course of action  - Evaluate amount of meals eaten  - Assist patient with eating if necessary   - Allow adequate time for meals  - Recommend/ encourage appropriate diets, oral nutritional supplements, and vitamin/mineral supplements  - Order, calculate, and assess calorie counts as needed  - Recommend, monitor, and adjust tube feedings and TPN/PPN based on assessed needs  - Assess need for intravenous fluids  - Provide specific nutrition/hydration education as appropriate  - Include patient/family/caregiver in decisions related to nutrition  Outcome: Progressing

## 2021-06-26 NOTE — LACTATION NOTE
Mom states she is pumping but is not getting milk out  Stressed need for frequent pumping to establish supply  Encouraged use of breast massage and hand expression also  Given discharge breastfeeding pkat and same reviewed  Reviewed engorgement relief measures, use of feeding log and when and where to call for additional assistance as needed  Discussed triple feeds and modified demand schedule  Encouraged to call for additional assistance as needed

## 2021-06-26 NOTE — PROGRESS NOTES
Progress Note - OB/GYN   Leon Coyne 36 y o  female MRN: [id number]  Unit/Bed#: -01 Encounter: [id number]    Assessment:  Postop Day #4 s/p repeat LTCS  Baby in NICU for blood sugars and will not be discharged with mother    Plan:  1) T1DM   Episode of hypoglycemia overnight to 63   Current regimen: Lantus 14U at bedtime, Lispro 2U TID with meals, SSI #2   Fingersticks to be taken pre-meals, at bedtime and at 2am   Continue to monitor   Endocrinology following and continued recs appreciated  2) Postoperative care   Meeting milestones   Encourage ambulation   Encourage pumping as baby in NICU   DVT ppx: Lovenox 40mg daily   3) Chronic HTN   No severe range blood pressures   Not currently on meds  4) Capacity evaluation   Seen by both Neuropsych and Psychiatry yesterdat   Neuropsych eval: 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  At this time, patient appears to have capacity to make informed medical decisions   Psychiatry: A trial with antidepressant such as Zoloft or Prozac might be beneficial to see if there is improvement seen     Greatly appreciate recommendations  5) Case management:   Declined VNA services, although thoroughly educated that these would be extremely beneficial to her  6) Disposition: Discharge today    Subjective/Objective   Chief Complaint:   Gas pain    Subjective:   Pain: incisional and gas pain  Tolerating PO: yes  Voiding: yes  Flatus: yes  Bowel Movement: no  Ambulating: yes  Chest pain: no  Shortness of breath: no  Leg pain: no  Lochia: minimal  Infant feeding: trying to pump      Objective:   Vitals: /78 (BP Location: Left arm)   Pulse 83   Temp 98 °F (36 7 °C) (Oral)   Resp 18   Ht 4' 10" (1 473 m)   Wt 69 5 kg (153 lb 3 5 oz)   LMP 10/09/2020 (Exact Date)   SpO2 98%   Breastfeeding Yes   BMI 32 02 kg/m²     No intake or output data in the 24 hours ending 06/26/21 9172    Physical Exam:     General: alert and oriented x 3, in no apparent distress  Cardiovascular: regular rate   Pulmonary: normal effort  Abdomen: soft, non-tender, non-distended, no rebound or guarding  Pelvis: Uterine fundus firm and non-tender, at the umbilicus   Incision: steri strips applied, dry and intact  Extremities: Nontender    Lab Results   Component Value Date    WBC 10 53 (H) 06/23/2021    HGB 10 3 (L) 06/23/2021    HCT 32 6 (L) 06/23/2021    MCV 93 06/23/2021     06/23/2021         Chelsea Knapp DO  6/26/2021  7:52 AM

## 2021-06-26 NOTE — QUICK NOTE
Blood glucose reviewed  Patient eating her meals at inconsistent times of the day  She has low and high blood glucose  We recommend continuing Lantus 14 q h s  And increasing lispro to 5 units t i d  A c   Patient needs to monitor her blood glucose at home and follow closely with endocrinology for dose adjustment

## 2021-06-26 NOTE — CONSULTS
Consultation - Neuropsychology/Psychology Department  Emmy Albrecht 36 y o  female MRN: [id number]  Unit/Bed#:  310-01 Encounter: [id number]        Reason for Consultation:  Emmy Albrecht is a 36y o  year old female who was referred for a Neuropsychological Exam to assess cognitive functioning and comment on capacity to make medical decisions        History of Present Illness  non compliance with medication treatment    Physician Requesting Consult: Bernardino Galan MD    PROBLEM LIST:  Patient Active Problem List   Diagnosis    Pre-existing type 1 diabetes mellitus with hyperglycemia during pregnancy in third trimester ([facility] [address] )    Hyperlipidemia, mixed    Chronic hypertension affecting pregnancy    Elevated hemoglobin A1c    Uterine synechiae    Type 1 diabetes mellitus during pregnancy in third trimester    Polyhydramnios affecting pregnancy in third trimester    Noncompliance with diabetes treatment    S/P repeat low transverse          Historical Information   Past Medical History:   Diagnosis Date    Diabetes mellitus ([facility] [address] )     uses kwiki pen     Diabetes mellitus type 1 ([facility] [address] )     High blood pressure     recently dx/ put on lisinopril     High cholesterol     Hypertension      no hypersion     Miscarriage     Recurrent pregnancy loss, antepartum condition or complication      Past Surgical History:   Procedure Laterality Date     SECTION  01/02/2014     X1    DILATION AND CURETTAGE OF UTERUS  04/10/2019    Missed AB     [place]  DELIVERY ONLY N/A 2021    Procedure:  SECTION () REPEAT;  Surgeon: Bernardino Galan MD;  Location: [facility] ;  Service: Obstetrics    WISDOM TOOTH EXTRACTION       Social History   Social History     Substance and Sexual Activity   Alcohol Use Never    Comment: Alcohol intake:   None - As per Richa Hines      Social History     Substance and Sexual Activity   Drug Use Never    Comment: Illicit drugs:   No  - As per [place] & Minor History     Tobacco Use   Smoking Status Never Smoker   Smokeless Tobacco Never Used     Family History:   Family History   Problem Relation Age of Onset    Diabetes Paternal Grandmother     No Known Problems Mother     No Known Problems Father     No Known Problems Sister     No Known Problems Brother     No Known Problems Daughter     No Known Problems Maternal Grandmother     No Known Problems Paternal Grandfather     No Known Problems Sister        Meds/Allergies   current meds:   Current Facility-Administered Medications   Medication Dose Route Frequency    acetaminophen (TYLENOL) tablet 650 mg  650 mg Oral Q4H PRN    benzocaine-menthol-lanolin-aloe (DERMOPLAST) 20-0 5 % topical spray 1 application  1 application Topical 4x Daily PRN    calcium carbonate (TUMS) chewable tablet 1,000 mg  1,000 mg Oral Daily PRN    diphenhydrAMINE (BENADRYL) tablet 25 mg  25 mg Oral Q6H PRN    docusate sodium (COLACE) capsule 100 mg  100 mg Oral BID    enoxaparin (LOVENOX) subcutaneous injection 40 mg  40 mg Subcutaneous Q24H SUYAPA    hydrocortisone 1 % cream 1 application  1 application Topical Daily PRN    ibuprofen (MOTRIN) tablet 600 mg  600 mg Oral Q4H PRN    insulin glargine (LANTUS) subcutaneous injection 14 Units 0 14 mL  14 Units Subcutaneous HS    insulin lispro (HumaLOG) 100 units/mL subcutaneous injection 1-5 Units  1-5 Units Subcutaneous TID AC    insulin lispro (HumaLOG) 100 units/mL subcutaneous injection 1-5 Units  1-5 Units Subcutaneous HS    insulin lispro (HumaLOG) 100 units/mL subcutaneous injection 4 Units  4 Units Subcutaneous TID With Meals    magnesium hydroxide (MILK OF MAGNESIA) oral suspension 30 mL  30 mL Oral Daily PRN    naloxone (NARCAN) 0 04 mg/mL syringe 0 04 mg  0 04 mg Intravenous Q3 min PRN    ondansetron (ZOFRAN) injection 4 mg  4 mg Intravenous Q8H PRN    oxyCODONE (ROXICODONE) immediate release tablet 10 mg  10 mg Oral Q6H PRN    oxyCODONE (ROXICODONE) IR tablet 5 mg 5 mg Oral Q6H PRN    simethicone (MYLICON) chewable tablet 80 mg  80 mg Oral 4x Daily PRN    witch hazel-glycerin (TUCKS) topical pad 1 pad  1 pad Topical Q4H PRN       No Known Allergies      Family and Social Support:   Freedom of Choice: Yes  CM Handoff Comments: 6/25: Medela breast pump ordered for room delivery via [place] order placed w/[employer]; Delivered by SW to pt room to facilitate d/c      Behavioral Observations: Alert, oriented x 3, cooperative; pleasant affect; denied depressed mood and anxiety; no overt evidence of psychotic process;     Cognitive Examination    General Cognitive Functioning MMSE = Impaired 25/28; General Fund of Information = Impaired    Attention/Concentration Auditory Selective Attention = Average; Auditory Vigilance = Average; Information Processing Speed = Within Normal Limits    Frontal Systems/Executive Functioning Mental Flexibility/Cognitive Control = Average; Working Memory = Average Abstract Reasoning = Impaired;  Generative Ability = Impaired,Commonsense Reasoning and Judgement = Low Average/Borderline    Language Functioning Confrontation naming = Average, Phonemic Fluency = Impaired; Semantic Retrieval = Average; Comprehension of Complex Ideational Material = Impaired; Praxis = Within Normal Limits; Repetition = Within Normal Limits; Basic Reading = Within Normal Limits; Following Commands = Within Normal Limits    Memory Functioning Narrative Recall - Short Delay = Impaired; Long Delay Narrative Recall = Impaired; Visual Recognition = Average    Visuo-Spatial Abilities Not Assessed    Functional Knowledge  Health & Safety Knowledge = Average;     Summary/Impression: On a measure assessing awareness of personal health status and ability to evaluate health problems, handle medical emergencies and take safety precautions, patient performed within normal limits   Notably, patient demonstrated deficits in memory, comprehension, generative ability and abstract reasoning ability  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  At this time, patient appears to have capacity to make informed medical decisions

## 2021-06-26 NOTE — PLAN OF CARE
Problem: PAIN - ADULT  Goal: Verbalizes/displays adequate comfort level or baseline comfort level  Description: Interventions:  - Encourage patient to monitor pain and request assistance  - Assess pain using appropriate pain scale  - Administer analgesics based on type and severity of pain and evaluate response  - Implement non-pharmacological measures as appropriate and evaluate response  - Consider cultural and social influences on pain and pain management  - Notify physician/advanced practitioner if interventions unsuccessful or patient reports new pain  Outcome: Adequate for Discharge     Problem: INFECTION - ADULT  Goal: Absence or prevention of progression during hospitalization  Description: INTERVENTIONS:  - Assess and monitor for signs and symptoms of infection  - Monitor lab/diagnostic results  - Monitor all insertion sites, i e  indwelling lines, tubes, and drains  - Monitor endotracheal if appropriate and nasal secretions for changes in amount and color  - [place] appropriate cooling/warming therapies per order  - Administer medications as ordered  - Instruct and encourage patient and family to use good hand hygiene technique  - Identify and instruct in appropriate isolation precautions for identified infection/condition  Outcome: Adequate for Discharge  Goal: Absence of fever/infection during neutropenic period  Description: INTERVENTIONS:  - Monitor WBC    Outcome: Adequate for Discharge     Problem: SAFETY ADULT  Goal: Patient will remain free of falls  Description: INTERVENTIONS:  - Educate patient/family on patient safety including physical limitations  - Instruct patient to call for assistance with activity   - Consult OT/PT to assist with strengthening/mobility   - Keep Call bell within reach  - Keep bed low and locked with side rails adjusted as appropriate  - Keep care items and personal belongings within reach  - Initiate and maintain comfort rounds  - Make Fall Risk Sign visible to staff  Hours, in advance of need  - Apply yellow socks and bracelet for high fall risk patients  - Consider moving patient to room near nurses station  Outcome: Adequate for Discharge  Goal: Maintain or return to baseline ADL function  Description: INTERVENTIONS:  -  Assess patient's ability to carry out ADLs; assess patient's baseline for ADL function and identify physical deficits which impact ability to perform ADLs (bathing, care of mouth/teeth, toileting, grooming, dressing, etc )  - Assess/evaluate cause of self-care deficits   - Assess range of motion  - Assess patient's mobility; develop plan if impaired  - Assess patient's need for assistive devices and provide as appropriate  - Encourage maximum independence but intervene and supervise when necessary  - Involve family in performance of ADLs  - Assess for home care needs following discharge   - Consider OT consult to assist with ADL evaluation and planning for discharge  - Provide patient education as appropriate  Outcome: Adequate for Discharge  Goal: Maintains/Returns to pre admission functional level  Description: INTERVENTIONS:  - Perform BMAT or MOVE assessment daily    - Set and communicate daily mobility goal to care team and patient/family/caregiver  - Collaborate with rehabilitation services on mobility goals if consulted  - Out of bed for toileting  - Record patient progress and toleration of activity level   Outcome: Adequate for Discharge     Problem: Knowledge Deficit  Goal: Patient/family/caregiver demonstrates understanding of disease process, treatment plan, medications, and discharge instructions  Description: Complete learning assessment and assess knowledge base    Interventions:  - Provide teaching at level of understanding  - Provide teaching via preferred learning methods  Outcome: Adequate for Discharge     Problem: DISCHARGE PLANNING  Goal: Discharge to home or other facility with appropriate resources  Description: INTERVENTIONS:  - Identify barriers to discharge w/patient and caregiver  - Arrange for needed discharge resources and transportation as appropriate  - Identify discharge learning needs (meds, wound care, etc )  - Arrange for interpretive services to assist at discharge as needed  - Refer to Case Management Department for coordinating discharge planning if the patient needs post-hospital services based on physician/advanced practitioner order or complex needs related to functional status, cognitive ability, or social support system  Outcome: Adequate for Discharge     Problem: POSTPARTUM  Goal: Experiences normal postpartum course  Description: INTERVENTIONS:  - Monitor maternal vital signs  - Assess uterine involution and lochia  Outcome: Adequate for Discharge  Goal: Appropriate maternal -  bonding  Description: INTERVENTIONS:  - Identify family support  - Assess for appropriate maternal/infant bonding   -Encourage maternal/infant bonding opportunities  - Referral to  or  as needed  Outcome: Adequate for Discharge  Goal: Establishment of infant feeding pattern  Description: INTERVENTIONS:  - Assess breast/bottle feeding  - Refer to lactation as needed  Outcome: Adequate for Discharge  Goal: Incision(s), wounds(s) or drain site(s) healing without S/S of infection  Description: INTERVENTIONS  - Assess and document dressing, incision, wound bed, drain sites and surrounding tissue  - Provide patient and family education  Outcome: Adequate for Discharge     Problem: Nutrition/Hydration-ADULT  Goal: Nutrient/Hydration intake appropriate for improving, restoring or maintaining nutritional needs  Description: Monitor and assess patient's nutrition/hydration status for malnutrition  Collaborate with interdisciplinary team and initiate plan and interventions as ordered  Monitor patient's weight and dietary intake as ordered or per policy   Utilize nutrition screening tool and intervene as necessary  Determine patient's food preferences and provide high-protein, high-caloric foods as appropriate       INTERVENTIONS:  - Monitor oral intake, urinary output, labs, and treatment plans  - Assess nutrition and hydration status and recommend course of action  - Evaluate amount of meals eaten  - Assist patient with eating if necessary   - Allow adequate time for meals  - Recommend/ encourage appropriate diets, oral nutritional supplements, and vitamin/mineral supplements  - Order, calculate, and assess calorie counts as needed  - Recommend, monitor, and adjust tube feedings and TPN/PPN based on assessed needs  - Assess need for intravenous fluids  - Provide specific nutrition/hydration education as appropriate  - Include patient/family/caregiver in decisions related to nutrition  Outcome: Adequate for Discharge

## 2021-06-28 NOTE — UTILIZATION REVIEW
Inpatient Admission Authorization Request   Notification of Maternity/Delivery &  Birth Information for Admission   SERVICING FACILITY:   [address]  Tax ID: [id number]  NPI: [id number]  Place of Service: [facility]patient [facility]  [address]  Place of Service Code: 24     ATTENDING PROVIDER:  Attending Name and NPI#: Heri Hui, [address] Loren[address] Kearns [[id number]]  Address: Roderick Kraus  [address]  Phone: [phone]     UTILIZATION REVIEW CONTACT:  Sirisha Goldsmith Utilization   Network Utilization Review Department  Phone: [phone]  Fax [phone]  Email: [email or web address]     PHYSICIAN ADVISORY SERVICES:  FOR [id number] REVIEW - MEDICAL NECESSITY DENIAL  Phone: [phone]  Fax: [phone]  Email: [email or web address]     TYPE OF REQUEST:  Inpatient Status     ADMISSION INFORMATION:  ADMISSION DATE/TIME: 21  1:56 PM  PATIENT DIAGNOSIS CODE/DESCRIPTION:  36 weeks gestation of pregnancy [Z3A 36]  Encounter for  delivery without indication [O82] The primary encounter diagnosis was Type 1 diabetes mellitus during pregnancy in third trimester  Diagnoses of Non-reassuring fetal heart tones, delivered, current hospitalization, Previous  section complicating pregnancy, Noncompliance with diabetes treatment, and S/P repeat low transverse  were also pertinent to this visit  1  Type 1 diabetes mellitus during pregnancy in third trimester    2  Non-reassuring fetal heart tones, delivered, current hospitalization    3  Previous  section complicating pregnancy    4  Noncompliance with diabetes treatment    5   S/P repeat low transverse       DISCHARGE DATE/TIME: 2021  6:00 PM  DISCHARGE DISPOSITION (IF DISCHARGED): Home/Self Care     MOTHER AND  INFORMATION:  Mother: Cesar Richey 1990   Delivering clinician:    SARITA Coppola    4    Para   2    Term   1       1    AB   1    Living   2       SAB   1    TAB   0    Ectopic   0    Multiple   0    Live Births   2               [place] Name & MRN:   Information for the patient's :  Heather  Girl Essie Severs) [[id number]]      Delivery Information:  Sex: female  Delivered 2021 2:33 PM by ; Gestational Age: 37w2d     Measurements:  Weight: 6 lb 7 5 oz (2935 g); Height: 17 5"    APGAR 1 minute 5 minutes 10 minutes   Totals: 3 8      [place] Birth Information: 36 y o  female MRN: [id number] Unit/Bed#: -01 Estimated Date of Delivery: 21  Birthweight: No birth weight on file  Gestational Age: <None> Delivery Type:           APGARS  One minute Five minutes Ten minutes   Totals:               IMPORTANT INFORMATION:  Please contact the Tricia Sheehan directly with any questions or concerns regarding this request  Department voicemails are confidential     Send requests for admission clinical reviews, concurrent reviews, approvals, and administrative denials due to lack of clinical to fax [phone]

## 2021-07-01 ENCOUNTER — OFFICE VISIT (OUTPATIENT)
Dept: OBGYN CLINIC | Facility: CLINIC | Age: 31
End: 2021-07-01

## 2021-07-01 VITALS
DIASTOLIC BLOOD PRESSURE: 72 MMHG | HEIGHT: 58 IN | WEIGHT: 139.2 LBS | BODY MASS INDEX: 29.22 KG/M2 | SYSTOLIC BLOOD PRESSURE: 116 MMHG

## 2021-07-01 PROCEDURE — 99024 POSTOP FOLLOW-UP VISIT: CPT | Performed by: OBSTETRICS & GYNECOLOGY

## 2021-07-01 RX ORDER — PNV NO.95/FERROUS FUM/FOLIC AC 28MG-0.8MG
1 TABLET ORAL DAILY
Qty: 100 TABLET | Refills: 3 | Status: SHIPPED | OUTPATIENT
Start: 2021-07-01 | End: 2022-06-16

## 2021-07-01 NOTE — PROGRESS NOTES
Assessment/Plan:     Diagnoses and all orders for this visit:    Postpartum care and examination  -     Prenatal Vit-Fe Fumarate-FA (prenatal vitamin) 28-0 8 mg; Take 1 tablet by mouth daily      36year old female  Status post repeat  section nonreassuring fetal status   Type 1 diabetes  None compliant with diabetes management  Plan    continue breast feeding  Continue prenatal vitamin daily   follow-up with endocrinologist to control blood sugar   Contraception discussed   Return to office in 2 weeks for postpartum visit      Subjective:      Patient ID: Leslie Molina is a 36 y o  female  HPI  77-year-old female presents to the office today for postop visit patient has repeat  section patient denies any heavy vaginal bleeding denies any nausea vomiting denies any diarrhea or constipation denies any urgency frequency or dysuria patient is still taking insulin for blood sugar control aware she need to follow up with endocrinologist as soon as possible for blood sugar control, patient is currently breast feeding breast feeding encourage and to continue taking prenatal vitamin daily mastitis precaution discussed with patient  The following portions of the patient's history were reviewed and updated as appropriate: allergies, current medications, past family history, past medical history, past social history, past surgical history and problem list     Review of Systems      Objective:      /72 (BP Location: Left arm, Patient Position: Sitting, Cuff Size: Standard)   Ht 4' 10" (1 473 m)   Wt 63 1 kg (139 lb 3 2 oz)   LMP 10/09/2020 (Exact Date)   BMI 29 09 kg/m²          Physical Exam  Constitutional:       Appearance: She is well-developed  Cardiovascular:      Rate and Rhythm: Normal rate and regular rhythm  Heart sounds: Normal heart sounds  Pulmonary:      Effort: Pulmonary effort is normal       Breath sounds: Normal breath sounds     Abdominal:      General: There is no distension  Palpations: Abdomen is soft  Tenderness: There is no abdominal tenderness  Comments: Incision clean/dry/intact mild erythema noted at the area of the mesh underwear recommend Contin underwear and cortisone 1% apply at the area of irritation   Neurological:      Mental Status: She is alert and oriented to person, place, and time     Psychiatric:         Behavior: Behavior normal

## 2021-12-15 ENCOUNTER — APPOINTMENT (EMERGENCY)
Dept: RADIOLOGY | Facility: HOSPITAL | Age: 31
End: 2021-12-15
Payer: COMMERCIAL

## 2021-12-15 ENCOUNTER — HOSPITAL ENCOUNTER (EMERGENCY)
Facility: HOSPITAL | Age: 31
Discharge: HOME | End: 2021-12-15
Attending: EMERGENCY MEDICINE
Payer: COMMERCIAL

## 2021-12-15 VITALS
HEART RATE: 91 BPM | TEMPERATURE: 98.5 F | DIASTOLIC BLOOD PRESSURE: 74 MMHG | OXYGEN SATURATION: 100 % | SYSTOLIC BLOOD PRESSURE: 140 MMHG | RESPIRATION RATE: 18 BRPM

## 2021-12-15 DIAGNOSIS — J06.9 URI (UPPER RESPIRATORY INFECTION): ICD-10-CM

## 2021-12-15 DIAGNOSIS — J10.1 INFLUENZA A: Primary | ICD-10-CM

## 2021-12-15 DIAGNOSIS — J34.89 RHINORRHEA: ICD-10-CM

## 2021-12-15 LAB
EXT PREG TEST URINE: NEGATIVE
EXT. CONTROL ED NAV: NORMAL
FLUAV RNA RESP QL NAA+PROBE: POSITIVE
FLUBV RNA RESP QL NAA+PROBE: NEGATIVE
RSV RNA RESP QL NAA+PROBE: NEGATIVE
SARS-COV-2 RNA RESP QL NAA+PROBE: NEGATIVE

## 2021-12-15 PROCEDURE — 81025 URINE PREGNANCY TEST: CPT | Performed by: PHYSICIAN ASSISTANT

## 2021-12-15 PROCEDURE — 99284 EMERGENCY DEPT VISIT MOD MDM: CPT | Performed by: PHYSICIAN ASSISTANT

## 2021-12-15 PROCEDURE — 0241U HB NFCT DS VIR RESP RNA 4 TRGT: CPT | Performed by: EMERGENCY MEDICINE

## 2021-12-15 PROCEDURE — 99285 EMERGENCY DEPT VISIT HI MDM: CPT

## 2021-12-15 PROCEDURE — 71045 X-RAY EXAM CHEST 1 VIEW: CPT

## 2021-12-15 PROCEDURE — 93005 ELECTROCARDIOGRAM TRACING: CPT

## 2021-12-15 RX ORDER — LORATADINE 10 MG/1
10 TABLET ORAL ONCE
Status: COMPLETED | OUTPATIENT
Start: 2021-12-15 | End: 2021-12-15

## 2021-12-15 RX ORDER — LORATADINE 10 MG/1
10 TABLET ORAL DAILY
Qty: 20 TABLET | Refills: 0 | Status: ON HOLD | OUTPATIENT
Start: 2021-12-15 | End: 2022-03-08

## 2021-12-15 RX ORDER — FLUTICASONE PROPIONATE 50 MCG
1 SPRAY, SUSPENSION (ML) NASAL DAILY
Status: DISCONTINUED | OUTPATIENT
Start: 2021-12-15 | End: 2021-12-15 | Stop reason: HOSPADM

## 2021-12-15 RX ORDER — SENNOSIDES 8.6 MG
650 CAPSULE ORAL EVERY 8 HOURS PRN
Qty: 9 TABLET | Refills: 0 | Status: SHIPPED | OUTPATIENT
Start: 2021-12-15 | End: 2021-12-18

## 2021-12-15 RX ADMIN — FLUTICASONE PROPIONATE 1 SPRAY: 50 SPRAY, METERED NASAL at 21:01

## 2021-12-15 RX ADMIN — LORATADINE 10 MG: 10 TABLET ORAL at 21:00

## 2021-12-16 LAB
ATRIAL RATE: 109 BPM
P AXIS: 74 DEGREES
PR INTERVAL: 118 MS
QRS AXIS: 89 DEGREES
QRSD INTERVAL: 66 MS
QT INTERVAL: 338 MS
QTC INTERVAL: 436 MS
T WAVE AXIS: 60 DEGREES
VENTRICULAR RATE: 100 BPM

## 2021-12-16 PROCEDURE — 93010 ELECTROCARDIOGRAM REPORT: CPT | Performed by: INTERNAL MEDICINE

## 2022-01-10 ENCOUNTER — PATIENT OUTREACH (OUTPATIENT)
Dept: CASE MANAGEMENT | Facility: OTHER | Age: 32
End: 2022-01-10
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
## 2022-01-12 ENCOUNTER — PATIENT OUTREACH (OUTPATIENT)
Dept: CASE MANAGEMENT | Facility: OTHER | Age: 32
End: 2022-01-12

## 2022-01-12 DIAGNOSIS — Z71.89 COMPLEX CARE COORDINATION: Primary | ICD-10-CM

## 2022-01-12 NOTE — PROGRESS NOTES
Notified via in basket that the patient was referred for complex care management  Patient does not meet the criteria at this time  Call placed to the patient to answer any questions & provide resources if there were any needs  Patient states need for additional insulin supplies  She missed her [facility] endocrinology appointment & they could not reschedule her until March  She agrees to contact endocrinology regarding needed supplies  She would like to establish care with a [facility] PCP  Emailed her a link with a list of providers in the [facility]  She has transportation & her insurance covers her meds  No financial needs at this time  Patient has this RN CM's contact info  She is aware that once she establishes with a [facility]'s PCP we can connect her with their care management team     No further action at this time

## 2022-03-08 ENCOUNTER — HOSPITAL ENCOUNTER (EMERGENCY)
Facility: HOSPITAL | Age: 32
End: 2022-03-08
Attending: INTERNAL MEDICINE
Payer: COMMERCIAL

## 2022-03-08 ENCOUNTER — HOSPITAL ENCOUNTER (INPATIENT)
Facility: HOSPITAL | Age: 32
LOS: 2 days | Discharge: HOME | DRG: 420 | End: 2022-03-10
Attending: INTERNAL MEDICINE | Admitting: INTERNAL MEDICINE
Payer: COMMERCIAL

## 2022-03-08 ENCOUNTER — APPOINTMENT (EMERGENCY)
Dept: RADIOLOGY | Facility: HOSPITAL | Age: 32
End: 2022-03-08
Payer: COMMERCIAL

## 2022-03-08 VITALS
WEIGHT: 140 LBS | HEART RATE: 105 BPM | DIASTOLIC BLOOD PRESSURE: 66 MMHG | TEMPERATURE: 98.1 F | RESPIRATION RATE: 23 BRPM | HEIGHT: 59 IN | BODY MASS INDEX: 28.22 KG/M2 | OXYGEN SATURATION: 100 % | SYSTOLIC BLOOD PRESSURE: 123 MMHG

## 2022-03-08 DIAGNOSIS — E10.10 TYPE 1 DIABETES MELLITUS WITH KETOACIDOSIS WITHOUT COMA (HCC): Primary | ICD-10-CM

## 2022-03-08 DIAGNOSIS — E10.10 DIABETIC KETOACIDOSIS WITHOUT COMA ASSOCIATED WITH TYPE 1 DIABETES MELLITUS (HCC): Primary | ICD-10-CM

## 2022-03-08 DIAGNOSIS — Z91.14 NON COMPLIANCE W MEDICATION REGIMEN: ICD-10-CM

## 2022-03-08 PROBLEM — Z91.199 NONCOMPLIANCE WITH DIABETES TREATMENT: Chronic | Status: ACTIVE | Noted: 2021-06-22

## 2022-03-08 PROBLEM — Z91.19 NONCOMPLIANCE WITH DIABETES TREATMENT: Chronic | Status: ACTIVE | Noted: 2021-06-22

## 2022-03-08 PROBLEM — E78.2 HYPERLIPIDEMIA, MIXED: Chronic | Status: ACTIVE | Noted: 2020-04-30

## 2022-03-08 LAB
ALBUMIN SERPL BCP-MCNC: 4.8 G/DL (ref 3.5–5)
ALP SERPL-CCNC: 186 U/L (ref 34–104)
ALT SERPL W P-5'-P-CCNC: 33 U/L (ref 7–52)
ANION GAP SERPL CALCULATED.3IONS-SCNC: 11 MMOL/L (ref 4–13)
ANION GAP SERPL CALCULATED.3IONS-SCNC: 15 MMOL/L (ref 4–13)
ANION GAP SERPL CALCULATED.3IONS-SCNC: 27 MMOL/L (ref 4–13)
ANION GAP SERPL CALCULATED.3IONS-SCNC: 30 MMOL/L (ref 4–13)
AST SERPL W P-5'-P-CCNC: 17 U/L (ref 13–39)
BACTERIA UR QL AUTO: ABNORMAL /HPF
BASOPHILS # BLD AUTO: 0.03 THOUSANDS/ΜL (ref 0–0.1)
BASOPHILS NFR BLD AUTO: 0 % (ref 0–1)
BETA-HYDROXYBUTYRATE: 5.6 MMOL/L
BILIRUB SERPL-MCNC: 0.45 MG/DL (ref 0.2–1)
BILIRUB UR QL STRIP: NEGATIVE
BUN SERPL-MCNC: 15 MG/DL (ref 5–25)
BUN SERPL-MCNC: 20 MG/DL (ref 5–25)
BUN SERPL-MCNC: 24 MG/DL (ref 5–25)
BUN SERPL-MCNC: 30 MG/DL (ref 5–25)
CALCIUM SERPL-MCNC: 7.1 MG/DL (ref 8.3–10.1)
CALCIUM SERPL-MCNC: 7.4 MG/DL (ref 8.3–10.1)
CALCIUM SERPL-MCNC: 7.8 MG/DL (ref 8.3–10.1)
CALCIUM SERPL-MCNC: 9.7 MG/DL (ref 8.4–10.2)
CHLORIDE SERPL-SCNC: 105 MMOL/L (ref 100–108)
CHLORIDE SERPL-SCNC: 105 MMOL/L (ref 100–108)
CHLORIDE SERPL-SCNC: 108 MMOL/L (ref 100–108)
CHLORIDE SERPL-SCNC: 95 MMOL/L (ref 96–108)
CLARITY UR: CLEAR
CO2 SERPL-SCNC: 15 MMOL/L (ref 21–32)
CO2 SERPL-SCNC: 18 MMOL/L (ref 21–32)
CO2 SERPL-SCNC: 6 MMOL/L (ref 21–32)
CO2 SERPL-SCNC: 8 MMOL/L (ref 21–32)
COLOR UR: YELLOW
CREAT SERPL-MCNC: 0.81 MG/DL (ref 0.6–1.3)
CREAT SERPL-MCNC: 0.82 MG/DL (ref 0.6–1.3)
CREAT SERPL-MCNC: 0.84 MG/DL (ref 0.6–1.3)
CREAT SERPL-MCNC: 1.09 MG/DL (ref 0.6–1.3)
EOSINOPHIL # BLD AUTO: 0 THOUSAND/ΜL (ref 0–0.61)
EOSINOPHIL NFR BLD AUTO: 0 % (ref 0–6)
ERYTHROCYTE [DISTWIDTH] IN BLOOD BY AUTOMATED COUNT: 12.6 % (ref 11.6–15.1)
EXT PREG TEST URINE: NEGATIVE
EXT. CONTROL ED NAV: NORMAL
GFR SERPL CREATININE-BSD FRML MDRD: 68 ML/MIN/1.73SQ M
GFR SERPL CREATININE-BSD FRML MDRD: 94 ML/MIN/1.73SQ M
GFR SERPL CREATININE-BSD FRML MDRD: 97 ML/MIN/1.73SQ M
GFR SERPL CREATININE-BSD FRML MDRD: 98 ML/MIN/1.73SQ M
GLUCOSE SERPL-MCNC: 217 MG/DL (ref 65–140)
GLUCOSE SERPL-MCNC: 220 MG/DL (ref 65–140)
GLUCOSE SERPL-MCNC: 222 MG/DL (ref 65–140)
GLUCOSE SERPL-MCNC: 222 MG/DL (ref 65–140)
GLUCOSE SERPL-MCNC: 240 MG/DL (ref 65–140)
GLUCOSE SERPL-MCNC: 267 MG/DL (ref 65–140)
GLUCOSE SERPL-MCNC: 281 MG/DL (ref 65–140)
GLUCOSE SERPL-MCNC: 296 MG/DL (ref 65–140)
GLUCOSE SERPL-MCNC: 317 MG/DL (ref 65–140)
GLUCOSE SERPL-MCNC: 33 MG/DL (ref 65–140)
GLUCOSE SERPL-MCNC: 354 MG/DL (ref 65–140)
GLUCOSE SERPL-MCNC: 374 MG/DL (ref 65–140)
GLUCOSE SERPL-MCNC: 377 MG/DL (ref 65–140)
GLUCOSE SERPL-MCNC: 397 MG/DL (ref 65–140)
GLUCOSE SERPL-MCNC: 40 MG/DL (ref 65–140)
GLUCOSE SERPL-MCNC: 41 MG/DL (ref 65–140)
GLUCOSE SERPL-MCNC: 432 MG/DL (ref 65–140)
GLUCOSE SERPL-MCNC: 437 MG/DL (ref 65–140)
GLUCOSE SERPL-MCNC: 609 MG/DL (ref 65–140)
GLUCOSE SERPL-MCNC: 75 MG/DL (ref 65–140)
GLUCOSE SERPL-MCNC: >500 MG/DL (ref 65–140)
GLUCOSE UR STRIP-MCNC: ABNORMAL MG/DL
HCT VFR BLD AUTO: 47.5 % (ref 34.8–46.1)
HGB BLD-MCNC: 15.5 G/DL (ref 11.5–15.4)
HGB UR QL STRIP.AUTO: ABNORMAL
IMM GRANULOCYTES # BLD AUTO: 0.04 THOUSAND/UL (ref 0–0.2)
IMM GRANULOCYTES NFR BLD AUTO: 1 % (ref 0–2)
KETONES UR STRIP-MCNC: ABNORMAL MG/DL
LEUKOCYTE ESTERASE UR QL STRIP: NEGATIVE
LIPASE SERPL-CCNC: 15 U/L (ref 11–82)
LYMPHOCYTES # BLD AUTO: 0.94 THOUSANDS/ΜL (ref 0.6–4.47)
LYMPHOCYTES NFR BLD AUTO: 14 % (ref 14–44)
MAGNESIUM SERPL-MCNC: 1.9 MG/DL (ref 1.6–2.6)
MAGNESIUM SERPL-MCNC: 2.2 MG/DL (ref 1.6–2.6)
MAGNESIUM SERPL-MCNC: 2.3 MG/DL (ref 1.9–2.7)
MAGNESIUM SERPL-MCNC: 2.7 MG/DL (ref 1.6–2.6)
MCH RBC QN AUTO: 29.3 PG (ref 26.8–34.3)
MCHC RBC AUTO-ENTMCNC: 32.6 G/DL (ref 31.4–37.4)
MCV RBC AUTO: 90 FL (ref 82–98)
MONOCYTES # BLD AUTO: 0.11 THOUSAND/ΜL (ref 0.17–1.22)
MONOCYTES NFR BLD AUTO: 2 % (ref 4–12)
MUCOUS THREADS UR QL AUTO: ABNORMAL
NEUTROPHILS # BLD AUTO: 5.79 THOUSANDS/ΜL (ref 1.85–7.62)
NEUTS SEG NFR BLD AUTO: 83 % (ref 43–75)
NITRITE UR QL STRIP: NEGATIVE
NON-SQ EPI CELLS URNS QL MICRO: ABNORMAL /HPF
NRBC BLD AUTO-RTO: 0 /100 WBCS
PH BLDV: 6.96 [PH] (ref 7.3–7.4)
PH UR STRIP.AUTO: 5.5 [PH]
PHOSPHATE SERPL-MCNC: 0.8 MG/DL (ref 2.7–4.5)
PHOSPHATE SERPL-MCNC: 2.5 MG/DL (ref 2.7–4.5)
PHOSPHATE SERPL-MCNC: 4.2 MG/DL (ref 2.7–4.5)
PHOSPHATE SERPL-MCNC: 6.4 MG/DL (ref 2.7–4.5)
PLATELET # BLD AUTO: 326 THOUSANDS/UL (ref 149–390)
PMV BLD AUTO: 9.3 FL (ref 8.9–12.7)
POTASSIUM SERPL-SCNC: 3.7 MMOL/L (ref 3.5–5.3)
POTASSIUM SERPL-SCNC: 4.5 MMOL/L (ref 3.5–5.3)
POTASSIUM SERPL-SCNC: 4.7 MMOL/L (ref 3.5–5.3)
POTASSIUM SERPL-SCNC: 4.9 MMOL/L (ref 3.5–5.3)
PROT SERPL-MCNC: 8.2 G/DL (ref 6.4–8.4)
PROT UR STRIP-MCNC: ABNORMAL MG/DL
RBC # BLD AUTO: 5.29 MILLION/UL (ref 3.81–5.12)
RBC #/AREA URNS AUTO: ABNORMAL /HPF
SODIUM SERPL-SCNC: 133 MMOL/L (ref 135–147)
SODIUM SERPL-SCNC: 135 MMOL/L (ref 136–145)
SODIUM SERPL-SCNC: 137 MMOL/L (ref 136–145)
SODIUM SERPL-SCNC: 138 MMOL/L (ref 136–145)
SP GR UR STRIP.AUTO: 1.02 (ref 1–1.03)
UROBILINOGEN UR QL STRIP.AUTO: 0.2 E.U./DL
WBC # BLD AUTO: 6.91 THOUSAND/UL (ref 4.31–10.16)
WBC #/AREA URNS AUTO: ABNORMAL /HPF

## 2022-03-08 PROCEDURE — 99285 EMERGENCY DEPT VISIT HI MDM: CPT

## 2022-03-08 PROCEDURE — 83735 ASSAY OF MAGNESIUM: CPT | Performed by: NURSE PRACTITIONER

## 2022-03-08 PROCEDURE — 90686 IIV4 VACC NO PRSV 0.5 ML IM: CPT | Performed by: INTERNAL MEDICINE

## 2022-03-08 PROCEDURE — 81001 URINALYSIS AUTO W/SCOPE: CPT | Performed by: PHYSICIAN ASSISTANT

## 2022-03-08 PROCEDURE — 96361 HYDRATE IV INFUSION ADD-ON: CPT

## 2022-03-08 PROCEDURE — 83735 ASSAY OF MAGNESIUM: CPT | Performed by: PHYSICIAN ASSISTANT

## 2022-03-08 PROCEDURE — 84100 ASSAY OF PHOSPHORUS: CPT | Performed by: PHYSICIAN ASSISTANT

## 2022-03-08 PROCEDURE — 36415 COLL VENOUS BLD VENIPUNCTURE: CPT | Performed by: PHYSICIAN ASSISTANT

## 2022-03-08 PROCEDURE — 87081 CULTURE SCREEN ONLY: CPT | Performed by: INTERNAL MEDICINE

## 2022-03-08 PROCEDURE — 90471 IMMUNIZATION ADMIN: CPT | Performed by: INTERNAL MEDICINE

## 2022-03-08 PROCEDURE — 96365 THER/PROPH/DIAG IV INF INIT: CPT

## 2022-03-08 PROCEDURE — 80053 COMPREHEN METABOLIC PANEL: CPT | Performed by: PHYSICIAN ASSISTANT

## 2022-03-08 PROCEDURE — 99285 EMERGENCY DEPT VISIT HI MDM: CPT | Performed by: PHYSICIAN ASSISTANT

## 2022-03-08 PROCEDURE — 71046 X-RAY EXAM CHEST 2 VIEWS: CPT

## 2022-03-08 PROCEDURE — 85025 COMPLETE CBC W/AUTO DIFF WBC: CPT | Performed by: PHYSICIAN ASSISTANT

## 2022-03-08 PROCEDURE — 82947 ASSAY GLUCOSE BLOOD QUANT: CPT | Performed by: INTERNAL MEDICINE

## 2022-03-08 PROCEDURE — 82948 REAGENT STRIP/BLOOD GLUCOSE: CPT

## 2022-03-08 PROCEDURE — 83690 ASSAY OF LIPASE: CPT | Performed by: PHYSICIAN ASSISTANT

## 2022-03-08 PROCEDURE — 82800 BLOOD PH: CPT | Performed by: INTERNAL MEDICINE

## 2022-03-08 PROCEDURE — 99223 1ST HOSP IP/OBS HIGH 75: CPT | Performed by: INTERNAL MEDICINE

## 2022-03-08 PROCEDURE — 82010 KETONE BODYS QUAN: CPT | Performed by: PHYSICIAN ASSISTANT

## 2022-03-08 PROCEDURE — 80048 BASIC METABOLIC PNL TOTAL CA: CPT | Performed by: NURSE PRACTITIONER

## 2022-03-08 PROCEDURE — 84100 ASSAY OF PHOSPHORUS: CPT | Performed by: NURSE PRACTITIONER

## 2022-03-08 PROCEDURE — 81025 URINE PREGNANCY TEST: CPT | Performed by: PHYSICIAN ASSISTANT

## 2022-03-08 RX ORDER — MAGNESIUM SULFATE HEPTAHYDRATE 40 MG/ML
2 INJECTION, SOLUTION INTRAVENOUS ONCE
Status: COMPLETED | OUTPATIENT
Start: 2022-03-08 | End: 2022-03-08

## 2022-03-08 RX ORDER — DEXTROSE 10 % IN WATER 10 %
250 INTRAVENOUS SOLUTION INTRAVENOUS ONCE
Status: COMPLETED | OUTPATIENT
Start: 2022-03-08 | End: 2022-03-08

## 2022-03-08 RX ORDER — SODIUM CHLORIDE, SODIUM GLUCONATE, SODIUM ACETATE, POTASSIUM CHLORIDE, MAGNESIUM CHLORIDE, SODIUM PHOSPHATE, DIBASIC, AND POTASSIUM PHOSPHATE .53; .5; .37; .037; .03; .012; .00082 G/100ML; G/100ML; G/100ML; G/100ML; G/100ML; G/100ML; G/100ML
500 INJECTION, SOLUTION INTRAVENOUS CONTINUOUS
Status: DISCONTINUED | OUTPATIENT
Start: 2022-03-08 | End: 2022-03-08 | Stop reason: HOSPADM

## 2022-03-08 RX ORDER — SODIUM CHLORIDE, SODIUM GLUCONATE, SODIUM ACETATE, POTASSIUM CHLORIDE, MAGNESIUM CHLORIDE, SODIUM PHOSPHATE, DIBASIC, AND POTASSIUM PHOSPHATE .53; .5; .37; .037; .03; .012; .00082 G/100ML; G/100ML; G/100ML; G/100ML; G/100ML; G/100ML; G/100ML
500 INJECTION, SOLUTION INTRAVENOUS CONTINUOUS
Status: CANCELLED | OUTPATIENT
Start: 2022-03-08 | End: 2022-03-08

## 2022-03-08 RX ORDER — POTASSIUM CHLORIDE 20 MEQ/1
20 TABLET, EXTENDED RELEASE ORAL ONCE
Status: COMPLETED | OUTPATIENT
Start: 2022-03-08 | End: 2022-03-08

## 2022-03-08 RX ORDER — SODIUM CHLORIDE, SODIUM GLUCONATE, SODIUM ACETATE, POTASSIUM CHLORIDE, MAGNESIUM CHLORIDE, SODIUM PHOSPHATE, DIBASIC, AND POTASSIUM PHOSPHATE .53; .5; .37; .037; .03; .012; .00082 G/100ML; G/100ML; G/100ML; G/100ML; G/100ML; G/100ML; G/100ML
250 INJECTION, SOLUTION INTRAVENOUS CONTINUOUS
Status: CANCELLED | OUTPATIENT
Start: 2022-03-08 | End: 2022-03-08

## 2022-03-08 RX ORDER — AMOXICILLIN 250 MG
2 CAPSULE ORAL 2 TIMES DAILY
Status: DISCONTINUED | OUTPATIENT
Start: 2022-03-08 | End: 2022-03-10 | Stop reason: HOSPADM

## 2022-03-08 RX ORDER — SODIUM CHLORIDE, SODIUM GLUCONATE, SODIUM ACETATE, POTASSIUM CHLORIDE, MAGNESIUM CHLORIDE, SODIUM PHOSPHATE, DIBASIC, AND POTASSIUM PHOSPHATE .53; .5; .37; .037; .03; .012; .00082 G/100ML; G/100ML; G/100ML; G/100ML; G/100ML; G/100ML; G/100ML
250 INJECTION, SOLUTION INTRAVENOUS CONTINUOUS
Status: DISCONTINUED | OUTPATIENT
Start: 2022-03-08 | End: 2022-03-08 | Stop reason: HOSPADM

## 2022-03-08 RX ORDER — SODIUM CHLORIDE, SODIUM GLUCONATE, SODIUM ACETATE, POTASSIUM CHLORIDE, MAGNESIUM CHLORIDE, SODIUM PHOSPHATE, DIBASIC, AND POTASSIUM PHOSPHATE .53; .5; .37; .037; .03; .012; .00082 G/100ML; G/100ML; G/100ML; G/100ML; G/100ML; G/100ML; G/100ML
250 INJECTION, SOLUTION INTRAVENOUS CONTINUOUS
Status: DISCONTINUED | OUTPATIENT
Start: 2022-03-08 | End: 2022-03-08

## 2022-03-08 RX ORDER — SODIUM CHLORIDE, SODIUM GLUCONATE, SODIUM ACETATE, POTASSIUM CHLORIDE, MAGNESIUM CHLORIDE, SODIUM PHOSPHATE, DIBASIC, AND POTASSIUM PHOSPHATE .53; .5; .37; .037; .03; .012; .00082 G/100ML; G/100ML; G/100ML; G/100ML; G/100ML; G/100ML; G/100ML
50 INJECTION, SOLUTION INTRAVENOUS CONTINUOUS
Status: DISCONTINUED | OUTPATIENT
Start: 2022-03-08 | End: 2022-03-10

## 2022-03-08 RX ORDER — INSULIN GLARGINE 100 [IU]/ML
25 INJECTION, SOLUTION SUBCUTANEOUS DAILY
Status: ON HOLD | COMMUNITY
End: 2022-03-10 | Stop reason: SDUPTHER

## 2022-03-08 RX ORDER — DEXTROSE, SODIUM CHLORIDE, SODIUM LACTATE, POTASSIUM CHLORIDE, AND CALCIUM CHLORIDE 5; .6; .31; .03; .02 G/100ML; G/100ML; G/100ML; G/100ML; G/100ML
250 INJECTION, SOLUTION INTRAVENOUS CONTINUOUS
Status: DISCONTINUED | OUTPATIENT
Start: 2022-03-08 | End: 2022-03-08

## 2022-03-08 RX ORDER — DEXTROSE 10 % IN WATER 10 %
INTRAVENOUS SOLUTION INTRAVENOUS
Status: COMPLETED
Start: 2022-03-08 | End: 2022-03-08

## 2022-03-08 RX ORDER — SODIUM CHLORIDE, SODIUM GLUCONATE, SODIUM ACETATE, POTASSIUM CHLORIDE, MAGNESIUM CHLORIDE, SODIUM PHOSPHATE, DIBASIC, AND POTASSIUM PHOSPHATE .53; .5; .37; .037; .03; .012; .00082 G/100ML; G/100ML; G/100ML; G/100ML; G/100ML; G/100ML; G/100ML
500 INJECTION, SOLUTION INTRAVENOUS CONTINUOUS
Status: DISCONTINUED | OUTPATIENT
Start: 2022-03-08 | End: 2022-03-08

## 2022-03-08 RX ADMIN — POTASSIUM PHOSPHATE, MONOBASIC AND POTASSIUM PHOSPHATE, DIBASIC 30 MMOL: 224; 236 INJECTION, SOLUTION, CONCENTRATE INTRAVENOUS at 21:25

## 2022-03-08 RX ADMIN — SENNOSIDES AND DOCUSATE SODIUM 2 TABLET: 50; 8.6 TABLET ORAL at 12:58

## 2022-03-08 RX ADMIN — SODIUM CHLORIDE 1000 ML: 0.9 INJECTION, SOLUTION INTRAVENOUS at 08:53

## 2022-03-08 RX ADMIN — Medication 250 ML: at 22:18

## 2022-03-08 RX ADMIN — MAGNESIUM SULFATE HEPTAHYDRATE 2 G: 40 INJECTION, SOLUTION INTRAVENOUS at 16:53

## 2022-03-08 RX ADMIN — SODIUM CHLORIDE 6.4 UNITS/HR: 9 INJECTION, SOLUTION INTRAVENOUS at 09:51

## 2022-03-08 RX ADMIN — DIBASIC SODIUM PHOSPHATE, MONOBASIC POTASSIUM PHOSPHATE AND MONOBASIC SODIUM PHOSPHATE 1 TABLET: 852; 155; 130 TABLET ORAL at 21:31

## 2022-03-08 RX ADMIN — DEXTROSE, SODIUM CHLORIDE, SODIUM LACTATE, POTASSIUM CHLORIDE, AND CALCIUM CHLORIDE 250 ML/HR: 5; .6; .31; .03; .02 INJECTION, SOLUTION INTRAVENOUS at 12:58

## 2022-03-08 RX ADMIN — POTASSIUM CHLORIDE 20 MEQ: 1500 TABLET, EXTENDED RELEASE ORAL at 12:58

## 2022-03-08 RX ADMIN — INFLUENZA VIRUS VACCINE 0.5 ML: 15; 15; 15; 15 SUSPENSION INTRAMUSCULAR at 17:45

## 2022-03-08 RX ADMIN — INSULIN LISPRO 5 UNITS: 100 INJECTION, SOLUTION INTRAVENOUS; SUBCUTANEOUS at 17:25

## 2022-03-08 RX ADMIN — SODIUM CHLORIDE 1000 ML: 0.9 INJECTION, SOLUTION INTRAVENOUS at 09:49

## 2022-03-08 RX ADMIN — DEXTROSE MONOHYDRATE 250 ML: 100 INJECTION, SOLUTION INTRAVENOUS at 22:18

## 2022-03-08 RX ADMIN — SODIUM CHLORIDE 25.6 UNITS/HR: 9 INJECTION, SOLUTION INTRAVENOUS at 18:18

## 2022-03-08 RX ADMIN — SODIUM CHLORIDE, SODIUM GLUCONATE, SODIUM ACETATE, POTASSIUM CHLORIDE, MAGNESIUM CHLORIDE, SODIUM PHOSPHATE, DIBASIC, AND POTASSIUM PHOSPHATE 500 ML/HR: .53; .5; .37; .037; .03; .012; .00082 INJECTION, SOLUTION INTRAVENOUS at 11:45

## 2022-03-08 RX ADMIN — SODIUM CHLORIDE, SODIUM GLUCONATE, SODIUM ACETATE, POTASSIUM CHLORIDE, MAGNESIUM CHLORIDE, SODIUM PHOSPHATE, DIBASIC, AND POTASSIUM PHOSPHATE 125 ML/HR: .53; .5; .37; .037; .03; .012; .00082 INJECTION, SOLUTION INTRAVENOUS at 16:48

## 2022-03-08 RX ADMIN — POTASSIUM CHLORIDE 20 MEQ: 1500 TABLET, EXTENDED RELEASE ORAL at 16:53

## 2022-03-08 NOTE — ASSESSMENT & PLAN NOTE
Lab Results   Component Value Date    HGBA1C 12 4 (H) 01/03/2022       Recent Labs     03/08/22  0831 03/08/22  1045   POCGLU >500* 432*       Blood Sugar Average: Last 72 hrs:     Pt reports long-acting insulin Rx ran out 3 days prior to admission  Tried to supplement w short-acting insulin under her own direction, but states this precipitated an episode of hypoglycemia  Night of 3/7/22 developed nausea, which continued into morning of 3/8/22 with one episode emesis, prompting presentation to [facility] ED at approximately 0800  PMHx significant for T1DM diagnosed 2012 with multiple prior admissions for DKA  Reports she follows with Endocrinology o/p, however does not appear to have any o/p Endocrinology encounters since 2020  FHx notable for diabetes in father and paternal grandmother  In ED, found to be in DKA on basis of clinical history, blood glucose 609; UA positive for 3+ glucose, 3+ ketones; venous pH 6 96; anion gap 30; beta-hydroxybutyrate 5 6  Bolused 2 liters NS IVF  Transferred to [facility] for admission to SDU at approximately 11:45  Plan:  Isolyte 500 ml bolus followed by Isolyte 250 ml/h  Insulin gtt - DKA protocol  D5 in LR infusion  Give K 20 meq scheduled once for 13:00  K 4 9 on admission, however likely total body K depleted and serum elevated due to DKA, expect serum K to drop as shifts intracellularly with insulin administration    Check BMP, Mg, Phos q4h - replete lytes prn  Check blood glucose q1h  Avoid hypoglycemia per protocol   Consult Nutrition for dietary recommendations in this patient with poor glycemic control

## 2022-03-08 NOTE — ED NOTES
Transfer info to [facility] ICU bed 9 via [facility] @ 1609  Accepting: Dr Jessica Pichardo  Nurse to Nurse report to be called to Calos Chung RN  03/08/22 2573

## 2022-03-08 NOTE — EMTALA/ACUTE CARE TRANSFER
[facility] EMERGENCY DEPARTMENT  [address] [facility] [address]  Dept: [phone]      EMTALA TRANSFER CONSENT    NAME Panda KELLOGG 1990                              MRN [id number]    I have been informed of my rights regarding examination, treatment, and transfer   by Dr Julio Tse MD    Benefits: Specialized equipment and/or services available at the receiving facility (Include comment)________________________    Risks: Potential for delay in receiving treatment      Consent for Transfer:  I acknowledge that my medical condition has been evaluated and explained to me by the emergency department physician or other qualified medical person and/or my attending physician, who has recommended that I be transferred to the service of  Accepting Physician: Elias Kaur at [address] Name, [address] : AIDA Shannon  The above potential benefits of such transfer, the potential risks associated with such transfer, and the probable risks of not being transferred have been explained to me, and I fully understand them  The doctor has explained that, in my case, the benefits of transfer outweigh the risks  I agree to be transferred  I authorize the performance of emergency medical procedures and treatments upon me in both transit and upon arrival at the receiving facility  Additionally, I authorize the release of any and all medical records to the receiving facility and request they be transported with me, if possible  I understand that the safest mode of transportation during a medical emergency is an ambulance and that the Hospital advocates the use of this mode of transport  Risks of traveling to the receiving facility by car, including absence of medical control, life sustaining equipment, such as oxygen, and medical personnel has been explained to me and I fully understand them      (EUGENIA CORRECT BOX BELOW)  [  ]  I consent to the stated transfer and to be transported by ambulance/helicopter  [  ]  I consent to the stated transfer, but refuse transportation by ambulance and accept full responsibility for my transportation by car  I understand the risks of non-ambulance transfers and I exonerate the Hospital and its staff from any deterioration in my condition that results from this refusal     X___________________________________________    DATE  22  TIME________  Signature of patient or legally responsible individual signing on patient behalf           RELATIONSHIP TO PATIENT_________________________          Provider Certification    NAME Cary Worthy                                         1990                              MRN [id number]    A medical screening exam was performed on the above named patient  Based on the examination:    Condition Necessitating Transfer The primary encounter diagnosis was Diabetic ketoacidosis without coma associated with type 1 diabetes mellitus ([facility] [address] )  A diagnosis of Non compliance w medication regimen was also pertinent to this visit      Patient Condition: The patient has been stabilized such that within reasonable medical probability, no material deterioration of the patient condition or the condition of the unborn child(parveen) is likely to result from the transfer    Reason for Transfer: Level of Care needed not available at this facility    Transfer Requirements: [address], ICU   · Space available and qualified personnel available for treatment as acknowledged by Johnie  · Agreed to accept transfer and to provide appropriate medical treatment as acknowledged by       [facility]  · Appropriate medical records of the examination and treatment of the patient are provided at the time of transfer   [address] _______  · Transfer will be performed by qualified personnel from [facility]  and appropriate transfer equipment as required, including the use of necessary and appropriate life support measures  Provider Certification: I have examined the patient and explained the following risks and benefits of being transferred/refusing transfer to the patient/family:         Based on these reasonable risks and benefits to the patient and/or the unborn child(parveen), and based upon the information available at the time of the patients examination, I certify that the medical benefits reasonably to be expected from the provision of appropriate medical treatments at another medical facility outweigh the increasing risks, if any, to the individuals medical condition, and in the case of labor to the unborn child, from effecting the transfer      X____________________________________________ DATE 03/08/22        TIME_______      ORIGINAL - SEND TO MEDICAL RECORDS   COPY - SEND WITH PATIENT DURING TRANSFER

## 2022-03-08 NOTE — ED PROVIDER NOTES
History  Chief Complaint   Patient presents with    Vomiting     Patient here with c/o vomiting episode x one and dry mouth  No other complaints  Did not check BS this AM     Pt with PMH: Diabetes mellitus type 1, mixed Hyperlipidemia, Polyhydramnios affecting pregnancy in third trimester, Noncompliance with diabetes treatment, memory issues/difficulty with recall  Past Surgical History:  SECTION X1, D&C UTERUS, missed AB, WISDOM TOOTH EXTRACTION    Presents to ED c/o sob for few days, 1 episode of non-bilious vomiting, dizziness, dry mouth, no fever, no cp, no sob, no abd pain, no bowel changes, no urinary complaints, no vag bleeding/dc  Pt states she hasn't taking her insulin in 3 days, bc she ran out, has trouble remember appointments, or when to  medication  Pt lives with 7 month old infant at home in [facility], but [facility] [place] [facility] [place], so [facility] ED  Called pharmacy they report meds: Urban Sofia (long acting) was on 45 units daily for several months, last filled 2021, new rx for 25 units daily sent 2022, but pt hasn't picked up refill; pt also taking Humalog 5 units TID before meals, but hasn't picked up either, no other medications listed  Prior to Admission Medications   Prescriptions Last Dose Informant Patient Reported? Taking? Accu-Chek FastClix Lancets MISC Past Week at Unknown time Self No Yes   Sig: Use 6 a day and as directed  Continuous Blood Gluc  (Dexcom G6 ) [address] Not Taking at Unknown time Self No No   Sig: Use as directed  Patient not taking: Reported on 2021   Continuous Blood Gluc Sensor (Dexcom G6 Sensor) MISC Not Taking at Unknown time Self No No   Si box=1 month supply or 3 sensors, use 1 sensor every 10 days  Patient not taking: Reported on 2021   Continuous Blood Gluc Transmit (Dexcom G6 Transmitter) MISC Not Taking at Unknown time Self No No   Sig: Transmitter change every 90 days     Patient not taking: Reported on 2021   Insulin Lispro (HUMALOG KWIKPEN SC)   Yes Yes   Sig: Inject 5 Units under the skin 3 (three) times a day before meals   Insulin Pen Needle 31G X 5 MM MISC  Self No No   Sig: Inject under the skin daily at bedtime Use one a day or as directed     Prenat-Fe Carbonyl-FA-Omega 3 (One-A-Day Womens Prenatal 1) 28-0 8-235 MG CAPS Not Taking at Unknown time Self No No   Sig: Take 1 tablet by mouth daily   Patient not taking: Reported on 3/8/2022    Prenatal Vit-Fe Fumarate-FA (prenatal vitamin) 28-0 8 mg   No No   Sig: Take 1 tablet by mouth daily   Urine Glucose-Ketones Test STRP Not Taking at Unknown time Self Yes No   Sig: Test   Patient not taking: Reported on 3/8/2022    ibuprofen (MOTRIN) 600 mg tablet   No No   Sig: Take 1 tablet (600 mg total) by mouth every 6 (six) hours as needed for moderate pain   insulin glargine (Basaglar KwikPen) 100 units/mL injection pen 3/7/2022 at Unknown time  Yes Yes   Sig: Inject 25 Units under the skin daily     loratadine (CLARITIN) 10 mg tablet Not Taking at Unknown time  No No   Sig: Take 1 tablet (10 mg total) by mouth daily   Patient not taking: Reported on 3/8/2022       Facility-Administered Medications: None       Past Medical History:   Diagnosis Date    Diabetes mellitus ([facility] [address] )     uses kwiki pen     Diabetes mellitus type 1 ([facility] [address] )     High blood pressure     recently dx/ put on lisinopril     High cholesterol     Hypertension      no hypersion     Miscarriage     Recurrent pregnancy loss, antepartum condition or complication        Past Surgical History:   Procedure Laterality Date     SECTION  01/02/2014     X1    DILATION AND CURETTAGE OF UTERUS  04/10/2019    Missed AB     [place]  DELIVERY ONLY N/A 2021    Procedure:  SECTION () REPEAT;  Surgeon: Monique Duncan MD;  Location: [facility] ;  Service: Obstetrics    WISDOM TOOTH EXTRACTION         Family History   Problem Relation Age of Onset    Diabetes Paternal Grandmother     No Known Problems Mother     No Known Problems Father     No Known Problems Sister     No Known Problems Brother     No Known Problems Daughter     No Known Problems Maternal Grandmother     No Known Problems Paternal Grandfather     No Known Problems Sister      I have reviewed and agree with the history as documented  E-Cigarette/Vaping    E-Cigarette Use Never User      E-Cigarette/Vaping Substances    Nicotine No     THC No     CBD No     Flavoring No     Other No     Unknown No      Social History     Tobacco Use    Smoking status: Never Smoker    Smokeless tobacco: Never Used   Vaping Use    Vaping Use: Never used   Substance Use Topics    Alcohol use: Never     Comment: Alcohol intake:   None - As per [employer] [facility]am Drug use: Never     Comment: Illicit drugs:   No  - As per [place]        Review of Systems   Constitutional: Negative for fever  HENT: Negative for ear pain, hearing loss and sore throat  Eyes: Negative for visual disturbance  Respiratory: Positive for shortness of breath  Negative for cough  Cardiovascular: Negative for chest pain and leg swelling  Gastrointestinal: Positive for vomiting  Negative for abdominal pain and diarrhea  Genitourinary: Positive for frequency  Negative for dysuria, vaginal bleeding and vaginal discharge  Musculoskeletal: Negative for arthralgias and myalgias  Skin: Negative for color change and pallor  Neurological: Positive for dizziness and weakness  Negative for headaches  All other systems reviewed and are negative  Physical Exam  Physical Exam  Vitals and nursing note reviewed  Constitutional:       General: She is in acute distress  Appearance: Normal appearance  She is well-developed  HENT:      Head: Normocephalic and atraumatic  Right Ear: External ear normal       Left Ear: External ear normal       Nose: Nose normal       Mouth/Throat:      Mouth: Mucous membranes are dry  Pharynx: Oropharynx is clear  Eyes:      Conjunctiva/sclera: Conjunctivae normal    Cardiovascular:      Rate and Rhythm: Normal rate and regular rhythm  Pulmonary:      Effort: Pulmonary effort is normal  No respiratory distress  Breath sounds: Normal breath sounds  Abdominal:      General: Bowel sounds are normal       Palpations: Abdomen is soft  Tenderness: There is no abdominal tenderness  Musculoskeletal:         General: Normal range of motion  Cervical back: Normal range of motion  Right lower leg: No edema  Left lower leg: No edema  Skin:     General: Skin is warm and dry  Capillary Refill: Capillary refill takes less than 2 seconds  Neurological:      Mental Status: She is alert and oriented to person, place, and time  Cranial Nerves: No cranial nerve deficit  Motor: No weakness     Psychiatric:         Behavior: Behavior normal          Vital Signs  ED Triage Vitals [03/08/22 0830]   Temperature Pulse Respirations Blood Pressure SpO2   98 1 °F (36 7 °C) 78 17 123/65 96 %      Temp Source Heart Rate Source Patient Position - Orthostatic VS BP Location FiO2 (%)   Oral Monitor Sitting Right arm --      Pain Score       --           Vitals:    03/08/22 0830 03/08/22 0950   BP: 123/65 123/66   Pulse: 78 105   Patient Position - Orthostatic VS: Sitting          Visual Acuity      ED Medications  Medications   insulin regular (HumuLIN R,NovoLIN R) 1 Units/mL in sodium chloride 0 9 % 100 mL infusion (3 2 Units/hr Intravenous Rate/Dose Change 3/8/22 1051)   multi-electrolyte (ISOLYTE-S PH 7 4 equivalent) IV solution (has no administration in time range)     Followed by   multi-electrolyte (ISOLYTE-S PH 7 4 equivalent) IV solution (has no administration in time range)   sodium chloride 0 9 % bolus 1,000 mL (0 mL Intravenous Stopped 3/8/22 1026)   sodium chloride 0 9 % bolus 1,000 mL (1,000 mL Intravenous New Bag 3/8/22 0949)       Diagnostic Studies  Results Reviewed     Procedure Component Value Units Date/Time    Fingerstick Glucose (POCT) [[id number]]  (Abnormal) Collected: 03/08/22 1045    Lab Status: Final result Updated: 03/08/22 1046     POC Glucose 432 mg/dl     UA w Reflex to Microscopic w Reflex to Culture [[id number]]  (Abnormal) Collected: 03/08/22 1024    Lab Status: Final result Specimen: Urine, Clean Catch Updated: 03/08/22 1045     Color, UA Yellow     Clarity, UA Clear     Specific Gravity, UA 1 025     pH, UA 5 5     Leukocytes, UA Negative     Nitrite, UA Negative     Protein, UA Trace mg/dl      Glucose, UA 3+ mg/dl      Ketones, UA 80 (3+) mg/dl      Urobilinogen, UA 0 2 E U /dl      Bilirubin, UA Negative     Blood, UA Trace-Intact    Urine Microscopic [[id number]] Collected: 03/08/22 1024    Lab Status:  In process Specimen: Urine, Clean Catch Updated: 03/08/22 1045    POCT pregnancy, urine [[id number]]  (Normal) Resulted: 03/08/22 1042    Lab Status: Final result Updated: 03/08/22 1042     EXT PREG TEST UR (Ref: Negative) NEGATIVE     Control VALID    Magnesium [[id number]]  (Normal) Collected: 03/08/22 0852    Lab Status: Final result Specimen: Blood from Arm, Right Updated: 03/08/22 1034     Magnesium 2 3 mg/dL     Phosphorus [[id number]]  (Abnormal) Collected: 03/08/22 0852    Lab Status: Final result Specimen: Blood from Arm, Right Updated: 03/08/22 1034     Phosphorus 6 4 mg/dL     pH, venous [[id number]]  (Abnormal) Collected: 03/08/22 0931    Lab Status: Final result Specimen: Blood from Arm, Right Updated: 03/08/22 1019     pH, Alexandre 2 426    Basic metabolic panel [[id number]]     Lab Status: No result Specimen: Blood     Magnesium [[id number]]     Lab Status: No result Specimen: Blood     Phosphorus [[id number]]     Lab Status: No result Specimen: Blood     Comprehensive metabolic panel [[id number]]  (Abnormal) Collected: 03/08/22 0852    Lab Status: Final result Specimen: Blood from Arm, Right Updated: 03/08/22 0921     Sodium 133 mmol/L Potassium 4 7 mmol/L      Chloride 95 mmol/L      CO2 8 mmol/L      ANION GAP 30 mmol/L      BUN 30 mg/dL      Creatinine 1 09 mg/dL      Glucose 609 mg/dL      Calcium 9 7 mg/dL      AST 17 U/L      ALT 33 U/L      Alkaline Phosphatase 186 U/L      Total Protein 8 2 g/dL      Albumin 4 8 g/dL      Total Bilirubin 0 45 mg/dL      eGFR 68 ml/min/1 73sq m     Narrative:      National Kidney Disease Foundation guidelines for Chronic Kidney Disease (CKD):     Stage 1 with normal or high GFR (GFR > 90 mL/min/1 73 square meters)    Stage 2 Mild CKD (GFR = 60-89 mL/min/1 73 square meters)    Stage 3A Moderate CKD (GFR = 45-59 mL/min/1 73 square meters)    Stage 3B Moderate CKD (GFR = 30-44 mL/min/1 73 square meters)    Stage 4 Severe CKD (GFR = 15-29 mL/min/1 73 square meters)    Stage 5 End Stage CKD (GFR <15 mL/min/1 73 square meters)  Note: GFR calculation is accurate only with a steady state creatinine    Lipase [[id number]]  (Normal) Collected: 03/08/22 0852    Lab Status: Final result Specimen: Blood from Arm, Right Updated: 03/08/22 0918     Lipase 15 u/L     Beta Hydroxybutyrate [[id number]]  (Abnormal) Collected: 03/08/22 0852    Lab Status: Final result Specimen: Blood from Arm, Right Updated: 03/08/22 0913     BETA-HYDROXYBUTYRATE 5 6 mmol/L     CBC and differential [[id number]]  (Abnormal) Collected: 03/08/22 0852    Lab Status: Final result Specimen: Blood from Arm, Right Updated: 03/08/22 0900     WBC 6 91 Thousand/uL      RBC 5 29 Million/uL      Hemoglobin 15 5 g/dL      Hematocrit 47 5 %      MCV 90 fL      MCH 29 3 pg      MCHC 32 6 g/dL      RDW 12 6 %      MPV 9 3 fL      Platelets 211 Thousands/uL      nRBC 0 /100 WBCs      Neutrophils Relative 83 %      Immat GRANS % 1 %      Lymphocytes Relative 14 %      Monocytes Relative 2 %      Eosinophils Relative 0 %      Basophils Relative 0 %      Neutrophils Absolute 5 79 Thousands/µL      Immature Grans Absolute 0 04 Thousand/uL      Lymphocytes Absolute 0 94 Thousands/µL      Monocytes Absolute 0 11 Thousand/µL      Eosinophils Absolute 0 00 Thousand/µL      Basophils Absolute 0 03 Thousands/µL     Fingerstick Glucose (POCT) [[id number]]  (Abnormal) Collected: 03/08/22 0831    Lab Status: Final result Updated: 03/08/22 0832     POC Glucose >500 mg/dl                  XR chest 2 views   Final Result by Jama Diaz MD (03/08 1014)      No acute cardiopulmonary disease  Workstation performed: [id number]                    Procedures  Procedures         ED Course                               SBIRT 22yo+      Most Recent Value   SBIRT (24 yo +)    In order to provide better care to our patients, we are screening all of our patients for alcohol and drug use  Would it be okay to ask you these screening questions? Yes Filed at: 03/08/2022 7254   Initial Alcohol Screen: US AUDIT-C     1  How often do you have a drink containing alcohol? 0 Filed at: 03/08/2022 0826   2  How many drinks containing alcohol do you have on a typical day you are drinking? 0 Filed at: 03/08/2022 0826   3a  Male UNDER 65: How often do you have five or more drinks on one occasion? 0 Filed at: 03/08/2022 0826   3b  FEMALE Any Age, or MALE 65+: How often do you have 4 or more drinks on one occassion? 0 Filed at: 03/08/2022 0826   Audit-C Score 0 Filed at: 03/08/2022 9691   CINDY: How many times in the past year have you    Used an illegal drug or used a prescription medication for non-medical reasons?  Never Filed at: 03/08/2022 4566                    MDM  Number of Diagnoses or Management Options  Diagnosis management comments: Spoke to ICU attending at [facility], Dr Travis Arce, accepts pt in transport to ICU       Amount and/or Complexity of Data Reviewed  Clinical lab tests: ordered and reviewed  Tests in the radiology section of CPT®: ordered  Review and summarize past medical records: yes  Discuss the patient with other providers: yes        Disposition  Final diagnoses:   Diabetic ketoacidosis without coma associated with type 1 diabetes mellitus ([facility] [address] )   Non compliance w medication regimen     Time reflects when diagnosis was documented in both MDM as applicable and the Disposition within this note     Time User Action Codes Description Comment    3/8/2022 10:10 AM Zuleika Quintero Add [E10 10] Diabetic ketoacidosis without coma associated with type 1 diabetes mellitus ([facility] [address] )     3/8/2022 10:11 AM Zuleika Ours Add [Z91 14] Non compliance w medication regimen       ED Disposition     ED Disposition Condition Date/Time Comment    Transfer to Another [address] Mar 8, 2022 10:10 AM Myles Chan should be transferred out to [address] [place] [address] Dr ICU  MD Documentation      Most Recent Value   Patient Condition The patient has been stabilized such that within reasonable medical probability, no material deterioration of the patient condition or the condition of the unborn child(parveen) is likely to result from the transfer   Reason for Transfer Level of Care needed not available at this facility   Benefits of Transfer Specialized equipment and/or services available at the receiving facility (Include comment)________________________   Risks of Transfer Potential for delay in receiving treatment   Accepting Physician [address] [place] [address]  Name, [address] [place] [address], ICU    (Name & Tel number) Sharon Rodriguez by [employer] and Unit #) als   Sending Moe Barreto, Hilary Burkett      RN Documentation      Most Recent Value   [address] Name, [address] [place], [place] (Name & Tel number) Sharon Rodriguez by [employer] and Unit #) als      Follow-up Information    None         Patient's Medications   Discharge Prescriptions    No medications on file       No discharge procedures on file      PDMP Review       Value Time User    PDMP Reviewed  Yes 12/15/2021  8:22 PM Thelma Christie PA-C          ED Provider  Electronically Signed by           Fady Chester PA-C  03/08/22 1117

## 2022-03-08 NOTE — ASSESSMENT & PLAN NOTE
Lab Results   Component Value Date    HGBA1C 12 4 (H) 01/03/2022       Recent Labs     03/08/22  1045 03/08/22  1159 03/08/22  1201 03/08/22  1253   POCGLU 432* 281* 267* 220*      Pt reports long-acting insulin Rx ran out 3 days prior to admission  Tried to supplement w short-acting insulin under her own direction, but states this precipitated an episode of hypoglycemia  Night of 3/7/22 developed nausea, which continued into morning of 3/8/22 with one episode emesis, prompting presentation to [facility] ED at approximately 0800  PMHx significant for T1DM diagnosed 2012 with multiple prior admissions for DKA  Reports she follows with Endocrinology o/p, however does not appear to have any o/p Endocrinology encounters since 2020  FHx notable for diabetes in father and paternal grandmother  In ED, found to be in DKA on basis of clinical history, blood glucose 609; UA positive for 3+ glucose, 3+ ketones; venous pH 6 96; anion gap 30; beta-hydroxybutyrate 5 6, HCO3 6  Bolused 2 liters NS IVF  Transferred to [facility] Seen for admission to SDU morning of 3/8/22      Plan:  Decrease Isolyte to 50 ml/h from 125 ml/h  Transition from drip to basal bolus   Give Lantus 15 U at 11:00   Discontinue insulin gtt 2 hours later at 13:00   Continue mealtime bolus Lispro 5 U TID with meals   SSI TID with meals and QHS  Check AM BMP, Mg, Phos - Monitor and replete prn  lytes improved and stabilized from admission  Check blood glucose q2h while on drip, then decrease to four times daily (with meals and at bedtime)  Avoid hypoglycemia per protocol  Consult Nutrition for dietary recommendations in this patient with poor glycemic control  Anticipate transfer to Med-Surg later today given clinical improvement and stability

## 2022-03-08 NOTE — H&P
[facility] [address]  H&P- Ailin Mena 1990, 36 y o  female MRN: [id number]  Unit/Bed#: ICU 09 Encounter: [id number]  Primary Care Provider: Jaya Lynn DO   Date and time admitted to hospital: 3/8/2022 11:34 AM    DKA, type 1, not at goal [facility])  Assessment & Plan  Lab Results   Component Value Date    HGBA1C 12 4 (H) 01/03/2022       Recent Labs     03/08/22  0831 03/08/22  1045   POCGLU >500* 432*       Blood Sugar Average: Last 72 hrs:     Pt reports long-acting insulin Rx ran out 3 days prior to admission  Tried to supplement w short-acting insulin under her own direction, but states this precipitated an episode of hypoglycemia  Night of 3/7/22 developed nausea, which continued into morning of 3/8/22 with one episode emesis, prompting presentation to [facility] ED at approximately 0800  PMHx significant for T1DM diagnosed 2012 with multiple prior admissions for DKA  Reports she follows with Endocrinology o/p, however does not appear to have any o/p Endocrinology encounters since 2020  FHx notable for diabetes in father and paternal grandmother  In ED, found to be in DKA on basis of clinical history, blood glucose 609; UA positive for 3+ glucose, 3+ ketones; venous pH 6 96; anion gap 30; beta-hydroxybutyrate 5 6  Bolused 2 liters NS IVF  Transferred to [place] for admission to SDU at approximately 11:45  Plan:  Isolyte 500 ml bolus followed by Isolyte 250 ml/h  Insulin gtt - DKA protocol  D5 in LR infusion  Give K 20 meq scheduled once for 13:00  K 4 9 on admission, however likely total body K depleted and serum elevated due to DKA, expect serum K to drop as shifts intracellularly with insulin administration    Check BMP, Mg, Phos q4h - replete lytes prn  Check blood glucose q1h  Avoid hypoglycemia per protocol   Consult Nutrition for dietary recommendations in this patient with poor glycemic control    -------------------------------------------------------------------------------------------------------------  Chief Complaint: Nausea, vomiting    History of Present Illness   HX and PE limited by: No limitations  Colin Ramírez is a 36 y o  female with PMHx T1DM and HLD who is transferred from the [place] ED to [facility] ICU for DKA  Pt reports that she was in her usual state of health until last night, when she began to feel nauseated  Continued to have nausea this AM, with episode of emesis x1  Pt also endorses dry mouth  Otherwise no complaints at this time  Pt reports that she ran out of her long-acting insulin prescription three days prior to admission and tried to supplement with short-acting, precipitating episode of self-reported hypoglycemia  Endorses many past admissions for DKA  On ROS, endorses increased urinary frequency--estimates she is urinating every 30 to 60 minutes  Also endorses abdominal distension/bloating since  2021, but denies abdominal pain  Last BM Saturday, which she reports does not constitute a change from baseline  History obtained from the patient   -------------------------------------------------------------------------------------------------------------  Dispo: Admit to Stepdown Level 1    Code Status: Level 1 - Full Code  --------------------------------------------------------------------------------------------------------------  Review of Systems   Constitutional: Negative for appetite change, chills and fever  Respiratory: Negative for shortness of breath  Cardiovascular: Negative for chest pain and leg swelling  Gastrointestinal: Positive for abdominal distention, nausea and vomiting  Endocrine: Positive for polyuria  Genitourinary: Positive for frequency  Negative for decreased urine volume, difficulty urinating and dysuria  Musculoskeletal: Negative for arthralgias and myalgias     Allergic/Immunologic: Negative for environmental allergies and food allergies  Physical Exam  Vitals and nursing note reviewed  Constitutional:       General: She is not in acute distress  Appearance: Normal appearance  She is not ill-appearing, toxic-appearing or diaphoretic  HENT:      Head: Normocephalic and atraumatic  Cardiovascular:      Rate and Rhythm: Regular rhythm  Tachycardia present  Pulses: Normal pulses  Heart sounds: Normal heart sounds  No murmur heard  No friction rub  No gallop  Comments:  on exam  Pulmonary:      Effort: Pulmonary effort is normal  No respiratory distress  Breath sounds: Normal breath sounds  No stridor  No wheezing, rhonchi or rales  Chest:      Chest wall: No tenderness  Abdominal:      General: Bowel sounds are normal  There is no distension  Palpations: Abdomen is soft  There is no mass  Tenderness: There is abdominal tenderness (mild diffuse tenderness to deep palpation)  There is no right CVA tenderness, left CVA tenderness, guarding or rebound  Hernia: No hernia is present  Musculoskeletal:         General: No swelling, tenderness or signs of injury  Right lower leg: No edema  Left lower leg: No edema  Skin:     General: Skin is warm and dry  Coloration: Skin is not jaundiced or pale  Findings: No bruising, erythema, lesion or rash  Comments: Well-healed mid-line abdominal scar   Neurological:      Mental Status: She is alert and oriented to person, place, and time     Psychiatric:         Mood and Affect: Mood normal          Behavior: Behavior normal        --------------------------------------------------------------------------------------------------------------  Vitals:   Vitals:    03/08/22 1148   BP: 144/66   Pulse: 102   Resp: (!) 30   Temp: 98 °F (36 7 °C)   TempSrc: Temporal   SpO2: 100%   Weight: 62 kg (136 lb 11 oz)   Height: 4' 11" (1 499 m)     Temp  Min: 98 °F (36 7 °C)  Max: 98 1 °F (36 7 °C)     Height: 4' 11" (149 9 cm)  Body mass index is 27 61 kg/m²  Laboratory and Diagnostics:  Results from last 7 days   Lab Units 22  0852   WBC Thousand/uL 6 91   HEMOGLOBIN g/dL 15 5*   HEMATOCRIT % 47 5*   PLATELETS Thousands/uL 326   NEUTROS PCT % 83*   MONOS PCT % 2*     Results from last 7 days   Lab Units 22  1201 22  0852   SODIUM mmol/L 138 133*   POTASSIUM mmol/L 4 9 4 7   CHLORIDE mmol/L 105 95*   CO2 mmol/L 6* 8*   ANION GAP mmol/L 27* 30*   BUN mg/dL 24 30*   CREATININE mg/dL 0 84 1 09   CALCIUM mg/dL 7 8* 9 7   GLUCOSE RANDOM mg/dL 296* 609*   ALT U/L  --  33   AST U/L  --  17   ALK PHOS U/L  --  186*   ALBUMIN g/dL  --  4 8   TOTAL BILIRUBIN mg/dL  --  0 45     Results from last 7 days   Lab Units 22  1201 22  0852   MAGNESIUM mg/dL 2 2 2 3   PHOSPHORUS mg/dL 4 2 6 4*                   ABG:    VBG:  Results from last 7 days   Lab Units 22  0931   PH ALEXSANDER  6 960*           Micro:        EKG: None  Imaging: CXR 3/8/22 is unremarkable, without evidence of acute cardiopulmonary disease        Historical Information   Past Medical History:   Diagnosis Date    Diabetes mellitus ([facility] [address] )     uses kwiki pen     Diabetes mellitus type 1 ([facility] [address] )     High blood pressure     recently dx/ put on lisinopril     High cholesterol     Hypertension      no hypersion     Miscarriage     Recurrent pregnancy loss, antepartum condition or complication      Past Surgical History:   Procedure Laterality Date     SECTION  01/02/2014     X1    DILATION AND CURETTAGE OF UTERUS  04/10/2019    Missed AB     SD  DELIVERY ONLY N/A 2021    Procedure:  SECTION () REPEAT;  Surgeon: Venecia Lewis MD;  Location: [facility];  Service: Obstetrics    WISDOM TOOTH EXTRACTION       Social History   Social History     Substance and Sexual Activity   Alcohol Use Never    Comment: Alcohol intake:   None - As per Kayla Castelan      Social History     Substance and Sexual Activity   Drug Use Never Comment: Illicit drugs:   No  - As per Iman Dietz      Social History     Tobacco Use   Smoking Status Never Smoker   Smokeless Tobacco Never Used     Exercise History: Not assessed  Family History:   Family History   Problem Relation Age of Onset    No Known Problems Mother     Diabetes Father     No Known Problems Sister     No Known Problems Sister     No Known Problems Brother     No Known Problems Maternal Grandmother     Diabetes Paternal Grandmother     No Known Problems Paternal Grandfather     No Known Problems Daughter      I have reviewed this patient's family history and commented on sigificant items within the HPI      Medications:  Current Facility-Administered Medications   Medication Dose Route Frequency    dextrose 5 % in lactated Ringer's infusion  250 mL/hr Intravenous Continuous    influenza vaccine, quadrivalent (FLUARIX) IM injection 0 5 mL  0 5 mL Intramuscular Once    insulin regular (HumuLIN R,NovoLIN R) 1 Units/mL in sodium chloride 0 9 % 100 mL infusion  0 1-30 Units/hr Intravenous Continuous    multi-electrolyte (ISOLYTE-S PH 7 4 equivalent) IV solution  500 mL/hr Intravenous Continuous    Followed by   Miner multi-electrolyte (ISOLYTE-S PH 7 4 equivalent) IV solution  250 mL/hr Intravenous Continuous    senna-docusate sodium (SENOKOT S) 8 6-50 mg per tablet 2 tablet  2 tablet Oral BID     Home medications:  Prior to Admission Medications   Prescriptions Last Dose Informant Patient Reported? Taking? Accu-Chek FastClix Lancets MISC  Self No No   Sig: Use 6 a day and as directed  Continuous Blood Gluc  (Dexcom G6 ) NERY  Self No No   Sig: Use as directed  Patient not taking: Reported on 2021   Continuous Blood Gluc Sensor (Dexcom G6 Sensor) MISC  Self No No   Si box=1 month supply or 3 sensors, use 1 sensor every 10 days     Patient not taking: Reported on 2021   Continuous Blood Gluc Transmit (Dexcom G6 Transmitter) MISC Unknown at Unknown time Self No No   Sig: Transmitter change every 90 days  Patient not taking: Reported on 6/7/2021   Insulin Lispro (HUMALOG KWIKPEN SC) 3/5/2022  Yes No   Sig: Inject 5 Units under the skin 3 (three) times a day before meals   Insulin Pen Needle 31G X 5 MM MISC  Self No No   Sig: Inject under the skin daily at bedtime Use one a day or as directed  Prenatal Vit-Fe Fumarate-FA (prenatal vitamin) 28-0 8 mg   No No   Sig: Take 1 tablet by mouth daily   ibuprofen (MOTRIN) 600 mg tablet Unknown at Unknown time  No No   Sig: Take 1 tablet (600 mg total) by mouth every 6 (six) hours as needed for moderate pain   insulin glargine (Basaglar KwikPen) 100 units/mL injection pen 3/5/2022 at Unknown time  Yes Yes   Sig: Inject 25 Units under the skin daily        Facility-Administered Medications: None     Allergies:  No Known Allergies    ------------------------------------------------------------------------------------------------------------  Advance Directive and Living Will:      Power of :    POLST:    ------------------------------------------------------------------------------------------------------------  Anticipated Length of Stay is > 2 midnights    Concha Randolph MD        Portions of the record may have been created with voice recognition software  Occasional wrong word or "sound a like" substitutions may have occurred due to the inherent limitations of voice recognition software    Read the chart carefully and recognize, using context, where substitutions have occurred

## 2022-03-09 LAB
ANION GAP SERPL CALCULATED.3IONS-SCNC: 11 MMOL/L (ref 4–13)
BUN SERPL-MCNC: 16 MG/DL (ref 5–25)
CA-I BLD-SCNC: 1.11 MMOL/L (ref 1.12–1.32)
CALCIUM SERPL-MCNC: 6.8 MG/DL (ref 8.3–10.1)
CHLORIDE SERPL-SCNC: 108 MMOL/L (ref 100–108)
CO2 SERPL-SCNC: 20 MMOL/L (ref 21–32)
CREAT SERPL-MCNC: 0.51 MG/DL (ref 0.6–1.3)
ERYTHROCYTE [DISTWIDTH] IN BLOOD BY AUTOMATED COUNT: 13.5 % (ref 11.6–15.1)
GFR SERPL CREATININE-BSD FRML MDRD: 130 ML/MIN/1.73SQ M
GLUCOSE SERPL-MCNC: 101 MG/DL (ref 65–140)
GLUCOSE SERPL-MCNC: 128 MG/DL (ref 65–140)
GLUCOSE SERPL-MCNC: 133 MG/DL (ref 65–140)
GLUCOSE SERPL-MCNC: 135 MG/DL (ref 65–140)
GLUCOSE SERPL-MCNC: 159 MG/DL (ref 65–140)
GLUCOSE SERPL-MCNC: 165 MG/DL (ref 65–140)
GLUCOSE SERPL-MCNC: 182 MG/DL (ref 65–140)
GLUCOSE SERPL-MCNC: 230 MG/DL (ref 65–140)
GLUCOSE SERPL-MCNC: 302 MG/DL (ref 65–140)
GLUCOSE SERPL-MCNC: 83 MG/DL (ref 65–140)
HCT VFR BLD AUTO: 35.1 % (ref 34.8–46.1)
HGB BLD-MCNC: 11.4 G/DL (ref 11.5–15.4)
MAGNESIUM SERPL-MCNC: 2.5 MG/DL (ref 1.6–2.6)
MCH RBC QN AUTO: 28.9 PG (ref 26.8–34.3)
MCHC RBC AUTO-ENTMCNC: 32.5 G/DL (ref 31.4–37.4)
MCV RBC AUTO: 89 FL (ref 82–98)
PHOSPHATE SERPL-MCNC: 4.4 MG/DL (ref 2.7–4.5)
PLATELET # BLD AUTO: 258 THOUSANDS/UL (ref 149–390)
PMV BLD AUTO: 8.8 FL (ref 8.9–12.7)
POTASSIUM SERPL-SCNC: 4.3 MMOL/L (ref 3.5–5.3)
RBC # BLD AUTO: 3.95 MILLION/UL (ref 3.81–5.12)
SODIUM SERPL-SCNC: 139 MMOL/L (ref 136–145)
WBC # BLD AUTO: 7.08 THOUSAND/UL (ref 4.31–10.16)

## 2022-03-09 PROCEDURE — 99232 SBSQ HOSP IP/OBS MODERATE 35: CPT | Performed by: INTERNAL MEDICINE

## 2022-03-09 PROCEDURE — 83735 ASSAY OF MAGNESIUM: CPT | Performed by: PHYSICIAN ASSISTANT

## 2022-03-09 PROCEDURE — 82948 REAGENT STRIP/BLOOD GLUCOSE: CPT

## 2022-03-09 PROCEDURE — 82330 ASSAY OF CALCIUM: CPT | Performed by: PHYSICIAN ASSISTANT

## 2022-03-09 PROCEDURE — 80048 BASIC METABOLIC PNL TOTAL CA: CPT | Performed by: PHYSICIAN ASSISTANT

## 2022-03-09 PROCEDURE — 84100 ASSAY OF PHOSPHORUS: CPT | Performed by: PHYSICIAN ASSISTANT

## 2022-03-09 PROCEDURE — 85027 COMPLETE CBC AUTOMATED: CPT | Performed by: PHYSICIAN ASSISTANT

## 2022-03-09 RX ORDER — CALCIUM GLUCONATE 20 MG/ML
1 INJECTION, SOLUTION INTRAVENOUS ONCE
Status: COMPLETED | OUTPATIENT
Start: 2022-03-09 | End: 2022-03-09

## 2022-03-09 RX ORDER — INSULIN GLARGINE 100 [IU]/ML
15 INJECTION, SOLUTION SUBCUTANEOUS EVERY MORNING
Status: DISCONTINUED | OUTPATIENT
Start: 2022-03-09 | End: 2022-03-10 | Stop reason: HOSPADM

## 2022-03-09 RX ADMIN — SODIUM CHLORIDE, SODIUM GLUCONATE, SODIUM ACETATE, POTASSIUM CHLORIDE, MAGNESIUM CHLORIDE, SODIUM PHOSPHATE, DIBASIC, AND POTASSIUM PHOSPHATE 50 ML/HR: .53; .5; .37; .037; .03; .012; .00082 INJECTION, SOLUTION INTRAVENOUS at 21:52

## 2022-03-09 RX ADMIN — INSULIN LISPRO 5 UNITS: 100 INJECTION, SOLUTION INTRAVENOUS; SUBCUTANEOUS at 08:01

## 2022-03-09 RX ADMIN — INSULIN LISPRO 5 UNITS: 100 INJECTION, SOLUTION INTRAVENOUS; SUBCUTANEOUS at 12:11

## 2022-03-09 RX ADMIN — SODIUM CHLORIDE 6 UNITS/HR: 9 INJECTION, SOLUTION INTRAVENOUS at 10:10

## 2022-03-09 RX ADMIN — SENNOSIDES AND DOCUSATE SODIUM 2 TABLET: 50; 8.6 TABLET ORAL at 08:01

## 2022-03-09 RX ADMIN — INSULIN GLARGINE 15 UNITS: 100 INJECTION, SOLUTION SUBCUTANEOUS at 10:58

## 2022-03-09 RX ADMIN — SODIUM PHOSPHATE, MONOBASIC, MONOHYDRATE 30 MMOL: 276; 142 INJECTION, SOLUTION INTRAVENOUS at 04:29

## 2022-03-09 RX ADMIN — INSULIN LISPRO 5 UNITS: 100 INJECTION, SOLUTION INTRAVENOUS; SUBCUTANEOUS at 15:51

## 2022-03-09 RX ADMIN — SODIUM CHLORIDE, SODIUM GLUCONATE, SODIUM ACETATE, POTASSIUM CHLORIDE, MAGNESIUM CHLORIDE, SODIUM PHOSPHATE, DIBASIC, AND POTASSIUM PHOSPHATE 125 ML/HR: .53; .5; .37; .037; .03; .012; .00082 INJECTION, SOLUTION INTRAVENOUS at 07:55

## 2022-03-09 RX ADMIN — CALCIUM GLUCONATE 1 G: 20 INJECTION, SOLUTION INTRAVENOUS at 05:32

## 2022-03-09 RX ADMIN — SENNOSIDES AND DOCUSATE SODIUM 2 TABLET: 50; 8.6 TABLET ORAL at 17:08

## 2022-03-09 RX ADMIN — INSULIN LISPRO 1 UNITS: 100 INJECTION, SOLUTION INTRAVENOUS; SUBCUTANEOUS at 12:11

## 2022-03-09 RX ADMIN — INSULIN LISPRO 2 UNITS: 100 INJECTION, SOLUTION INTRAVENOUS; SUBCUTANEOUS at 15:50

## 2022-03-09 RX ADMIN — SODIUM CHLORIDE, SODIUM GLUCONATE, SODIUM ACETATE, POTASSIUM CHLORIDE, MAGNESIUM CHLORIDE, SODIUM PHOSPHATE, DIBASIC, AND POTASSIUM PHOSPHATE 125 ML/HR: .53; .5; .37; .037; .03; .012; .00082 INJECTION, SOLUTION INTRAVENOUS at 00:08

## 2022-03-09 RX ADMIN — INSULIN LISPRO 1 UNITS: 100 INJECTION, SOLUTION INTRAVENOUS; SUBCUTANEOUS at 21:53

## 2022-03-09 NOTE — PROGRESS NOTES
[facility] [address]  Progress Note - Cesar Richey 1990, 36 y o  female MRN: [id number]  Unit/Bed#: ICU 09 Encounter: [id number]  Primary Care Provider: Didi Forbes DO   Date and time admitted to hospital: 3/8/2022 11:34 AM    * DKA, type 1, not at goal [facility])  Assessment & Plan  Lab Results   Component Value Date    HGBA1C 12 4 (H) 01/03/2022       Recent Labs     03/08/22  1045 03/08/22  1159 03/08/22  1201 03/08/22  1253   POCGLU 432* 281* 267* 220*      Pt reports long-acting insulin Rx ran out 3 days prior to admission  Tried to supplement w short-acting insulin under her own direction, but states this precipitated an episode of hypoglycemia  Night of 3/7/22 developed nausea, which continued into morning of 3/8/22 with one episode emesis, prompting presentation to [place] ED at approximately 0800  PMHx significant for T1DM diagnosed 2012 with multiple prior admissions for DKA  Reports she follows with Endocrinology o/p, however does not appear to have any o/p Endocrinology encounters since 2020  FHx notable for diabetes in father and paternal grandmother  In ED, found to be in DKA on basis of clinical history, blood glucose 609; UA positive for 3+ glucose, 3+ ketones; venous pH 6 96; anion gap 30; beta-hydroxybutyrate 5 6, HCO3 6  Bolused 2 liters NS IVF  [facility]red to [facility] for admission to SDU morning of 3/8/22      Plan:  Decrease Isolyte to 50 ml/h from 125 ml/h  Transition from drip to basal bolus   Give Lantus 15 U at 11:00   Discontinue insulin gtt 2 hours later at 13:00   Continue mealtime bolus Lispro 5 U TID with meals   SSI TID with meals and QHS  Check AM BMP, Mg, Phos - Monitor and replete prn  lytes improved and stabilized from admission  Check blood glucose q2h while on drip, then decrease to four times daily (with meals and at bedtime)  Avoid hypoglycemia per protocol  Consult Nutrition for dietary recommendations in this patient with poor glycemic control  Anticipate transfer to Med-Surg later today given clinical improvement and stability  ----------------------------------------------------------------------------------------  HPI/24hr events: Overnight, transitioned from DKA insulin gtt to non-DKA gtt at approx 21:00  At approx 22:00, pt found to be hypoglycemic to low of 33, asymptomatic, bolused dextrose 10% 250 ml with rapid return to normal range  Patient appropriate for transfer out of the ICU today?: Patient does not meet criteria for ICU Follow-up Clinic; referral has not been made  Disposition: Transfer to Med-Surg   Code Status: Level 1 - Full Code  ---------------------------------------------------------------------------------------  SUBJECTIVE  This morning, Ms Tera Hudson is seen sitting up in bed having just eaten breakfast, alert, engaged, in no acute distress  She reports feeling well this morning and denies any symptoms of hypoglycemia overnight  Denies HA, lightheadedness, dizziness, nausea, vomiting  No complaints, no new or worsening symptoms  Last BM Saturday  Pt reports she moves bowels approx twice per week at baseline and does not desire escalation of bowel regimen at this time      Review of Systems  Review of systems was reviewed and negative unless stated above in HPI/24-hour events   ---------------------------------------------------------------------------------------  OBJECTIVE    Vitals   Vitals:    22 0400 22 0600 22 0757 22 0800   BP: 100/60 110/63  111/56   Pulse: 80 72 83 95   Resp: 17 15 15 (!) 23   Temp: 98 °F (36 7 °C)  97 8 °F (36 6 °C)    TempSrc:   Temporal    SpO2: 99% 99% 99% 99%   Weight:  66 5 kg (146 lb 9 7 oz)     Height:         Temp (24hrs), Av 9 °F (36 6 °C), Min:97 6 °F (36 4 °C), Max:98 4 °F (36 9 °C)  Current: Temperature: 97 8 °F (36 6 °C)          Respiratory:  SpO2: SpO2: 99 %, SpO2 Activity: SpO2 Activity: At Rest, SpO2 Device: O2 Device: None (Room air) Invasive/non-invasive ventilation settings   Respiratory  Report   Lab Data (Last 4 hours)    None         O2/Vent Data (Last 4 hours)    None                Physical Exam  Vitals and nursing note reviewed  Constitutional:       General: She is not in acute distress  Appearance: Normal appearance  She is not ill-appearing, toxic-appearing or diaphoretic  HENT:      Head: Normocephalic and atraumatic  Cardiovascular:      Rate and Rhythm: Normal rate and regular rhythm  Pulses: Normal pulses  Heart sounds: Normal heart sounds  No murmur heard  No friction rub  No gallop  Pulmonary:      Effort: Pulmonary effort is normal  No respiratory distress  Breath sounds: No stridor  No wheezing, rhonchi or rales  Chest:      Chest wall: No tenderness  Abdominal:      General: Bowel sounds are normal  There is distension (mild distension)  Palpations: Abdomen is soft  Tenderness: There is abdominal tenderness (mild diffuse tenderness to palpation from Right quadrants to just left of midline)  There is no guarding or rebound  Musculoskeletal:         General: No tenderness  Right lower leg: No edema  Left lower leg: No edema  Skin:     General: Skin is warm and dry  Neurological:      Mental Status: She is alert     Psychiatric:         Mood and Affect: Mood normal          Behavior: Behavior normal              Laboratory and Diagnostics:  Results from last 7 days   Lab Units 03/09/22  0417 03/08/22  0852   WBC Thousand/uL 7 08 6 91   HEMOGLOBIN g/dL 11 4* 15 5*   HEMATOCRIT % 35 1 47 5*   PLATELETS Thousands/uL 258 326   NEUTROS PCT %  --  83*   MONOS PCT %  --  2*     Results from last 7 days   Lab Units 03/09/22  0417 03/08/22  2222 03/08/22 2001 03/08/22  1609 03/08/22  1201 03/08/22  0852   SODIUM mmol/L 139  --  137 135* 138 133*   POTASSIUM mmol/L 4 3  --  3 7 4 5 4 9 4 7   CHLORIDE mmol/L 108  --  108 105 105 95*   CO2 mmol/L 20*  --  18* 15* 6* 8*   ANION GAP mmol/L 11  --  11 15* 27* 30*   BUN mg/dL 16  --  20 15 24 30*   CREATININE mg/dL 0 51*  --  0 81 0 82 0 84 1 09   CALCIUM mg/dL 6 8*  --  7 4* 7 1* 7 8* 9 7   GLUCOSE RANDOM mg/dL 128 41* 222* 437* 296* 609*   ALT U/L  --   --   --   --   --  33   AST U/L  --   --   --   --   --  17   ALK PHOS U/L  --   --   --   --   --  186*   ALBUMIN g/dL  --   --   --   --   --  4 8   TOTAL BILIRUBIN mg/dL  --   --   --   --   --  0 45     Results from last 7 days   Lab Units 03/09/22  0417 03/08/22 2001 03/08/22  1609 03/08/22  1201 03/08/22  0852   MAGNESIUM mg/dL 2 5 2 7* 1 9 2 2 2 3   PHOSPHORUS mg/dL 4 4 0 8* 2 5* 4 2 6 4*                   ABG:    VBG:  Results from last 7 days   Lab Units 03/08/22  0931   PH ALEXSANDER  6 960*           Micro        EKG: None this admission  Imaging: I have personally reviewed pertinent reports  and I have personally reviewed pertinent films in PACS  CXR from 3/8/22 unremarkable  Intake and Output  I/O       03/07 0701  03/08 0700 03/08 0701 03/09 0700 03/09 0701  03/10 0700    P  O   600 200    I V  (mL/kg)  6407 5 (96 4) 10 (0 2)    IV Piggyback  676 7     Total Intake(mL/kg)  7684 2 (115 6) 210 (3 2)    Urine (mL/kg/hr)  1950     Total Output  1950     Net  +5734 2 +210           Unmeasured Urine Occurrence   1 x          Height and Weights   Height: 4' 11" (149 9 cm)     Body mass index is 29 61 kg/m²    Weight (last 2 days)     Date/Time Weight    03/09/22 0600 66 5 (146 61)    03/08/22 1148 62 (136 69)            Nutrition       Diet Orders   (From admission, onward)             Start     Ordered    03/08/22 1415  Room Service  Once        Question:  Type of Service  Answer:  Room Service-Appropriate    03/08/22 1414    03/08/22 1143  Diet [place]/CHO Controlled; Consistent Carbohydrate Diet Level 2 (5 carb servings/75 grams CHO/meal)  Diet effective now        References:    Nutrtion Support Algorithm Enteral vs  Parenteral   Question Answer Comment   Diet Type [place]/CHO Controlled [place]/CHO Controlled Consistent Carbohydrate Diet Level 2 (5 carb servings/75 grams CHO/meal)    RD to adjust diet per protocol? Yes        03/08/22 1145                  Active Medications  Scheduled Meds:  Current Facility-Administered Medications   Medication Dose Route Frequency Provider Last Rate    insulin glargine  15 Units Subcutaneous QAM Jessica Graf MD      insulin lispro  1-5 Units Subcutaneous TID [facility] Jessica Graf MD      insulin lispro  1-5 Units Subcutaneous HS Jessica Graf MD      insulin lispro  5 Units Subcutaneous TID With Meals Jessica Graf MD      insulin regular (HumuLIN R,NovoLIN R) infusion  0 3-21 Units/hr Intravenous Titrated Jessica Graf MD 6 Units/hr (03/09/22 1010)    multi-electrolyte  50 mL/hr Intravenous Continuous Jessica Graf MD 50 mL/hr (03/09/22 1055)    senna-docusate sodium  2 tablet Oral BID CECILIO Hughes       Continuous Infusions:  insulin regular (HumuLIN R,NovoLIN R) infusion, 0 3-21 Units/hr, Last Rate: 6 Units/hr (03/09/22 1010)  multi-electrolyte, 50 mL/hr, Last Rate: 50 mL/hr (03/09/22 1055)      PRN Meds:        Invasive Devices Review  Invasive Devices  Report    Peripheral Intravenous Line            Peripheral IV 03/08/22 Left Forearm 1 day    Long-Dwell Peripheral IV (Midline) 03/08/22 Right Brachial <1 day                Rationale for remaining devices:   Peripheral IV for access, medication administration  Long-dwell periph IV for access, meds, lab draws  ---------------------------------------------------------------------------------------  Advance Directive and Living Will:      Power of :    POLST:    ---------------------------------------------------------------------------------------    Jessica Graf MD      Portions of the record may have been created with voice recognition software    Occasional wrong word or "sound a like" substitutions may have occurred due to the inherent limitations of voice recognition software    Read the chart carefully and recognize, using context, where substitutions have occurred

## 2022-03-09 NOTE — UTILIZATION REVIEW
Initial Clinical Review    Admission: Date/Time/Statement:   Admission Orders (From admission, onward)     Ordered        22 1139  Inpatient Admission  Once                      Orders Placed This Encounter   Procedures    Inpatient Admission     Standing Status:   Standing     Number of Occurrences:   1     Order Specific Question:   Level of Care     Answer:   Critical Care [15]     Order Specific Question:   Estimated length of stay     Answer:   More than 2 Midnights     Order Specific Question:   Certification     Answer:   I certify that inpatient services are medically necessary for this patient for a duration of greater than two midnights  See H&P and MD Progress Notes for additional information about the patient's course of treatment  Initial Presentation: transferred from [facility]  36 y o  female with PMHx T1DM and HLD who is transferred from the [facility] to [facility] ICU for DKA  Hx T1DM diagnosed  with multiple prior admissions for DKA  Pt reports that she was in her usual state of health until last night, when she began to feel nauseated  Continued to have nausea this AM, with episode of emesis x1  Pt also endorses dry mouth  Pt reports that she ran out of her long-acting insulin prescription three days prior to admission and tried to supplement with short-acting, precipitating episode of self-reported hypoglycemia  Endorses increased urinary frequency, abdominal distension/bloating since  2021  Last BM Saturday, not a change from baseline  In [facility] ED, found to be in DKA on basis of clinical history, blood glucose 609; UA positive for 3+ glucose, 3+ ketones; venous pH 6 96; anion gap 30; beta-hydroxybutyrate 5 6  Bolused 2 liters NS IVF  Transferred to [address] [place] [address] for ICU LOC & admitted to inpatient status for DKA  Started on IV insulin gtt, IVF  Date: 3/9/22   Day 2:   HPI/24hr events: Overnight, transitioned from DKA insulin gtt to non-DKA gtt at approx 21:00  At approx 22:00, pt found to be hypoglycemic to low of 33, asymptomatic, bolused dextrose 10% 250 ml with rapid return to normal range  Electrolyte repletion in progress  Anticipate transfer to med surg LOC later today      ED Triage Vitals   Temperature Pulse Respirations Blood Pressure SpO2   03/08/22 1148 03/08/22 1148 03/08/22 1148 03/08/22 1148 03/08/22 1148   98 °F (36 7 °C) 102 (!) 30 144/66 100 %      Temp Source Heart Rate Source Patient Position - Orthostatic VS BP Location FiO2 (%)   03/08/22 1148 03/08/22 1148 -- -- --   Temporal Monitor         Pain Score       03/08/22 1200       No Pain          Wt Readings from Last 1 Encounters:   03/09/22 66 5 kg (146 lb 9 7 oz)     Additional Vital Signs:   03/09/22 0600 -- 72 15 110/63 81 99 % --   03/09/22 0400 98 °F (36 7 °C) 80 17 100/60 75 99 % --   03/09/22 0200 -- 80 16 97/66 75 99 % --   03/09/22 0000 97 6 °F (36 4 °C) 81 17 104/61 74 99 % --   03/08/22 2200 -- 94 14 103/56 73 98 % --   03/08/22 2000 98 4 °F (36 9 °C) 108 Abnormal  23 Abnormal  104/53 75 99 % --   03/08/22 1900 -- 112 Abnormal  21 -- -- 98 % --   03/08/22 1800 98 °F (36 7 °C) 103 21 106/59 77 99 % --   03/08/22 1600 97 7 °F (36 5 °C) 103 20 110/55 75 99 % --   03/08/22 1400 -- 88 18 108/56 75 100 % --     Pertinent Labs/Diagnostic Test Results:     Results from last 7 days   Lab Units 03/09/22  0417 03/08/22  0852   WBC Thousand/uL 7 08 6 91   HEMOGLOBIN g/dL 11 4* 15 5*   HEMATOCRIT % 35 1 47 5*   PLATELETS Thousands/uL 258 326   NEUTROS ABS Thousands/µL  --  5 79     Results from last 7 days   Lab Units 03/09/22  0417 03/08/22 2001 03/08/22  1609 03/08/22  1201 03/08/22  0852   SODIUM mmol/L 139 137 135* 138 133*   POTASSIUM mmol/L 4 3 3 7 4 5 4 9 4 7   CHLORIDE mmol/L 108 108 105 105 95*   CO2 mmol/L 20* 18* 15* 6* 8*   ANION GAP mmol/L 11 11 15* 27* 30*   BUN mg/dL 16 20 15 24 30*   CREATININE mg/dL 0 51* 0 81 0 82 0 84 1 09   EGFR ml/min/1 73sq m 130 98 97 94 68   CALCIUM mg/dL 6 8* 7  4* 7 1* 7 8* 9 7   CALCIUM, IONIZED mmol/L 1 11*  --   --   --   --    MAGNESIUM mg/dL 2 5 2 7* 1 9 2 2 2 3   PHOSPHORUS mg/dL 4 4 0 8* 2 5* 4 2 6 4*     Results from last 7 days   Lab Units 03/08/22  0852   AST U/L 17   ALT U/L 33   ALK PHOS U/L 186*   TOTAL PROTEIN g/dL 8 2   ALBUMIN g/dL 4 8   TOTAL BILIRUBIN mg/dL 0 45     Results from last 7 days   Lab Units 03/09/22  0602 03/09/22  0414 03/09/22  0205 03/09/22  0005 03/08/22  2224 03/08/22  2211 03/08/22  2209 03/08/22  1959 03/08/22  1859 03/08/22  1800 03/08/22  1659 03/08/22  1606   POC GLUCOSE mg/dl 83 133 101 135 75 33* 40* 217* 317* 377* 354* 397*     Results from last 7 days   Lab Units 03/09/22  0417 03/08/22  2222 03/08/22  2001 03/08/22  1609 03/08/22  1201 03/08/22  0852   GLUCOSE RANDOM mg/dL 128 41* 222* 437* 296* 609*     BETA-HYDROXYBUTYRATE   Date Value Ref Range Status   03/08/2022 5 6 (H) <0 6 mmol/L Final      Results from last 7 days   Lab Units 03/08/22  0931   PH ALEXSANDER  6 960*     Results from last 7 days   Lab Units 03/08/22  0852   LIPASE u/L 15     Results from last 7 days   Lab Units 03/08/22  1024   CLARITY UA  Clear   COLOR UA  Yellow   SPEC GRAV UA  1 025   PH UA  5 5   GLUCOSE UA mg/dl 3+*   KETONES UA mg/dl 80 (3+)*   BLOOD UA  Trace-Intact*   PROTEIN UA mg/dl Trace*   NITRITE UA  Negative   BILIRUBIN UA  Negative   UROBILINOGEN UA E U /dl 0 2   LEUKOCYTES UA  Negative   WBC UA /hpf 0-1   RBC UA /hpf None Seen   BACTERIA UA /hpf Occasional   EPITHELIAL CELLS WET PREP /hpf Occasional   MUCUS THREADS  Occasional*       Past Medical History:   Diagnosis Date    Diabetes mellitus ([facility] )     uses kwiki pen     Diabetes mellitus type 1 ([facility] )     High blood pressure     recently dx/ put on lisinopril     High cholesterol     Hypertension      no hypersion     Miscarriage     Recurrent pregnancy loss, antepartum condition or complication      Present on Admission:   DKA, type 1, not at goal [facility])    Admitting Diagnosis: DKA (diabetic ketoacidosis) ([facility] ) [E11 10]  Age/Sex: 36 y o  female  Admission Orders:  accuchecks  Nursing dysphagia assessment  Neuro checks qs  scd    Scheduled Medications:  calcium gluconate 1 g in sodium chloride 0 9% 50 mL, Once, 3/9  dextrose infusion 10 % bolus, Dose: 250 mL, Once, 3/8  insulin lispro, 5 Units, Subcutaneous, TID With Meals  magnesium sulfate 2 g/50 mL IVPB 2 g, Once, 3/8  potassium chloride (K-DUR,KLOR-CON) CR tablet 20 mEq, x 2 doses, 3/8  potassium phosphates 30 mmol in sodium chloride 0 9 % 250 mL infusion, Once 3/8 & repeat once 3/9  senna-docusate sodium, 2 tablet, Oral, BID    Continuous IV Infusions:  insulin regular (HumuLIN R,NovoLIN R) 1 Units/mL in sodium chloride 0 9 % 100 mL infusion  Rate: 0 3-21 mL/hr Dose: 0 3-21 Units/hr  Freq: Titrated Route: IV  Last Dose: 6 Units/hr (03/09/22 1010)  Start: 03/08/22 2045  insulin regular (HumuLIN R,NovoLIN R) 1 Units/mL in sodium chloride 0 9 % 100 mL infusion  Rate: 0 1-30 mL/hr Dose: 0 1-30 Units/hr  Freq: Continuous Route: IV  Last Dose: Stopped (03/08/22 2047)  Start: 03/08/22 1145 End: 03/08/22 2042  multi-electrolyte, 125 mL/hr, Intravenous, Continuous    Network Utilization Review Department  ATTENTION: Please call with any questions or concerns to [phone] and carefully listen to the prompts so that you are directed to the right person  All voicemails are confidential   Trish Jade all requests for admission clinical reviews, approved or denied determinations and any other requests to dedicated fax number below belonging to the campus where the patient is receiving treatment   List of dedicated fax numbers for the Facilities:  [address] DENIALS (Administrative/Medical Necessity) [phone]   [address] [place] [address] (Maternity/NICU/Pediatrics) 261 [facility],7Th Floor [address]  [address] Medical [place] [address] [address] [place] [address] [address] [place] [address] [phone]

## 2022-03-09 NOTE — PLAN OF CARE
Problem: Prexisting or High Potential for Compromised Skin Integrity  Goal: Skin integrity is maintained or improved  Description: INTERVENTIONS:  - Identify patients at risk for skin breakdown  - Assess and monitor skin integrity  - Assess and monitor nutrition and hydration status  - Monitor labs   - Assess for incontinence   - Turn and reposition patient  - Assist with mobility/ambulation  - Relieve pressure over bony prominences  - Avoid friction and shearing  - Provide appropriate hygiene as needed including keeping skin clean and dry  - Evaluate need for skin moisturizer/barrier cream  - Collaborate with interdisciplinary team   - Patient/family teaching  - Consider wound care consult   Outcome: Progressing     Problem: SAFETY ADULT  Goal: Patient will remain free of falls  Description: INTERVENTIONS:  - Educate patient/family on patient safety including physical limitations  - Instruct patient to call for assistance with activity   - Consult OT/PT to assist with strengthening/mobility   - Keep Call bell within reach  - Keep bed low and locked with side rails adjusted as appropriate  - Keep care items and personal belongings within reach  - Initiate and maintain comfort rounds  - Make Fall Risk Sign visible to staff  - Offer Toileting every 2 Hours, in advance of need  - Initiate/Maintain bed alarm  - Apply yellow socks and bracelet for high fall risk patients  - Consider moving patient to room near nurses station  Outcome: Progressing     Problem: DISCHARGE PLANNING  Goal: Discharge to home or other facility with appropriate resources  Description: INTERVENTIONS:  - Identify barriers to discharge w/patient and caregiver  - Arrange for needed discharge resources and transportation as appropriate  - Identify discharge learning needs (meds, wound care, etc )  - Arrange for interpretive services to assist at discharge as needed  - Refer to Case Management Department for coordinating discharge planning if the patient needs post-hospital services based on physician/advanced practitioner order or complex needs related to functional status, cognitive ability, or social support system  Outcome: Progressing     Problem: METABOLIC, FLUID AND ELECTROLYTES - ADULT  Goal: Electrolytes maintained within normal limits  Description: INTERVENTIONS:  - Monitor labs and assess patient for signs and symptoms of electrolyte imbalances  - Administer electrolyte replacement as ordered  - Monitor response to electrolyte replacements, including repeat lab results as appropriate  - Instruct patient on fluid and nutrition as appropriate  Outcome: Progressing  Goal: Glucose maintained within target range  Description: INTERVENTIONS:  - Monitor Blood Glucose as ordered  - Assess for signs and symptoms of hyperglycemia and hypoglycemia  - Administer ordered medications to maintain glucose within target range  - Assess nutritional intake and initiate nutrition service referral as needed  Outcome: Progressing

## 2022-03-09 NOTE — UTILIZATION REVIEW
Inpatient Admission Authorization Request   NOTIFICATION OF INPATIENT ADMISSION/INPATIENT AUTHORIZATION REQUEST   SERVICING FACILITY:   [place] [address] [address]  Tax ID: [id number]  NPI: [id number]  Place of Service: Inpatient [address] [facility]  [address]  Place of Service Code: 24     ATTENDING PROVIDER:  Attending Name and NPI#: Bernie Cabrera [[id number]]  Address: [facility] [address]  Phone: [phone]     UTILIZATION REVIEW CONTACT:  Blane Parry, Utilization   Network Utilization Review Department  Phone: [phone]  Fax [phone]  Email: [email or web address]     PHYSICIAN ADVISORY SERVICES:  FOR [id number] REVIEW - MEDICAL NECESSITY DENIAL  Phone: [phone]  Fax: [phone]  Email: [email or web address]     TYPE OF REQUEST:  Inpatient Status     ADMISSION INFORMATION:  ADMISSION DATE/TIME: 3/8/22 11:34 AM  PATIENT DIAGNOSIS CODE/DESCRIPTION:  DKA (diabetic ketoacidosis) ([facility] ) [E11 10]  DISCHARGE DATE/TIME: No discharge date for patient encounter  IMPORTANT INFORMATION:  Please contact the Allison Farrell directly with any questions or concerns regarding this request  Department voicemails are confidential     Send requests for admission clinical reviews, concurrent reviews, approvals, and administrative denials due to lack of clinical to fax [phone]

## 2022-03-09 NOTE — QUICK NOTE
Spoke with pt to update regarding clinical course and plan of care  Offered to call family/friend/contact with update but patient declined  Pt's sister-in-law subsequently called unit for update  After confirming permission with pt to discuss with sister-in-law, provided update regarding clinical course and plan of care  All questions and concerns addressed at this time 
22.9

## 2022-03-09 NOTE — CASE MANAGEMENT
Case Management Assessment & Discharge Planning Note    Patient name Milton Quezada  Location ICU 09/ICU 09 MRN [id number]  : 1990 Date 3/9/2022       Current Admission Date: 3/8/2022  Current Admission Diagnosis:DKA, type 1, not at goal [facility])   Patient Active Problem List    Diagnosis Date Noted    DKA, type 1, not at goal [facility]) 2022    Noncompliance with diabetes treatment 2021    S/P repeat low transverse  2021    Polyhydramnios affecting pregnancy in third trimester 2021    Type 1 diabetes mellitus during pregnancy in third trimester 2021    Uterine synechiae 2021    Pre-existing type 1 diabetes mellitus with hyperglycemia during pregnancy in third trimester ([facility] ) 2020    Hyperlipidemia, mixed 2020    Chronic hypertension affecting pregnancy 2020      LOS (days): 1  Geometric Mean LOS (GMLOS) (days):   Days to GMLOS:     OBJECTIVE:    Risk of Unplanned Readmission Score: 9         Current admission status: Inpatient       Preferred Pharmacy:   [address] [facility] [address] [facility] [address] [address] [facility]  [address] [facility]  [address]  Phone: [phone] Fax: [address] [place] [address]  Phone: [phone] Fax: [phone]    Primary Care Provider: Milton Ward DO    Primary Insurance: [facility]  Secondary Insurance:     ASSESSMENT:  [place] Proxies    There are no active Health Care Proxies on file  Patient Information  Admitted from[de-identified] Home  Mental Status: Alert  During Assessment patient was accompanied by: Not accompanied during assessment  Assessment information provided by[de-identified] Patient  Primary Caregiver: Self  Support Systems: Parent  What city do you live in?: [address] entry access options   Select all that apply : Stairs  Number of steps to enter home : One Flight (three flights of stairs)  Do the steps have railings?: Yes  Type of Current Residence: Apartment  Floor Level: 3  Upon entering residence, is there a bedroom on the main floor (no further steps)?: Yes  Upon entering residence, is there a bathroom on the main floor (no further steps)?: Yes  In the last 12 months, was there a time when you were not able to pay the mortgage or rent on time?: No  In the last 12 months, how many places have you lived?: 1  In the last 12 months, was there a time when you did not have a steady place to sleep or slept in a shelter (including now)?: No  Homeless/housing insecurity resource given?: N/A  Living Arrangements: Lives Alone  Is patient a ?: No    Activities of Daily Living Prior to Admission  Functional Status: Independent  Completes ADLs independently?: Yes  Ambulates independently?: Yes  Does patient use assisted devices?: No  Does patient currently own DME?: No  Does patient have a history of Outpatient Therapy (PT/OT)?: No  Does the patient have a history of Short-Term Rehab?: No  Does patient have a history of HHC?: No  Does patient currently have [facility]?: No         Patient Information Continued  Income Source: Unemployed  Does patient have prescription coverage?: Yes  Within the past 12 months, you worried that your food would run out before you got the money to buy more : Never true  Within the past 12 months, the food you bought just didnt last and you didnt have money to get more : Never true  Food insecurity resource given?: N/A  Does patient receive dialysis treatments?: No  Does patient have a history of substance abuse?: No  Does patient have a history of Mental Health Diagnosis?: No         Means of Transportation  Means of Transport to Appts[de-identified] Drives Self  In the past 12 months, has lack of transportation kept you from medical appointments or from getting medications?: No  In the past 12 months, has lack of transportation kept you from meetings, work, or from getting things needed for daily living?: No  Was application for public transport provided?: N/A        DISCHARGE DETAILS:    Discharge planning discussed with[de-identified] patient  Freedom of Choice: Yes  Comments - Freedom of Choice: pt understands ability to be involved with care in the hospital and arranging for any services outpt  CM contacted family/caregiver?: No- see comments (pt declined cm contact family or friends)        Would you like to participate in our [address] service program?  : No - Declined     Cm spoke with pt verified demographics and performed assessment  Pt was unsure why she did not take her long acting insulin  Cm tried to assess any barriers that might have been present and performed a RCA  But pt stated she can afford all of her medication, and has access to transportation to  her medication  Cm offered to find pharmacy that could deliver medication, but pt declined  Cm educated pt on importance of complying with insulin regimen  PT understands, and will try to take medications as prescribed in the future  Pt declined meeting with a diabetes educator outpt, or dietitian  Pt states she does not have any discharge needs at this time  Cm will follow

## 2022-03-09 NOTE — PROGRESS NOTES
Pastoral Care Progress Note    3/9/2022  Patient: Colin Ramírez : 1990  Admission Date & Time: 3/8/2022 1134  MRN: [id number] CSN: [id number]                     Chaplaincy Interventions Utilized:   Relationship Building: Cultivated a relationship of care and support  Patient said she was admitted due to vomiting and being dehydrated  She is feeling much better and welcomed a prayer for health     Ritual: Provided prayer     22 1500   Clinical Encounter Type   Visited With Patient   Routine Visit Introduction   Referral To    Confucianist Encounters   Confucianist Needs Prayer

## 2022-03-09 NOTE — PLAN OF CARE
Plan of care cont           Problem: DISCHARGE PLANNING  Goal: Discharge to home or other facility with appropriate resources  Description: INTERVENTIONS:  - Identify barriers to discharge w/patient and caregiver  - Arrange for needed discharge resources and transportation as appropriate  - Identify discharge learning needs (meds, wound care, etc )  - Arrange for interpretive services to assist at discharge as needed  - Refer to Case Management Department for coordinating discharge planning if the patient needs post-hospital services based on physician/advanced practitioner order or complex needs related to functional status, cognitive ability, or social support system  Outcome: Progressing     Problem: METABOLIC, FLUID AND ELECTROLYTES - ADULT  Goal: Electrolytes maintained within normal limits  Description: INTERVENTIONS:  - Monitor labs and assess patient for signs and symptoms of electrolyte imbalances  - Administer electrolyte replacement as ordered  - Monitor response to electrolyte replacements, including repeat lab results as appropriate  - Instruct patient on fluid and nutrition as appropriate  Outcome: Progressing  Goal: Fluid balance maintained  Description: INTERVENTIONS:  - Monitor labs   - Monitor I/O and WT  - Instruct patient on fluid and nutrition as appropriate  - Assess for signs & symptoms of volume excess or deficit  Outcome: Progressing  Goal: Glucose maintained within target range  Description: INTERVENTIONS:  - Monitor Blood Glucose as ordered  - Assess for signs and symptoms of hyperglycemia and hypoglycemia  - Administer ordered medications to maintain glucose within target range  - Assess nutritional intake and initiate nutrition service referral as needed  Outcome: Progressing

## 2022-03-10 VITALS
BODY MASS INDEX: 31.16 KG/M2 | DIASTOLIC BLOOD PRESSURE: 77 MMHG | HEIGHT: 59 IN | OXYGEN SATURATION: 100 % | SYSTOLIC BLOOD PRESSURE: 113 MMHG | TEMPERATURE: 97.3 F | RESPIRATION RATE: 18 BRPM | HEART RATE: 88 BPM | WEIGHT: 154.54 LBS

## 2022-03-10 LAB
ANION GAP SERPL CALCULATED.3IONS-SCNC: 3 MMOL/L (ref 4–13)
BUN SERPL-MCNC: 9 MG/DL (ref 5–25)
CA-I BLD-SCNC: 1.02 MMOL/L (ref 1.12–1.32)
CALCIUM SERPL-MCNC: 7.1 MG/DL (ref 8.3–10.1)
CHLORIDE SERPL-SCNC: 107 MMOL/L (ref 100–108)
CO2 SERPL-SCNC: 26 MMOL/L (ref 21–32)
CREAT SERPL-MCNC: 0.43 MG/DL (ref 0.6–1.3)
ERYTHROCYTE [DISTWIDTH] IN BLOOD BY AUTOMATED COUNT: 14.4 % (ref 11.6–15.1)
GFR SERPL CREATININE-BSD FRML MDRD: 137 ML/MIN/1.73SQ M
GLUCOSE SERPL-MCNC: 211 MG/DL (ref 65–140)
GLUCOSE SERPL-MCNC: 219 MG/DL (ref 65–140)
GLUCOSE SERPL-MCNC: 229 MG/DL (ref 65–140)
HCT VFR BLD AUTO: 31.3 % (ref 34.8–46.1)
HGB BLD-MCNC: 10.2 G/DL (ref 11.5–15.4)
MAGNESIUM SERPL-MCNC: 2 MG/DL (ref 1.6–2.6)
MCH RBC QN AUTO: 29.6 PG (ref 26.8–34.3)
MCHC RBC AUTO-ENTMCNC: 32.6 G/DL (ref 31.4–37.4)
MCV RBC AUTO: 91 FL (ref 82–98)
MRSA NOSE QL CULT: NORMAL
PHOSPHATE SERPL-MCNC: 2.3 MG/DL (ref 2.7–4.5)
PLATELET # BLD AUTO: 160 THOUSANDS/UL (ref 149–390)
PMV BLD AUTO: 11.5 FL (ref 8.9–12.7)
POTASSIUM SERPL-SCNC: 4 MMOL/L (ref 3.5–5.3)
RBC # BLD AUTO: 3.45 MILLION/UL (ref 3.81–5.12)
SODIUM SERPL-SCNC: 136 MMOL/L (ref 136–145)
WBC # BLD AUTO: 3.91 THOUSAND/UL (ref 4.31–10.16)

## 2022-03-10 PROCEDURE — 83735 ASSAY OF MAGNESIUM: CPT | Performed by: NURSE PRACTITIONER

## 2022-03-10 PROCEDURE — 80048 BASIC METABOLIC PNL TOTAL CA: CPT | Performed by: NURSE PRACTITIONER

## 2022-03-10 PROCEDURE — 82330 ASSAY OF CALCIUM: CPT | Performed by: NURSE PRACTITIONER

## 2022-03-10 PROCEDURE — 99239 HOSP IP/OBS DSCHRG MGMT >30: CPT | Performed by: INTERNAL MEDICINE

## 2022-03-10 PROCEDURE — 82948 REAGENT STRIP/BLOOD GLUCOSE: CPT

## 2022-03-10 PROCEDURE — 84100 ASSAY OF PHOSPHORUS: CPT | Performed by: NURSE PRACTITIONER

## 2022-03-10 PROCEDURE — 85027 COMPLETE CBC AUTOMATED: CPT | Performed by: NURSE PRACTITIONER

## 2022-03-10 RX ORDER — INSULIN LISPRO 100 [IU]/ML
6 INJECTION, SOLUTION INTRAVENOUS; SUBCUTANEOUS
Qty: 5.4 ML | Refills: 0 | Status: SHIPPED | OUTPATIENT
Start: 2022-03-10 | End: 2022-04-09

## 2022-03-10 RX ORDER — INSULIN GLARGINE 100 [IU]/ML
3 INJECTION, SOLUTION SUBCUTANEOUS ONCE
Status: COMPLETED | OUTPATIENT
Start: 2022-03-10 | End: 2022-03-10

## 2022-03-10 RX ORDER — CALCIUM CARBONATE 500(1250)
2 TABLET ORAL
Status: COMPLETED | OUTPATIENT
Start: 2022-03-10 | End: 2022-03-10

## 2022-03-10 RX ORDER — PEN NEEDLE, DIABETIC 32GX 5/32"
NEEDLE, DISPOSABLE MISCELLANEOUS
Qty: 100 EACH | Refills: 0 | Status: SHIPPED | OUTPATIENT
Start: 2022-03-10

## 2022-03-10 RX ORDER — INSULIN GLARGINE 100 [IU]/ML
18 INJECTION, SOLUTION SUBCUTANEOUS DAILY
Qty: 5.4 ML | Refills: 0 | Status: SHIPPED | OUTPATIENT
Start: 2022-03-10 | End: 2022-06-16

## 2022-03-10 RX ADMIN — INSULIN LISPRO 2 UNITS: 100 INJECTION, SOLUTION INTRAVENOUS; SUBCUTANEOUS at 11:39

## 2022-03-10 RX ADMIN — CALCIUM 2 TABLET: 500 TABLET ORAL at 08:57

## 2022-03-10 RX ADMIN — INSULIN GLARGINE 3 UNITS: 100 INJECTION, SOLUTION SUBCUTANEOUS at 10:14

## 2022-03-10 RX ADMIN — INSULIN LISPRO 2 UNITS: 100 INJECTION, SOLUTION INTRAVENOUS; SUBCUTANEOUS at 06:37

## 2022-03-10 RX ADMIN — DIBASIC SODIUM PHOSPHATE, MONOBASIC POTASSIUM PHOSPHATE AND MONOBASIC SODIUM PHOSPHATE 1 TABLET: 852; 155; 130 TABLET ORAL at 08:57

## 2022-03-10 RX ADMIN — DIBASIC SODIUM PHOSPHATE, MONOBASIC POTASSIUM PHOSPHATE AND MONOBASIC SODIUM PHOSPHATE 1 TABLET: 852; 155; 130 TABLET ORAL at 11:41

## 2022-03-10 RX ADMIN — INSULIN LISPRO 5 UNITS: 100 INJECTION, SOLUTION INTRAVENOUS; SUBCUTANEOUS at 06:38

## 2022-03-10 RX ADMIN — INSULIN GLARGINE 15 UNITS: 100 INJECTION, SOLUTION SUBCUTANEOUS at 09:11

## 2022-03-10 NOTE — DISCHARGE INSTRUCTIONS
Diabetes Type 1: Management   WHAT YOU NEED TO KNOW:   The goal of managing type 1 diabetes is to delay or prevent complications  Complications can include kidney disease, heart and blood vessel disease, and eye and skin conditions  DISCHARGE INSTRUCTIONS:   Have someone call your local emergency number (911 in the [address] [facility]) if:   · You cannot be woken  · You have signs of diabetic ketoacidosis:     ? confusion, fatigue    ? vomiting    ? rapid heartbeat    ? fruity smelling breath    ? extreme thirst    ? dry mouth and skin    · You have any of the following signs of a heart attack:      ? Squeezing, pressure, or pain in your chest    ? You may  also have any of the following:     § Discomfort or pain in your back, neck, jaw, stomach, or arm    § Shortness of breath    § Nausea or vomiting    § Lightheadedness or a sudden cold sweat    · You have any of the following signs of a stroke:      ? Numbness or drooping on one side of your face     ? Weakness in an arm or leg    ? Confusion or difficulty speaking    ? Dizziness, a severe headache, or vision loss    Call your doctor or diabetes care team if:   · You have a sore or wound that will not heal     · You have a change in the amount you urinate  · Your blood sugar levels are higher than your target goals  · You often have lower blood sugar levels than your target goals  · Your skin is red, dry, warm, or swollen  · You have trouble coping with diabetes, or you feel anxious or depressed  · You have questions or concerns about your condition or care  Manage your blood sugar levels:   · Check your blood sugar levels as directed and as needed  Several items are available to use to check your levels  You may need to check by testing a drop of blood in a glucose monitor  You may instead be given a continuous glucose monitoring (CGM) device  A sensor is placed in your abdomen or on your arm   You put a transmitter on the sensor to get a reading that shows up on the monitor  A CGM device can be used with or without an insulin pump  Talk with your provider to find out which is best for you  The goal for blood sugar levels before meals  is between 80 and 130 mg/dL and 2 hours after eating  is lower than 180 mg/dL  · Know what to do if your blood sugar level is too high or too low  Levels that remain too high or too low can be life-threatening  Learn the signs or symptoms of high blood sugar levels such, as increased urination and fatigue  Also learn the signs or symptoms of low levels such as shakiness, sweating, or irritability  Always glucose tablets or glucose gel to take if levels are too low  · Take your insulin as directed  You will need insulin for the rest of your life  Your insulin may be given by injections, or you may have an insulin pump or pen  An insulin pump is an implanted device gives you a constant amount of insulin through the day  The amount changes when the number of carbohydrates (carbs) are entered  An insulin pen is a device prefilled with the right amount of insulin  You and your care team will discuss which method is best for you  · Count your carbs, protein, and fats correctly  You will learn the amount of carbs, protein, and fat you will need every day  Each meal should have a balance of carbs, proteins, and fats  Eat whole grains, raw fruits, and steamed vegetables for fiber  · Know the risks if you choose to drink alcohol  Alcohol can cause your blood sugar levels to be low if you use insulin  Alcohol can cause high blood sugar levels and weight gain if you drink too much  Women 21 years or older and men 72 years or older should limit alcohol to 1 drink a day  Men aged 24 to 59 years should limit alcohol to 2 drinks a day  A drink of alcohol is 12 ounces of beer, 5 ounces of wine, or 1½ ounces of liquor  · Be active 5 days or more, for a total of 150 minutes, in a week    Physical activity helps to lower your blood sugar levels and helps with weight management  It can also help decrease kidney and heart problems  Your provider can help you create an activity plan  The plan can include the best activities for you  The plan will tell you what your blood sugar level should be before exercise  Check your blood sugar level before and after you exercise and before driving  · Go to follow-up appointments  You may be sent to different types of providers to check for complications of diabetes  Types of providers include heart (cardiologist), kidney (nephrologist), and nerve (neurologist) specialists  Complications of diabetes can start 3 to 5 years after your diagnosis  You will need to get your eyes checked every year or if you have problems with your vision  Other things you can do to manage type 1 diabetes:   · Wear medical alert identification  Wear medical alert jewelry or carry a card that says you have diabetes  Ask your provider where to get these items  · Do not smoke  Nicotine and other chemicals in cigarettes and cigars can cause lung and blood vessel damage  It also makes it more difficult to manage your diabetes  Ask your provider for information if you currently smoke and need help to quit  Do not use e-cigarettes or smokeless tobacco in place of cigarettes or to help you quit  They still contain nicotine  · Check your feet each day for cuts, scratches, calluses, or other wounds  Look for redness and swelling, and feel for warmth  Wear shoes that fit well  Check your shoes for rocks or other objects that can hurt your feet  Do not walk barefoot or wear shoes without socks  Wear cotton socks to help keep your feet dry  · Ask about vaccines you may need  You have a higher risk for serious illness if you get the flu, pneumonia, COVID-19, or hepatitis  Ask your provider if you should get vaccines to prevent these or other diseases, and when to get the vaccines      Follow up with your doctor or diabetes care team as directed: You may need to follow up more often if you are having problems  Write down your questions so you remember to ask them during your visits  © Copyright [employer] 2022 Information is for End User's use only and may not be sold, redistributed or otherwise used for commercial purposes  All illustrations and images included in CareNotes® are the copyrighted property of A D A M , Inc  or Thaddeus Sanchez   The above information is an  only  It is not intended as medical advice for individual conditions or treatments  Talk to your doctor, nurse or pharmacist before following any medical regimen to see if it is safe and effective for you  Diabetic Ketoacidosis   WHAT YOU NEED TO KNOW:   Diabetic ketoacidosis (DKA) is a life-threatening condition caused by dangerously high blood sugar levels  Your blood sugar levels become high because your body does not have enough insulin  Insulin helps move sugar out of the blood so it can be used for energy  The lack of insulin forces your body to use fat instead of sugar for energy  As fats are broken down, they leave chemicals called ketones that build up in your blood  Ketones are dangerous at high levels  DISCHARGE INSTRUCTIONS:   Call or have someone call your local emergency number (911 in the [address] [place] [address],[address]) for any of the following:   · You have a seizure  · You begin to breathe fast, or are short of breath  · You become weak and confused  Seek care immediately if:   · You are more drowsy than usual        Contact your healthcare provider if:   · You have fruity, sweet breath  · You have severe, new stomach pain and are vomiting  · Your blood sugar level is lower or higher than your healthcare provider says it should be  · You have ketones in your blood or urine  · You have a fever or chills       · You are more thirsty than usual      · You are urinating more often than usual  · You have questions or concerns about your condition or care  Medicines:   · Insulin and diabetes medicine  decreases the amount of sugar in your blood  · Take your medicine as directed  Contact your healthcare provider if you think your medicine is not helping or if you have side effects  Tell him or her if you are allergic to any medicine  Keep a list of the medicines, vitamins, and herbs you take  Include the amounts, and when and why you take them  Bring the list or the pill bottles to follow-up visits  Carry your medicine list with you in case of an emergency  Help prevent DKA: The best way to prevent DKA is to control your diabetes  Ask your healthcare provider for more information on how to manage your diabetes  The following may help decrease your risk for DKA:  · Monitor your blood sugar levels closely  if you have an infection, are stressed, sick, or experience trauma  Check your blood sugar levels often  You may need to check at least 3 times each day  If your blood sugar level is too high, give yourself insulin as directed by your healthcare provider  · Manage your sick days  When you are sick, you may not eat as much as you normally would  You may need to change the amount of insulin you give yourself  You may need to check your blood sugar level more often than usual  Make a plan with your healthcare provider about how to manage your diabetes when you are sick  · Check your ketones as directed  Follow your healthcare provider's instructions about when you should check your blood or urine for ketones  Your healthcare provider may give you a machine to check your blood ketones  Urine ketones can be checked with sticks you dip in your urine  Do not exercise if you have ketones in your urine or blood  Exercise may increase the level of ketones in your blood or urine and cause dehydration  · Know how to treat DKA    If you have signs of DKA, drink more liquids that do not contain sugar, such as water  Take your insulin as directed by your healthcare provider and go to the nearest emergency room  Follow up with your healthcare provider as directed: You may need to return often for blood tests  Write down your questions so you remember to ask them during your visits  © Copyright [employer] 2022 Information is for End User's use only and may not be sold, redistributed or otherwise used for commercial purposes  All illustrations and images included in CareNotes® are the copyrighted property of A D A [employer]  or Thaddeus Sanchez   The above information is an  only  It is not intended as medical advice for individual conditions or treatments  Talk to your doctor, nurse or pharmacist before following any medical regimen to see if it is safe and effective for you  Hypoglycemia in a Person with Diabetes   WHAT YOU NEED TO KNOW:   Hypoglycemia is a serious condition that happens when your blood glucose (sugar) level drops too low  The blood sugar level is usually too high in a person with diabetes, but the level can also drop too low  It is important to follow your diabetes management plan to keep your blood sugar level steady  DISCHARGE INSTRUCTIONS:   Have someone call your local emergency number (911 in the [address]) if:   · You have a seizure or pass out  · Your blood sugar is less than 50 mg/dL and does not respond to treatment  · You feel you are going to pass out  · You have trouble thinking clearly  Call your doctor or diabetes care team if:   · You have had symptoms of low blood sugar several times  · You have questions about the amount of insulin or diabetes medicine you are taking  · You have questions or concerns about your condition or care  Medicines:   · Insulin or diabetes medicine  help to keep your blood sugar under control  · Glucagon  may be needed if you have severe hypoglycemia  · Take your medicine as directed  Contact your healthcare provider if you think your medicine is not helping or if you have side effects  Tell him or her if you are allergic to any medicine  Keep a list of the medicines, vitamins, and herbs you take  Include the amounts, and when and why you take them  Bring the list or the pill bottles to follow-up visits  Carry your medicine list with you in case of an emergency  Manage hypoglycemia:   · Check your blood sugar level right away if you have symptoms of hypoglycemia  Hypoglycemia usually happens when your blood sugar level is 70 mg/dL or below  Ask your diabetes care team what blood sugar level is too low for you  · If your blood sugar level is too low, eat or drink 15 grams of fast-acting carbohydrate  Examples of this amount of fast-acting carbohydrate are 4 ounces (½ cup) of fruit juice or 4 ounces of regular soda  Other examples are 2 tablespoons of raisins or 1 tube of glucose gel  Check your blood sugar level 15 minutes later  Sit still as you wait  If the level is still low (less than 100 mg/dL), have another 15 grams of carbohydrate  When the level returns to 100 mg/dL, eat a meal if it is time  If your meal time is more than 1 hour away, eat a snack  The snack should contain carbohydrates, such as the following:    ? 3/4 cup of cereal    ? 1 cup of skim or low fat milk    ? 6 soda crackers    ? 1/2 of a turkey sandwich    ? 15 fat-free chips  This will help prevent another drop in blood sugar  Always carefully follow your diabetes care team's instructions on how to treat low blood sugar levels  · Always carry a source of fast-acting carbohydrate  If you have symptoms of hypoglycemia and you do not have a blood glucose meter, have a source of fast-acting carbohydrate anyway  Avoid carbohydrate foods that are high in fat  The fat content may make the carbohydrate take longer to increase your blood sugar level  Ask your diabetes care team if you should carry a glucagon kit  Glucagon is a medicine that is injected when you develop severe hypoglycemia and become unconscious  Check the expiration date every month and replace it before it expires  · Teach others how to help you if you have symptoms of hypoglycemia  Tell them about the symptoms of hypoglycemia  Ask them to give you a source of fast-acting carbohydrate if you cannot get it yourself  Ask them to give you a glucagon injection if you have signs of hypoglycemia and you become unconscious or have a seizure  Ask them to call the local emergency number (911 in the [address] [facility])   This is an emergency  Tell them never to try to make you swallow anything if you faint or have a seizure  · Wear medical alert jewelry  or carry a card that says you have diabetes  Ask where to get these items  Prevent hypoglycemia:   · Take diabetes medicine as directed  Take your medicine at the right time and in the right amount  Do not  double the amount of medicine you take unless instructed by your diabetes care team  Jose Ridgrabiel may need oral diabetes medicine, insulin, or both to help control your blood sugar levels  Your healthcare provider will teach you how and when to take oral diabetes medicine  You will also be taught about side effects oral diabetes medicine can cause  Insulin may be added if oral diabetes medicine becomes less effective over time  Insulin may be injected, or given through a pump or pen  You and your care team will discuss which method is best for you  ? An insulin pump  is an implanted device that gives your insulin 24 hours a day  An insulin pump prevents the need for multiple insulin injections in a day  ? An insulin pen  is a device prefilled with the right amount of insulin  · Eat meals and snacks as directed  Talk to your dietitian or diabetes care team about a meal plan that is right for you  Do not skip meals  · Check your blood sugar level as directed    Ask your diabetes care team what your blood sugar levels should be before and after you eat  Ask when and how often to check your blood sugar level  You may need to check a drop of blood in a glucose test machine  You may need to check at least 3 times each day  Record your blood sugar level results and take the record with you when you see your care team  They may use it to make changes to your medicine, food, or exercise schedules  Your care team provider may recommend a continuous glucose monitor (CGM)  A CGM is a device that is worn at all times  The CGM checks your blood sugar every 5 minutes  It sends results to an electronic device such as a smart phone  · Check your blood sugar level before you exercise  Physical activity, such as exercise can decrease your blood sugar level  If your blood sugar level is less than 100 mg/dL, have a carbohydrate snack  Examples are 4 to 6 crackers, ½ banana, 8 ounces (1 cup) of nonfat or 1% milk, or 4 ounces (½ cup) of juice  If you will be active for more than 1 hour, you may need to check your blood sugar level every 30 minutes  Your diabetes care team may also recommend that you check your blood sugar level after your activity  · Know the risks if you choose to drink alcohol  Alcohol can cause your blood sugar levels to be low if you use insulin  Alcohol can cause high blood sugar levels and weight gain if you drink too much  Women 21 years or older and men 72 years or older should limit alcohol to 1 drink a day  Men aged 24 to 59 years should limit alcohol to 2 drinks a day  A drink of alcohol is 12 ounces of beer, 5 ounces of wine, or 1½ ounces of liquor  Follow up with your doctor or diabetes care team as directed:  Write down your questions so you remember to ask them during your visits  © Copyright [employer] 2022 Information is for End User's use only and may not be sold, redistributed or otherwise used for commercial purposes   All illustrations and images included in [facility] are the copyrighted property of A D A [employer]  or [facility] Yogesh Sanchez   The above information is an  only  It is not intended as medical advice for individual conditions or treatments  Talk to your doctor, nurse or pharmacist before following any medical regimen to see if it is safe and effective for you  What to Do if Your Blood Sugar is Low   WHAT YOU NEED TO KNOW:   Low blood sugar levels (hypoglycemia) can happen with Type 1 and Type 2 diabetes  Low levels are more likely to happen if you use insulin  Hypoglycemia can cause you to have falls, accidents, and injuries  A blood sugar level that gets too low can lead to seizures, coma, and death  Learn to recognize the symptoms early so you can get treatment quickly  DISCHARGE INSTRUCTIONS:   Have someone call your local emergency number (911 in the [address] [facility]) if:   · You cannot be woken  · You have a seizure  Call your doctor if:   · You have symptoms of a low blood sugar level, such as trouble thinking, sweating, or a pounding heartbeat  · Your blood sugar level is lower than normal and it does not improve with treatment  · You often have lower blood sugar levels than your target goals  · You have trouble coping with your illness, or you feel anxious or depressed  · You have questions or concerns about your condition or care  What to do if you have symptoms of low blood sugar: If you are sweaty, anxious, confused or have a fast, pounding heartbeat:  · Check your blood sugar level, if possible  Your blood sugar level is too low if it is at or below 70 mg/dL  · Eat or drink 15 grams of fast-acting carbohydrate  Fast-acting carbohydrates will raise your blood sugar level quickly  Examples of 15 grams of fast-acting carbohydrates:     ? 4 ounces (½ cup) of fruit juice     ? 4 ounces of regular soda    ? 2 tablespoons of raisins     ?  1 tube of glucose gel or 3 to 4 glucose tablets       · Check your blood sugar level 15 minutes later   If the level is still low (less than 100 mg/dL), eat another 15 grams of carbohydrate  When the level returns to 100 mg/dL, eat a snack or meal that contains carbohydrates  This will help prevent another drop in blood sugar  · Teach people close to you how to use your glucagon kit  Your blood sugar may be too low for you to be awake  People need to know when and how to use your kit  Prevent low blood sugar levels:  Prevent low blood sugar by knowing what increases your risk  Ask your healthcare provider for ways to prevent low blood sugar levels  Any of the following can increase your risk of low blood sugar:  · Fasting for tests or procedures    · During or after intense exercise    · Late or postponed meals    · Sleeping (you may need a bedtime snack)     · Drinking alcohol if you use insulin or insulin releasing pills    Follow up with your doctor as directed:  Write down your questions so you remember to ask them during your visits  © [employer] 2022 Information is for End User's use only and may not be sold, redistributed or otherwise used for commercial purposes  All illustrations and images included in CareNotes® are the copyrighted property of A D A M , Inc  or [facility] Yogesh Sanchez   The above information is an  only  It is not intended as medical advice for individual conditions or treatments  Talk to your doctor, nurse or pharmacist before following any medical regimen to see if it is safe and effective for you

## 2022-03-10 NOTE — CASE MANAGEMENT
Case Management Discharge Planning Note    Patient name Jada Mercado  Location ICU 09/ICU 09 MRN [id number]  : 1990 Date 3/10/2022       Current Admission Date: 3/8/2022  Current Admission Diagnosis:DKA, type 1, not at goal [facility])   Patient Active Problem List    Diagnosis Date Noted    DKA, type 1, not at goal [facility]) 2022    Noncompliance with diabetes treatment 2021    S/P repeat low transverse  2021    Polyhydramnios affecting pregnancy in third trimester 2021    Type 1 diabetes mellitus during pregnancy in third trimester 2021    Uterine synechiae 2021    Pre-existing type 1 diabetes mellitus with hyperglycemia during pregnancy in third trimester ([facility] [address] ) 2020    Hyperlipidemia, mixed 2020    Chronic hypertension affecting pregnancy 2020      LOS (days): 2  Geometric Mean LOS (GMLOS) (days):   Days to GMLOS:     OBJECTIVE:  Risk of Unplanned Readmission Score: 9         Current admission status: Inpatient   Preferred Pharmacy:   [address] [place] [address] [address] [address] [address]  Phone: [phone] Fax: [phone] [facility] [address] ,  [facility] ,  [address] [place] [address]  Phone: [phone] Fax: [phone]    Primary Care Provider: Tera Ferguson DO    Primary Insurance: [address] [place] [address]  Secondary Insurance:     DISCHARGE DETAILS:    Discharge planning discussed with[de-identified] patient           Were Treatment Team discharge recommendations reviewed with patient/caregiver?: Yes  Did patient/caregiver verbalize understanding of patient care needs?: Yes  Were patient/caregiver advised of the risks associated with not following Treatment Team discharge recommendations?: Yes         Requested  [address]         Is the patient interested in Rajendrajon Brasher at discharge?: No    DME Referral Provided  Referral made for DME?: No         Would you like to participate in our [address] service program?  : No - Declined    Treatment Team Recommendation: Home  Discharge Destination Plan[de-identified] Home  Transport at Discharge : Family        Accompanied by: Family member          Pt wanted Cm to make appointment for pt  CM made appointment for pt At [facility] in [place] with Dr Jeremy Dailey on 3/16/22 at [address] [place] [address]  All information was placed on AVS and given to pt via nursing    Pt is ready for d/c

## 2022-03-10 NOTE — DISCHARGE SUMMARY
Discharge Summary - [facility] 73 Internal Medicine    Patient Information: Samia Leyva 36 y o  female MRN: [id number]  Unit/Bed#: ICU 09 Encounter: [id number]    Discharging Physician / Practitioner: Esmer Joseph MD  PCP: Rufino Goldstein DO  Admission Date: 3/8/2022  Discharge Date: 03/10/22    Reason for Admission: No chief complaint on file  Discharge Diagnoses:     Principal Problem (Resolved):    DKA, type 1, not at goal [facility])  Active Problems:    Hyperlipidemia, mixed      Consultations During Hospital Stay:  IP CONSULT TO CASE MANAGEMENT  IP CONSULT TO NUTRITION SERVICES    Procedures Performed:     · None    Significant Findings:     · Refer to hospital course and above listed diagnosis related plan for details    Imaging while in hospital:    XR chest 2 views    Result Date: 3/8/2022  Narrative: CHEST INDICATION:   dka  COMPARISON:  12/15/2021 EXAM PERFORMED/VIEWS:  XR CHEST PA & LATERAL FINDINGS: Cardiomediastinal silhouette appears unremarkable  The lungs are clear  No pneumothorax or pleural effusion  Osseous structures appear within normal limits for patient age  Impression: No acute cardiopulmonary disease  Workstation performed: [id number]       Incidental Findings:   · None    Test Results Pending at Discharge (will require follow up):   · As per After Visit Summary     Outpatient Tests Requested:  · None    Complications:  Refer to hospital course and above listed diagnosis related plan, if any    Hospital Course: As per HPI  Samia Leyva is a 36 y o  female patient history of diabetes mellitus type 1, dyslipidemia who originally presented to the hospital on 3/8/2022 due to DKA  Pt reported that she was in her usual state of health until night prior to presentation, when she began to feel nauseated  Continued to have nausea till AM, with episode of emesis x1  Pt also endorsed dry mouth  Otherwise no complaints    Pt reported that she ran out of her long-acting insulin prescription three days prior to admission and tried to supplement with short-acting, precipitating episode of self-reported hypoglycemia  Endorsed many past admissions for DKA  On ROS, endorses increased urinary frequency--estimates she is urinating every 30 to 60 minutes  Also endorsesd abdominal distension/bloating since  2021, but denied abdominal pain  Patient was treated as per DKA protocol with improvement  Electrolytes remained stable  Patient was transition to basal and bolus insulin regimen  And blood was remained reasonably controlled  Patient was advised compliance  Close up follow-up with endocrinology was arranged  Prescription meds sent to the pharmacy for pickup  Patient reported that she has all other diabetic supplies at home  Patient reported that she has been forgetful leading to poor adherence to insulin regimen, various modalities were discussed for reminders to increase compliance  Please see above list of diagnoses and related plan for additional information  Condition at Discharge: stable     Discharge Day Visit / Exam:     Subjective:    Sitting up in chair, feeling much better  Tolerating diet, denies any further nausea , vomiting, abdominal pain  Denies any chest pain shortness of breath     No further hypoglycemia    Vitals: Blood Pressure: 113/77 (03/10/22 0815)  Pulse: 88 (03/10/22 0815)  Temperature: (!) 97 3 °F (36 3 °C) (03/10/22 1100)  Temp Source: Temporal (03/10/22 1100)  Respirations: 18 (03/10/22 0815)  Height: 4' 11" (149 9 cm) (22 1148)  Weight - Scale: 70 1 kg (154 lb 8 7 oz) (03/10/22 0600)  SpO2: 100 % (03/10/22 0815)  Exam:   Physical Exam  Constitutional:       General: She is not in acute distress  Appearance: She is obese  HENT:      Head: Normocephalic and atraumatic  Cardiovascular:      Rate and Rhythm: Normal rate  Pulmonary:      Effort: Pulmonary effort is normal  No respiratory distress  Breath sounds: Normal breath sounds   No wheezing or rales  Abdominal:      General: Bowel sounds are normal  There is no distension  Palpations: Abdomen is soft  Tenderness: There is no abdominal tenderness  There is no guarding or rebound  Musculoskeletal:      Right lower leg: No edema  Left lower leg: No edema  Skin:     General: Skin is warm and dry  Findings: No rash  Neurological:      General: No focal deficit present  Mental Status: She is alert and oriented to person, place, and time  Mental status is at baseline  Discharge instructions/Information to patient and family:(Discharge Medications and Follow up):   See after visit summary for information provided to patient and family  Provisions for Follow-Up Care:  See after visit summary for information related to follow-up care and any pertinent home health orders  Disposition: Home    Planned Readmission:  No     Discharge Statement:  I spent 40 minutes discharging the patient  This time was spent on the day of discharge  I had direct contact with the patient on the day of discharge  Greater than 50% of the total time was spent examining patient, answering all patient questions, arranging and discussing plan of care with patient as well as directly providing post-discharge instructions  Additional time then spent on discharge activities  Discharge Medications:  See after visit summary for reconciled discharge medications provided to patient and family  ** Please Note: "This note has been constructed using a voice recognition system  Therefore there may be syntax, spelling, and/or grammatical errors   Please call if you have any questions  "**

## 2022-03-14 PROBLEM — E10.10 DKA, TYPE 1, NOT AT GOAL (HCC): Status: RESOLVED | Noted: 2022-03-08 | Resolved: 2022-03-14

## 2022-03-17 NOTE — UTILIZATION REVIEW
Notification of Discharge   This is a Notification of Discharge from our facility [address]  Please be advised that this patient has been discharge from our facility  Below you will find the admission and discharge date and time including the patients disposition  UTILIZATION REVIEW CONTACT:  Nam Fermin  Utilization   Network Utilization Review Department  Phone: [phone] x carefully listen to the prompts  All voicemails are confidential   Email: [email or web address]     PHYSICIAN ADVISORY SERVICES:  FOR [id number] REVIEW - MEDICAL NECESSITY DENIAL  Phone: [phone]  Fax: [phone]  Email: [email or web address]     PRESENTATION DATE: 3/8/2022 11:34 AM  OBERVATION ADMISSION DATE:   INPATIENT ADMISSION DATE: 3/8/22 11:34 AM   DISCHARGE DATE: 3/10/2022  2:15 PM  DISPOSITION: Home/Self Care Home/Self Care      IMPORTANT INFORMATION:  Send all requests for admission clinical reviews, approved or denied determinations and any other requests to dedicated fax number below belonging to the campus where the patient is receiving treatment   List of dedicated fax numbers:  [address] DENIALS (Administrative/Medical Necessity) [phone]   [address] (Maternity/NICU/Pediatrics) [phone]   [facility] [phone]   130 [facility] [phone]   [address] [place] [address] [phone]   2000 [facility] [address] [place] [address],[address] [phone]   [facility]  [phone]   2205 [facility] [address]  2401 [facility] 1000 [facility] [phone]

## 2022-05-12 ENCOUNTER — ANNUAL EXAM (OUTPATIENT)
Dept: OBGYN CLINIC | Facility: CLINIC | Age: 32
End: 2022-05-12
Payer: COMMERCIAL

## 2022-05-12 VITALS
BODY MASS INDEX: 27.94 KG/M2 | HEIGHT: 59 IN | WEIGHT: 138.6 LBS | DIASTOLIC BLOOD PRESSURE: 70 MMHG | SYSTOLIC BLOOD PRESSURE: 122 MMHG

## 2022-05-12 DIAGNOSIS — Z12.4 ENCOUNTER FOR PAPANICOLAOU SMEAR FOR CERVICAL CANCER SCREENING: ICD-10-CM

## 2022-05-12 DIAGNOSIS — L29.2 VULVAR ITCHING: ICD-10-CM

## 2022-05-12 DIAGNOSIS — Z01.411 ENCOUNTER FOR GYNECOLOGICAL EXAMINATION WITH ABNORMAL FINDING: Primary | ICD-10-CM

## 2022-05-12 DIAGNOSIS — N89.8 VAGINAL ITCHING: ICD-10-CM

## 2022-05-12 DIAGNOSIS — Z11.3 SCREENING FOR STD (SEXUALLY TRANSMITTED DISEASE): ICD-10-CM

## 2022-05-12 PROCEDURE — 99395 PREV VISIT EST AGE 18-39: CPT | Performed by: PHYSICIAN ASSISTANT

## 2022-05-12 PROCEDURE — 87491 CHLMYD TRACH DNA AMP PROBE: CPT | Performed by: PHYSICIAN ASSISTANT

## 2022-05-12 PROCEDURE — 0503F POSTPARTUM CARE VISIT: CPT | Performed by: PHYSICIAN ASSISTANT

## 2022-05-12 PROCEDURE — G0143 SCR C/V CYTO,THINLAYER,RESCR: HCPCS | Performed by: PHYSICIAN ASSISTANT

## 2022-05-12 PROCEDURE — 81514 NFCT DS BV&VAGINITIS DNA ALG: CPT | Performed by: PHYSICIAN ASSISTANT

## 2022-05-12 PROCEDURE — G0476 HPV COMBO ASSAY CA SCREEN: HCPCS | Performed by: PHYSICIAN ASSISTANT

## 2022-05-12 PROCEDURE — 87591 N.GONORRHOEAE DNA AMP PROB: CPT | Performed by: PHYSICIAN ASSISTANT

## 2022-05-12 NOTE — PROGRESS NOTES
ASSESSMENT & PLAN: Josefina Russ is a 29 y o  [id number] with abnormal gynecologic exam     1   Routine well woman exam done today  2  Pap and HPV:  The patient's last pap and hpv was 2020  It was normal     Pap and cotesting was done today  GC/CT today  Current ASCCP Guidelines reviewed  3   The following were reviewed in today's visit: breast self exam, STD testing and family planning choices  4  Pt has excoriation and fissures from vulvar itching and scratching with nails and toilet paper  Topical hydrocortisone prescribed to use BID for itch relief, to use with OTC A&D to [facility] area  Molecular panel, STD today to r/o infection  Will tx further based on results  5  Briefly discussed contraceptive options, pt declines any contraception at this time and is ok with pregnancy if it happens, she states  RTO in 1 year for annual, sooner if problems arise  CC:  Annual Gynecologic Examination    HPI: Josefina Russ is a 29 y o  [id number] who presents for annual gynecologic examination  She has the following concerns:  Itching and burning in vulva/vagina x 1 month  Denies abnormal odor or discharge  She has not used any OTC  No urinary sxs  Under care from [facility] for T1DM  Admits she has not been checking sugar, which she needs to  Do to qualify for CGM  Patient's last menstrual period was 2022  Menses frequency: once a month , every 28 -30 days  Length of bleedin-5 days  Bleeding quality: average  Pain/cramping with menses: denies cramping or associated sxs  Denies intermenstrual bleeding       Health Maintenance:      She does not perform regular monthly self breast exams  denies breast concerns today  She feels safe at home       Past Medical History:   Diagnosis Date    Diabetes mellitus ([facility] )     uses kwiki pen     Diabetes mellitus type 1 ([facility] [address] )     High blood pressure     recently dx/ put on lisinopril     High cholesterol     Hypertension      no hypersion     Miscarriage     Recurrent pregnancy loss, antepartum condition or complication        Past Surgical History:   Procedure Laterality Date     SECTION  01/02/2014     X1    DILATION AND CURETTAGE OF UTERUS  04/10/2019    Missed AB     PA  DELIVERY ONLY N/A 2021    Procedure:  SECTION () REPEAT;  Surgeon: Valentin Corado MD;  Location: [facility] ;  Service: Obstetrics    WISDOM TOOTH EXTRACTION         Past OB/Gyn History:  OB History        4    Para   2    Term   1       1    AB   1    Living   2       SAB   1    IAB   0    Ectopic   0    Multiple   0    Live Births   2               Pt does not have menstrual issues  History of sexually transmitted infection: No   History of abnormal pap smears: No      Patient is not currently sexually active  The current method of family planning is none      Family History   Problem Relation Age of Onset    No Known Problems Mother     Diabetes Father     No Known Problems Sister     No Known Problems Sister     No Known Problems Brother     No Known Problems Maternal Grandmother     Diabetes Paternal Grandmother     No Known Problems Paternal Grandfather     No Known Problems Daughter        Family history of Breast/Uterine/Ovarian/Colon Cancer: none    Social History:  Social History     Socioeconomic History    Marital status: Legally      Spouse name: Not on file    Number of children: 1    Years of education: 15    Highest education level: High school graduate   Occupational History    Occupation:     Tobacco Use    Smoking status: Never Smoker    Smokeless tobacco: Never Used   Vaping Use    Vaping Use: Never used   Substance and Sexual Activity    Alcohol use: Never     Comment: Alcohol intake:   None - As per Javon Tadeo Drug use: Never     Comment: Illicit drugs:   No  - As per Javon Tadeo Sexual activity: Not Currently     Partners: Male     Birth control/protection: None     Comment: Pt reports no recent sexual activity and LMP approx 2 wks ago   Other Topics Concern    Not on file   Social History Narrative    · Most recent tobacco use screenin2019      · Do you currently or have you served in the [employer]:   No      · Were you activated, into active duty, as a member of the [employer] or as a Reservist:   No      · Caffeine intake:   None      · Occupation:   manufacturing      · Sexual orientation:   Heterosexual      · General stress level:   Low      · Live alone or with others:   with others      · Diet:   Diabetic      · Single or multi-level home/work:   multi level home      · Exercise level:   None      · Seat belts used routinely:   Yes      · Advance directive: Yes     · Sunscreen used routinely:   Yes      · Presence of domestic violence:   No      · Do you feel safe at home:   Yes     - As per Julio Cesar Becerra      Social Determinants of Health     Financial Resource Strain: Not on file   Food Insecurity: No Food Insecurity    Worried About Running Out of Food in the Last Year: Never true    [place] of Food in the Last Year: Never true   Transportation Needs: No Transportation Needs    Lack of Transportation (Medical): No    Lack of Transportation (Non-Medical): No   Physical Activity: Not on file   Stress: Not on file   Social Connections: Not on file   Intimate Partner Violence: Not on file   Housing Stability: Low Risk     Unable to Pay for Housing in the Last Year: No    Number of Places Lived in the Last Year: 1    Unstable Housing in the Last Year: No         No Known Allergies      Current Outpatient Medications:     Insulin Pen Needle (BD Pen Needle Kerrie 2nd Gen) 32G X 4 MM MISC, For use with insulin pen  Pharmacy may dispense brand covered by insurance , Disp: 100 each, Rfl: 0    Insulin Pen Needle (BD Pen Needle Kerrie 2nd Gen) 32G X 4 MM MISC, For use with insulin pen   Pharmacy may dispense brand covered by insurance , Disp: 100 each, Rfl: 0   Accu-Chek FastClix Lancets MISC, Use 6 a day and as directed  (Patient not taking: Reported on 5/12/2022), Disp: 102 each, Rfl: 2    Continuous Blood Gluc  (Dexcom G6 ) NERY, Use as directed  (Patient not taking: No sig reported), Disp: 1 Device, Rfl: 0    Continuous Blood Gluc Sensor (Dexcom G6 Sensor) MISC, 1 box=1 month supply or 3 sensors, use 1 sensor every 10 days  (Patient not taking: No sig reported), Disp: 1 each, Rfl: 12    Continuous Blood Gluc Transmit (Dexcom G6 Transmitter) MISC, Transmitter change every 90 days  (Patient not taking: No sig reported), Disp: 1 each, Rfl: 3    insulin glargine (Basaglar KwikPen) 100 units/mL injection pen, Inject 18 Units under the skin daily, Disp: 5 4 mL, Rfl: 0    insulin lispro (HumaLOG KwikPen) 100 units/mL injection pen, Inject 6 Units under the skin 3 (three) times a day with meals, Disp: 5 4 mL, Rfl: 0    Insulin Pen Needle 31G X 5 MM MISC, Inject under the skin daily at bedtime Use one a day or as directed , Disp: 100 each, Rfl: 1    Prenatal Vit-Fe Fumarate-FA (prenatal vitamin) 28-0 8 mg, Take 1 tablet by mouth daily, Disp: 100 tablet, Rfl: 3      Review of Systems  Constitutional :no fever, feels well, no tiredness, no recent weight gain or loss  ENT: no ear ache, no loss of hearing, no nosebleeds or nasal discharge, no sore throat or hoarseness  Cardiovascular: no complaints of slow or fast heart beat, no chest pain, no palpitations, no leg claudication or lower extremity edema  Respiratory: no complaints of shortness of shortness of breath, no HART  Breasts:no complaints of breast pain, breast lump, or nipple discharge  Gastrointestinal: no complaints of abdominal pain, constipation, nausea, vomiting, or diarrhea or bloody stools  Genitourinary :as in HPI;  no complaints of dysuria, incontinence, pelvic pain, no dysmenorrhea, vaginal discharge or abnormal vaginal bleeding    Musculoskeletal: no complaints of arthralgia, no myalgia, no joint swelling or stiffness, no limb pain or swelling  Integumentary: no complaints of skin rash or lesion, itching or dry skin  Neurological: no complaints of headache, no confusion, no numbness or tingling, no dizziness or fainting  Mental health: no anxiety, depression, SI    Objective      /70 (BP Location: Left arm, Patient Position: Sitting, Cuff Size: Standard)   Ht 4' 11" (1 499 m)   Wt 62 9 kg (138 lb 9 6 oz)   LMP 03/04/2022   BMI 27 99 kg/m²   General:   appears stated age, cooperative, alert normal mood and affect   Neck: normal, supple,trachea midline, no masses  Thyroid palpated normal     Heart: regular rate and rhythm, S1, S2 normal, no murmur, click, rub or gallop   Lungs: clear to auscultation bilaterally   Breasts: normal appearance, no masses or tenderness, No nipple retraction or dimpling, No nipple discharge or bleeding, No axillary or supraclavicular adenopathy, Normal to palpation without dominant masses   Abdomen: soft, non-tender, without masses or organomegaly   Vulva: Excoriation and open fissures from scratching, dryness noted on vulva, primarily superior around clitoris  No suprapubic rash  No papules, pustules, ulcers or vesicles  Vagina: normal vagina, no discharge, exudate, lesion, or erythema   Urethra: normal   Cervix: Normal, no discharge  PAP done  Nontender  Uterus: normal   Adnexa: no mass, fullness, tenderness   Lymphatic palpation of lymph nodes in neck, axilla, groin and/or other locations: no lymphadenopathy or masses noted   Skin normal skin turgor and no rashes     Psychiatric orientation to person, place, and time: normal  mood and affect: normal

## 2022-05-13 ENCOUNTER — TELEPHONE (OUTPATIENT)
Dept: LAB | Facility: HOSPITAL | Age: 32
End: 2022-05-13

## 2022-05-13 LAB
C GLABRATA DNA VAG QL NAA+PROBE: NEGATIVE
C KRUSEI DNA VAG QL NAA+PROBE: NEGATIVE
C TRACH DNA SPEC QL NAA+PROBE: NEGATIVE
CANDIDA SP 6 PNL VAG NAA+PROBE: NEGATIVE
N GONORRHOEA DNA SPEC QL NAA+PROBE: NEGATIVE
T VAGINALIS DNA VAG QL NAA+PROBE: NEGATIVE
VAGINOSIS/ITIS DNA PNL VAG PROBE+SIG AMP: POSITIVE

## 2022-05-16 DIAGNOSIS — N76.0 BACTERIAL VAGINOSIS: Primary | ICD-10-CM

## 2022-05-16 DIAGNOSIS — B96.89 BACTERIAL VAGINOSIS: Primary | ICD-10-CM

## 2022-05-16 LAB
HPV HR 12 DNA CVX QL NAA+PROBE: POSITIVE
HPV16 DNA CVX QL NAA+PROBE: NEGATIVE
HPV18 DNA CVX QL NAA+PROBE: NEGATIVE

## 2022-05-16 RX ORDER — METRONIDAZOLE 500 MG/1
500 TABLET ORAL EVERY 12 HOURS SCHEDULED
Qty: 14 TABLET | Refills: 0 | Status: SHIPPED | OUTPATIENT
Start: 2022-05-16 | End: 2022-05-23

## 2022-05-22 LAB
LAB AP GYN PRIMARY INTERPRETATION: NORMAL
Lab: NORMAL
PATH INTERP SPEC-IMP: NORMAL

## 2022-06-13 ENCOUNTER — HOSPITAL ENCOUNTER (INPATIENT)
Facility: HOSPITAL | Age: 32
LOS: 3 days | Discharge: HOME | DRG: 420 | End: 2022-06-16
Attending: EMERGENCY MEDICINE
Payer: COMMERCIAL

## 2022-06-13 DIAGNOSIS — E10.69 TYPE 1 DIABETES MELLITUS WITH OTHER SPECIFIED COMPLICATION (HCC): ICD-10-CM

## 2022-06-13 DIAGNOSIS — O24.013 TYPE 1 DIABETES MELLITUS DURING PREGNANCY IN THIRD TRIMESTER: ICD-10-CM

## 2022-06-13 DIAGNOSIS — E10.10 TYPE 1 DIABETES MELLITUS WITH KETOACIDOSIS WITHOUT COMA (HCC): ICD-10-CM

## 2022-06-13 DIAGNOSIS — E10.10 DIABETIC KETOACIDOSIS WITHOUT COMA ASSOCIATED WITH TYPE 1 DIABETES MELLITUS (HCC): Primary | ICD-10-CM

## 2022-06-13 PROBLEM — E78.5 DYSLIPIDEMIA: Status: ACTIVE | Noted: 2022-06-13

## 2022-06-13 PROBLEM — E10.9 TYPE 1 DIABETES MELLITUS (HCC): Status: ACTIVE | Noted: 2022-06-13

## 2022-06-13 PROBLEM — D72.829 LEUKOCYTOSIS: Status: ACTIVE | Noted: 2022-06-13

## 2022-06-13 LAB
ALBUMIN SERPL BCP-MCNC: 4.2 G/DL (ref 3.5–5)
ALP SERPL-CCNC: 166 U/L (ref 46–116)
ALT SERPL W P-5'-P-CCNC: 33 U/L (ref 12–78)
AMPHETAMINES SERPL QL SCN: NEGATIVE
ANION GAP SERPL CALCULATED.3IONS-SCNC: 18 MMOL/L (ref 4–13)
ANION GAP SERPL CALCULATED.3IONS-SCNC: 21 MMOL/L (ref 4–13)
ANION GAP SERPL CALCULATED.3IONS-SCNC: 30 MMOL/L (ref 4–13)
AST SERPL W P-5'-P-CCNC: 21 U/L (ref 5–45)
BACTERIA UR QL AUTO: NORMAL /HPF
BARBITURATES UR QL: NEGATIVE
BASE EX.OXY STD BLDV CALC-SCNC: 32.4 % (ref 60–80)
BASE EX.OXY STD BLDV CALC-SCNC: 94.8 % (ref 60–80)
BASE EXCESS BLDV CALC-SCNC: -13.9 MMOL/L
BASE EXCESS BLDV CALC-SCNC: -22.1 MMOL/L
BASOPHILS # BLD MANUAL: 0 THOUSAND/UL (ref 0–0.1)
BASOPHILS NFR MAR MANUAL: 0 % (ref 0–1)
BENZODIAZ UR QL: NEGATIVE
BETA-HYDROXYBUTYRATE: 7.4 MMOL/L
BILIRUB SERPL-MCNC: 0.44 MG/DL (ref 0.2–1)
BILIRUB UR QL STRIP: NEGATIVE
BODY TEMPERATURE: 98.9 DEGREES FEHRENHEIT
BUN SERPL-MCNC: 12 MG/DL (ref 5–25)
BUN SERPL-MCNC: 19 MG/DL (ref 5–25)
BUN SERPL-MCNC: 25 MG/DL (ref 5–25)
CALCIUM SERPL-MCNC: 6.6 MG/DL (ref 8.3–10.1)
CALCIUM SERPL-MCNC: 7.5 MG/DL (ref 8.3–10.1)
CALCIUM SERPL-MCNC: 9 MG/DL (ref 8.3–10.1)
CARDIAC TROPONIN I PNL SERPL HS: <2 NG/L
CHLORIDE SERPL-SCNC: 107 MMOL/L (ref 100–108)
CHLORIDE SERPL-SCNC: 109 MMOL/L (ref 100–108)
CHLORIDE SERPL-SCNC: 94 MMOL/L (ref 100–108)
CLARITY UR: ABNORMAL
CO2 SERPL-SCNC: 10 MMOL/L (ref 21–32)
CO2 SERPL-SCNC: 12 MMOL/L (ref 21–32)
CO2 SERPL-SCNC: 13 MMOL/L (ref 21–32)
COCAINE UR QL: NEGATIVE
COLOR UR: ABNORMAL
CREAT SERPL-MCNC: 0.62 MG/DL (ref 0.6–1.3)
CREAT SERPL-MCNC: 0.83 MG/DL (ref 0.6–1.3)
CREAT SERPL-MCNC: 1.18 MG/DL (ref 0.6–1.3)
EOSINOPHIL # BLD MANUAL: 0 THOUSAND/UL (ref 0–0.4)
EOSINOPHIL NFR BLD MANUAL: 0 % (ref 0–6)
ERYTHROCYTE [DISTWIDTH] IN BLOOD BY AUTOMATED COUNT: 12.6 % (ref 11.6–15.1)
FLUAV RNA RESP QL NAA+PROBE: NEGATIVE
FLUBV RNA RESP QL NAA+PROBE: NEGATIVE
GFR SERPL CREATININE-BSD FRML MDRD: 121 ML/MIN/1.73SQ M
GFR SERPL CREATININE-BSD FRML MDRD: 62 ML/MIN/1.73SQ M
GFR SERPL CREATININE-BSD FRML MDRD: 94 ML/MIN/1.73SQ M
GLUCOSE SERPL-MCNC: 174 MG/DL (ref 65–140)
GLUCOSE SERPL-MCNC: 181 MG/DL (ref 65–140)
GLUCOSE SERPL-MCNC: 183 MG/DL (ref 65–140)
GLUCOSE SERPL-MCNC: 185 MG/DL (ref 65–140)
GLUCOSE SERPL-MCNC: 185 MG/DL (ref 65–140)
GLUCOSE SERPL-MCNC: 193 MG/DL (ref 65–140)
GLUCOSE SERPL-MCNC: 196 MG/DL (ref 65–140)
GLUCOSE SERPL-MCNC: 208 MG/DL (ref 65–140)
GLUCOSE SERPL-MCNC: 339 MG/DL (ref 65–140)
GLUCOSE SERPL-MCNC: 723 MG/DL (ref 65–140)
GLUCOSE SERPL-MCNC: >500 MG/DL (ref 65–140)
GLUCOSE UR STRIP-MCNC: ABNORMAL MG/DL
HCO3 BLDV-SCNC: 11.4 MMOL/L (ref 24–30)
HCO3 BLDV-SCNC: 7.7 MMOL/L (ref 24–30)
HCT VFR BLD AUTO: 48.2 % (ref 34.8–46.1)
HGB BLD-MCNC: 15.8 G/DL (ref 11.5–15.4)
HGB UR QL STRIP.AUTO: ABNORMAL
KETONES UR STRIP-MCNC: ABNORMAL MG/DL
LACTATE SERPL-SCNC: 1.9 MMOL/L (ref 0.5–2)
LEUKOCYTE ESTERASE UR QL STRIP: ABNORMAL
LYMPHOCYTES # BLD AUTO: 0.9 THOUSAND/UL (ref 0.6–4.47)
LYMPHOCYTES # BLD AUTO: 7 % (ref 14–44)
MAGNESIUM SERPL-MCNC: 1.5 MG/DL (ref 1.6–2.6)
MAGNESIUM SERPL-MCNC: 1.9 MG/DL (ref 1.6–2.6)
MAGNESIUM SERPL-MCNC: 2.4 MG/DL (ref 1.6–2.6)
MCH RBC QN AUTO: 30.3 PG (ref 26.8–34.3)
MCHC RBC AUTO-ENTMCNC: 32.8 G/DL (ref 31.4–37.4)
MCV RBC AUTO: 92 FL (ref 82–98)
METHADONE UR QL: NEGATIVE
MONOCYTES # BLD AUTO: 0 THOUSAND/UL (ref 0–1.22)
MONOCYTES NFR BLD: 0 % (ref 4–12)
NEUTROPHILS # BLD MANUAL: 11.99 THOUSAND/UL (ref 1.85–7.62)
NEUTS BAND NFR BLD MANUAL: 6 % (ref 0–8)
NEUTS SEG NFR BLD AUTO: 87 % (ref 43–75)
NITRITE UR QL STRIP: NEGATIVE
NON VENT ROOM AIR: 21 %
NON-SQ EPI CELLS URNS QL MICRO: NORMAL /HPF
O2 CT BLDV-SCNC: 18.4 ML/DL
O2 CT BLDV-SCNC: 7.5 ML/DL
OPIATES UR QL SCN: NEGATIVE
OTHER STN SPEC: NORMAL
OXYCODONE+OXYMORPHONE UR QL SCN: NEGATIVE
PCO2 BLDV: 26 MM HG (ref 42–50)
PCO2 BLDV: 29.6 MM HG (ref 42–50)
PCP UR QL: NEGATIVE
PH BLDV: 7.03 [PH] (ref 7.3–7.4)
PH BLDV: 7.26 [PH] (ref 7.3–7.4)
PH UR STRIP.AUTO: 5.5 [PH]
PHOSPHATE SERPL-MCNC: 1.6 MG/DL (ref 2.7–4.5)
PHOSPHATE SERPL-MCNC: 3.1 MG/DL (ref 2.7–4.5)
PLATELET # BLD AUTO: 343 THOUSANDS/UL (ref 149–390)
PLATELET BLD QL SMEAR: ADEQUATE
PMV BLD AUTO: 9.4 FL (ref 8.9–12.7)
PO2 BLDV: 24.4 MM HG (ref 35–45)
PO2 BLDV: 76.9 MM HG (ref 35–45)
POTASSIUM SERPL-SCNC: 3.4 MMOL/L (ref 3.5–5.3)
POTASSIUM SERPL-SCNC: 4.6 MMOL/L (ref 3.5–5.3)
POTASSIUM SERPL-SCNC: 5.6 MMOL/L (ref 3.5–5.3)
PROT SERPL-MCNC: 8.2 G/DL (ref 6.4–8.2)
PROT UR STRIP-MCNC: ABNORMAL MG/DL
RBC # BLD AUTO: 5.22 MILLION/UL (ref 3.81–5.12)
RBC #/AREA URNS AUTO: NORMAL /HPF
RBC MORPH BLD: NORMAL
RSV RNA RESP QL NAA+PROBE: NEGATIVE
SARS-COV-2 RNA RESP QL NAA+PROBE: NEGATIVE
SODIUM SERPL-SCNC: 134 MMOL/L (ref 136–145)
SODIUM SERPL-SCNC: 138 MMOL/L (ref 136–145)
SODIUM SERPL-SCNC: 142 MMOL/L (ref 136–145)
SP GR UR STRIP.AUTO: 1.02 (ref 1–1.03)
THC UR QL: NEGATIVE
UROBILINOGEN UR QL STRIP.AUTO: 0.2 E.U./DL
WBC # BLD AUTO: 12.89 THOUSAND/UL (ref 4.31–10.16)
WBC #/AREA URNS AUTO: NORMAL /HPF

## 2022-06-13 PROCEDURE — 80053 COMPREHEN METABOLIC PANEL: CPT | Performed by: EMERGENCY MEDICINE

## 2022-06-13 PROCEDURE — 82805 BLOOD GASES W/O2 SATURATION: CPT | Performed by: NURSE PRACTITIONER

## 2022-06-13 PROCEDURE — 85027 COMPLETE CBC AUTOMATED: CPT | Performed by: EMERGENCY MEDICINE

## 2022-06-13 PROCEDURE — 96360 HYDRATION IV INFUSION INIT: CPT

## 2022-06-13 PROCEDURE — 84484 ASSAY OF TROPONIN QUANT: CPT | Performed by: EMERGENCY MEDICINE

## 2022-06-13 PROCEDURE — 82948 REAGENT STRIP/BLOOD GLUCOSE: CPT

## 2022-06-13 PROCEDURE — 93005 ELECTROCARDIOGRAM TRACING: CPT

## 2022-06-13 PROCEDURE — 99285 EMERGENCY DEPT VISIT HI MDM: CPT

## 2022-06-13 PROCEDURE — 85007 BL SMEAR W/DIFF WBC COUNT: CPT | Performed by: EMERGENCY MEDICINE

## 2022-06-13 PROCEDURE — 0241U HB NFCT DS VIR RESP RNA 4 TRGT: CPT | Performed by: EMERGENCY MEDICINE

## 2022-06-13 PROCEDURE — 82010 KETONE BODYS QUAN: CPT | Performed by: EMERGENCY MEDICINE

## 2022-06-13 PROCEDURE — 80048 BASIC METABOLIC PNL TOTAL CA: CPT | Performed by: INTERNAL MEDICINE

## 2022-06-13 PROCEDURE — 87081 CULTURE SCREEN ONLY: CPT | Performed by: INTERNAL MEDICINE

## 2022-06-13 PROCEDURE — 83735 ASSAY OF MAGNESIUM: CPT | Performed by: EMERGENCY MEDICINE

## 2022-06-13 PROCEDURE — 99291 CRITICAL CARE FIRST HOUR: CPT | Performed by: EMERGENCY MEDICINE

## 2022-06-13 PROCEDURE — 84100 ASSAY OF PHOSPHORUS: CPT | Performed by: INTERNAL MEDICINE

## 2022-06-13 PROCEDURE — 83605 ASSAY OF LACTIC ACID: CPT | Performed by: EMERGENCY MEDICINE

## 2022-06-13 PROCEDURE — 81001 URINALYSIS AUTO W/SCOPE: CPT | Performed by: INTERNAL MEDICINE

## 2022-06-13 PROCEDURE — 36415 COLL VENOUS BLD VENIPUNCTURE: CPT | Performed by: EMERGENCY MEDICINE

## 2022-06-13 PROCEDURE — 83036 HEMOGLOBIN GLYCOSYLATED A1C: CPT | Performed by: INTERNAL MEDICINE

## 2022-06-13 PROCEDURE — 82805 BLOOD GASES W/O2 SATURATION: CPT | Performed by: EMERGENCY MEDICINE

## 2022-06-13 PROCEDURE — 99223 1ST HOSP IP/OBS HIGH 75: CPT | Performed by: INTERNAL MEDICINE

## 2022-06-13 PROCEDURE — 80307 DRUG TEST PRSMV CHEM ANLYZR: CPT | Performed by: INTERNAL MEDICINE

## 2022-06-13 PROCEDURE — 83735 ASSAY OF MAGNESIUM: CPT | Performed by: INTERNAL MEDICINE

## 2022-06-13 RX ORDER — SODIUM CHLORIDE 450 MG/100ML
250 INJECTION, SOLUTION INTRAVENOUS CONTINUOUS
Status: DISCONTINUED | OUTPATIENT
Start: 2022-06-13 | End: 2022-06-13

## 2022-06-13 RX ORDER — ONDANSETRON 2 MG/ML
4 INJECTION INTRAMUSCULAR; INTRAVENOUS ONCE
Status: COMPLETED | OUTPATIENT
Start: 2022-06-13 | End: 2022-06-13

## 2022-06-13 RX ORDER — DEXTROSE AND SODIUM CHLORIDE 5; .45 G/100ML; G/100ML
250 INJECTION, SOLUTION INTRAVENOUS CONTINUOUS
Status: DISCONTINUED | OUTPATIENT
Start: 2022-06-13 | End: 2022-06-14

## 2022-06-13 RX ORDER — SODIUM CHLORIDE, SODIUM GLUCONATE, SODIUM ACETATE, POTASSIUM CHLORIDE, MAGNESIUM CHLORIDE, SODIUM PHOSPHATE, DIBASIC, AND POTASSIUM PHOSPHATE .53; .5; .37; .037; .03; .012; .00082 G/100ML; G/100ML; G/100ML; G/100ML; G/100ML; G/100ML; G/100ML
1000 INJECTION, SOLUTION INTRAVENOUS ONCE
Status: COMPLETED | OUTPATIENT
Start: 2022-06-13 | End: 2022-06-14

## 2022-06-13 RX ORDER — ACETAMINOPHEN 325 MG/1
650 TABLET ORAL EVERY 6 HOURS PRN
Status: DISCONTINUED | OUTPATIENT
Start: 2022-06-13 | End: 2022-06-16 | Stop reason: HOSPADM

## 2022-06-13 RX ORDER — MAGNESIUM SULFATE HEPTAHYDRATE 40 MG/ML
4 INJECTION, SOLUTION INTRAVENOUS ONCE
Status: COMPLETED | OUTPATIENT
Start: 2022-06-13 | End: 2022-06-14

## 2022-06-13 RX ORDER — ENOXAPARIN SODIUM 100 MG/ML
40 INJECTION SUBCUTANEOUS DAILY
Status: DISCONTINUED | OUTPATIENT
Start: 2022-06-13 | End: 2022-06-16 | Stop reason: HOSPADM

## 2022-06-13 RX ORDER — ONDANSETRON 2 MG/ML
4 INJECTION INTRAMUSCULAR; INTRAVENOUS EVERY 8 HOURS PRN
Status: DISCONTINUED | OUTPATIENT
Start: 2022-06-13 | End: 2022-06-16 | Stop reason: HOSPADM

## 2022-06-13 RX ORDER — SODIUM CHLORIDE 450 MG/100ML
500 INJECTION, SOLUTION INTRAVENOUS CONTINUOUS
Status: DISCONTINUED | OUTPATIENT
Start: 2022-06-13 | End: 2022-06-13

## 2022-06-13 RX ORDER — CALCIUM GLUCONATE 20 MG/ML
2 INJECTION, SOLUTION INTRAVENOUS ONCE
Status: COMPLETED | OUTPATIENT
Start: 2022-06-13 | End: 2022-06-14

## 2022-06-13 RX ORDER — POTASSIUM CHLORIDE 20 MEQ/1
40 TABLET, EXTENDED RELEASE ORAL ONCE
Status: COMPLETED | OUTPATIENT
Start: 2022-06-13 | End: 2022-06-13

## 2022-06-13 RX ADMIN — POTASSIUM CHLORIDE 40 MEQ: 20 TABLET, EXTENDED RELEASE ORAL at 23:19

## 2022-06-13 RX ADMIN — SODIUM CHLORIDE 6.3 UNITS/HR: 9 INJECTION, SOLUTION INTRAVENOUS at 14:56

## 2022-06-13 RX ADMIN — ENOXAPARIN SODIUM 40 MG: 40 INJECTION SUBCUTANEOUS at 15:02

## 2022-06-13 RX ADMIN — SODIUM CHLORIDE 500 ML/HR: 0.45 INJECTION, SOLUTION INTRAVENOUS at 16:49

## 2022-06-13 RX ADMIN — SODIUM CHLORIDE 1000 ML: 0.9 INJECTION, SOLUTION INTRAVENOUS at 13:44

## 2022-06-13 RX ADMIN — DEXTROSE AND SODIUM CHLORIDE 250 ML/HR: 5; .45 INJECTION, SOLUTION INTRAVENOUS at 21:54

## 2022-06-13 RX ADMIN — SODIUM CHLORIDE, SODIUM GLUCONATE, SODIUM ACETATE, POTASSIUM CHLORIDE, MAGNESIUM CHLORIDE, SODIUM PHOSPHATE, DIBASIC, AND POTASSIUM PHOSPHATE 1000 ML: .53; .5; .37; .037; .03; .012; .00082 INJECTION, SOLUTION INTRAVENOUS at 23:16

## 2022-06-13 RX ADMIN — CALCIUM GLUCONATE 2 G: 20 INJECTION, SOLUTION INTRAVENOUS at 23:16

## 2022-06-13 RX ADMIN — INSULIN HUMAN 6 UNITS: 100 INJECTION, SOLUTION PARENTERAL at 14:49

## 2022-06-13 RX ADMIN — DEXTROSE AND SODIUM CHLORIDE 250 ML/HR: 5; .45 INJECTION, SOLUTION INTRAVENOUS at 18:04

## 2022-06-13 RX ADMIN — ONDANSETRON 4 MG: 2 INJECTION INTRAMUSCULAR; INTRAVENOUS at 14:48

## 2022-06-13 RX ADMIN — SODIUM CHLORIDE 1000 ML: 0.9 INJECTION, SOLUTION INTRAVENOUS at 15:00

## 2022-06-13 RX ADMIN — SODIUM CHLORIDE 12.6 UNITS/HR: 9 INJECTION, SOLUTION INTRAVENOUS at 15:47

## 2022-06-13 NOTE — PLAN OF CARE
Problem: MOBILITY - ADULT  Goal: Maintain or return to baseline ADL function  Description: INTERVENTIONS:  -  Assess patient's ability to carry out ADLs; assess patient's baseline for ADL function and identify physical deficits which impact ability to perform ADLs (bathing, care of mouth/teeth, toileting, grooming, dressing, etc )  - Assess/evaluate cause of self-care deficits   - Assess range of motion  - Assess patient's mobility; develop plan if impaired  - Assess patient's need for assistive devices and provide as appropriate  - Encourage maximum independence but intervene and supervise when necessary  - Involve family in performance of ADLs  - Assess for home care needs following discharge   - Consider OT consult to assist with ADL evaluation and planning for discharge  - Provide patient education as appropriate  Outcome: Progressing  Goal: Maintains/Returns to pre admission functional level  Description: INTERVENTIONS:  - Perform BMAT or MOVE assessment daily    - Set and communicate daily mobility goal to care team and patient/family/caregiver     - Collaborate with rehabilitation services on mobility goals if consulted  - Stand patient 3 times a day  - Ambulate patient 3 times a day  - Out of bed to chair 3 times a day   - Out of bed for meals 3 times a day  - Out of bed for toileting  - Record patient progress and toleration of activity level   Outcome: Progressing     Problem: SAFETY ADULT  Goal: Maintain or return to baseline ADL function  Description: INTERVENTIONS:  -  Assess patient's ability to carry out ADLs; assess patient's baseline for ADL function and identify physical deficits which impact ability to perform ADLs (bathing, care of mouth/teeth, toileting, grooming, dressing, etc )  - Assess/evaluate cause of self-care deficits   - Assess range of motion  - Assess patient's mobility; develop plan if impaired  - Assess patient's need for assistive devices and provide as appropriate  - Encourage maximum independence but intervene and supervise when necessary  - Involve family in performance of ADLs  - Assess for home care needs following discharge   - Consider OT consult to assist with ADL evaluation and planning for discharge  - Provide patient education as appropriate  Outcome: Progressing  Goal: Maintains/Returns to pre admission functional level  Description: INTERVENTIONS:  - Perform BMAT or MOVE assessment daily    - Set and communicate daily mobility goal to care team and patient/family/caregiver  - Collaborate with rehabilitation services on mobility goals if consulted  - Stand patient 3 times a day  - Ambulate patient 3 times a day  - Out of bed to chair 3 times a day   - Out of bed for meals 3 times a day  - Out of bed for toileting  - Record patient progress and toleration of activity level   Outcome: Progressing  Goal: Patient will remain free of falls  Description: INTERVENTIONS:  - Educate patient/family on patient safety including physical limitations  - Instruct patient to call for assistance with activity   - Consult OT/PT to assist with strengthening/mobility   - Keep Call bell within reach  - Keep bed low and locked with side rails adjusted as appropriate  - Keep care items and personal belongings within reach  - Initiate and maintain comfort rounds  - Make Fall Risk Sign visible to staff  - Offer Toileting every 2 Hours, in advance of need  - Initiate/Maintain bed alarm  - Apply yellow socks and bracelet for high fall risk patients  - Consider moving patient to room near nurses station  Outcome: Progressing     Problem: Knowledge Deficit  Goal: Patient/family/caregiver demonstrates understanding of disease process, treatment plan, medications, and discharge instructions  Description: Complete learning assessment and assess knowledge base    Interventions:  - Provide teaching at level of understanding  - Provide teaching via preferred learning methods  Outcome: Progressing     Problem: GASTROINTESTINAL - ADULT  Goal: Minimal or absence of nausea and/or vomiting  Description: INTERVENTIONS:  - Administer IV fluids if ordered to ensure adequate hydration  - Maintain NPO status until nausea and vomiting are resolved  - Administer ordered antiemetic medications as needed  - Provide nonpharmacologic comfort measures as appropriate  - Advance diet as tolerated, if ordered  - Consider nutrition services referral to assist patient with adequate nutrition and appropriate food choices  Outcome: Progressing     Problem: METABOLIC, FLUID AND ELECTROLYTES - ADULT  Goal: Electrolytes maintained within normal limits  Description: INTERVENTIONS:  - Monitor labs and assess patient for signs and symptoms of electrolyte imbalances  - Administer electrolyte replacement as ordered  - Monitor response to electrolyte replacements, including repeat lab results as appropriate  - Instruct patient on fluid and nutrition as appropriate  Outcome: Progressing  Goal: Glucose maintained within target range  Description: INTERVENTIONS:  - Monitor Blood Glucose as ordered  - Assess for signs and symptoms of hyperglycemia and hypoglycemia  - Administer ordered medications to maintain glucose within target range  - Assess nutritional intake and initiate nutrition service referral as needed  Outcome: Progressing

## 2022-06-13 NOTE — ASSESSMENT & PLAN NOTE
Lab Results   Component Value Date    HGBA1C 12 4 (H) 01/03/2022       Recent Labs     06/13/22  1256 06/13/22  1444   POCGLU >500* >500*       Blood Sugar Average: Last 72 hrs:  · Patient was diagnosed with type 1 diabetes mellitus at age 25   · [de-identified] with [facility] endocrinology, last appointment was in March 22  · Patient reported that she has an appointment has not been able to go to the appointment  · Last insulin regimen-Basaglar-18 units q a m , Admelog 5 units t i d  Last known A1c in January 22-12 4%  · Patient has not been taking her insulin for about 3 days as she was living with her sister  PLAN :   · Has had history of prior DKA, noncompliance to medications  Will consult Case Management for further assistance

## 2022-06-13 NOTE — H&P
[address]  H&P - Taniya Johnsont 1990, 29 y o  female MRN: [id number]  Unit/Bed#: ED 04 Encounter: [id number]  Primary Care Provider: Jodi Francois,    Date and time admitted to hospital: 6/13/2022  1:10 PM    * Diabetic ketoacidosis associated with type 1 diabetes mellitus [facility])  Assessment & Plan  Lab Results   Component Value Date    HGBA1C 12 4 (H) 01/03/2022       Recent Labs     06/13/22  1256 06/13/22  1444   POCGLU >500* >500*       Blood Sugar Average: Last 72 hrs:  · History of type 1 diabetes, diagnosed at age 25, complicated by albuminuria  · Patient follows with endocrinology at [facility] office visit was in March 22  · Patient reports that she has not been taking her insulin for about 3 days, as she was visiting her sister's home  She did not also take the short-acting insulin as she felt that "it would not work well if she did not take the long-acting insulin"   · Patient is unable to recall the accurate home doses, as per last office visit in [place] was supposed to be taking Admelog 5 units t i d  A c , Basaglar 18 units Q a m   · No recent fever, chills, cough, is currently complaining of some shortness of breath  No dysuria, diarrhea, constipation  Patient was able to tolerate food yesterday, however has had 1 episode of vomiting this morning  · Patient does not check her blood sugars at home regularly  · Last A1c in Jan 2022-12 4%  · On presentation-labs revealed a VBG-pH-7 03, pCO2 -29, bicarb on VBG-7 7  Elevated beta hydroxybutyrate-7 4  Lactic acid was normal at 1 9  CBC showed leukocytosis, elevated hemoglobin, elevated platelets  Magnesium was normal at 2 4    · BMP revealed a corrected sodium of 144, potassium of 5 6, bicarb of 10, anion gap was elevated at 30, blood glucose was 723, liver enzymes were normal except for an isolated elevated alkaline phosphatase-166, total bilirubin was normal at 0 44, EGFR-62    · Infectious work up : Urinalysis- pending  · Status post 2 L normal saline bolus in the ED and 1 x 4 mg IV Zofran    PLAN :   · Admit to critical care  · Start patient on DKA protocol- insulin 0 1 u/kg bolus followed by insulin drip per DKA protocol  Will avoid repletion of K+ as serum K is > 5 5  · Will change IV fluids to 1/2 normal saline at 250 ml/hr   · Follow BMP, magnesium, phosphorus every 4 hours ; electrolyte replacement per protocol   · Fingerstick glucose every hour   · Will keep patient NPO, once BG reaches < 250 mg/dl --> change IV fluids to dextrose-containing IV fluids  Dyslipidemia  Assessment & Plan  · As per chart review, patient is supposed to be on simvastatin-however was discontinued during pregnancy  · Currently patient does not take any medications for dyslipidemia  Leukocytosis  Assessment & Plan  · Likely hemoconcentration due to severe dehydration  · Low suspicion for infection at this time  · Will continue routine monitoring     Type 1 diabetes mellitus [facility])  Assessment & Plan  Lab Results   Component Value Date    HGBA1C 12 4 (H) 01/03/2022       Recent Labs     06/13/22  1256 06/13/22  1444   POCGLU >500* >500*       Blood Sugar Average: Last 72 hrs:  · Patient was diagnosed with type 1 diabetes mellitus at age 25   · [de-identified] with [facility] endocrinology, last appointment was in March 22  · Patient reported that she has an appointment has not been able to go to the appointment  · Last insulin regimen-Basaglar-18 units q a m , Admelog 5 units t i d  Last known A1c in January 22-12 4%  · Patient has not been taking her insulin for about 3 days as she was living with her sister  PLAN :   · Has had history of prior DKA, noncompliance to medications  Will consult Case Management for further assistance        -------------------------------------------------------------------------------------------------------------  Chief Complaint:  Elevated blood sugars, vomiting    History of Present Illness   HX and PE limited by:  No limitations  Eliane Seay is a 29 y o  female who presents with elevated blood sugars, 1 episode of vomiting that occurred around 11:00 a m  On the day of admission  Patient is a known case of type 1 diabetes, diagnosed at age 25  Patient reported that she has not been taking her long-acting insulin for about 3 days as she was living with his sister, she has also not been taking her short-acting insulin as she felt that it would not help without taking the long-acting insulin  Patient denies any fever, chills, cough, sputum production, abdominal pain, diarrhea, constipation in the recent past   No exposure to sick contacts  Patient was able to tolerate food 1 day prior to admission, however had an episode of vomiting on the day of admission  Patient checked her blood sugars and found that it was significantly elevated to above 500  Patient is under to come to the emergency department  Of note, patient has had prior episodes of DKA which required hospitalization  She follows with [facility] Endocrinology, last visit was in March 22  Her outpatient regimen as per chart review-Basaglar -18 units q a m , Admelog 5 units t i d  A c   Patient is unable to accurately recall her short-acting insulin doses, and is unable to tell her long-acting insulin dose  Last A1c in January 22-12 4%  In the ED-   Initial vitals-temperature-98/heart rate-124/blood pressure-133/100/saturating 96% on room air  Labs revealed a pH of 7 03, pCO2 -29, bicarb on VBG-7 7, bicarb on BMP-10  Elevated beta hydroxybutyrate of 7 4  CBC revealed leukocytosis, elevated hemoglobin and platelets consistent with hemoconcentration due to dehydration  Magnesium was normal at 2 4  Initial troponin-negative    BMP-corrected sodium-144, potassium is elevated mildly at 5 6, bicarb was 10, anion gap is significantly elevated at 30, glucose was elevated at 723, creatinine-1 18, liver enzymes were normal except for an elevated isolated alkaline phosphatase to 166  COVID/influenza/RSV testing was negative, urinalysis-pending  Patient was given 2 L of normal saline in the ED, started on insulin as per DKA protocol  Patient shall be admitted to the ICU  History obtained from chart review and the patient   -------------------------------------------------------------------------------------------------------------  Dispo: Admit to Critical Care     Code Status: Level 1 - Full Code  --------------------------------------------------------------------------------------------------------------  Review of Systems    A 12-point, complete review of systems was reviewed and negative except as stated above     Physical Exam  Vitals and nursing note reviewed  Constitutional:       General: She is not in acute distress  Appearance: She is well-developed  Comments: Appears tired and dehydrated    HENT:      Head: Normocephalic and atraumatic  Mouth/Throat:      Mouth: Mucous membranes are dry  Eyes:      General:         Right eye: No discharge  Left eye: No discharge  Conjunctiva/sclera: Conjunctivae normal    Cardiovascular:      Rate and Rhythm: Regular rhythm  Tachycardia present  Pulses: Normal pulses  Heart sounds: Normal heart sounds  No murmur heard  Pulmonary:      Effort: Pulmonary effort is normal  No respiratory distress  Breath sounds: Normal breath sounds  Abdominal:      Palpations: Abdomen is soft  Tenderness: There is no abdominal tenderness  Musculoskeletal:      Cervical back: Neck supple  Right lower leg: No edema  Left lower leg: No edema  Skin:     General: Skin is warm and dry  Capillary Refill: Capillary refill takes less than 2 seconds  Neurological:      Mental Status: She is alert and oriented to person, place, and time     Psychiatric:         Mood and Affect: Mood normal  --------------------------------------------------------------------------------------------------------------  Vitals:   Vitals:    22 1250   BP: 133/100   Pulse: (!) 124   Resp: 20   Temp: 98 °F (36 7 °C)   SpO2: 96%   Weight: 62 6 kg (138 lb)   Height: 4' 10" (1 473 m)     Temp  Min: 98 °F (36 7 °C)  Max: 98 °F (36 7 °C)     Height: 4' 10" (147 3 cm)  Body mass index is 28 84 kg/m²      Laboratory and Diagnostics:  Results from last 7 days   Lab Units 22  1332   WBC Thousand/uL 12 89*   HEMOGLOBIN g/dL 15 8*   HEMATOCRIT % 48 2*   PLATELETS Thousands/uL 343   BANDS PCT % 6   MONO PCT % 0*     Results from last 7 days   Lab Units 22  1332   SODIUM mmol/L 134*   POTASSIUM mmol/L 5 6*   CHLORIDE mmol/L 94*   CO2 mmol/L 10*   ANION GAP mmol/L 30*   BUN mg/dL 25   CREATININE mg/dL 1 18   CALCIUM mg/dL 9 0   GLUCOSE RANDOM mg/dL 723*   ALT U/L 33   AST U/L 21   ALK PHOS U/L 166*   ALBUMIN g/dL 4 2   TOTAL BILIRUBIN mg/dL 0 44     Results from last 7 days   Lab Units 22  1332   MAGNESIUM mg/dL 2 4               Results from last 7 days   Lab Units 22  1334   LACTIC ACID mmol/L 1 9     ABG:    VBG:  Results from last 7 days   Lab Units 22  1334   PH ALEXSANDER  7 031*   PCO2 ALEXSANDER mm Hg 29 6*   PO2 ALEXSANDER mm Hg 24 4*   HCO3 ALEXSANDER mmol/L 7 7*   BASE EXC ALEXSANDER mmol/L -22 1           Micro:        Imaging: I have personally reviewed pertinent films in PACS      Historical Information   Past Medical History:   Diagnosis Date    Diabetes mellitus ([facility] )     uses kwiki pen     Diabetes mellitus type 1 ([address] )     High blood pressure     recently dx/ put on lisinopril     High cholesterol     Hypertension      no hypersion     Miscarriage     Recurrent pregnancy loss, antepartum condition or complication      Past Surgical History:   Procedure Laterality Date     SECTION  01/02/2014     X1    DILATION AND CURETTAGE OF UTERUS  04/10/2019    Missed AB     [place]  DELIVERY ONLY N/A 2021    Procedure:  SECTION () REPEAT;  Surgeon: Mike Alcaraz MD;  Location: [facility] ;  Service: Obstetrics    WISDOM TOOTH EXTRACTION       Social History   Social History     Substance and Sexual Activity   Alcohol Use Never    Comment: Alcohol intake:   None - As per [place]      Social History     Substance and Sexual Activity   Drug Use Never    Comment: Illicit drugs:   No  - As per [place]      Social History     Tobacco Use   Smoking Status Never Smoker   Smokeless Tobacco Never Used     Family History:   Family History   Problem Relation Age of Onset    No Known Problems Mother     Diabetes Father     No Known Problems Sister     No Known Problems Sister     No Known Problems Brother     No Known Problems Maternal Grandmother     Diabetes Paternal Grandmother     No Known Problems Paternal Grandfather     No Known Problems Daughter      Family history unknown      Medications:  Current Facility-Administered Medications   Medication Dose Route Frequency    enoxaparin (LOVENOX) subcutaneous injection 40 mg  40 mg Subcutaneous Daily    insulin regular (HumuLIN R,NovoLIN R) 1 Units/mL in sodium chloride 0 9 % 100 mL infusion  0 1-30 Units/hr Intravenous Continuous    sodium chloride 0 9 % bolus 1,000 mL  1,000 mL Intravenous Once    sodium chloride infusion 0 45 %  250 mL/hr Intravenous Continuous     Home medications:  Prior to Admission Medications   Prescriptions Last Dose Informant Patient Reported? Taking? Accu-Chek FastClix Lancets MISC  Self No No   Sig: Use 6 a day and as directed  Patient not taking: Reported on 2022   Continuous Blood Gluc  (Dexcom G6 ) NERY  Self No No   Sig: Use as directed  Patient not taking: No sig reported   Continuous Blood Gluc Sensor (Dexcom G6 Sensor) MISC  Self No No   Si box=1 month supply or 3 sensors, use 1 sensor every 10 days     Patient not taking: No sig reported   Continuous Blood Gluc Transmit (Dexcom G6 Transmitter) MISC  Self No No   Sig: Transmitter change every 90 days  Patient not taking: No sig reported   Insulin Pen Needle (BD Pen Needle Kerrie 2nd Gen) 32G X 4 MM MISC   No No   Sig: For use with insulin pen  Pharmacy may dispense brand covered by insurance  Insulin Pen Needle (BD Pen Needle Kerrie 2nd Gen) 32G X 4 MM MISC   No No   Sig: For use with insulin pen  Pharmacy may dispense brand covered by insurance  Prenatal Vit-Fe Fumarate-FA (prenatal vitamin) 28-0 8 mg   No No   Sig: Take 1 tablet by mouth daily   hydrocortisone 2 5 % cream   No No   Sig: Apply topically 2 (two) times a day To vulva for itching   insulin glargine (Basaglar KwikPen) 100 units/mL injection pen   No No   Sig: Inject 18 Units under the skin daily   insulin lispro (HumaLOG KwikPen) 100 units/mL injection pen   No No   Sig: Inject 6 Units under the skin 3 (three) times a day with meals      Facility-Administered Medications: None     Allergies:  No Known Allergies    ------------------------------------------------------------------------------------------------------------  Advance Directive and Living Will:      Power of :    POLST:    ------------------------------------------------------------------------------------------------------------  Anticipated Length of Stay is > 2 midnights    Care Time Delivered:   Upon my evaluation, this patient had a high probability of imminent or life-threatening deterioration due to DKA, which required my direct attention, intervention, and personal management  I have personally provided 60 minutes (2 to 3pm) of critical care time, exclusive of procedures, teaching, family meetings, and any prior time recorded by providers other than myself  Samina Woods MD        Portions of the record may have been created with voice recognition software    Occasional wrong word or "sound a like" substitutions may have occurred due to the inherent limitations of voice recognition software    Read the chart carefully and recognize, using context, where substitutions have occurred

## 2022-06-13 NOTE — ASSESSMENT & PLAN NOTE
· As per chart review, patient is supposed to be on simvastatin-however was discontinued during pregnancy  · Currently patient does not take any medications for dyslipidemia

## 2022-06-13 NOTE — ED PROVIDER NOTES
History  Chief Complaint   Patient presents with    Hyperglycemia - Symptomatic     States she took her sugar 30 minutes ago and it was over 600 and she vomited and feels sob     26-year-old female with past history of type 1 insulin-dependent diabetes, DKA, hyperlipidemia, hypertension, presents to the ED for evaluation of acute onset nausea, vomiting, abdominal discomfort since this morning  Patient states that she has not taken her long-acting or short-acting insulin over the past 4 days as she was visiting her sister and she did not take her insulin with her  Patient started to have nausea and vomiting this morning  Patient thinks she is likely to be DKA again  Patient came to the ED for further evaluation  History provided by:  Patient  Hyperglycemia - Symptomatic  Associated symptoms: abdominal pain, nausea and vomiting    Associated symptoms: no chest pain, no confusion, no dizziness, no dysuria, no fever, no shortness of breath and no weakness        Prior to Admission Medications   Prescriptions Last Dose Informant Patient Reported? Taking? Accu-Chek FastClix Lancets MISC  Self No No   Sig: Use 6 a day and as directed  Patient not taking: Reported on 2022   Continuous Blood Gluc  (Dexcom G6 ) NERY  Self No No   Sig: Use as directed  Patient not taking: No sig reported   Continuous Blood Gluc Sensor (Dexcom G6 Sensor) MISC  Self No No   Si box=1 month supply or 3 sensors, use 1 sensor every 10 days  Patient not taking: No sig reported   Continuous Blood Gluc Transmit (Dexcom G6 Transmitter) MISC  Self No No   Sig: Transmitter change every 90 days  Patient not taking: No sig reported   Insulin Pen Needle (BD Pen Needle Kerrie 2nd Gen) 32G X 4 MM MISC   No No   Sig: For use with insulin pen  Pharmacy may dispense brand covered by insurance  Insulin Pen Needle (BD Pen Needle Kerrie 2nd Gen) 32G X 4 MM MISC   No No   Sig: For use with insulin pen   Pharmacy may dispense brand covered by insurance  Prenatal Vit-Fe Fumarate-FA (prenatal vitamin) 28-0 8 mg   No No   Sig: Take 1 tablet by mouth daily   hydrocortisone 2 5 % cream   No No   Sig: Apply topically 2 (two) times a day To vulva for itching   insulin glargine (Basaglar KwikPen) 100 units/mL injection pen   No No   Sig: Inject 18 Units under the skin daily   insulin lispro (HumaLOG KwikPen) 100 units/mL injection pen   No No   Sig: Inject 6 Units under the skin 3 (three) times a day with meals      Facility-Administered Medications: None       Past Medical History:   Diagnosis Date    Diabetes mellitus ([facility] )     uses kwiki pen     Diabetes mellitus type 1 ([facility] )     High blood pressure     recently dx/ put on lisinopril     High cholesterol     Hypertension      no hypersion     Miscarriage     Recurrent pregnancy loss, antepartum condition or complication        Past Surgical History:   Procedure Laterality Date     SECTION  01/02/2014     X1    DILATION AND CURETTAGE OF UTERUS  04/10/2019    Missed AB     [place]  DELIVERY ONLY N/A 2021    Procedure:  SECTION () REPEAT;  Surgeon: Jovana White MD;  Location: [facility] ;  Service: Obstetrics    WISDOM TOOTH EXTRACTION         Family History   Problem Relation Age of Onset    No Known Problems Mother     Diabetes Father     No Known Problems Sister     No Known Problems Sister     No Known Problems Brother     No Known Problems Maternal Grandmother     Diabetes Paternal Grandmother     No Known Problems Paternal Grandfather     No Known Problems Daughter      I have reviewed and agree with the history as documented      E-Cigarette/Vaping    E-Cigarette Use Never User      E-Cigarette/Vaping Substances    Nicotine No     THC No     CBD No     Flavoring No     Other No     Unknown No      Social History     Tobacco Use    Smoking status: Never Smoker    Smokeless tobacco: Never Used   Vaping Use    Vaping Use: Never used Substance Use Topics    Alcohol use: Never     Comment: Alcohol intake:   None - As per Pilar Lama Drug use: Never     Comment: Illicit drugs:   No  - As per [place]        Review of Systems   Constitutional: Negative for activity change, appetite change, chills and fever  HENT: Negative for congestion and ear pain  Eyes: Negative for pain and discharge  Respiratory: Negative for cough, chest tightness, shortness of breath, wheezing and stridor  Cardiovascular: Negative for chest pain and palpitations  Gastrointestinal: Positive for abdominal pain, nausea and vomiting  Negative for abdominal distention and constipation  Endocrine: Negative for cold intolerance  Genitourinary: Negative for dysuria, frequency and urgency  Musculoskeletal: Negative for arthralgias and back pain  Skin: Negative for color change and rash  Allergic/Immunologic: Negative for environmental allergies and food allergies  Neurological: Negative for dizziness, weakness, numbness and headaches  Hematological: Negative for adenopathy  Psychiatric/Behavioral: Negative for agitation, behavioral problems and confusion  The patient is not nervous/anxious  All other systems reviewed and are negative  Physical Exam  Physical Exam  Vitals and nursing note reviewed  Constitutional:       Appearance: She is well-developed  HENT:      Head: Normocephalic and atraumatic  Eyes:      Conjunctiva/sclera: Conjunctivae normal    Cardiovascular:      Rate and Rhythm: Normal rate and regular rhythm  Heart sounds: Normal heart sounds  Pulmonary:      Effort: Pulmonary effort is normal       Breath sounds: Normal breath sounds  Abdominal:      General: Bowel sounds are normal  There is no distension  Palpations: Abdomen is soft  Tenderness: There is no abdominal tenderness  Comments: Abdomen is soft, nondistended, with bowel sounds present all 4 quadrants  No tenderness to palpation of abdomen  Musculoskeletal:         General: Normal range of motion  Cervical back: Normal range of motion and neck supple  Skin:     General: Skin is warm and dry  Neurological:      Mental Status: She is alert and oriented to person, place, and time  Psychiatric:         Behavior: Behavior normal          Thought Content:  Thought content normal          Judgment: Judgment normal          Vital Signs  ED Triage Vitals [06/13/22 1250]   Temperature Pulse Respirations Blood Pressure SpO2   98 °F (36 7 °C) (!) 124 20 133/100 96 %      Temp src Heart Rate Source Patient Position - Orthostatic VS BP Location FiO2 (%)   -- -- -- -- --      Pain Score       No Pain           Vitals:    06/13/22 1250 06/13/22 1515   BP: 133/100 138/65   Pulse: (!) 124 (!) 108         Visual Acuity      ED Medications  Medications   sodium chloride 0 9 % bolus 1,000 mL (1,000 mL Intravenous New Bag 6/13/22 1500)   enoxaparin (LOVENOX) subcutaneous injection 40 mg (40 mg Subcutaneous Given 6/13/22 1502)   insulin regular (HumuLIN R,NovoLIN R) 1 Units/mL in sodium chloride 0 9 % 100 mL infusion (6 3 Units/hr Intravenous New Bag 6/13/22 1456)   sodium chloride infusion 0 45 % (has no administration in time range)   ondansetron (ZOFRAN) injection 4 mg (has no administration in time range)   sodium chloride 0 9 % bolus 1,000 mL (0 mL Intravenous Stopped 6/13/22 1450)   ondansetron (ZOFRAN) injection 4 mg (4 mg Intravenous Given 6/13/22 1448)   insulin regular (HumuLIN R,NovoLIN R) injection 6 Units (6 Units Intravenous Given 6/13/22 1449)       Diagnostic Studies  Results Reviewed     Procedure Component Value Units Date/Time    Fingerstick Glucose (POCT) [[id number]]  (Abnormal) Collected: 06/13/22 1542    Lab Status: Final result Updated: 06/13/22 1546     POC Glucose >500 mg/dl     HS Troponin I 4hr [[id number]]     Lab Status: No result Specimen: Blood     Blood gas, venous [[id number]]     Lab Status: No result Specimen: Blood     Hemoglobin A1C [[id number]]     Lab Status: No result Specimen: Blood     Basic metabolic panel [[id number]]     Lab Status: No result Specimen: Blood     Magnesium [[id number]]     Lab Status: No result Specimen: Blood     Phosphorus [[id number]]     Lab Status: No result Specimen: Blood     Fingerstick Glucose (POCT) [[id number]]  (Abnormal) Collected: 06/13/22 1444    Lab Status: Final result Updated: 06/13/22 1445     POC Glucose >500 mg/dl     COVID/FLU/RSV - 2 hour TAT [[id number]]  (Normal) Collected: 06/13/22 1334    Lab Status: Final result Specimen: Nares from Nose Updated: 06/13/22 1428     SARS-CoV-2 Negative     INFLUENZA A PCR Negative     INFLUENZA B PCR Negative     RSV PCR Negative    Narrative:      FOR PEDIATRIC PATIENTS - copy/paste COVID Guidelines URL to browser: [email or web address]    SARS-CoV-2 assay is a Nucleic Acid Amplification assay intended for the  qualitative detection of nucleic acid from SARS-CoV-2 in nasopharyngeal  swabs  Results are for the presumptive identification of SARS-CoV-2 RNA  Positive results are indicative of infection with SARS-CoV-2, the virus  causing COVID-19, but do not rule out bacterial infection or co-infection  with other viruses  Laboratories within the [place] and its  territories are required to report all positive results to the appropriate  public health authorities  Negative results do not preclude SARS-CoV-2  infection and should not be used as the sole basis for treatment or other  patient management decisions  Negative results must be combined with  clinical observations, patient history, and epidemiological information  This test has not been FDA cleared or approved  This test has been authorized by FDA under an Emergency Use Authorization  (EUA)   This test is only authorized for the duration of time the  declaration that circumstances exist justifying the authorization of the  emergency use of an in vitro diagnostic tests for detection of SARS-CoV-2  virus and/or diagnosis of COVID-19 infection under section 564(b)(1) of  the Act, 21 U  S C  085QLQ-9(K)(3), unless the authorization is terminated  or revoked sooner  The test has been validated but independent review by FDA  and CLIA is pending  Test performed using [employer] GeneXpert: This RT-PCR assay targets N2,  a region unique to SARS-CoV-2  A conserved region in the E-gene was chosen  for pan-Sarbecovirus detection which includes SARS-CoV-2      Comprehensive metabolic panel [[id number]]  (Abnormal) Collected: 06/13/22 1332    Lab Status: Final result Specimen: Blood from Arm, Left Updated: 06/13/22 1412     Sodium 134 mmol/L      Potassium 5 6 mmol/L      Chloride 94 mmol/L      CO2 10 mmol/L      ANION GAP 30 mmol/L      BUN 25 mg/dL      Creatinine 1 18 mg/dL      Glucose 723 mg/dL      Calcium 9 0 mg/dL      AST 21 U/L      ALT 33 U/L      Alkaline Phosphatase 166 U/L      Total Protein 8 2 g/dL      Albumin 4 2 g/dL      Total Bilirubin 0 44 mg/dL      eGFR 62 ml/min/1 73sq m     Narrative:      [facility] guidelines for Chronic Kidney Disease (CKD):     Stage 1 with normal or high GFR (GFR > 90 mL/min/1 73 square meters)    Stage 2 Mild CKD (GFR = 60-89 mL/min/1 73 square meters)    Stage 3A Moderate CKD (GFR = 45-59 mL/min/1 73 square meters)    Stage 3B Moderate CKD (GFR = 30-44 mL/min/1 73 square meters)    Stage 4 Severe CKD (GFR = 15-29 mL/min/1 73 square meters)    Stage 5 End Stage CKD (GFR <15 mL/min/1 73 square meters)  Note: GFR calculation is accurate only with a steady state creatinine    HS Troponin I 2hr [[id number]]     Lab Status: No result Specimen: Blood     HS Troponin 0hr (reflex protocol) [[id number]]  (Normal) Collected: 06/13/22 1332    Lab Status: Final result Specimen: Blood from Arm, Left Updated: 06/13/22 1411     hs TnI 0hr <2 ng/L     Magnesium [[id number]]  (Normal) Collected: 06/13/22 1332 Lab Status: Final result Specimen: Blood from Arm, Left Updated: 06/13/22 1409     Magnesium 2 4 mg/dL     CBC and differential [[id number]]  (Abnormal) Collected: 06/13/22 1332    Lab Status: Final result Specimen: Blood from Arm, Left Updated: 06/13/22 1409     WBC 12 89 Thousand/uL      RBC 5 22 Million/uL      Hemoglobin 15 8 g/dL      Hematocrit 48 2 %      MCV 92 fL      MCH 30 3 pg      MCHC 32 8 g/dL      RDW 12 6 %      MPV 9 4 fL      Platelets 628 Thousands/uL     Narrative: This is an appended report  These results have been appended to a previously verified report  Manual Differential(PHLEBS Do Not Order) [[id number]]  (Abnormal) Collected: 06/13/22 1332    Lab Status: Final result Specimen: Blood from Arm, Left Updated: 06/13/22 1409     Segmented % 87 %      Bands % 6 %      Lymphocytes % 7 %      Monocytes % 0 %      Eosinophils, % 0 %      Basophils % 0 %      Absolute Neutrophils 11 99 Thousand/uL      Lymphocytes Absolute 0 90 Thousand/uL      Monocytes Absolute 0 00 Thousand/uL      Eosinophils Absolute 0 00 Thousand/uL      Basophils Absolute 0 00 Thousand/uL      Total Counted --     RBC Morphology Normal     Platelet Estimate Adequate    Lactic acid [[id number]]  (Normal) Collected: 06/13/22 1334    Lab Status: Final result Specimen: Blood from Arm, Left Updated: 06/13/22 1404     LACTIC ACID 1 9 mmol/L     Narrative:      Result may be elevated if tourniquet was used during collection      Blood gas, venous [[id number]]  (Abnormal) Collected: 06/13/22 1334    Lab Status: Final result Specimen: Blood from Arm, Left Updated: 06/13/22 1346     pH, Alexandre 7 031     pCO2, Alexandre 29 6 mm Hg      pO2, Alexandre 24 4 mm Hg      HCO3, Alexandre 7 7 mmol/L      Base Excess, Alexandre -22 1 mmol/L      O2 Content, Alexandre 7 5 ml/dL      O2 HGB, VENOUS 32 4 %     Beta Hydroxybutyrate [[id number]]  (Abnormal) Collected: 06/13/22 1332    Lab Status: Final result Specimen: Blood from Arm, Left Updated: 06/13/22 1344 BETA-HYDROXYBUTYRATE 7 4 mmol/L     UA w Reflex to Microscopic w Reflex to Culture [[id number]]     Lab Status: No result Specimen: Urine     Rapid drug screen, urine [[id number]]     Lab Status: No result Specimen: Urine     Fingerstick Glucose (POCT) [[id number]]  (Abnormal) Collected: 06/13/22 1256    Lab Status: Final result Updated: 06/13/22 1257     POC Glucose >500 mg/dl                  No orders to display              Procedures  ECG 12 Lead Documentation Only    Date/Time: 6/13/2022 1:53 PM  Performed by: Grisel Subramanian DO  Authorized by: Grisel Subramanian DO     Indications / Diagnosis:  DKA  ECG reviewed by me, the ED Provider: yes    Patient location:  ED  Previous ECG:     Previous ECG:  Compared to current    Similarity:  Changes noted    Comparison to cardiac monitor: Yes    Interpretation:     Interpretation: abnormal    Comments:      Sinus rhythm, rate 106, normal axis, normal intervals, no acute ST/T-wave abnormalities noted, wide  Noted suggesting possible right atrial enlargement, sinus tachycardia is noted there is new compared to previous EKG    CriticalCare Time  Performed by: Grisel Subramanian DO  Authorized by: Grisel Subramanian DO     Critical care provider statement:     Critical care time (minutes):  40    Critical care time was exclusive of:  Separately billable procedures and treating other patients    Critical care was necessary to treat or prevent imminent or life-threatening deterioration of the following conditions:  Endocrine crisis    Critical care was time spent personally by me on the following activities:  Blood draw for specimens, obtaining history from patient or surrogate, development of treatment plan with patient or surrogate, discussions with consultants, evaluation of patient's response to treatment, examination of patient, review of old charts, re-evaluation of patient's condition, ordering and review of laboratory studies, ordering and review of radiographic studies, interpretation of cardiac output measurements and ordering and performing treatments and interventions  Comments:      DKA             ED Course                                             MDM  Number of Diagnoses or Management Options  Diabetic ketoacidosis without coma associated with type 1 diabetes mellitus ([place] [address] ): new and requires workup  Diagnosis management comments: Obtain DKA workup  Continue to monitor patient for any worsening symptoms  Plan for admission       Amount and/or Complexity of Data Reviewed  Clinical lab tests: ordered and reviewed  Tests in the medicine section of CPT®: reviewed and ordered  Review and summarize past medical records: yes  Independent visualization of images, tracings, or specimens: yes    Risk of Complications, Morbidity, and/or Mortality  General comments: Patient's lab work was consistent with DKA  Insulin bolus and drip started in the ED  Patient is admitted to ICU for further management  Patient agrees with ICU plan  Patient Progress  Patient progress: stable      Disposition  Final diagnoses:   Diabetic ketoacidosis without coma associated with type 1 diabetes mellitus ([place] [address] )     Time reflects when diagnosis was documented in both MDM as applicable and the Disposition within this note     Time User Action Codes Description Comment    6/13/2022  2:26 PM Sky Lerma Add [E10 10] Diabetic ketoacidosis without coma associated with type 1 diabetes mellitus ([facility] )     6/13/2022  2:29 PM William Doty Add [O24 013] Type 1 diabetes mellitus during pregnancy in third trimester       ED Disposition     ED Disposition   Admit    Condition   Stable    Date/Time   Mon Jun 13, 2022  2:26 PM    Comment   Case was discussed with ICU AP and the patient's admission status was agreed to be Admission Status: inpatient status to the service of Dr Svitlana Stephen             Follow-up Information    None         Patient's Medications   Discharge Prescriptions    No medications on file No discharge procedures on file      PDMP Review       Value Time User    PDMP Reviewed  Yes 12/15/2021  8:22 PM Agustin Mcdaniel PA-C          ED Provider  Electronically Signed by           Sofia Ignacio DO  06/13/22 6644

## 2022-06-13 NOTE — ASSESSMENT & PLAN NOTE
Lab Results   Component Value Date    HGBA1C 12 4 (H) 01/03/2022       Recent Labs     06/13/22  1256 06/13/22  1444   POCGLU >500* >500*       Blood Sugar Average: Last 72 hrs:  · History of type 1 diabetes, diagnosed at age 25, complicated by albuminuria  · Patient follows with endocrinology at [facility] office visit was in March 22  · Patient reports that she has not been taking her insulin for about 3 days, as she was visiting her sister's home  She did not also take the short-acting insulin as she felt that "it would not work well if she did not take the long-acting insulin"   · Patient is unable to recall the accurate home doses, as per last office visit in [place] was supposed to be taking Admelog 5 units t i d  A c , Basaglar 18 units Q a m   · No recent fever, chills, cough, is currently complaining of some shortness of breath  No dysuria, diarrhea, constipation  Patient was able to tolerate food yesterday, however has had 1 episode of vomiting this morning  · Patient does not check her blood sugars at home regularly  · Last A1c in Jan 2022-12 4%  · On presentation-labs revealed a VBG-pH-7 03, pCO2 -29, bicarb on VBG-7 7  Elevated beta hydroxybutyrate-7 4  Lactic acid was normal at 1 9  CBC showed leukocytosis, elevated hemoglobin, elevated platelets  Magnesium was normal at 2 4  · BMP revealed a corrected sodium of 144, potassium of 5 6, bicarb of 10, anion gap was elevated at 30, blood glucose was 723, liver enzymes were normal except for an isolated elevated alkaline phosphatase-166, total bilirubin was normal at 0 44, EGFR-62    · Infectious work up :  Urinalysis- pending  · Status post 2 L normal saline bolus in the ED and 1 x 4 mg IV Zofran    PLAN :   · Admit to critical care  · Start patient on DKA protocol- insulin 0 1 u/kg bolus followed by insulin drip per DKA protocol  Will avoid repletion of K+ as serum K is > 5 5     · Will change IV fluids to 1/2 normal saline at 250 ml/hr   · Follow BMP, magnesium, phosphorus every 4 hours ; electrolyte replacement per protocol   · Fingerstick glucose every hour   · Will keep patient NPO, once BG reaches < 250 mg/dl --> change IV fluids to dextrose-containing IV fluids

## 2022-06-13 NOTE — ASSESSMENT & PLAN NOTE
· Likely hemoconcentration due to severe dehydration  · Low suspicion for infection at this time    · Will continue routine monitoring

## 2022-06-13 NOTE — ED NOTES
Patient transferred to ICU by Jes HENDRIX on insulin drip, endorse belongings     Nereyda Echeverria RN  06/13/22 2387

## 2022-06-13 NOTE — QUICK NOTE
Spoke to patient's mom Ariella Espinal and updated her on Gloria's condition  All questions were answered

## 2022-06-14 PROBLEM — E11.10 DKA (DIABETIC KETOACIDOSIS) (HCC): Status: ACTIVE | Noted: 2021-01-27

## 2022-06-14 PROBLEM — E10.10 DIABETIC KETOACIDOSIS ASSOCIATED WITH TYPE 1 DIABETES MELLITUS (HCC): Status: RESOLVED | Noted: 2022-06-13 | Resolved: 2022-06-14

## 2022-06-14 LAB
ANION GAP SERPL CALCULATED.3IONS-SCNC: 10 MMOL/L (ref 4–13)
ANION GAP SERPL CALCULATED.3IONS-SCNC: 12 MMOL/L (ref 4–13)
ANION GAP SERPL CALCULATED.3IONS-SCNC: 14 MMOL/L (ref 4–13)
BASE EX.OXY STD BLDV CALC-SCNC: 57.9 % (ref 60–80)
BASE EX.OXY STD BLDV CALC-SCNC: 85.3 % (ref 60–80)
BASE EX.OXY STD BLDV CALC-SCNC: 97.3 % (ref 60–80)
BASE EXCESS BLDV CALC-SCNC: -11.1 MMOL/L
BASE EXCESS BLDV CALC-SCNC: -5.9 MMOL/L
BASE EXCESS BLDV CALC-SCNC: -9.5 MMOL/L
BASOPHILS # BLD AUTO: 0.04 THOUSANDS/ΜL (ref 0–0.1)
BASOPHILS NFR BLD AUTO: 0 % (ref 0–1)
BODY TEMPERATURE: 98 DEGREES FEHRENHEIT
BODY TEMPERATURE: 98.2 DEGREES FEHRENHEIT
BODY TEMPERATURE: 98.2 DEGREES FEHRENHEIT
BUN SERPL-MCNC: 10 MG/DL (ref 5–25)
BUN SERPL-MCNC: 7 MG/DL (ref 5–25)
BUN SERPL-MCNC: 7 MG/DL (ref 5–25)
CA-I BLD-SCNC: 1.12 MMOL/L (ref 1.12–1.32)
CALCIUM SERPL-MCNC: 6.8 MG/DL (ref 8.3–10.1)
CALCIUM SERPL-MCNC: 7.1 MG/DL (ref 8.3–10.1)
CALCIUM SERPL-MCNC: 7.3 MG/DL (ref 8.3–10.1)
CHLORIDE SERPL-SCNC: 107 MMOL/L (ref 100–108)
CHLORIDE SERPL-SCNC: 108 MMOL/L (ref 100–108)
CHLORIDE SERPL-SCNC: 110 MMOL/L (ref 100–108)
CHOLEST SERPL-MCNC: 181 MG/DL
CO2 SERPL-SCNC: 17 MMOL/L (ref 21–32)
CO2 SERPL-SCNC: 17 MMOL/L (ref 21–32)
CO2 SERPL-SCNC: 20 MMOL/L (ref 21–32)
CREAT SERPL-MCNC: 0.42 MG/DL (ref 0.6–1.3)
CREAT SERPL-MCNC: 0.5 MG/DL (ref 0.6–1.3)
CREAT SERPL-MCNC: 0.58 MG/DL (ref 0.6–1.3)
EOSINOPHIL # BLD AUTO: 0.02 THOUSAND/ΜL (ref 0–0.61)
EOSINOPHIL NFR BLD AUTO: 0 % (ref 0–6)
ERYTHROCYTE [DISTWIDTH] IN BLOOD BY AUTOMATED COUNT: 12.6 % (ref 11.6–15.1)
EST. AVERAGE GLUCOSE BLD GHB EST-MCNC: 315 MG/DL
GFR SERPL CREATININE-BSD FRML MDRD: 124 ML/MIN/1.73SQ M
GFR SERPL CREATININE-BSD FRML MDRD: 130 ML/MIN/1.73SQ M
GFR SERPL CREATININE-BSD FRML MDRD: 137 ML/MIN/1.73SQ M
GLUCOSE SERPL-MCNC: 112 MG/DL (ref 65–140)
GLUCOSE SERPL-MCNC: 121 MG/DL (ref 65–140)
GLUCOSE SERPL-MCNC: 133 MG/DL (ref 65–140)
GLUCOSE SERPL-MCNC: 134 MG/DL (ref 65–140)
GLUCOSE SERPL-MCNC: 136 MG/DL (ref 65–140)
GLUCOSE SERPL-MCNC: 139 MG/DL (ref 65–140)
GLUCOSE SERPL-MCNC: 151 MG/DL (ref 65–140)
GLUCOSE SERPL-MCNC: 155 MG/DL (ref 65–140)
GLUCOSE SERPL-MCNC: 158 MG/DL (ref 65–140)
GLUCOSE SERPL-MCNC: 186 MG/DL (ref 65–140)
GLUCOSE SERPL-MCNC: 197 MG/DL (ref 65–140)
GLUCOSE SERPL-MCNC: 198 MG/DL (ref 65–140)
GLUCOSE SERPL-MCNC: 343 MG/DL (ref 65–140)
GLUCOSE SERPL-MCNC: 428 MG/DL (ref 65–140)
GLUCOSE SERPL-MCNC: 68 MG/DL (ref 65–140)
GLUCOSE SERPL-MCNC: 73 MG/DL (ref 65–140)
GLUCOSE SERPL-MCNC: 83 MG/DL (ref 65–140)
HBA1C MFR BLD: 12.6 %
HCO3 BLDV-SCNC: 14.3 MMOL/L (ref 24–30)
HCO3 BLDV-SCNC: 16.2 MMOL/L (ref 24–30)
HCO3 BLDV-SCNC: 19.1 MMOL/L (ref 24–30)
HCT VFR BLD AUTO: 34.8 % (ref 34.8–46.1)
HDLC SERPL-MCNC: 66 MG/DL
HGB BLD-MCNC: 12.2 G/DL (ref 11.5–15.4)
IMM GRANULOCYTES # BLD AUTO: 0.03 THOUSAND/UL (ref 0–0.2)
IMM GRANULOCYTES NFR BLD AUTO: 0 % (ref 0–2)
LDLC SERPL CALC-MCNC: 93 MG/DL (ref 0–100)
LYMPHOCYTES # BLD AUTO: 3.01 THOUSANDS/ΜL (ref 0.6–4.47)
LYMPHOCYTES NFR BLD AUTO: 27 % (ref 14–44)
MAGNESIUM SERPL-MCNC: 1.9 MG/DL (ref 1.6–2.6)
MAGNESIUM SERPL-MCNC: 2.9 MG/DL (ref 1.6–2.6)
MAGNESIUM SERPL-MCNC: 3.1 MG/DL (ref 1.6–2.6)
MCH RBC QN AUTO: 30.7 PG (ref 26.8–34.3)
MCHC RBC AUTO-ENTMCNC: 34.8 G/DL (ref 31.4–37.4)
MCV RBC AUTO: 88 FL (ref 82–98)
MONOCYTES # BLD AUTO: 0.86 THOUSAND/ΜL (ref 0.17–1.22)
MONOCYTES NFR BLD AUTO: 8 % (ref 4–12)
NEUTROPHILS # BLD AUTO: 7.41 THOUSANDS/ΜL (ref 1.85–7.62)
NEUTS SEG NFR BLD AUTO: 65 % (ref 43–75)
NON VENT ROOM AIR: 21 %
NON VENT ROOM AIR: 21 %
NRBC BLD AUTO-RTO: 0 /100 WBCS
O2 CT BLDV-SCNC: 11.4 ML/DL
O2 CT BLDV-SCNC: 15.7 ML/DL
O2 CT BLDV-SCNC: 17.9 ML/DL
PCO2 BLDV: 31.1 MM HG (ref 42–50)
PCO2 BLDV: 34.8 MM HG (ref 42–50)
PCO2 BLDV: 35.8 MM HG (ref 42–50)
PH BLDV: 7.28 [PH] (ref 7.3–7.4)
PH BLDV: 7.29 [PH] (ref 7.3–7.4)
PH BLDV: 7.34 [PH] (ref 7.3–7.4)
PHOSPHATE SERPL-MCNC: 1.4 MG/DL (ref 2.7–4.5)
PHOSPHATE SERPL-MCNC: 2.7 MG/DL (ref 2.7–4.5)
PHOSPHATE SERPL-MCNC: 3 MG/DL (ref 2.7–4.5)
PLATELET # BLD AUTO: 260 THOUSANDS/UL (ref 149–390)
PMV BLD AUTO: 8.7 FL (ref 8.9–12.7)
PO2 BLDV: 111 MM HG (ref 35–45)
PO2 BLDV: 26.8 MM HG (ref 35–45)
PO2 BLDV: 51.2 MM HG (ref 35–45)
POTASSIUM SERPL-SCNC: 3.8 MMOL/L (ref 3.5–5.3)
POTASSIUM SERPL-SCNC: 3.8 MMOL/L (ref 3.5–5.3)
POTASSIUM SERPL-SCNC: 4.1 MMOL/L (ref 3.5–5.3)
RBC # BLD AUTO: 3.94 MILLION/UL (ref 3.81–5.12)
SODIUM SERPL-SCNC: 137 MMOL/L (ref 136–145)
SODIUM SERPL-SCNC: 138 MMOL/L (ref 136–145)
SODIUM SERPL-SCNC: 140 MMOL/L (ref 136–145)
TRIGL SERPL-MCNC: 109 MG/DL
WBC # BLD AUTO: 11.37 THOUSAND/UL (ref 4.31–10.16)

## 2022-06-14 PROCEDURE — 97161 PT EVAL LOW COMPLEX 20 MIN: CPT

## 2022-06-14 PROCEDURE — 85025 COMPLETE CBC W/AUTO DIFF WBC: CPT | Performed by: NURSE PRACTITIONER

## 2022-06-14 PROCEDURE — 82948 REAGENT STRIP/BLOOD GLUCOSE: CPT

## 2022-06-14 PROCEDURE — 82805 BLOOD GASES W/O2 SATURATION: CPT | Performed by: INTERNAL MEDICINE

## 2022-06-14 PROCEDURE — 84100 ASSAY OF PHOSPHORUS: CPT | Performed by: INTERNAL MEDICINE

## 2022-06-14 PROCEDURE — 84100 ASSAY OF PHOSPHORUS: CPT | Performed by: NURSE PRACTITIONER

## 2022-06-14 PROCEDURE — 83735 ASSAY OF MAGNESIUM: CPT | Performed by: NURSE PRACTITIONER

## 2022-06-14 PROCEDURE — 83735 ASSAY OF MAGNESIUM: CPT | Performed by: INTERNAL MEDICINE

## 2022-06-14 PROCEDURE — 80048 BASIC METABOLIC PNL TOTAL CA: CPT | Performed by: NURSE PRACTITIONER

## 2022-06-14 PROCEDURE — 99232 SBSQ HOSP IP/OBS MODERATE 35: CPT | Performed by: INTERNAL MEDICINE

## 2022-06-14 PROCEDURE — 80061 LIPID PANEL: CPT | Performed by: NURSE PRACTITIONER

## 2022-06-14 PROCEDURE — 82330 ASSAY OF CALCIUM: CPT | Performed by: NURSE PRACTITIONER

## 2022-06-14 PROCEDURE — 80048 BASIC METABOLIC PNL TOTAL CA: CPT | Performed by: INTERNAL MEDICINE

## 2022-06-14 PROCEDURE — 82805 BLOOD GASES W/O2 SATURATION: CPT | Performed by: NURSE PRACTITIONER

## 2022-06-14 RX ORDER — POTASSIUM CHLORIDE 20 MEQ/1
20 TABLET, EXTENDED RELEASE ORAL ONCE
Status: COMPLETED | OUTPATIENT
Start: 2022-06-14 | End: 2022-06-14

## 2022-06-14 RX ORDER — SODIUM CHLORIDE, SODIUM GLUCONATE, SODIUM ACETATE, POTASSIUM CHLORIDE, MAGNESIUM CHLORIDE, SODIUM PHOSPHATE, DIBASIC, AND POTASSIUM PHOSPHATE .53; .5; .37; .037; .03; .012; .00082 G/100ML; G/100ML; G/100ML; G/100ML; G/100ML; G/100ML; G/100ML
1000 INJECTION, SOLUTION INTRAVENOUS ONCE
Status: COMPLETED | OUTPATIENT
Start: 2022-06-14 | End: 2022-06-14

## 2022-06-14 RX ADMIN — POTASSIUM CHLORIDE 20 MEQ: 20 TABLET, EXTENDED RELEASE ORAL at 06:13

## 2022-06-14 RX ADMIN — MAGNESIUM SULFATE HEPTAHYDRATE 4 G: 40 INJECTION, SOLUTION INTRAVENOUS at 00:31

## 2022-06-14 RX ADMIN — SODIUM CHLORIDE, SODIUM GLUCONATE, SODIUM ACETATE, POTASSIUM CHLORIDE, MAGNESIUM CHLORIDE, SODIUM PHOSPHATE, DIBASIC, AND POTASSIUM PHOSPHATE 1000 ML: .53; .5; .37; .037; .03; .012; .00082 INJECTION, SOLUTION INTRAVENOUS at 04:04

## 2022-06-14 RX ADMIN — SODIUM CHLORIDE 2 UNITS/HR: 9 INJECTION, SOLUTION INTRAVENOUS at 10:25

## 2022-06-14 RX ADMIN — POTASSIUM PHOSPHATE, MONOBASIC AND POTASSIUM PHOSPHATE, DIBASIC 30 MMOL: 224; 236 INJECTION, SOLUTION, CONCENTRATE INTRAVENOUS at 00:28

## 2022-06-14 NOTE — OCCUPATIONAL THERAPY NOTE
Occupational Therapy Screen       06/14/22 1133   Note Type   Note type Screen   Cancel Reasons   (Patient independent)   Additional Comments OT orders received  Chart reviewed  Patient is currently independent in all ADLs and mobility without AD; no skilled inpatient OT services indicated at this time     Licensure   [place] License Number  [place], [place] [id number]

## 2022-06-14 NOTE — ASSESSMENT & PLAN NOTE
Lab Results   Component Value Date    HGBA1C 12 6 (H) 06/13/2022       Recent Labs     06/14/22  0109 06/14/22  0213 06/14/22  0306 06/14/22  0407   POCGLU 121 136 134 158*       Blood Sugar Average: Last 72 hrs:  · History of type 1 diabetes, diagnosed at age 25, complicated by albuminuria  · Patient follows with endocrinology at [facility] office visit was in March 22  · Patient reports that she has not been taking her insulin for about 3 days, as she was visiting her sister's home  She did not also take the short-acting insulin as she felt that "it would not work well if she did not take the long-acting insulin"   · Patient is unable to recall the accurate home doses, as per last office visit in [place] was supposed to be taking Admelog 5 units t i d  A c , Basaglar 18 units Q a m   · No recent fever, chills, cough, is currently complaining of some shortness of breath  No dysuria, diarrhea, constipation  Patient was able to tolerate food 1 day prior to admission, however has had 1 episode of vomiting on the day of admission  · Patient does not check her blood sugars at home regularly  · Last A1c in Jan 2022-12 4%, repeat A1c-12 6%  · On presentation-labs revealed a VBG-pH-7 03, pCO2 -29, bicarb on VBG-7 7  Elevated beta hydroxybutyrate-7 4  Lactic acid was normal at 1 9  CBC showed leukocytosis, elevated hemoglobin, elevated platelets  Magnesium was normal at 2 4  · BMP revealed a corrected sodium of 144, potassium of 5 6, bicarb of 10, anion gap was elevated at 30, blood glucose was 723, liver enzymes were normal except for an isolated elevated alkaline phosphatase-166, total bilirubin was normal at 0 44, EGFR-62    · Infectious work up :  Urinalysis- pending  · Status post 2 L normal saline bolus in the ED and 1 x 4 mg IV Zofran  · Anion gap closed at around 2:00 a m, patient received 2 L of isolate overnight    Currently on D5 half NS at 250 cc/hour and insulin on the non DKA protocol  · Serum bicarb at 2:15 a m -17    PLAN :   · Patient's blood glucose currently is 68, will hold insulin drip  · Correction per hypoglycemia protocol  · Follow morning labs  · Patient can likely be transferred to [facility]

## 2022-06-14 NOTE — ASSESSMENT & PLAN NOTE
· Likely hemoconcentration due to severe dehydration--improving, WBC in the most recent CBC-11 37  · Low suspicion for infection at this time    · Will continue routine monitoring

## 2022-06-14 NOTE — ASSESSMENT & PLAN NOTE
Lab Results   Component Value Date    HGBA1C 12 6 (H) 06/13/2022       Recent Labs     06/14/22  0109 06/14/22  0213 06/14/22  0306 06/14/22  0407   POCGLU 121 136 134 158*       Blood Sugar Average: Last 72 hrs:  · (P) 178  25Patient was diagnosed with type 1 diabetes mellitus at age 25   · Frederic June with [facility] endocrinology, last appointment was in March 22  · Patient reported that she has an appointment has not been able to go to the appointment  · Last insulin regimen-Basaglar-18 units q a m , Admelog 5 units t i d  Last known A1c in January 22-12 4%  · Patient has not been taking her insulin for about 3 days prior to admission as she was living with her sister  · Hemoglobin A1c repeated-12 6%    PLAN :   · Has had history of prior DKA, noncompliance to medications  Will consult Case Management for further assistance  · Patient will need education about diabetes, importance of follow-up and compliance to medications

## 2022-06-14 NOTE — QUICK NOTE
Plan of care discussed with the patient's family member - patient's Rd Davis  Answered their questions/ concerns, provided daily updates  Discussed with the patient's mother regarding her the concern for noncompliance of the patient with her diabetes medications and appointments  The patient's mother has a language barrier, however was able to express concern that she thinks that the daughter might need visiting nurses to help her  Advised that we can discuss with Case Management regarding the same

## 2022-06-14 NOTE — PROGRESS NOTES
Im[facility] 47  Progress Note - Radha Lozoya 1990, 29 y o  female MRN: [id number]  Unit/Bed#: ICU 05 Encounter: [id number]  Primary Care Provider: Damion Briggs DO   Date and time admitted to hospital: 6/13/2022  1:10 PM    * Diabetic ketoacidosis associated with type 1 diabetes mellitus (HCC)-resolved as of 6/14/2022  Assessment & Plan  Lab Results   Component Value Date    HGBA1C 12 6 (H) 06/13/2022       Recent Labs     06/14/22  0109 06/14/22  0213 06/14/22  0306 06/14/22  0407   POCGLU 121 136 134 158*       Blood Sugar Average: Last 72 hrs:  · History of type 1 diabetes, diagnosed at age 25, complicated by albuminuria  · Patient follows with endocrinology at [facility] office visit was in March 22  · Patient reports that she has not been taking her insulin for about 3 days, as she was visiting her sister's home  She did not also take the short-acting insulin as she felt that "it would not work well if she did not take the long-acting insulin"   · Patient is unable to recall the accurate home doses, as per last office visit in [place] was supposed to be taking Admelog 5 units t i d  A c , Basaglar 18 units Q a m   · No recent fever, chills, cough, is currently complaining of some shortness of breath  No dysuria, diarrhea, constipation  Patient was able to tolerate food 1 day prior to admission, however has had 1 episode of vomiting on the day of admission  · Patient does not check her blood sugars at home regularly  · Last A1c in Jan 2022-12 4%, repeat A1c-12 6%  · On presentation-labs revealed a VBG-pH-7 03, pCO2 -29, bicarb on VBG-7 7  Elevated beta hydroxybutyrate-7 4  Lactic acid was normal at 1 9  CBC showed leukocytosis, elevated hemoglobin, elevated platelets  Magnesium was normal at 2 4    · BMP revealed a corrected sodium of 144, potassium of 5 6, bicarb of 10, anion gap was elevated at 30, blood glucose was 723, liver enzymes were normal except for an isolated elevated alkaline phosphatase-166, total bilirubin was normal at 0 44, EGFR-62    · Infectious work up :  Urinalysis- pending  · Status post 2 L normal saline bolus in the ED and 1 x 4 mg IV Zofran  · Anion gap closed at around 2:00 a m, patient received 2 L of isolate overnight  Currently on D5 half NS at 250 cc/hour and insulin on the non DKA protocol  · Serum bicarb at 2:15 a m -17    PLAN :   · Patient's blood glucose currently is 68, will hold insulin drip  · Correction per hypoglycemia protocol  · Follow morning labs  · Patient can likely be transferred to med surg  Dyslipidemia  Assessment & Plan  · As per chart review, patient is supposed to be on simvastatin-however was discontinued during pregnancy  · Currently patient does not take any medications for dyslipidemia  Leukocytosis  Assessment & Plan  · Likely hemoconcentration due to severe dehydration--improving, WBC in the most recent CBC-11 37  · Low suspicion for infection at this time  · Will continue routine monitoring     Type 1 diabetes mellitus [facility])  Assessment & Plan  Lab Results   Component Value Date    HGBA1C 12 6 (H) 06/13/2022       Recent Labs     06/14/22  0109 06/14/22  0213 06/14/22  0306 06/14/22  0407   POCGLU 121 136 134 158*       Blood Sugar Average: Last 72 hrs:  · (P) 178  25Patient was diagnosed with type 1 diabetes mellitus at age 25   · [de-identified] with [facility] endocrinology, last appointment was in March 22  · Patient reported that she has an appointment has not been able to go to the appointment  · Last insulin regimen-Basaglar-18 units q a m , Admelog 5 units t i d  Last known A1c in January 22-12 4%  · Patient has not been taking her insulin for about 3 days prior to admission as she was living with her sister  · Hemoglobin A1c repeated-12 6%    PLAN :   · Has had history of prior DKA, noncompliance to medications  Will consult Case Management for further assistance    · Patient will need education about diabetes, importance of follow-up and compliance to medications       ----------------------------------------------------------------------------------------  HPI/24hr events:  Anion gap closed at around 2 am  Patient received another 2 L of isolate overnight  IV fluids continued-D5 half NS at 250 mL an hour  Insulin was changed to non DKA protocol  Vitals-afebrile/heart rate-70 to 80s, intermittent /NWBVW pressure-systolic-100-110/diastolic-60 to 30M/XCBWYCGRKE at 99% on room air  I/O-intake-4 4 L, output- 900 cc, net-positive 3 5 L    Labs-most recent VBG at 2:15 a m -pH-7 28, pCO2-31, bicarb-14 3  POC glucose trend- 91--958    BMP at 8:00 a m - sodium-138, potassium-4 1, bicarb-17, anion gap-14    BMP at 2:15 a m -sodium-137, potassium-3 8, bicarb-17, BUN-10, creatinine-0 42, EGFR-137, magnesium -1 9, phosphorus-2 7  CBC-WBC-11 37, hemoglobin-12 2, platelets-260  No new imaging in the past 24 hours  Microbiology-MRSA culture-pending    Medications-status post 2 g IV calcium gluconate, for g IV magnesium sulfate, 2 L of isolate overnight  Ordered 2 mEq p o  X1 potassium chloride, patient received 40 p o  KCl overnight  Also received potassium phosphate 30 millimole IV x1 overnight  IV drips-D5 half NS at 250 mL/hour, insulin drip per non DKA protocol  Disposition: Transfer to Med-Surg   Code Status: Level 1 - Full Code  ---------------------------------------------------------------------------------------  SUBJECTIVE  Patient was seen and examined at the bedside  She reported no complaints overnight, no nausea or vomiting  No chest pain or shortness of breath  Patient ate a snack last night which she tolerated      Review of Systems  Review of systems was reviewed and negative unless stated above in HPI/24-hour events   ---------------------------------------------------------------------------------------  OBJECTIVE    Vitals   Vitals:    06/14/22 0300 06/14/22 0400 22 0500 22 0600   BP: 98/61 110/75 106/72 102/60   Pulse: 90 82 82 86   Resp:  17 18   Temp:    98 2 °F (36 8 °C)   TempSrc:       SpO2: 99% 100% 99% 98%   Weight:       Height:         Temp (24hrs), Av 1 °F (36 7 °C), Min:97 4 °F (36 3 °C), Max:98 9 °F (37 2 °C)  Current: Temperature: 98 2 °F (36 8 °C)          Respiratory:       Invasive/non-invasive ventilation settings   Respiratory  Report   Lab Data (Last 4 hours)    None         O2/Vent Data (Last 4 hours)    None                Physical Exam  Vitals and nursing note reviewed  Constitutional:       General: She is not in acute distress  Appearance: She is well-developed  HENT:      Head: Normocephalic and atraumatic  Eyes:      General:         Right eye: No discharge  Left eye: No discharge  Conjunctiva/sclera: Conjunctivae normal    Cardiovascular:      Rate and Rhythm: Normal rate and regular rhythm  Pulses: Normal pulses  Heart sounds: Normal heart sounds  No murmur heard  Pulmonary:      Effort: Pulmonary effort is normal  No respiratory distress  Breath sounds: Normal breath sounds  Abdominal:      Palpations: Abdomen is soft  Tenderness: There is no abdominal tenderness  Musculoskeletal:      Cervical back: Neck supple  Skin:     General: Skin is warm and dry  Capillary Refill: Capillary refill takes less than 2 seconds  Neurological:      Mental Status: She is alert and oriented to person, place, and time     Psychiatric:         Mood and Affect: Mood normal              Laboratory and Diagnostics:  Results from last 7 days   Lab Units 22  0215 22  1332   WBC Thousand/uL 11 37* 12 89*   HEMOGLOBIN g/dL 12 2 15 8*   HEMATOCRIT % 34 8 48 2*   PLATELETS Thousands/uL 260 343   NEUTROS PCT % 65  --    BANDS PCT %  --  6   MONOS PCT % 8  --    MONO PCT %  --  0*     Results from last 7 days   Lab Units 22  0215 22  2206 22  1751 22  1332 SODIUM mmol/L 137 138 142 134*   POTASSIUM mmol/L 3 8 3 4* 4 6 5 6*   CHLORIDE mmol/L 108 107 109* 94*   CO2 mmol/L 17* 13* 12* 10*   ANION GAP mmol/L 12 18* 21* 30*   BUN mg/dL 10 12 19 25   CREATININE mg/dL 0 42* 0 62 0 83 1 18   CALCIUM mg/dL 7 1* 6 6* 7 5* 9 0   GLUCOSE RANDOM mg/dL 133 185* 181* 723*   ALT U/L  --   --   --  33   AST U/L  --   --   --  21   ALK PHOS U/L  --   --   --  166*   ALBUMIN g/dL  --   --   --  4 2   TOTAL BILIRUBIN mg/dL  --   --   --  0 44     Results from last 7 days   Lab Units 06/14/22  0215 06/13/22  2206 06/13/22  1751 06/13/22  1332   MAGNESIUM mg/dL 1 9 1 5* 1 9 2 4   PHOSPHORUS mg/dL 2 7 1 6* 3 1  --                Results from last 7 days   Lab Units 06/13/22  1334   LACTIC ACID mmol/L 1 9     ABG:    VBG:  Results from last 7 days   Lab Units 06/14/22  0215   PH ALEXSANDER  7 281*   PCO2 ALEXSANDER mm Hg 31 1*   PO2 ALEXSANDER mm Hg 111 0*   HCO3 ALEXSANDER mmol/L 14 3*   BASE EXC ALEXSANDER mmol/L -11 1           Micro      Imaging: I have personally reviewed pertinent films in PACS    Intake and Output  I/O       06/12 0701  06/13 0700 06/13 0701  06/14 0700    I V  (mL/kg)  2347 3 (37 5)    IV Piggyback  2100    Total Intake(mL/kg)  4447 3 (71)    Urine (mL/kg/hr)  900    Total Output  900    Net  +3547 3                Height and Weights   Height: 4' 10" (147 3 cm)     Body mass index is 28 84 kg/m²  Weight (last 2 days)     Date/Time Weight    06/13/22 1250 62 6 (138)            Nutrition       Diet Orders   (From admission, onward)             Start     Ordered    06/14/22 0319  Diet [place]/CHO Controlled; Consistent Carbohydrate Diet Level 2 (5 carb servings/75 grams CHO/meal)  Diet effective now        References:    Nutrtion Support Algorithm Enteral vs  Parenteral   Question Answer Comment   Diet Type [place]/CHO Controlled    [place]/CHO Controlled Consistent Carbohydrate Diet Level 2 (5 carb servings/75 grams CHO/meal)    RD to adjust diet per protocol?  Yes        06/14/22 0318    06/13/22 8369  Room Service  Once        Question:  Type of Service  Answer:  Room Service - Appropriate with Assistance    06/13/22 1809                  Active Medications  Scheduled Meds:  Current Facility-Administered Medications   Medication Dose Route Frequency Provider Last Rate    acetaminophen  650 mg Oral Q6H PRN Marguerite Neumanno V, CRNP      dextrose 5 % and sodium chloride 0 45 %  250 mL/hr Intravenous Continuous [employer], CRNP 250 mL/hr (06/14/22 0504)    enoxaparin  40 mg Subcutaneous Daily Serina Pereira MD      insulin regular (HumuLIN R,NovoLIN R) infusion  0 3-21 Units/hr Intravenous Titrated Marguerite Berry V, CRNP 3 Units/hr (06/14/22 0408)    ondansetron  4 mg Intravenous Q8H PRN Serina Pereira MD      potassium phosphate  30 mmol Intravenous Once Marguerite Neumanno V, CRNP 30 mmol (06/14/22 0028)     Continuous Infusions:  dextrose 5 % and sodium chloride 0 45 %, 250 mL/hr, Last Rate: 250 mL/hr (06/14/22 0504)  insulin regular (HumuLIN R,NovoLIN R) infusion, 0 3-21 Units/hr, Last Rate: 3 Units/hr (06/14/22 0408)      PRN Meds:   acetaminophen, 650 mg, Q6H PRN  ondansetron, 4 mg, Q8H PRN        Invasive Devices Review  Invasive Devices  Report    Peripheral Intravenous Line  Duration           Long-Dwell Peripheral IV (Midline) 78/82/99 Right Basilic <1 day    Peripheral IV 06/13/22 Left;Upper Arm <1 day              ---------------------------------------------------------------------------------------  Advance Directive and Living Will:      Power of :    POLST:    ---------------------------------------------------------------------------------------  Care Time Delivered:   Upon my evaluation, this patient had a high probability of imminent or life-threatening deterioration due to Metabolic acidosis, which required my direct attention, intervention, and personal management    I have personally provided Thirty minutes (Seven to Seven hundred thirty) of critical care time, exclusive of procedures, teaching, family meetings, and any prior time recorded by providers other than myself  Donn Alvarez MD      Portions of the record may have been created with voice recognition software  Occasional wrong word or "sound a like" substitutions may have occurred due to the inherent limitations of voice recognition software    Read the chart carefully and recognize, using context, where substitutions have occurred

## 2022-06-14 NOTE — UTILIZATION REVIEW
Initial Clinical Review    Admission: Date/Time/Statement:   Admission Orders (From admission, onward)     Ordered        06/13/22 1429  Inpatient Admission  Once                      Orders Placed This Encounter   Procedures    Inpatient Admission     Standing Status:   Standing     Number of Occurrences:   1     Order Specific Question:   Level of Care     Answer:   Critical Care [15]     Order Specific Question:   Estimated length of stay     Answer:   More than 2 Midnights     Order Specific Question:   Certification     Answer:   I certify that inpatient services are medically necessary for this patient for a duration of greater than two midnights  See H&P and MD Progress Notes for additional information about the patient's course of treatment  ED Arrival Information     Expected   -    Arrival   6/13/2022 12:42    Acuity   Urgent            Means of arrival   Walk-In    Escorted by   Self    Service   Hospitalist    Admission type   Urgent            Arrival complaint   sugar 600, vomiting           Chief Complaint   Patient presents with    Hyperglycemia - Symptomatic     States she took her sugar 30 minutes ago and it was over 600 and she vomited and feels sob     Initial Presentation:   29 yof to ER from home c/o acute onset N&V, abd discomfort since this morning  Blood sugar reading >600, has not taken insulin in 4 days  Hx type 1 insulin-dependent diabetes, DKA, hyperlipidemia, hypertension  Presents tachycardic with s/s as above  Admission work-up showing leukocytosis, hyponatremia,  hyperkalemia, blood sugar >700  Admitted to inpatient status & started on IV insulin gtt       ED Triage Vitals   Temperature Pulse Respirations Blood Pressure SpO2   06/13/22 1250 06/13/22 1250 06/13/22 1250 06/13/22 1250 06/13/22 1250   98 °F (36 7 °C) (!) 124 20 133/100 96 %      Temp Source Heart Rate Source Patient Position - Orthostatic VS BP Location FiO2 (%)   06/13/22 2018 06/14/22 0735 06/14/22 0735 06/14/22 2110 --   Temporal Monitor Lying Left arm       Pain Score       06/13/22 1250       No Pain          Wt Readings from Last 1 Encounters:   06/14/22 64 5 kg (142 lb 3 2 oz)     Additional Vital Signs:   Date/Time Temp Pulse Resp BP MAP (mmHg) SpO2 O2 Device Patient Position - Orthostatic VS   06/14/22 1000 -- 98 18 101/67 79 99 % -- --   06/14/22 0830 -- 89 19 105/69 83 99 % -- --   06/14/22 0735 98 °F (36 7 °C) 101 21 114/78 90 99 % None (Room air) Lying   06/14/22 0700 -- 83 16 114/67 87 100 % -- --   06/14/22 0600 98 2 °F (36 8 °C) 86 18 102/60 75 98 % -- --   06/14/22 0500 -- 82 17 106/72 85 99 % -- --   06/14/22 0400 -- 82 17 110/75 87 100 % -- --   06/14/22 0300 -- 90 21 98/61 75 99 % -- --   06/14/22 0200 98 2 °F (36 8 °C) 89 20 95/58 70 99 % -- --   06/14/22 0100 -- 85 16 100/62 76 99 % -- --   06/14/22 0000 -- 75 21 113/74 87 99 % -- --   06/13/22 2300 -- 101 22 103/60 74 99 % -- --   06/13/22 2200 98 9 °F (37 2 °C) 95 21 111/65 83 100 % -- --   06/13/22 2100 -- 90 21 108/62 80 100 % -- --   06/13/22 2018 97 4 °F (36 3 °C) Abnormal  -- -- -- -- -- -- --   06/13/22 2000 -- 84 23 Abnormal  108/59 79 100 % -- --     Pertinent Labs/Diagnostic Test Results:     Results from last 7 days   Lab Units 06/13/22  1334   SARS-COV-2  Negative     Results from last 7 days   Lab Units 06/14/22  0215 06/13/22  1332   WBC Thousand/uL 11 37* 12 89*   HEMOGLOBIN g/dL 12 2 15 8*   HEMATOCRIT % 34 8 48 2*   PLATELETS Thousands/uL 260 343   NEUTROS ABS Thousands/µL 7 41  --    BANDS PCT %  --  6     Results from last 7 days   Lab Units 06/14/22  0732 06/14/22  0215 06/13/22  2206 06/13/22  1751 06/13/22  1332   SODIUM mmol/L 138 137 138 142 134*   POTASSIUM mmol/L 4 1 3 8 3 4* 4 6 5 6*   CHLORIDE mmol/L 107 108 107 109* 94*   CO2 mmol/L 17* 17* 13* 12* 10*   ANION GAP mmol/L 14* 12 18* 21* 30*   BUN mg/dL 7 10 12 19 25   CREATININE mg/dL 0 50* 0 42* 0 62 0 83 1 18   EGFR ml/min/1 73sq m 130 137 121 94 62   CALCIUM mg/dL 6 8* 7  1* 6 6* 7 5* 9 0   CALCIUM, IONIZED mmol/L  --  1 12  --   --   --    MAGNESIUM mg/dL 3 1* 1 9 1 5* 1 9 2 4   PHOSPHORUS mg/dL 3 0 2 7 1 6* 3 1  --      Results from last 7 days   Lab Units 06/13/22  1332   AST U/L 21   ALT U/L 33   ALK PHOS U/L 166*   TOTAL PROTEIN g/dL 8 2   ALBUMIN g/dL 4 2   TOTAL BILIRUBIN mg/dL 0 44     Results from last 7 days   Lab Units 06/14/22  0959 06/14/22  0807 06/14/22  0626 06/14/22  0611 06/14/22  0407 06/14/22  0306 06/14/22  0213 06/14/22  0109 06/14/22  0008 06/13/22  2301 06/13/22  2205 06/13/22  2104   POC GLUCOSE mg/dl 186* 343* 83 68 158* 134 136 121 112 174* 183* 196*     Results from last 7 days   Lab Units 06/14/22  0732 06/14/22  0215 06/13/22  2206 06/13/22  1751 06/13/22  1332   GLUCOSE RANDOM mg/dL 198* 133 185* 181* 723*     Results from last 7 days   Lab Units 06/13/22  1332   HEMOGLOBIN A1C % 12 6*   EAG mg/dl 315     BETA-HYDROXYBUTYRATE   Date Value Ref Range Status   06/13/2022 7 4 (H) <0 6 mmol/L Final      Results from last 7 days   Lab Units 06/14/22  0732 06/14/22  0215 06/13/22  2206   PH ALEXSANDER  7 285* 7 281* 7 259*   PCO2 ALEXSANDER mm Hg 34 8* 31 1* 26 0*   PO2 ALEXSANDER mm Hg 51 2* 111 0* 76 9*   HCO3 ALEXSANDER mmol/L 16 2* 14 3* 11 4*   BASE EXC ALEXSANDER mmol/L -9 5 -11 1 -13 9   O2 CONTENT ALEXSANDER ml/dL 15 7 17 9 18 4   O2 HGB, VENOUS % 85 3* 97 3* 94 8*     Results from last 7 days   Lab Units 06/13/22  1332   HS TNI 0HR ng/L <2     Results from last 7 days   Lab Units 06/13/22  1334   LACTIC ACID mmol/L 1 9     Results from last 7 days   Lab Units 06/13/22  1748   CLARITY UA  Slightly Cloudy   COLOR UA  Light Yellow   SPEC GRAV UA  1 025   PH UA  5 5   GLUCOSE UA mg/dl >=1000 (1%)*   KETONES UA mg/dl >=80 (3+)*   BLOOD UA  Trace-Intact*   PROTEIN UA mg/dl Trace*   NITRITE UA  Negative   BILIRUBIN UA  Negative   UROBILINOGEN UA E U /dl 0 2   LEUKOCYTES UA  Elevated glucose may cause decreased leukocyte values   See urine microscopic for [facility] result/*   WBC UA /hpf 0-1   RBC UA /hpf 1-2   BACTERIA UA /hpf Occasional   EPITHELIAL CELLS WET PREP /hpf Occasional     Results from last 7 days   Lab Units 06/13/22  1334   INFLUENZA A PCR  Negative   INFLUENZA B PCR  Negative   RSV PCR  Negative     Results from last 7 days   Lab Units 06/13/22  1748   AMPH/METH  Negative   BARBITURATE UR  Negative   BENZODIAZEPINE UR  Negative   COCAINE UR  Negative   METHADONE URINE  Negative   OPIATE UR  Negative   PCP UR  Negative   THC UR  Negative     ED Treatment:   Medication Administration from 06/13/2022 1242 to 06/13/2022 1721       Date/Time Order Dose Route Action     06/13/2022 1344 sodium chloride 0 9 % bolus 1,000 mL 1,000 mL Intravenous New Bag     06/13/2022 1500 sodium chloride 0 9 % bolus 1,000 mL 1,000 mL Intravenous New Bag     06/13/2022 1448 ondansetron (ZOFRAN) injection 4 mg 4 mg Intravenous Given     06/13/2022 1502 enoxaparin (LOVENOX) subcutaneous injection 40 mg 40 mg Subcutaneous Given     06/13/2022 1449 insulin regular (HumuLIN R,NovoLIN R) injection 6 Units 6 Units Intravenous Given     06/13/2022 1703 insulin regular (HumuLIN R,NovoLIN R) 1 Units/mL in sodium chloride 0 9 % 100 mL infusion 6 3 Units/hr Intravenous Rate/Dose Change     06/13/2022 1547 insulin regular (HumuLIN R,NovoLIN R) 1 Units/mL in sodium chloride 0 9 % 100 mL infusion 12 6 Units/hr Intravenous New Bag     06/13/2022 1456 insulin regular (HumuLIN R,NovoLIN R) 1 Units/mL in sodium chloride 0 9 % 100 mL infusion 6 3 Units/hr Intravenous New Bag     06/13/2022 1649 sodium chloride infusion 0 45 % 500 mL/hr Intravenous New Bag        Past Medical History:   Diagnosis Date    Diabetes mellitus ([facility] [address] )     uses kwiki pen     Diabetes mellitus type 1 ([facility] [address] )     High blood pressure     recently dx/ put on lisinopril     High cholesterol     Hypertension      no hypersion     Miscarriage     Recurrent pregnancy loss, antepartum condition or complication      Present on Admission:   (Resolved) Diabetic ketoacidosis associated with type 1 diabetes mellitus (HCC)   Dyslipidemia   Leukocytosis   Type 1 diabetes mellitus (HCC)   Metabolic acidosis with increased anion gap and accumulation of organic acids   DKA (diabetic ketoacidosis) ([facility])    Admitting Diagnosis: Hyperglycemia [R73 9]  Diabetic ketoacidosis without coma associated with type 1 diabetes mellitus ([facility] [address] ) [E10 10]  Type 1 diabetes mellitus during pregnancy in third trimester [O24 013]  Age/Sex: 29 y o  female  Admission Orders:  accuchecks  Nursing dysphagia assessment  Neuro checks q4h  Pt/ot eval & tx  Scd/foot pumps    Scheduled Medications:  enoxaparin, 40 mg, Subcutaneous, Daily    Continuous IV Infusions:  insulin regular (HumuLIN R,NovoLIN R) infusion, 0 3-21 Units/hr, Intravenous, Titrated    PRN Meds:  acetaminophen, 650 mg, Oral, Q6H PRN  ondansetron, 4 mg, Intravenous, Q8H PRN    Network Utilization Review Department  ATTENTION: Please call with any questions or concerns to [phone] and carefully listen to the prompts so that you are directed to the right person  All voicemails are confidential   Payam Castano all requests for admission clinical reviews, approved or denied determinations and any other requests to dedicated fax number below belonging to the campus where the patient is receiving treatment   List of dedicated fax numbers for the Facilities:  [address] DENIALS (Administrative/Medical Necessity) [phone]   [address] (Maternity/NICU/Pediatrics) 261 [facility],[address]  [address] [facility] [place] Brenda Cole [place] [id number] [address] 1924 [facility] Emily Hoyos 1481 [phone] [facility] 951 [phone]

## 2022-06-14 NOTE — PHYSICAL THERAPY NOTE
PT EVALUATION     06/14/22 1030   PT Last Visit   PT Visit Date 06/14/22   Note Type   Note type Evaluation   Pain Assessment   Pain Assessment Tool 0-10   Pain Score No Pain   Restrictions/Precautions   Weight Bearing Precautions Per Order No   Home Living   Type of Home Apartment   Home Layout One level  (2 flights to get to 3rd floor apartment)   Additional Comments Pt has no DME ,  does not use AD   Prior Function   Level of [place] Independent with ADLs and functional mobility   Lives With Daughter   Receives Help From Family   ADL Assistance Independent   IADLs Independent   Vocational Full time employment   General   Additional Pertinent History pt admitted with DKA   Family/Caregiver Present No   Cognition   Overall Cognitive Status WFL   Arousal/Participation Cooperative   Orientation Level Oriented X4   Following Commands Follows all commands and directions without difficulty   Subjective   Subjective Pt reports feeling better   RLE Assessment   RLE Assessment WFL  (strength: WFLs)   LLE Assessment   LLE Assessment WFL  (strength: WFLs)   Bed Mobility   Additional Comments Pt presents in bedside chair   Transfers   Sit to Stand 7  Independent   Stand to Sit 7  Independent   Ambulation/Elevation   Gait pattern WNL   Gait Assistance 7  Independent   Assistive Device None   Distance 50 feet with changes in direction   Ambulation/Elevation Additional Comments assist only to bring IV pole   Balance   Static Sitting Good   Dynamic Sitting Good   Static Standing Good   Dynamic Standing Good   Ambulatory Good   Activity Tolerance   Activity Tolerance Patient tolerated treatment well   Nurse Made Aware yes: Reba   Assessment   Prognosis Good   Assessment Patient seen for Physical Therapy evaluation  Patient admitted with DKA (diabetic ketoacidosis) ([facility] )    Comorbidities affecting patient's physical performance include: KDA, HTN, HLD,  Personal factors affecting patient at time of initial evaluation include: lives in single story house  Prior to admission, patient was independent with functional mobility without assistive device, independent with ADLS, independent with IADLS, living with daughter in a single  level home with 2 flights of steps to enter, ambulating household distance, ambulating community distances and works full time  Please find objective findings from Physical Therapy assessment regarding body systems outlined above with no  impairments or limitations  The Barthel Index was used as a functional outcome tool presenting with a score of Barthel Index Score: 85 today indicating minimal limitations of functional mobility and ADLS  Patient's clinical presentation is currently stable  as seen in patient's presentation   Pt does not require skilled PT services for this admission    As demonstrated by objective findings, the assigned level of complexity for this evaluation is low  The patient's AM-PAC Basic Mobility Inpatient Short Form Raw Score is 24  A Raw score of greater than 16 suggests the patient may benefit from discharge to home  Please also refer to the recommendation of the Physical Therapist for safe discharge planning     Goals   Patient Goals to go home   Plan   Treatment/Interventions Spoke to nursing   PT Frequency Other (Comment)  (no skilled PT needs for this admission )   Recommendation   PT Discharge Recommendation No rehabilitation needs   AM-PAC Basic Mobility Inpatient   Turning in Bed Without Bedrails 4   Lying on Back to Sitting on Edge of Flat Bed 4   Moving Bed to Chair 4   Standing Up From Chair 4   Walk in Room 4   Climb 3-5 Stairs 4   Basic Mobility Inpatient Raw Score 24   Basic Mobility Standardized Score 57 68   Highest Level Of Mobility   JH-HLM Goal 8: Walk 250 feet or more   JH-HLM Achieved 7: Walk 25 feet or more   Barthel Index   Feeding 10   Bathing 5   Grooming Score 5   Dressing Score 10   Bladder Score 10   Bowels Score 10   Toilet Use Score 10   Transfers (Bed/Chair) Score 15   Mobility (Level Surface) Score 0   Stairs Score 10   Barthel Index Score 85   End of Consult   Patient Position at End of Consult Bedside chair; All needs within reach   Licensure   [address] Number  Shanel Velasquez Nupur PT  [id number]

## 2022-06-15 PROBLEM — E87.20 METABOLIC ACIDOSIS WITH INCREASED ANION GAP AND ACCUMULATION OF ORGANIC ACIDS: Status: RESOLVED | Noted: 2021-01-27 | Resolved: 2022-06-15

## 2022-06-15 PROBLEM — E87.2 METABOLIC ACIDOSIS WITH INCREASED ANION GAP AND ACCUMULATION OF ORGANIC ACIDS: Status: RESOLVED | Noted: 2021-01-27 | Resolved: 2022-06-15

## 2022-06-15 PROBLEM — E87.8 ELECTROLYTE ABNORMALITY: Status: ACTIVE | Noted: 2022-06-15

## 2022-06-15 LAB
ALBUMIN SERPL BCP-MCNC: 2.5 G/DL (ref 3.5–5)
ALP SERPL-CCNC: 90 U/L (ref 46–116)
ALT SERPL W P-5'-P-CCNC: 27 U/L (ref 12–78)
ANION GAP SERPL CALCULATED.3IONS-SCNC: 10 MMOL/L (ref 4–13)
AST SERPL W P-5'-P-CCNC: 44 U/L (ref 5–45)
BASOPHILS # BLD AUTO: 0.02 THOUSANDS/ΜL (ref 0–0.1)
BASOPHILS NFR BLD AUTO: 0 % (ref 0–1)
BILIRUB SERPL-MCNC: 0.32 MG/DL (ref 0.2–1)
BUN SERPL-MCNC: 6 MG/DL (ref 5–25)
CALCIUM ALBUM COR SERPL-MCNC: 8.4 MG/DL (ref 8.3–10.1)
CALCIUM SERPL-MCNC: 7.2 MG/DL (ref 8.3–10.1)
CHLORIDE SERPL-SCNC: 109 MMOL/L (ref 100–108)
CO2 SERPL-SCNC: 24 MMOL/L (ref 21–32)
CREAT SERPL-MCNC: 0.43 MG/DL (ref 0.6–1.3)
EOSINOPHIL # BLD AUTO: 0.05 THOUSAND/ΜL (ref 0–0.61)
EOSINOPHIL NFR BLD AUTO: 1 % (ref 0–6)
ERYTHROCYTE [DISTWIDTH] IN BLOOD BY AUTOMATED COUNT: 13.4 % (ref 11.6–15.1)
GFR SERPL CREATININE-BSD FRML MDRD: 136 ML/MIN/1.73SQ M
GLUCOSE SERPL-MCNC: 106 MG/DL (ref 65–140)
GLUCOSE SERPL-MCNC: 117 MG/DL (ref 65–140)
GLUCOSE SERPL-MCNC: 118 MG/DL (ref 65–140)
GLUCOSE SERPL-MCNC: 119 MG/DL (ref 65–140)
GLUCOSE SERPL-MCNC: 196 MG/DL (ref 65–140)
GLUCOSE SERPL-MCNC: 209 MG/DL (ref 65–140)
GLUCOSE SERPL-MCNC: 238 MG/DL (ref 65–140)
GLUCOSE SERPL-MCNC: 240 MG/DL (ref 65–140)
GLUCOSE SERPL-MCNC: 262 MG/DL (ref 65–140)
GLUCOSE SERPL-MCNC: 276 MG/DL (ref 65–140)
GLUCOSE SERPL-MCNC: 435 MG/DL (ref 65–140)
GLUCOSE SERPL-MCNC: 453 MG/DL (ref 65–140)
GLUCOSE SERPL-MCNC: 67 MG/DL (ref 65–140)
HCG SERPL QL: NEGATIVE
HCT VFR BLD AUTO: 34.4 % (ref 34.8–46.1)
HGB BLD-MCNC: 11.9 G/DL (ref 11.5–15.4)
IMM GRANULOCYTES # BLD AUTO: 0.02 THOUSAND/UL (ref 0–0.2)
IMM GRANULOCYTES NFR BLD AUTO: 0 % (ref 0–2)
LYMPHOCYTES # BLD AUTO: 2.4 THOUSANDS/ΜL (ref 0.6–4.47)
LYMPHOCYTES NFR BLD AUTO: 41 % (ref 14–44)
MAGNESIUM SERPL-MCNC: 2.2 MG/DL (ref 1.6–2.6)
MCH RBC QN AUTO: 30.5 PG (ref 26.8–34.3)
MCHC RBC AUTO-ENTMCNC: 34.6 G/DL (ref 31.4–37.4)
MCV RBC AUTO: 88 FL (ref 82–98)
MONOCYTES # BLD AUTO: 0.36 THOUSAND/ΜL (ref 0.17–1.22)
MONOCYTES NFR BLD AUTO: 6 % (ref 4–12)
MRSA NOSE QL CULT: NORMAL
NEUTROPHILS # BLD AUTO: 2.99 THOUSANDS/ΜL (ref 1.85–7.62)
NEUTS SEG NFR BLD AUTO: 52 % (ref 43–75)
NRBC BLD AUTO-RTO: 0 /100 WBCS
PHOSPHATE SERPL-MCNC: 1.8 MG/DL (ref 2.7–4.5)
PLATELET # BLD AUTO: 194 THOUSANDS/UL (ref 149–390)
PMV BLD AUTO: 8.8 FL (ref 8.9–12.7)
POTASSIUM SERPL-SCNC: 3.4 MMOL/L (ref 3.5–5.3)
PROT SERPL-MCNC: 5.1 G/DL (ref 6.4–8.2)
RBC # BLD AUTO: 3.9 MILLION/UL (ref 3.81–5.12)
SODIUM SERPL-SCNC: 143 MMOL/L (ref 136–145)
WBC # BLD AUTO: 5.84 THOUSAND/UL (ref 4.31–10.16)

## 2022-06-15 PROCEDURE — 99232 SBSQ HOSP IP/OBS MODERATE 35: CPT | Performed by: INTERNAL MEDICINE

## 2022-06-15 PROCEDURE — 84703 CHORIONIC GONADOTROPIN ASSAY: CPT | Performed by: INTERNAL MEDICINE

## 2022-06-15 PROCEDURE — 80053 COMPREHEN METABOLIC PANEL: CPT | Performed by: NURSE PRACTITIONER

## 2022-06-15 PROCEDURE — 84100 ASSAY OF PHOSPHORUS: CPT | Performed by: NURSE PRACTITIONER

## 2022-06-15 PROCEDURE — 82948 REAGENT STRIP/BLOOD GLUCOSE: CPT

## 2022-06-15 PROCEDURE — 83735 ASSAY OF MAGNESIUM: CPT | Performed by: NURSE PRACTITIONER

## 2022-06-15 PROCEDURE — 85025 COMPLETE CBC W/AUTO DIFF WBC: CPT | Performed by: NURSE PRACTITIONER

## 2022-06-15 RX ORDER — INSULIN LISPRO 100 [IU]/ML
1-5 INJECTION, SOLUTION INTRAVENOUS; SUBCUTANEOUS
Status: DISCONTINUED | OUTPATIENT
Start: 2022-06-15 | End: 2022-06-16 | Stop reason: HOSPADM

## 2022-06-15 RX ORDER — INSULIN LISPRO 100 [IU]/ML
1-5 INJECTION, SOLUTION INTRAVENOUS; SUBCUTANEOUS
Status: DISCONTINUED | OUTPATIENT
Start: 2022-06-16 | End: 2022-06-16 | Stop reason: HOSPADM

## 2022-06-15 RX ORDER — INSULIN GLARGINE 100 [IU]/ML
24 INJECTION, SOLUTION SUBCUTANEOUS
Status: DISCONTINUED | OUTPATIENT
Start: 2022-06-15 | End: 2022-06-16 | Stop reason: HOSPADM

## 2022-06-15 RX ORDER — INSULIN LISPRO 100 [IU]/ML
6 INJECTION, SOLUTION INTRAVENOUS; SUBCUTANEOUS
Status: DISCONTINUED | OUTPATIENT
Start: 2022-06-15 | End: 2022-06-16 | Stop reason: HOSPADM

## 2022-06-15 RX ORDER — INSULIN LISPRO 100 [IU]/ML
1-6 INJECTION, SOLUTION INTRAVENOUS; SUBCUTANEOUS
Status: DISCONTINUED | OUTPATIENT
Start: 2022-06-15 | End: 2022-06-16 | Stop reason: HOSPADM

## 2022-06-15 RX ADMIN — INSULIN LISPRO 6 UNITS: 100 INJECTION, SOLUTION INTRAVENOUS; SUBCUTANEOUS at 15:39

## 2022-06-15 RX ADMIN — INSULIN GLARGINE 24 UNITS: 100 INJECTION, SOLUTION SUBCUTANEOUS at 11:04

## 2022-06-15 RX ADMIN — POTASSIUM PHOSPHATE, MONOBASIC AND POTASSIUM PHOSPHATE, DIBASIC 21 MMOL: 224; 236 INJECTION, SOLUTION, CONCENTRATE INTRAVENOUS at 09:46

## 2022-06-15 RX ADMIN — DIBASIC SODIUM PHOSPHATE, MONOBASIC POTASSIUM PHOSPHATE AND MONOBASIC SODIUM PHOSPHATE 1 TABLET: 852; 155; 130 TABLET ORAL at 12:49

## 2022-06-15 RX ADMIN — DIBASIC SODIUM PHOSPHATE, MONOBASIC POTASSIUM PHOSPHATE AND MONOBASIC SODIUM PHOSPHATE 1 TABLET: 852; 155; 130 TABLET ORAL at 09:51

## 2022-06-15 RX ADMIN — INSULIN LISPRO 2 UNITS: 100 INJECTION, SOLUTION INTRAVENOUS; SUBCUTANEOUS at 21:04

## 2022-06-15 RX ADMIN — INSULIN LISPRO 6 UNITS: 100 INJECTION, SOLUTION INTRAVENOUS; SUBCUTANEOUS at 15:38

## 2022-06-15 RX ADMIN — DIBASIC SODIUM PHOSPHATE, MONOBASIC POTASSIUM PHOSPHATE AND MONOBASIC SODIUM PHOSPHATE 1 TABLET: 852; 155; 130 TABLET ORAL at 21:04

## 2022-06-15 RX ADMIN — DIBASIC SODIUM PHOSPHATE, MONOBASIC POTASSIUM PHOSPHATE AND MONOBASIC SODIUM PHOSPHATE 1 TABLET: 852; 155; 130 TABLET ORAL at 17:28

## 2022-06-15 NOTE — PROGRESS NOTES
[facility] Internal Medicine Progress Note  Patient: Herrera Bailey 29 y o  female   MRN: [id number]  PCP: Cierra Landa DO  Unit/Bed#: [address] [place] [address] Encounter: [id number]  Date Of Visit: 06/15/22    Problem List:    Principal Problem:    DKA (diabetic ketoacidosis) ([facility] )  Active Problems:    Noncompliance with diabetes treatment    Type 1 diabetes mellitus ([facility] )    Dyslipidemia    Leukocytosis    Electrolyte abnormality      Assessment & Plan:    * DKA (diabetic ketoacidosis) ([facility] )  Assessment & Plan  Lab Results   Component Value Date    HGBA1C 12 6 (H) 06/13/2022       Recent Labs     06/15/22  1535 06/15/22  1611 06/15/22  1730 06/15/22  2002   POCGLU 453* 435* 209* 238*       Blood Sugar Average: Last 72 hrs:  (P) 197 [id number]     Presented to ED due to not feeling well, nausea 1 episode of vomiting  Reported that she was not using her insulin 3 days prior to admission while visiting her sister  Denied any fever, chills, any focal signs and symptoms of infection  Presentation noted to have DKA as evidenced by pH of 7 0, bicarb 10, elevated beta hydroxybutyrate and anion gap of 30  Initially admitted to ICU on insulin drip  Lab reviewed, anion gap resolved  Bicarb improved  Hyperglycemia is better  Tolerating diet, Remains on insulin drip  · Will transition to insulin basal bolus regimen as below  · Monitor blood glucose trend  · Follow-up labs    Type 1 diabetes mellitus [facility])  Assessment & Plan  Lab Results   Component Value Date    HGBA1C 12 6 (H) 06/13/2022       Recent Labs     06/15/22  1535 06/15/22  1611 06/15/22  1730 06/15/22  2002   POCGLU 453* 435* 209* 238*       Blood Sugar Average: Last 72 hrs:  (P) 197 [id number]     Poor control  Likely related to noncompliance as patient reports forgetting insulin  Patient does report that she does have insulin, glucometer with supplies at home  Follows up with [facility]-on Basaglar 18 units q a m   With Admelog 6 units t i d  As per prior notes  Patient reports that she was supposed to be on Basaglar 36 units with Admelog 6 units t i d   · Will transition insulin drip to Lantus 24 units q a m  And lispro 6 units t i d  With meals  · Continue corrective scale insulin  · Monitor blood glucose trend    Noncompliance with diabetes treatment  Assessment & Plan  Advised compliance,    Leukocytosis  Assessment & Plan  Likely hemoconcentration/reactive-improved with hydration and diabetic control      Electrolyte abnormality  Assessment & Plan  Hypokalemia, hypophosphatemia-repleted monitor          VTE Pharmacologic Prophylaxis: VTE Score: 1 Moderate Risk (Score 3-4) - Pharmacological DVT Prophylaxis Ordered: enoxaparin (Lovenox)  Patient Centered Rounds: I performed bedside rounds with nursing staff today  Discussions with Specialists or Other Care Team Provider:  yes    Education and Discussions with Family / Patient: Patient declined call to   Time Spent for Care: 45 minutes  More than 50% of total time spent on counseling and coordination of care as described above  Current Length of Stay: 2 day(s)  Current Patient Status: Inpatient   Certification Statement: The patient will continue to require additional inpatient hospital stay due to Management of uncontrolled diabetes  Discharge Plan: Anticipate discharge in 24-48 hrs to home      Code Status: Level 1 - Full Code    Subjective:   Feels well  Tolerating diet  Denies any fever chills, denies any nausea vomiting abdominal pain  Denies any chest pain or shortness of breath    Reported that she did not use her insulin as she was at her sister's home and ended up staying there longer than she expected    Discussed at length regarding need for better diabetic control and compliance  Discuss regarding with short-term and long-term diabetic related complications with poor diabetic control      Objective:     Vitals:   Temp (24hrs), Av 9 °F (36 6 °C), Min:97 8 °F (36 6 °C), Max:98 °F (36 7 °C)    Temp:  [97 8 °F (36 6 °C)-98 °F (36 7 °C)] 98 °F (36 7 °C)  HR:  [] 72  Resp:  [18] 18  BP: (100-106)/(65-68) 100/68  SpO2:  [97 %-100 %] 100 % on room air  Body mass index is 29 63 kg/m²  Input and Output Summary (last 24 hours):   No intake or output data in the 24 hours ending 06/15/22 1040    Physical Exam:   Physical Exam  Constitutional:       General: She is not in acute distress  HENT:      Head: Normocephalic and atraumatic  Cardiovascular:      Rate and Rhythm: Normal rate  Pulmonary:      Effort: Pulmonary effort is normal  No respiratory distress  Breath sounds: Normal breath sounds  No wheezing or rales  Abdominal:      General: Bowel sounds are normal  There is no distension  Palpations: Abdomen is soft  Tenderness: There is no abdominal tenderness  There is no guarding or rebound  Musculoskeletal:      Right lower leg: No edema  Left lower leg: No edema  Skin:     General: Skin is warm and dry  Findings: No rash  Neurological:      General: No focal deficit present  Mental Status: She is alert and oriented to person, place, and time  Mental status is at baseline  Additional Data:     Labs:  Results from last 7 days   Lab Units 06/15/22  0602 06/14/22  0215 06/13/22  1332   WBC Thousand/uL 5 84   < > 12 89*   HEMOGLOBIN g/dL 11 9   < > 15 8*   HEMATOCRIT % 34 4*   < > 48 2*   PLATELETS Thousands/uL 194   < > 343   BANDS PCT %  --   --  6   NEUTROS PCT % 52   < >  --    LYMPHS PCT % 41   < >  --    LYMPHO PCT %  --   --  7*   MONOS PCT % 6   < >  --    MONO PCT %  --   --  0*   EOS PCT % 1   < > 0    < > = values in this interval not displayed       Results from last 7 days   Lab Units 06/15/22  0602   SODIUM mmol/L 143   POTASSIUM mmol/L 3 4*   CHLORIDE mmol/L 109*   CO2 mmol/L 24   BUN mg/dL 6   CREATININE mg/dL 0 43*   ANION GAP mmol/L 10   CALCIUM mg/dL 7 2*   ALBUMIN g/dL 2 5*   TOTAL BILIRUBIN mg/dL 0  32   ALK PHOS U/L 90   ALT U/L 27   AST U/L 44   GLUCOSE RANDOM mg/dL 119         Results from last 7 days   Lab Units 06/15/22  1009 06/15/22  0816 06/15/22  0603 06/15/22  0408 06/15/22  0200 06/15/22  0005 06/14/22  2206 06/14/22  2004 06/14/22  1804 06/14/22  1555 06/14/22  1400 06/14/22  1145   POC GLUCOSE mg/dl 196* 240* 117 118 106 262* 73 276* 197* 139 428* 155*     Results from last 7 days   Lab Units 06/13/22  1332   HEMOGLOBIN A1C % 12 6*     Results from last 7 days   Lab Units 06/13/22  1334   LACTIC ACID mmol/L 1 9       Lines/Drains:  Invasive Devices  Report    Peripheral Intravenous Line  Duration           Long-Dwell Peripheral IV (Midline) 15/98/54 Right Basilic 1 day    Peripheral IV 06/13/22 Left;Upper Arm 1 day                      Imaging: No pertinent imaging reviewed      Recent Cultures (last 7 days):         Last 24 Hours Medication List:   Current Facility-Administered Medications   Medication Dose Route Frequency Provider Last Rate    acetaminophen  650 mg Oral Q6H PRN Stacy Marquez MD      enoxaparin  40 mg Subcutaneous Daily Stacy Marquez MD      insulin glargine  24 Units Subcutaneous Daily With Breakfast Carmita Garcia MD      insulin lispro  1-5 Units Subcutaneous HS MD Sunli Arroyo [START ON 6/16/2022] insulin lispro  1-5 Units Subcutaneous 0200 Carmita Garcia MD      insulin lispro  1-6 Units Subcutaneous TID AC Carmita Garcia MD      insulin lispro  6 Units Subcutaneous TID With Meals Cramita Garcia MD      insulin regular (HumuLIN R,NovoLIN R) infusion  0 3-21 Units/hr Intravenous Titrated Carmita Garcia MD 2 Units/hr (06/15/22 1011)    ondansetron  4 mg Intravenous Q8H PRN Stacy Marquez MD      potassium phosphate  21 mmol Intravenous Once Carmita Garcia MD 21 mmol (06/15/22 0946)    potassium-sodium phosphateS  1 tablet Oral 4x Daily (with meals and at bedtime) Carmita Garcia MD          Today, Patient Was Seen By: Aaron Negrete Jude Bone MD    ** Please Note: "This note has been constructed using a voice recognition system  Therefore there may be syntax, spelling, and/or grammatical errors   Please call if you have any questions  "**

## 2022-06-15 NOTE — CASE MANAGEMENT
Case Management Assessment & Discharge Planning Note    Patient name Elva Modi  Location [address] [facility] [address] [id number]  : 1990 Date 6/15/2022       Current Admission Date: 2022  Current Admission Diagnosis:DKA (diabetic ketoacidosis) ([place] [address] )   Patient Active Problem List    Diagnosis Date Noted    Dyslipidemia 2022    Leukocytosis 2022    Type 1 diabetes mellitus ([place] [address] ) 2022    Noncompliance with diabetes treatment 2021    S/P repeat low transverse  2021    Polyhydramnios affecting pregnancy in third trimester 2021    Type 1 diabetes mellitus during pregnancy in third trimester 2021    Uterine synechiae 2021    DKA (diabetic ketoacidosis) ([place] [address] )     Metabolic acidosis with increased anion gap and accumulation of organic acids 2021    Pre-existing type 1 diabetes mellitus with hyperglycemia during pregnancy in third trimester ([place] [address] ) 2020    Hyperlipidemia, mixed 2020    Chronic hypertension affecting pregnancy 2020      LOS (days): 2  Geometric Mean LOS (GMLOS) (days):   Days to GMLOS:     OBJECTIVE:    Risk of Unplanned Readmission Score: 16 7         Current admission status: Inpatient       Preferred Pharmacy:   [facility] [address] [facility] [phone] [facility]  [address] [facility]  [address]  Phone: [phone] Fax: [phone] [facility] [address] ,  [facility] [address] ,  [address] [place] [address]  Phone: [phone] Fax: [phone]    Primary Care Provider: Liban Baxter DO    Primary Insurance: [place] [employer]  Secondary Insurance:     ASSESSMENT:  [place] Proxies    There are no active Health Care Proxies on file                        Patient Information  Admitted from[de-identified] Home  Mental Status: Alert  Assessment information provided by[de-identified] Patient  Primary Caregiver: Self  Support Systems: Parent, Family members  [place] of Residence: [address] [facility] do you live in?: [address] [facility] entry access options  Select all that apply : Stairs  Number of steps to enter home : One Flight (3 flights)  Do the steps have railings?: Yes  Type of Current Residence: Apartment  Floor Level: 3  Upon entering residence, is there a bedroom on the main floor (no further steps)?: Yes  Upon entering residence, is there a bathroom on the main floor (no further steps)?: Yes  Living Arrangements: Lives Alone    Activities of Daily Living Prior to Admission  Functional Status: Independent  Completes ADLs independently?: Yes  Ambulates independently?: Yes  Does patient use assisted devices?: No  Does patient currently own DME?: No  Does patient have a history of Outpatient Therapy (PT/OT)?: No  Does the patient have a history of Short-Term Rehab?: No  Does patient have a history of HHC?: No  Does patient currently have [facility]?: No         Patient Information Continued  Income Source: Unemployed  Does patient have prescription coverage?: Yes  Does patient receive dialysis treatments?: No  Does patient have a history of substance abuse?: No  Does patient have a history of Mental Health Diagnosis?: No         Means of Transportation  Means of Transport to Appts[de-identified] Drives Self        DISCHARGE DETAILS:    Discharge planning discussed with[de-identified] patient     Comments - Freedom of Choice: pt understands ability to be involved with care in the hospital and arranging for services outpt  Other Referral/Resources/Interventions Provided:  Referral Comments: Per rounds, pt has hx of noncompliance with insulin  When CM spoke with pt to address any barriers pt did not state any issues and was not very talkative  She stated her insurance covers her meds and she drives so no transportation barriers  Patient is from [place] and was visiting her sister who lives her which is how pt ended up at [facility]   Pt does not appear to have any dc needs but will remain available if any discharge needs arise

## 2022-06-15 NOTE — PLAN OF CARE
Problem: MOBILITY - ADULT  Goal: Maintain or return to baseline ADL function  Description: INTERVENTIONS:  -  Assess patient's ability to carry out ADLs; assess patient's baseline for ADL function and identify physical deficits which impact ability to perform ADLs (bathing, care of mouth/teeth, toileting, grooming, dressing, etc )  - Assess/evaluate cause of self-care deficits   - Assess range of motion  - Assess patient's mobility; develop plan if impaired  - Assess patient's need for assistive devices and provide as appropriate  - Encourage maximum independence but intervene and supervise when necessary  - Involve family in performance of ADLs  - Assess for home care needs following discharge   - Consider OT consult to assist with ADL evaluation and planning for discharge  - Provide patient education as appropriate  Outcome: Progressing  Goal: Maintains/Returns to pre admission functional level  Description: INTERVENTIONS:  - Perform BMAT or MOVE assessment daily    - Set and communicate daily mobility goal to care team and patient/family/caregiver  - Collaborate with rehabilitation services on mobility goals if consulted  - Perform Range of Motion 4 times a day  - Reposition patient every 2 hours  - Dangle patient 3 times a day  - Stand patient 3 times a day  - Ambulate patient 3 times a day  - Out of bed to chair 3 times a day   - Out of bed for meals 3 times a day  - Out of bed for toileting  - Record patient progress and toleration of activity level   Outcome: Progressing     Problem: Knowledge Deficit  Goal: Patient/family/caregiver demonstrates understanding of disease process, treatment plan, medications, and discharge instructions  Description: Complete learning assessment and assess knowledge base    Interventions:  - Provide teaching at level of understanding  - Provide teaching via preferred learning methods  Outcome: Progressing     Problem: GASTROINTESTINAL - ADULT  Goal: Minimal or absence of nausea and/or vomiting  Description: INTERVENTIONS:  - Administer IV fluids if ordered to ensure adequate hydration  - Maintain NPO status until nausea and vomiting are resolved  - Nasogastric tube if ordered  - Administer ordered antiemetic medications as needed  - Provide nonpharmacologic comfort measures as appropriate  - Advance diet as tolerated, if ordered  - Consider nutrition services referral to assist patient with adequate nutrition and appropriate food choices  Outcome: Progressing     Problem: METABOLIC, FLUID AND ELECTROLYTES - ADULT  Goal: Electrolytes maintained within normal limits  Description: INTERVENTIONS:  - Monitor labs and assess patient for signs and symptoms of electrolyte imbalances  - Administer electrolyte replacement as ordered  - Monitor response to electrolyte replacements, including repeat lab results as appropriate  - Instruct patient on fluid and nutrition as appropriate  Outcome: Progressing  Goal: Glucose maintained within target range  Description: INTERVENTIONS:  - Monitor Blood Glucose as ordered  - Assess for signs and symptoms of hyperglycemia and hypoglycemia  - Administer ordered medications to maintain glucose within target range  - Assess nutritional intake and initiate nutrition service referral as needed  Outcome: Progressing

## 2022-06-16 VITALS
RESPIRATION RATE: 19 BRPM | TEMPERATURE: 98 F | HEART RATE: 89 BPM | BODY MASS INDEX: 29.76 KG/M2 | DIASTOLIC BLOOD PRESSURE: 69 MMHG | OXYGEN SATURATION: 97 % | HEIGHT: 58 IN | SYSTOLIC BLOOD PRESSURE: 111 MMHG | WEIGHT: 141.76 LBS

## 2022-06-16 PROBLEM — E11.10 DKA (DIABETIC KETOACIDOSIS) (HCC): Status: RESOLVED | Noted: 2021-01-27 | Resolved: 2022-06-16

## 2022-06-16 LAB
ANION GAP SERPL CALCULATED.3IONS-SCNC: 5 MMOL/L (ref 4–13)
BUN SERPL-MCNC: 10 MG/DL (ref 5–25)
CALCIUM SERPL-MCNC: 7.6 MG/DL (ref 8.3–10.1)
CHLORIDE SERPL-SCNC: 110 MMOL/L (ref 100–108)
CO2 SERPL-SCNC: 27 MMOL/L (ref 21–32)
CREAT SERPL-MCNC: 0.45 MG/DL (ref 0.6–1.3)
ERYTHROCYTE [DISTWIDTH] IN BLOOD BY AUTOMATED COUNT: 13.4 % (ref 11.6–15.1)
GFR SERPL CREATININE-BSD FRML MDRD: 134 ML/MIN/1.73SQ M
GLUCOSE SERPL-MCNC: 106 MG/DL (ref 65–140)
GLUCOSE SERPL-MCNC: 137 MG/DL (ref 65–140)
GLUCOSE SERPL-MCNC: 202 MG/DL (ref 65–140)
GLUCOSE SERPL-MCNC: 93 MG/DL (ref 65–140)
HCT VFR BLD AUTO: 35.7 % (ref 34.8–46.1)
HGB BLD-MCNC: 12.1 G/DL (ref 11.5–15.4)
MAGNESIUM SERPL-MCNC: 2.1 MG/DL (ref 1.6–2.6)
MCH RBC QN AUTO: 30.5 PG (ref 26.8–34.3)
MCHC RBC AUTO-ENTMCNC: 33.9 G/DL (ref 31.4–37.4)
MCV RBC AUTO: 90 FL (ref 82–98)
PHOSPHATE SERPL-MCNC: 3 MG/DL (ref 2.7–4.5)
PLATELET # BLD AUTO: 180 THOUSANDS/UL (ref 149–390)
PMV BLD AUTO: 9 FL (ref 8.9–12.7)
POTASSIUM SERPL-SCNC: 3.8 MMOL/L (ref 3.5–5.3)
RBC # BLD AUTO: 3.97 MILLION/UL (ref 3.81–5.12)
SODIUM SERPL-SCNC: 142 MMOL/L (ref 136–145)
WBC # BLD AUTO: 4.68 THOUSAND/UL (ref 4.31–10.16)

## 2022-06-16 PROCEDURE — 84100 ASSAY OF PHOSPHORUS: CPT | Performed by: INTERNAL MEDICINE

## 2022-06-16 PROCEDURE — 82948 REAGENT STRIP/BLOOD GLUCOSE: CPT

## 2022-06-16 PROCEDURE — 80048 BASIC METABOLIC PNL TOTAL CA: CPT | Performed by: INTERNAL MEDICINE

## 2022-06-16 PROCEDURE — 99239 HOSP IP/OBS DSCHRG MGMT >30: CPT | Performed by: INTERNAL MEDICINE

## 2022-06-16 PROCEDURE — 85027 COMPLETE CBC AUTOMATED: CPT | Performed by: INTERNAL MEDICINE

## 2022-06-16 PROCEDURE — 83735 ASSAY OF MAGNESIUM: CPT | Performed by: INTERNAL MEDICINE

## 2022-06-16 RX ORDER — INSULIN GLARGINE 100 [IU]/ML
24 INJECTION, SOLUTION SUBCUTANEOUS DAILY
Qty: 15 ML | Refills: 0 | Status: SHIPPED | OUTPATIENT
Start: 2022-06-16 | End: 2022-06-16

## 2022-06-16 RX ORDER — PEN NEEDLE, DIABETIC 32GX 5/32"
NEEDLE, DISPOSABLE MISCELLANEOUS
Qty: 100 EACH | Refills: 0 | Status: SHIPPED | OUTPATIENT
Start: 2022-06-16

## 2022-06-16 RX ORDER — INSULIN GLARGINE 100 [IU]/ML
24 INJECTION, SOLUTION SUBCUTANEOUS DAILY
Qty: 15 ML | Refills: 0 | Status: SHIPPED | OUTPATIENT
Start: 2022-06-16

## 2022-06-16 RX ADMIN — INSULIN GLARGINE 24 UNITS: 100 INJECTION, SOLUTION SUBCUTANEOUS at 08:24

## 2022-06-16 RX ADMIN — DIBASIC SODIUM PHOSPHATE, MONOBASIC POTASSIUM PHOSPHATE AND MONOBASIC SODIUM PHOSPHATE 1 TABLET: 852; 155; 130 TABLET ORAL at 12:06

## 2022-06-16 RX ADMIN — INSULIN LISPRO 6 UNITS: 100 INJECTION, SOLUTION INTRAVENOUS; SUBCUTANEOUS at 12:05

## 2022-06-16 RX ADMIN — DIBASIC SODIUM PHOSPHATE, MONOBASIC POTASSIUM PHOSPHATE AND MONOBASIC SODIUM PHOSPHATE 1 TABLET: 852; 155; 130 TABLET ORAL at 08:25

## 2022-06-16 RX ADMIN — INSULIN LISPRO 6 UNITS: 100 INJECTION, SOLUTION INTRAVENOUS; SUBCUTANEOUS at 08:25

## 2022-06-16 RX ADMIN — INSULIN LISPRO 2 UNITS: 100 INJECTION, SOLUTION INTRAVENOUS; SUBCUTANEOUS at 12:04

## 2022-06-16 NOTE — PLAN OF CARE
Problem: MOBILITY - ADULT  Goal: Maintain or return to baseline ADL function  Description: INTERVENTIONS:  -  Assess patient's ability to carry out ADLs; assess patient's baseline for ADL function and identify physical deficits which impact ability to perform ADLs (bathing, care of mouth/teeth, toileting, grooming, dressing, etc )  - Assess/evaluate cause of self-care deficits   - Assess range of motion  - Assess patient's mobility; develop plan if impaired  - Assess patient's need for assistive devices and provide as appropriate  - Encourage maximum independence but intervene and supervise when necessary  - Involve family in performance of ADLs  - Assess for home care needs following discharge   - Consider OT consult to assist with ADL evaluation and planning for discharge  - Provide patient education as appropriate  Outcome: Progressing  Goal: Maintains/Returns to pre admission functional level  Description: INTERVENTIONS:  - Perform BMAT or MOVE assessment daily    - Set and communicate daily mobility goal to care team and patient/family/caregiver  - Collaborate with rehabilitation services on mobility goals if consulted  - Perform Range of Motion 4 times a day  - Reposition patient every 2 hours    - Dangle patient 3 times a day  - Stand patient 3 times a day  - Ambulate patient 3 times a day  - Out of bed to chair 3 times a day   - Out of bed for meals 3 times a day  - Out of bed for toileting  - Record patient progress and toleration of activity level   Outcome: Progressing     Problem: SAFETY ADULT  Goal: Maintain or return to baseline ADL function  Description: INTERVENTIONS:  -  Assess patient's ability to carry out ADLs; assess patient's baseline for ADL function and identify physical deficits which impact ability to perform ADLs (bathing, care of mouth/teeth, toileting, grooming, dressing, etc )  - Assess/evaluate cause of self-care deficits   - Assess range of motion  - Assess patient's mobility; develop plan if impaired  - Assess patient's need for assistive devices and provide as appropriate  - Encourage maximum independence but intervene and supervise when necessary  - Involve family in performance of ADLs  - Assess for home care needs following discharge   - Consider OT consult to assist with ADL evaluation and planning for discharge  - Provide patient education as appropriate  Outcome: Progressing  Goal: Maintains/Returns to pre admission functional level  Description: INTERVENTIONS:  - Perform BMAT or MOVE assessment daily    - Set and communicate daily mobility goal to care team and patient/family/caregiver  - Collaborate with rehabilitation services on mobility goals if consulted  - Perform Range of Motion 4 times a day  - Reposition patient every 2 hours  - Dangle patient 3 times a day  - Stand patient 3 times a day  - Ambulate patient 3 times a day  - Out of bed to chair 3 times a day   - Out of bed for meals 3 times a day  - Out of bed for toileting  - Record patient progress and toleration of activity level   Outcome: Progressing     Problem: Knowledge Deficit  Goal: Patient/family/caregiver demonstrates understanding of disease process, treatment plan, medications, and discharge instructions  Description: Complete learning assessment and assess knowledge base    Interventions:  - Provide teaching at level of understanding  - Provide teaching via preferred learning methods  Outcome: Progressing

## 2022-06-16 NOTE — ASSESSMENT & PLAN NOTE
Lab Results   Component Value Date    HGBA1C 12 6 (H) 06/13/2022       Recent Labs     06/15/22  2002 06/16/22  0158 06/16/22  0700 06/16/22  1104   POCGLU 238* 106 137 202*       Blood Sugar Average: Last 72 hrs:  (P) 260 [id number]     Poor control  Likely related to noncompliance as patient reports forgetting insulin  Patient does report that she does have insulin, glucometer with supplies at home but does not monitor blood sugar at home regularly  Follows up with [facility] 18 units q a m  With Admelog 6 units t i d  As per prior notes  Patient reports that she was supposed to be on Basaglar 36 units with Admelog 6 units t i d   · Transitioned insulin drip to Lantus 24 units q a m  And lispro 6 units t i d  With meals  · Blood glucose is fairly well controlled  · Patient was recommended compliance, advised not to miss any insulin doses especially long-acting and take a short-acting with meals without missing  · Recommended  to monitor blood glucose regularly at home  · Patient reported that she does have insulin and supplies at home  Lantus prescription was provided as she had only 1 pen left  · Patient was seen by case management as she reported that her insurance has changed  Follow-up appointment was provided

## 2022-06-16 NOTE — ASSESSMENT & PLAN NOTE
Lab Results   Component Value Date    HGBA1C 12 6 (H) 06/13/2022       Recent Labs     06/15/22  2002 06/16/22  0158 06/16/22  0700 06/16/22  1104   POCGLU 238* 106 137 202*       Blood Sugar Average: Last 72 hrs:  (P) 206 [id number]     Presented to ED due to not feeling well, nausea 1 episode of vomiting  Reported that she was not using her insulin 3 days prior to admission while visiting her sister  Denied any fever, chills, any focal signs and symptoms of infection  Presentation noted to have DKA as evidenced by pH of 7 0, blood glucose 723, bicarb 10, elevated beta hydroxybutyrate and anion gap of 30  Initially admitted to ICU on insulin drip  Lab reviewed, anion gap resolved  Bicarb improved  Hyperglycemia is better    Tolerating diet, Remains on insulin drip  · Transitioned to insulin basal bolus regimen as below  · Blood glucose trend has improved  · Continue current regimen  · Emphasized compliance

## 2022-06-16 NOTE — CASE MANAGEMENT
Case Management Discharge Planning Note    Patient name Rufus Singh  Location [address] [address]/4 [facility] [id number]  : 1990 Date 2022       Current Admission Date: 2022  Current Admission Diagnosis:DKA (diabetic ketoacidosis) ([facility] )   Patient Active Problem List    Diagnosis Date Noted    Electrolyte abnormality 06/15/2022    Dyslipidemia 2022    Leukocytosis 2022    Type 1 diabetes mellitus ([place] [address] ) 2022    Noncompliance with diabetes treatment 2021    S/P repeat low transverse  2021    Polyhydramnios affecting pregnancy in third trimester 2021    Type 1 diabetes mellitus during pregnancy in third trimester 2021    Uterine synechiae 2021    DKA (diabetic ketoacidosis) ([place] [address] ) 2021    Pre-existing type 1 diabetes mellitus with hyperglycemia during pregnancy in third trimester ([place] [address] ) 2020    Hyperlipidemia, mixed 2020    Chronic hypertension affecting pregnancy 2020      LOS (days): 3  Geometric Mean LOS (GMLOS) (days):   Days to GMLOS:     OBJECTIVE:  Risk of Unplanned Readmission Score: 17 26       Current admission status: Inpatient   Preferred Pharmacy:   [address] [place] [address] [facility] [address] [address]  Phone: [phone] Fax: [phone] [facility] [address] [place] [address]  Phone: [phone] Fax: [phone]    Primary Care Provider: Lorrie Hernandes DO    Primary Insurance: [place] [employer]  Secondary Insurance:     DISCHARGE DETAILS:    Discharge planning discussed with[de-identified] Patient      Were Treatment Team discharge recommendations reviewed with patient/caregiver?: Yes  Did patient/caregiver verbalize understanding of patient care needs?: Yes  Were patient/caregiver advised of the risks associated with not following Treatment Team discharge recommendations?: Yes     Other Referral/Resources/Interventions Provided:  Interventions: Prescription Price Check, PCP    Referral Comments: Patient's insurance will change from [employer] [place] to [employer] on July 1  Patient had expressed concern to attending that she will need new doctors because her current ones will not take her new insurance  MUNIR printed a list of PCPs from the [facility] and encouraged patient to choose one near her home in [facility] and to make an appointment as soon as possible  MUNIR also looked for an endocrinologist in the plan and made an appointment for patient on July 28 at 9:00 AM in the [address] [place] [address] endocrinology office  Julianna Zavala also has a [facility] office but it is scheduling out into September  Appointment was entered on patient's AVS   MUNIR also called [facility] in [place] to check on the costs of patient's prescriptions  Her Lantus will be $3 00  The Dexcom ordered needs prior authorization and per attending, patient's current [facility] endocrinologist is working on that  SW encouraged patient to call the office to follow up       Treatment Team Recommendation: Home  Discharge Destination Plan[de-identified] Home  Transport at Discharge : Family

## 2022-06-16 NOTE — NURSING NOTE
The patient discharged home  AVS and all the discharge instructions regarding insulin teaching, the side effects, blood sugar check, the diet, and  the follow up appointments provided to the patient  MD also provided an education  The  provided an information regarding insulin cost as well  The patient verbalized well understandings  Tatiana Irvin

## 2022-06-16 NOTE — DISCHARGE SUMMARY
Discharge Summary - [facility] Internal Medicine    Patient Information: Elva Modi 29 y o  female MRN: [id number]  Unit/Bed#: [address] [place] [address] Encounter: [id number]    Discharging Physician / Practitioner: Audie Owen MD  PCP: Liban Baxter DO  Admission Date: 6/13/2022  Discharge Date: 06/16/22    Reason for Admission: Hyperglycemia - Symptomatic (States she took her sugar 30 minutes ago and it was over 600 and she vomited and feels sob)      Discharge Diagnoses:     Principal Problem (Resolved):    DKA (diabetic ketoacidosis) ([facility] )  Active Problems:    Noncompliance with diabetes treatment    Type 1 diabetes mellitus ([place] [address] )    Dyslipidemia    Leukocytosis    Electrolyte abnormality  Resolved Problems:    Metabolic acidosis with increased anion gap and accumulation of organic acids    Diabetic ketoacidosis associated with type 1 diabetes mellitus ([place] [address] )        * DKA (diabetic ketoacidosis) ([facility])-resolved as of 6/16/2022  Assessment & Plan  Lab Results   Component Value Date    HGBA1C 12 6 (H) 06/13/2022       Recent Labs     06/15/22  2002 06/16/22  0158 06/16/22  0700 06/16/22  1104   POCGLU 238* 106 137 202*       Blood Sugar Average: Last 72 hrs:  (P) 211 [id number]     Presented to ED due to not feeling well, nausea 1 episode of vomiting  Reported that she was not using her insulin 3 days prior to admission while visiting her sister  Denied any fever, chills, any focal signs and symptoms of infection  Presentation noted to have DKA as evidenced by pH of 7 0, blood glucose 723, bicarb 10, elevated beta hydroxybutyrate and anion gap of 30  Initially admitted to ICU on insulin drip  Lab reviewed, anion gap resolved  Bicarb improved  Hyperglycemia is better    Tolerating diet, Remains on insulin drip  · Transitioned to insulin basal bolus regimen as below  · Blood glucose trend has improved  · Continue current regimen  · Emphasized compliance    Type 1 diabetes mellitus ([place] [address] )  Assessment & Plan  Lab Results   Component Value Date    HGBA1C 12 6 (H) 06/13/2022       Recent Labs     06/15/22  2002 06/16/22  0158 06/16/22  0700 06/16/22  1104   POCGLU 238* 106 137 202*       Blood Sugar Average: Last 72 hrs:  (P) 133 [id number]     Poor control  Likely related to noncompliance as patient reports forgetting insulin  Patient does report that she does have insulin, glucometer with supplies at home but does not monitor blood sugar at home regularly  Follows up with [facility] 18 units q a m  With Admelog 6 units t i d  As per prior notes  Patient reports that she was supposed to be on Basaglar 36 units with Admelog 6 units t i d   · Transitioned insulin drip to Lantus 24 units q a m  And lispro 6 units t i d  With meals  · Blood glucose is fairly well controlled  · Patient was recommended compliance, advised not to miss any insulin doses especially long-acting and take a short-acting with meals without missing  · Recommended  to monitor blood glucose regularly at home  · Patient reported that she does have insulin and supplies at home  Lantus prescription was provided as she had only 1 pen left  · Patient was seen by case management as she reported that her insurance has changed  Follow-up appointment was provided      Noncompliance with diabetes treatment  Assessment & Plan  Advised compliance, educated to put reminders and get help from the family to ensure compliance with the insulin and blood glucose monitoring    Leukocytosis  Assessment & Plan  Likely hemoconcentration/reactive-improved with hydration and diabetic control      Electrolyte abnormality  Assessment & Plan  Hypokalemia, hypophosphatemia-repleted monitor      Consultations During Hospital Stay:  IP CONSULT TO CASE MANAGEMENT    Procedures Performed:     · None    Significant Findings:     · Refer to hospital course and above listed diagnosis related plan for details    Imaging while in hospital:    No results found  Incidental Findings:   · None    Test Results Pending at Discharge (will require follow up):   · As per After Visit Summary     Outpatient Tests Requested:  · As per Endocrinology    Complications:  Refer to hospital course and above listed diagnosis related plan, if any    Hospital Course: As per HPI  Mai Cuenca is a 29 y o  female patient with history of diabetes mellitus type 1, poor medical adherence who originally presented to the hospital on 6/13/2022 due to elevated blood sugars, 1 episode of vomiting that occurred around 11:00 AM on the day of admission  Patient is a known case of type 1 diabetes, diagnosed at age 25  Patient reported that she has not been taking her long-acting insulin for about 3 days as she was living with his sister, she has also not been taking her short-acting insulin as she felt that it would not help without taking the long-acting insulin  Patient denied any fever, chills, cough, sputum production, abdominal pain, diarrhea, constipation in the recent past   No exposure to sick contacts  Patient was able to tolerate food 1 day prior to admission, however had an episode of vomiting on the day of admission      Patient checked her blood sugars and found that it was significantly elevated to above 500  Patient is under to come to the emergency department  Of note, patient has had prior episodes of DKA which required hospitalization  She follows with [facility], last visit was in March 22  Her outpatient regimen as per chart review-Basaglar -18 units q a m , Admelog 5 units t i d  A c   Patient is unable to accurately recall her short-acting insulin doses, and is unable to tell her long-acting insulin dose  Last A1c in January 22-12 4%      In the ED-   Initial vitals-temperature-98/heart rate-124/blood pressure-133/100/saturating 96% on room air  Labs revealed a pH of 7 03, pCO2 -29, bicarb on VBG-7 7, bicarb on BMP-10   Elevated beta hydroxybutyrate of 7 4  CBC revealed leukocytosis, elevated hemoglobin and platelets consistent with hemoconcentration due to dehydration  Magnesium was normal at 2 4  Initial troponin-negative  BMP-corrected sodium-144, potassium is elevated mildly at 5 6, bicarb was 10, anion gap is significantly elevated at 30, glucose was elevated at 723, creatinine-1 18, liver enzymes were normal except for an elevated isolated alkaline phosphatase to 166      COVID/influenza/RSV testing was negative, urinalysis-pending      Patient was given 2 L of normal saline in the ED, started on insulin as per DKA protocol  Patient shall be admitted to the ICU  Patient was treated as per DKA protocol with improvement  Patient was subsequently transition to Lantus and short-acting insulin with improvement in blood glucose control  Patient was extensively educated about diabetes management and need for compliance  Patient advised continued monitoring and close follow-up with PCP and endocrinology after discharge  Close follow-up with PCP was recommended and patient was referred to endocrinology  Please see above list of diagnoses and related plan for additional information  Condition at Discharge: stable     Discharge Day Visit / Exam:     Subjective:   Feels well, tolerating diet  Denies any pain  Denies any nausea vomiting    Discussed at length regarding need for compliance with diabetic regimen and treatment  Recommended to put personal reminder and get family to remind to ensure compliance  Patient reported that she has 1 pen left for long-acting insulin and and has 3-4 pens for short-acting insulin at home  She reported that she does have a glucometer and other diabetic supply      Vitals: Blood Pressure: 111/69 (06/16/22 0813)  Pulse: 89 (06/16/22 0813)  Temperature: 98 °F (36 7 °C) (06/15/22 1832)  Temp Source: Temporal (06/14/22 0735)  Respirations: 19 (06/15/22 0800)  Height: 4' 10" (147 3 cm) (06/13/22 1250)  Weight - Scale: 64 3 kg (141 lb 12 1 oz) (06/15/22 0600)  SpO2: 97 % (06/16/22 0813) on room air  Exam:   Physical Exam  Constitutional:       General: She is not in acute distress  HENT:      Head: Normocephalic and atraumatic  Cardiovascular:      Rate and Rhythm: Normal rate  Pulmonary:      Effort: Pulmonary effort is normal  No respiratory distress  Breath sounds: Normal breath sounds  No wheezing or rales  Abdominal:      General: Bowel sounds are normal  There is no distension  Palpations: Abdomen is soft  Tenderness: There is no abdominal tenderness  There is no guarding or rebound  Musculoskeletal:      Right lower leg: No edema  Left lower leg: No edema  Skin:     General: Skin is warm and dry  Findings: No rash  Neurological:      General: No focal deficit present  Mental Status: She is alert and oriented to person, place, and time  Mental status is at baseline  Psychiatric:         Mood and Affect: Mood normal          Discharge instructions/Information to patient and family:(Discharge Medications and Follow up):   See after visit summary for information provided to patient and family  Provisions for Follow-Up Care:  See after visit summary for information related to follow-up care and any pertinent home health orders  Disposition: Home    Planned Readmission:  No     Discharge Statement:  I spent 45 minutes discharging the patient  This time was spent on the day of discharge  I had direct contact with the patient on the day of discharge  Greater than 50% of the total time was spent examining patient, answering all patient questions, arranging and discussing plan of care with patient as well as directly providing post-discharge instructions  Additional time then spent on discharge activities  Discussed with patient's sister regarding discharge and follow-up recommendation  Again emphasized need for compliance and monitoring    Family participation was advised      Discharge Medications:  See after visit summary for reconciled discharge medications provided to patient and family  ** Please Note: "This note has been constructed using a voice recognition system  Therefore there may be syntax, spelling, and/or grammatical errors   Please call if you have any questions  "**

## 2022-06-19 LAB
ATRIAL RATE: 105 BPM
ATRIAL RATE: 106 BPM
P AXIS: 76 DEGREES
P AXIS: 77 DEGREES
PR INTERVAL: 128 MS
PR INTERVAL: 136 MS
QRS AXIS: 57 DEGREES
QRS AXIS: 66 DEGREES
QRSD INTERVAL: 74 MS
QRSD INTERVAL: 74 MS
QT INTERVAL: 312 MS
QT INTERVAL: 328 MS
QTC INTERVAL: 412 MS
QTC INTERVAL: 435 MS
T WAVE AXIS: 19 DEGREES
T WAVE AXIS: 6 DEGREES
VENTRICULAR RATE: 105 BPM
VENTRICULAR RATE: 106 BPM

## 2022-06-19 PROCEDURE — 93010 ELECTROCARDIOGRAM REPORT: CPT | Performed by: INTERNAL MEDICINE

## 2022-06-28 ENCOUNTER — HOSPITAL ENCOUNTER (EMERGENCY)
Facility: HOSPITAL | Age: 32
Discharge: HOME | End: 2022-06-28
Attending: EMERGENCY MEDICINE | Admitting: EMERGENCY MEDICINE
Payer: COMMERCIAL

## 2022-06-28 VITALS
BODY MASS INDEX: 29.63 KG/M2 | DIASTOLIC BLOOD PRESSURE: 59 MMHG | OXYGEN SATURATION: 98 % | TEMPERATURE: 97.9 F | HEART RATE: 77 BPM | RESPIRATION RATE: 16 BRPM | SYSTOLIC BLOOD PRESSURE: 110 MMHG | HEIGHT: 58 IN

## 2022-06-28 DIAGNOSIS — R73.9 HYPERGLYCEMIA: Primary | ICD-10-CM

## 2022-06-28 LAB
ALBUMIN SERPL BCP-MCNC: 4 G/DL (ref 3.5–5)
ALP SERPL-CCNC: 133 U/L (ref 34–104)
ALT SERPL W P-5'-P-CCNC: 17 U/L (ref 7–52)
ANION GAP SERPL CALCULATED.3IONS-SCNC: 7 MMOL/L (ref 4–13)
AST SERPL W P-5'-P-CCNC: 16 U/L (ref 13–39)
ATRIAL RATE: 69 BPM
BACTERIA UR QL AUTO: NORMAL /HPF
BASE EX.OXY STD BLDV CALC-SCNC: 96.2 % (ref 60–80)
BASE EXCESS BLDV CALC-SCNC: 1.2 MMOL/L
BASOPHILS # BLD AUTO: 0.04 THOUSANDS/ΜL (ref 0–0.1)
BASOPHILS NFR BLD AUTO: 0 % (ref 0–1)
BETA-HYDROXYBUTYRATE: 0.2 MMOL/L
BILIRUB SERPL-MCNC: 0.57 MG/DL (ref 0.2–1)
BILIRUB UR QL STRIP: NEGATIVE
BUN SERPL-MCNC: 12 MG/DL (ref 5–25)
CALCIUM SERPL-MCNC: 9.6 MG/DL (ref 8.4–10.2)
CHLORIDE SERPL-SCNC: 95 MMOL/L (ref 96–108)
CLARITY UR: CLEAR
CO2 SERPL-SCNC: 29 MMOL/L (ref 21–32)
COLOR UR: ABNORMAL
CREAT SERPL-MCNC: 0.81 MG/DL (ref 0.6–1.3)
EOSINOPHIL # BLD AUTO: 0.01 THOUSAND/ΜL (ref 0–0.61)
EOSINOPHIL NFR BLD AUTO: 0 % (ref 0–6)
ERYTHROCYTE [DISTWIDTH] IN BLOOD BY AUTOMATED COUNT: 12.5 % (ref 11.6–15.1)
EXT PREG TEST URINE: NEGATIVE
EXT. CONTROL ED NAV: NORMAL
GFR SERPL CREATININE-BSD FRML MDRD: 97 ML/MIN/1.73SQ M
GLUCOSE SERPL-MCNC: 321 MG/DL (ref 65–140)
GLUCOSE SERPL-MCNC: 505 MG/DL (ref 65–140)
GLUCOSE SERPL-MCNC: >500 MG/DL (ref 65–140)
GLUCOSE UR STRIP-MCNC: ABNORMAL MG/DL
HCO3 BLDV-SCNC: 25.5 MMOL/L (ref 24–30)
HCT VFR BLD AUTO: 41.8 % (ref 34.8–46.1)
HGB BLD-MCNC: 14 G/DL (ref 11.5–15.4)
HGB UR QL STRIP.AUTO: NEGATIVE
IMM GRANULOCYTES # BLD AUTO: 0.04 THOUSAND/UL (ref 0–0.2)
IMM GRANULOCYTES NFR BLD AUTO: 0 % (ref 0–2)
KETONES UR STRIP-MCNC: ABNORMAL MG/DL
LEUKOCYTE ESTERASE UR QL STRIP: ABNORMAL
LYMPHOCYTES # BLD AUTO: 1.62 THOUSANDS/ΜL (ref 0.6–4.47)
LYMPHOCYTES NFR BLD AUTO: 18 % (ref 14–44)
MAGNESIUM SERPL-MCNC: 2.1 MG/DL (ref 1.9–2.7)
MCH RBC QN AUTO: 30.2 PG (ref 26.8–34.3)
MCHC RBC AUTO-ENTMCNC: 33.5 G/DL (ref 31.4–37.4)
MCV RBC AUTO: 90 FL (ref 82–98)
MONOCYTES # BLD AUTO: 0.35 THOUSAND/ΜL (ref 0.17–1.22)
MONOCYTES NFR BLD AUTO: 4 % (ref 4–12)
NEUTROPHILS # BLD AUTO: 7.12 THOUSANDS/ΜL (ref 1.85–7.62)
NEUTS SEG NFR BLD AUTO: 78 % (ref 43–75)
NITRITE UR QL STRIP: NEGATIVE
NON-SQ EPI CELLS URNS QL MICRO: NORMAL /HPF
NRBC BLD AUTO-RTO: 0 /100 WBCS
O2 CT BLDV-SCNC: 20.2 ML/DL
P AXIS: 81 DEGREES
PCO2 BLDV: 39.5 MM HG (ref 42–50)
PH BLDV: 7.43 [PH] (ref 7.3–7.4)
PH UR STRIP.AUTO: 7 [PH]
PLATELET # BLD AUTO: 272 THOUSANDS/UL (ref 149–390)
PMV BLD AUTO: 9.2 FL (ref 8.9–12.7)
PO2 BLDV: 132.1 MM HG (ref 35–45)
POTASSIUM SERPL-SCNC: 4.9 MMOL/L (ref 3.5–5.3)
PR INTERVAL: 128 MS
PROT SERPL-MCNC: 6.9 G/DL (ref 6.4–8.4)
PROT UR STRIP-MCNC: NEGATIVE MG/DL
QRS AXIS: 81 DEGREES
QRSD INTERVAL: 66 MS
QT INTERVAL: 380 MS
QTC INTERVAL: 407 MS
RBC # BLD AUTO: 4.63 MILLION/UL (ref 3.81–5.12)
RBC #/AREA URNS AUTO: NORMAL /HPF
SODIUM SERPL-SCNC: 131 MMOL/L (ref 135–147)
SP GR UR STRIP.AUTO: 1.01 (ref 1–1.03)
T WAVE AXIS: 71 DEGREES
UROBILINOGEN UR QL STRIP.AUTO: 0.2 E.U./DL
VENTRICULAR RATE: 69 BPM
WBC # BLD AUTO: 9.18 THOUSAND/UL (ref 4.31–10.16)
WBC #/AREA URNS AUTO: NORMAL /HPF

## 2022-06-28 PROCEDURE — 96360 HYDRATION IV INFUSION INIT: CPT

## 2022-06-28 PROCEDURE — 96372 THER/PROPH/DIAG INJ SC/IM: CPT

## 2022-06-28 PROCEDURE — 81001 URINALYSIS AUTO W/SCOPE: CPT | Performed by: EMERGENCY MEDICINE

## 2022-06-28 PROCEDURE — 99283 EMERGENCY DEPT VISIT LOW MDM: CPT

## 2022-06-28 PROCEDURE — 82010 KETONE BODYS QUAN: CPT | Performed by: EMERGENCY MEDICINE

## 2022-06-28 PROCEDURE — 93005 ELECTROCARDIOGRAM TRACING: CPT

## 2022-06-28 PROCEDURE — 85025 COMPLETE CBC W/AUTO DIFF WBC: CPT | Performed by: EMERGENCY MEDICINE

## 2022-06-28 PROCEDURE — 83735 ASSAY OF MAGNESIUM: CPT | Performed by: EMERGENCY MEDICINE

## 2022-06-28 PROCEDURE — 80053 COMPREHEN METABOLIC PANEL: CPT | Performed by: EMERGENCY MEDICINE

## 2022-06-28 PROCEDURE — 93010 ELECTROCARDIOGRAM REPORT: CPT | Performed by: INTERNAL MEDICINE

## 2022-06-28 PROCEDURE — 81025 URINE PREGNANCY TEST: CPT | Performed by: EMERGENCY MEDICINE

## 2022-06-28 PROCEDURE — 36415 COLL VENOUS BLD VENIPUNCTURE: CPT | Performed by: EMERGENCY MEDICINE

## 2022-06-28 PROCEDURE — 82948 REAGENT STRIP/BLOOD GLUCOSE: CPT

## 2022-06-28 PROCEDURE — 82805 BLOOD GASES W/O2 SATURATION: CPT | Performed by: EMERGENCY MEDICINE

## 2022-06-28 PROCEDURE — 99284 EMERGENCY DEPT VISIT MOD MDM: CPT | Performed by: EMERGENCY MEDICINE

## 2022-06-28 RX ORDER — INSULIN LISPRO 100 [IU]/ML
12 INJECTION, SOLUTION INTRAVENOUS; SUBCUTANEOUS ONCE
Status: COMPLETED | OUTPATIENT
Start: 2022-06-28 | End: 2022-06-28

## 2022-06-28 RX ADMIN — SODIUM CHLORIDE 1000 ML: 0.9 INJECTION, SOLUTION INTRAVENOUS at 13:37

## 2022-06-28 RX ADMIN — INSULIN LISPRO 12 UNITS: 100 INJECTION, SOLUTION INTRAVENOUS; SUBCUTANEOUS at 15:17

## 2022-06-28 NOTE — ED PROVIDER NOTES
History  Chief Complaint   Patient presents with    Hyperglycemia - no symptoms     Pt states blood sugar running 600s starting today, denies symptoms, hx diabetes, thinks she has type 1 but isnt sure  Last dose insulin 24units long acting, 4 units short acting around 180       42-year-old female coming into the ED for evaluation of hyperglycemia  She has a history of type 1 diabetes, on insulin  She states blood sugars have been around 600 today  She took 24 units of her long-acting insulin in 4 units of her short after seeing the blood sugar over 600  She states she has no symptoms, denies dysuria, urinary frequency, polydipsia, chest pain, shortness of breath, abdominal pain, or vomiting or diarrhea  She has had DKA before, and states she felt much sicker those times  History provided by:  Patient   used: No    Hyperglycemia - no symptoms      Prior to Admission Medications   Prescriptions Last Dose Informant Patient Reported? Taking? Accu-Chek FastClix Lancets MISC  Self No No   Sig: Use 6 a day and as directed  Patient not taking: Reported on 2022   Continuous Blood Gluc  (Dexcom G6 ) NERY  Self No No   Sig: Use as directed  Patient not taking: No sig reported   Continuous Blood Gluc Sensor (Dexcom G6 Sensor) MISC  Self No No   Si box=1 month supply or 3 sensors, use 1 sensor every 10 days  Patient not taking: No sig reported   Continuous Blood Gluc Transmit (Dexcom G6 Transmitter) MISC  Self No No   Sig: Transmitter change every 90 days  Patient not taking: No sig reported   Insulin Pen Needle (BD Pen Needle Kerrie 2nd Gen) 32G X 4 MM MISC   No No   Sig: For use with insulin pen  Pharmacy may dispense brand covered by insurance  Insulin Pen Needle (BD Pen Needle Kerrie 2nd Gen) 32G X 4 MM MISC   No No   Sig: For use with insulin pen  Pharmacy may dispense brand covered by insurance     Insulin Pen Needle (BD Pen Needle Kerrie 2nd Gen) 32G X 4 MM MISC No No   Sig: For use with insulin pen  Pharmacy may dispense brand covered by insurance  insulin glargine (Lantus SoloStar) 100 units/mL injection pen   No No   Sig: Inject 24 Units under the skin daily   insulin lispro (HumaLOG KwikPen) 100 units/mL injection pen   No No   Sig: Inject 6 Units under the skin 3 (three) times a day with meals      Facility-Administered Medications: None       Past Medical History:   Diagnosis Date    Diabetes mellitus ([facility] )     uses kwiki pen     Diabetes mellitus type 1 ([facility] )     High blood pressure     recently dx/ put on lisinopril     High cholesterol     Hypertension      no hypersion     Miscarriage     Recurrent pregnancy loss, antepartum condition or complication        Past Surgical History:   Procedure Laterality Date     SECTION  01/02/2014     X1    DILATION AND CURETTAGE OF UTERUS  04/10/2019    Missed AB     TX  DELIVERY ONLY N/A 2021    Procedure:  SECTION () REPEAT;  Surgeon: Kayla Frankel MD;  Location: [facility] ;  Service: Obstetrics    WISDOM TOOTH EXTRACTION         Family History   Problem Relation Age of Onset    No Known Problems Mother     Diabetes Father     No Known Problems Sister     No Known Problems Sister     No Known Problems Brother     No Known Problems Maternal Grandmother     Diabetes Paternal Grandmother     No Known Problems Paternal Grandfather     No Known Problems Daughter      I have reviewed and agree with the history as documented      E-Cigarette/Vaping    E-Cigarette Use Never User      E-Cigarette/Vaping Substances    Nicotine No     THC No     CBD No     Flavoring No     Other No     Unknown No      Social History     Tobacco Use    Smoking status: Never Smoker    Smokeless tobacco: Never Used   Vaping Use    Vaping Use: Never used   Substance Use Topics    Alcohol use: Never     Comment: Alcohol intake:   None - As per Jac [place] Drug use: Never     Comment: Illicit drugs: No  - As per [place]        Review of Systems   All other systems reviewed and are negative  Physical Exam  Physical Exam  Vitals and nursing note reviewed  Constitutional:       General: She is not in acute distress  Appearance: Normal appearance  She is well-developed and normal weight  She is not ill-appearing, toxic-appearing or diaphoretic  HENT:      Head: Normocephalic and atraumatic  Right Ear: External ear normal       Left Ear: External ear normal       Nose: Nose normal       Mouth/Throat:      Mouth: Mucous membranes are moist       Pharynx: Oropharynx is clear  Eyes:      Conjunctiva/sclera: Conjunctivae normal    Cardiovascular:      Rate and Rhythm: Normal rate and regular rhythm  Pulses: Normal pulses  Heart sounds: Normal heart sounds  Pulmonary:      Effort: Pulmonary effort is normal       Breath sounds: Normal breath sounds  Abdominal:      General: Abdomen is flat  Bowel sounds are normal  There is no distension or abdominal bruit  There are no signs of injury  Palpations: Abdomen is soft  There is no shifting dullness  Tenderness: There is no abdominal tenderness  Genitourinary:     Adnexa: Right adnexa normal and left adnexa normal    Musculoskeletal:         General: Normal range of motion  Cervical back: Normal range of motion and neck supple  Skin:     General: Skin is warm and dry  Capillary Refill: Capillary refill takes less than 2 seconds  Neurological:      General: No focal deficit present  Mental Status: She is alert and oriented to person, place, and time  Mental status is at baseline     Psychiatric:         Mood and Affect: Mood normal          Behavior: Behavior normal          Vital Signs  ED Triage Vitals [06/28/22 1249]   Temperature Pulse Respirations Blood Pressure SpO2   97 9 °F (36 6 °C) 98 16 142/61 98 %      Temp Source Heart Rate Source Patient Position - Orthostatic VS BP Location FiO2 (%)   Oral Monitor Sitting Left arm --      Pain Score       No Pain           Vitals:    06/28/22 1249 06/28/22 1530   BP: 142/61 110/59   Pulse: 98 77   Patient Position - Orthostatic VS: Sitting Sitting         Visual Acuity      ED Medications  Medications   sodium chloride 0 9 % bolus 1,000 mL (0 mL Intravenous Stopped 6/28/22 1507)   insulin lispro (HumaLOG) 100 units/mL subcutaneous injection 12 Units (12 Units Subcutaneous Given 6/28/22 1517)       Diagnostic Studies  Results Reviewed     Procedure Component Value Units Date/Time    Fingerstick Glucose (POCT) [[id number]]  (Abnormal) Collected: 06/28/22 1559    Lab Status: Final result Updated: 06/28/22 1600     POC Glucose 321 mg/dl     Urine Microscopic [[id number]]  (Normal) Collected: 06/28/22 1507    Lab Status: Final result Specimen: Urine, Clean Catch Updated: 06/28/22 1532     RBC, UA 0-1 /hpf      WBC, UA 0-1 /hpf      Epithelial Cells Occasional /hpf      Bacteria, UA None Seen /hpf     UA w Reflex to Microscopic w Reflex to Culture [[id number]]  (Abnormal) Collected: 06/28/22 1507    Lab Status: Final result Specimen: Urine, Clean Catch Updated: 06/28/22 1525     Color, UA Light Yellow     Clarity, UA Clear     Specific Gravity, UA 1 010     pH, UA 7 0     Leukocytes, UA Elevated glucose may cause decreased leukocyte values   See urine microscopic for [facility] result/     Nitrite, UA Negative     Protein, UA Negative mg/dl      Glucose, UA >=1000 (1%) mg/dl      Ketones, UA Trace mg/dl      Urobilinogen, UA 0 2 E U /dl      Bilirubin, UA Negative     Occult Blood, UA Negative    POCT pregnancy, urine [[id number]]  (Normal) Resulted: 06/28/22 1513    Lab Status: Final result Updated: 06/28/22 1513     Control valid     EXT PREG TEST UR (Ref: Negative) negative    Comprehensive metabolic panel [[id number]]  (Abnormal) Collected: 06/28/22 1331    Lab Status: Final result Specimen: Blood from Arm, Left Updated: 06/28/22 1437     Sodium 131 mmol/L      Potassium 4 9 mmol/L Chloride 95 mmol/L      CO2 29 mmol/L      ANION GAP 7 mmol/L      BUN 12 mg/dL      Creatinine 0 81 mg/dL      Glucose 505 mg/dL      Calcium 9 6 mg/dL      AST 16 U/L      ALT 17 U/L      Alkaline Phosphatase 133 U/L      Total Protein 6 9 g/dL      Albumin 4 0 g/dL      Total Bilirubin 0 57 mg/dL      eGFR 97 ml/min/1 73sq m     Narrative:      [place] guidelines for Chronic Kidney Disease (CKD):     Stage 1 with normal or high GFR (GFR > 90 mL/min/1 73 square meters)    Stage 2 Mild CKD (GFR = 60-89 mL/min/1 73 square meters)    Stage 3A Moderate CKD (GFR = 45-59 mL/min/1 73 square meters)    Stage 3B Moderate CKD (GFR = 30-44 mL/min/1 73 square meters)    Stage 4 Severe CKD (GFR = 15-29 mL/min/1 73 square meters)    Stage 5 End Stage CKD (GFR <15 mL/min/1 73 square meters)  Note: GFR calculation is accurate only with a steady state creatinine    Magnesium [[id number]]  (Normal) Collected: 06/28/22 1331    Lab Status: Final result Specimen: Blood from Arm, Left Updated: 06/28/22 1422     Magnesium 2 1 mg/dL     Blood gas, venous [[id number]]  (Abnormal) Collected: 06/28/22 1331    Lab Status: Final result Specimen: Blood from Arm, Left Updated: 06/28/22 1406     pH, Alexandre 7 428     pCO2, Alexandre 39 5 mm Hg      pO2, Alexandre 132 1 mm Hg      HCO3, Alexandre 25 5 mmol/L      Base Excess, Alexandre 1 2 mmol/L      O2 Content, Alexandre 20 2 ml/dL      O2 HGB, VENOUS 96 2 %     Beta Hydroxybutyrate [[id number]]  (Normal) Collected: 06/28/22 1331    Lab Status: Final result Specimen: Blood from Arm, Left Updated: 06/28/22 1354     BETA-HYDROXYBUTYRATE 0 2 mmol/L     CBC and differential [[id number]]  (Abnormal) Collected: 06/28/22 1331    Lab Status: Final result Specimen: Blood from Arm, Left Updated: 06/28/22 1344     WBC 9 18 Thousand/uL      RBC 4 63 Million/uL      Hemoglobin 14 0 g/dL      Hematocrit 41 8 %      MCV 90 fL      MCH 30 2 pg      MCHC 33 5 g/dL      RDW 12 5 %      MPV 9 2 fL      Platelets 272 Thousands/uL      nRBC 0 /100 WBCs      Neutrophils Relative 78 %      Immat GRANS % 0 %      Lymphocytes Relative 18 %      Monocytes Relative 4 %      Eosinophils Relative 0 %      Basophils Relative 0 %      Neutrophils Absolute 7 12 Thousands/µL      Immature Grans Absolute 0 04 Thousand/uL      Lymphocytes Absolute 1 62 Thousands/µL      Monocytes Absolute 0 35 Thousand/µL      Eosinophils Absolute 0 01 Thousand/µL      Basophils Absolute 0 04 Thousands/µL     Fingerstick Glucose (POCT) [[id number]]  (Abnormal) Collected: 06/28/22 1306    Lab Status: Final result Updated: 06/28/22 1307     POC Glucose >500 mg/dl                  No orders to display              Procedures  Procedures         ED Course                               SBIRT 22yo+    Flowsheet Row Most Recent Value   SBIRT (25 yo +)    In order to provide better care to our patients, we are screening all of our patients for alcohol and drug use  Would it be okay to ask you these screening questions? Yes Filed at: 06/28/2022 1339   Initial Alcohol Screen: US AUDIT-C     1  How often do you have a drink containing alcohol? 0 Filed at: 06/28/2022 1339   2  How many drinks containing alcohol do you have on a typical day you are drinking? 0 Filed at: 06/28/2022 1339   3a  Male UNDER 65: How often do you have five or more drinks on one occasion? 0 Filed at: 06/28/2022 1339   3b  FEMALE Any Age, or MALE 65+: How often do you have 4 or more drinks on one occassion? 0 Filed at: 06/28/2022 1339   Audit-C Score 0 Filed at: 06/28/2022 1339   CINDY: How many times in the past year have you    Used an illegal drug or used a prescription medication for non-medical reasons? Never Filed at: 06/28/2022 1339                    MDM  Number of Diagnoses or Management Options  Hyperglycemia: new and requires workup  Diagnosis management comments: 28 yo F w/ hyperglycemia in setting of type 1 diabetes  Blood glucose in ED just over 500  Asymptomatic   Not in DKA per labs  Pt very well appearing, normal exam  After d/w her, it seems she's taking her insulin incorrectly, only taking long acting insulin 24 units, and 2 units short acting w/ meals (should be 6 units)  Blood glucose improved to low 300s in the ED after fluids and insulin  Stable for d/c home  Endo f/u  Amount and/or Complexity of Data Reviewed  Clinical lab tests: ordered and reviewed  Decide to obtain previous medical records or to obtain history from someone other than the patient: yes    Risk of Complications, Morbidity, and/or Mortality  Presenting problems: moderate  Diagnostic procedures: moderate  Management options: moderate    Patient Progress  Patient progress: stable      Disposition  Final diagnoses:   Hyperglycemia     Time reflects when diagnosis was documented in both MDM as applicable and the Disposition within this note     Time User Action Codes Description Comment    6/28/2022  4:25 PM Norma Alex Add [R73 9] Hyperglycemia       ED Disposition     ED Disposition   Discharge    Condition   Stable    Date/Time   Tue Jun 28, 2022  4:25 PM    Comment   Catarina Delgadillo discharge to home/self care  Follow-up Information     Follow up With Specialties Details Why Contact Prince Ingram DO General Practice In 3 days  [address] [place] [id number] [phone]            Discharge Medication List as of 6/28/2022  4:25 PM      CONTINUE these medications which have NOT CHANGED    Details   Accu-Chek FastClix Lancets MISC Use 6 a day and as directed , Normal      Continuous Blood Gluc  (Dexcom G6 ) NERY Use as directed , Normal      Continuous Blood Gluc Sensor (Dexcom G6 Sensor) MISC 1 box=1 month supply or 3 sensors, use 1 sensor every 10 days  , Normal      Continuous Blood Gluc Transmit (Dexcom G6 Transmitter) MISC Transmitter change every 90 days  , Normal      insulin glargine (Lantus SoloStar) 100 units/mL injection pen Inject 24 Units under the skin daily, Starting Thu 6/16/2022, Normal      insulin lispro (HumaLOG KwikPen) 100 units/mL injection pen Inject 6 Units under the skin 3 (three) times a day with meals, Starting Thu 3/10/2022, Until Sat 4/9/2022, Normal      !! Insulin Pen Needle (BD Pen Needle Kerrie 2nd Gen) 32G X 4 MM MISC For use with insulin pen  Pharmacy may dispense brand covered by insurance , Normal      !! Insulin Pen Needle (BD Pen Needle Kerrie 2nd Gen) 32G X 4 MM MISC For use with insulin pen  Pharmacy may dispense brand covered by insurance , Normal      !! Insulin Pen Needle (BD Pen Needle Kerrie 2nd Gen) 32G X 4 MM MISC For use with insulin pen  Pharmacy may dispense brand covered by insurance , Normal       !! - Potential duplicate medications found  Please discuss with provider  No discharge procedures on file      PDMP Review       Value Time User    PDMP Reviewed  Yes 12/15/2021  8:22 PM Whitney Byrnes PA-C          ED Provider  Electronically Signed by           Latesha Saldaña DO  06/29/22 1791

## 2022-12-05 ENCOUNTER — APPOINTMENT (EMERGENCY)
Dept: CT IMAGING | Facility: HOSPITAL | Age: 32
End: 2022-12-05

## 2022-12-05 ENCOUNTER — HOSPITAL ENCOUNTER (INPATIENT)
Facility: HOSPITAL | Age: 32
LOS: 1 days | Discharge: HOME | End: 2022-12-07
Attending: EMERGENCY MEDICINE | Admitting: INTERNAL MEDICINE
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
## 2022-12-05 DIAGNOSIS — E11.00 DIABETES MELLITUS WITH NONKETOTIC HYPEROSMOLARITY (HCC): ICD-10-CM

## 2022-12-05 DIAGNOSIS — E10.65 TYPE 1 DIABETES MELLITUS WITH HYPERGLYCEMIA (HCC): ICD-10-CM

## 2022-12-05 DIAGNOSIS — Z59.812 HOUSING INSTABILITY AFTER RECENT HOMELESSNESS: ICD-10-CM

## 2022-12-05 DIAGNOSIS — Z91.199 NONCOMPLIANCE WITH DIABETES TREATMENT: Chronic | ICD-10-CM

## 2022-12-05 LAB
ALBUMIN SERPL BCP-MCNC: 3.9 G/DL (ref 3.5–5)
ALP SERPL-CCNC: 127 U/L (ref 34–104)
ALT SERPL W P-5'-P-CCNC: 11 U/L (ref 7–52)
ANION GAP SERPL CALCULATED.3IONS-SCNC: 7 MMOL/L (ref 4–13)
ANION GAP SERPL CALCULATED.3IONS-SCNC: 8 MMOL/L (ref 4–13)
APTT PPP: 24 SECONDS (ref 23–37)
AST SERPL W P-5'-P-CCNC: 9 U/L (ref 13–39)
BACTERIA UR QL AUTO: NORMAL /HPF
BASE EX.OXY STD BLDV CALC-SCNC: 49.1 % (ref 60–80)
BASE EXCESS BLDV CALC-SCNC: -0.1 MMOL/L
BASOPHILS # BLD AUTO: 0.02 THOUSANDS/ÂΜL (ref 0–0.1)
BASOPHILS NFR BLD AUTO: 0 % (ref 0–1)
BETA-HYDROXYBUTYRATE: 0.2 MMOL/L
BILIRUB SERPL-MCNC: 0.43 MG/DL (ref 0.2–1)
BILIRUB UR QL STRIP: NEGATIVE
BUN SERPL-MCNC: 10 MG/DL (ref 5–25)
BUN SERPL-MCNC: 17 MG/DL (ref 5–25)
CALCIUM SERPL-MCNC: 7.6 MG/DL (ref 8.4–10.2)
CALCIUM SERPL-MCNC: 9.3 MG/DL (ref 8.4–10.2)
CARDIAC TROPONIN I PNL SERPL HS: <2 NG/L
CARDIAC TROPONIN I PNL SERPL HS: <2 NG/L
CHLORIDE SERPL-SCNC: 102 MMOL/L (ref 96–108)
CHLORIDE SERPL-SCNC: 91 MMOL/L (ref 96–108)
CLARITY UR: CLEAR
CO2 SERPL-SCNC: 25 MMOL/L (ref 21–32)
CO2 SERPL-SCNC: 28 MMOL/L (ref 21–32)
COLOR UR: COLORLESS
CREAT SERPL-MCNC: 0.39 MG/DL (ref 0.6–1.3)
CREAT SERPL-MCNC: 0.78 MG/DL (ref 0.6–1.3)
EOSINOPHIL # BLD AUTO: 0.01 THOUSAND/ÂΜL (ref 0–0.61)
EOSINOPHIL NFR BLD AUTO: 0 % (ref 0–6)
ERYTHROCYTE [DISTWIDTH] IN BLOOD BY AUTOMATED COUNT: 12 % (ref 11.6–15.1)
EXT PREGNANCY TEST URINE: NEGATIVE
EXT. CONTROL: NORMAL
GFR SERPL CREATININE-BSD FRML MDRD: 102 ML/MIN/1.73SQ M
GFR SERPL CREATININE-BSD FRML MDRD: 141 ML/MIN/1.73SQ M
GLUCOSE SERPL-MCNC: 135 MG/DL (ref 65–140)
GLUCOSE SERPL-MCNC: 160 MG/DL (ref 65–140)
GLUCOSE SERPL-MCNC: 183 MG/DL (ref 65–140)
GLUCOSE SERPL-MCNC: 205 MG/DL (ref 65–140)
GLUCOSE SERPL-MCNC: 207 MG/DL (ref 65–140)
GLUCOSE SERPL-MCNC: 977 MG/DL (ref 65–140)
GLUCOSE SERPL-MCNC: >500 MG/DL (ref 65–140)
GLUCOSE SERPL-MCNC: >500 MG/DL (ref 65–140)
GLUCOSE UR STRIP-MCNC: ABNORMAL MG/DL
HCO3 BLDV-SCNC: 28.1 MMOL/L (ref 24–30)
HCT VFR BLD AUTO: 44.1 % (ref 34.8–46.1)
HGB BLD-MCNC: 13.9 G/DL (ref 11.5–15.4)
HGB UR QL STRIP.AUTO: NEGATIVE
IMM GRANULOCYTES # BLD AUTO: 0.02 THOUSAND/UL (ref 0–0.2)
IMM GRANULOCYTES NFR BLD AUTO: 0 % (ref 0–2)
INR PPP: 0.84 (ref 0.84–1.19)
KETONES UR STRIP-MCNC: NEGATIVE MG/DL
LACTATE SERPL-SCNC: 1.2 MMOL/L (ref 0.5–2)
LACTATE SERPL-SCNC: 2.1 MMOL/L (ref 0.5–2)
LACTATE SERPL-SCNC: 3.9 MMOL/L (ref 0.5–2)
LEUKOCYTE ESTERASE UR QL STRIP: ABNORMAL
LYMPHOCYTES # BLD AUTO: 0.98 THOUSANDS/ÂΜL (ref 0.6–4.47)
LYMPHOCYTES NFR BLD AUTO: 20 % (ref 14–44)
MCH RBC QN AUTO: 29.9 PG (ref 26.8–34.3)
MCHC RBC AUTO-ENTMCNC: 31.5 G/DL (ref 31.4–37.4)
MCV RBC AUTO: 95 FL (ref 82–98)
MONOCYTES # BLD AUTO: 0.25 THOUSAND/ÂΜL (ref 0.17–1.22)
MONOCYTES NFR BLD AUTO: 5 % (ref 4–12)
NEUTROPHILS # BLD AUTO: 3.67 THOUSANDS/ÂΜL (ref 1.85–7.62)
NEUTS SEG NFR BLD AUTO: 75 % (ref 43–75)
NITRITE UR QL STRIP: NEGATIVE
NON-SQ EPI CELLS URNS QL MICRO: NORMAL /HPF
NRBC BLD AUTO-RTO: 0 /100 WBCS
O2 CT BLDV-SCNC: 10.5 ML/DL
PCO2 BLDV: 60.1 MM HG (ref 42–50)
PH BLDV: 7.29 [PH] (ref 7.3–7.4)
PH UR STRIP.AUTO: 7 [PH]
PLATELET # BLD AUTO: 236 THOUSANDS/UL (ref 149–390)
PMV BLD AUTO: 9.2 FL (ref 8.9–12.7)
PO2 BLDV: 28 MM HG (ref 35–45)
POTASSIUM SERPL-SCNC: 3.4 MMOL/L (ref 3.5–5.3)
POTASSIUM SERPL-SCNC: 4.5 MMOL/L (ref 3.5–5.3)
PROCALCITONIN SERPL-MCNC: 0.05 NG/ML
PROT SERPL-MCNC: 7 G/DL (ref 6.4–8.4)
PROT UR STRIP-MCNC: NEGATIVE MG/DL
PROTHROMBIN TIME: 11.8 SECONDS (ref 11.6–14.5)
RBC # BLD AUTO: 4.65 MILLION/UL (ref 3.81–5.12)
RBC #/AREA URNS AUTO: NORMAL /HPF
SODIUM SERPL-SCNC: 127 MMOL/L (ref 135–147)
SODIUM SERPL-SCNC: 134 MMOL/L (ref 135–147)
SP GR UR STRIP.AUTO: 1.03 (ref 1–1.03)
UROBILINOGEN UR STRIP-ACNC: <2 MG/DL
WBC # BLD AUTO: 4.95 THOUSAND/UL (ref 4.31–10.16)
WBC #/AREA URNS AUTO: NORMAL /HPF

## 2022-12-05 RX ORDER — SODIUM CHLORIDE, SODIUM GLUCONATE, SODIUM ACETATE, POTASSIUM CHLORIDE, MAGNESIUM CHLORIDE, SODIUM PHOSPHATE, DIBASIC, AND POTASSIUM PHOSPHATE .53; .5; .37; .037; .03; .012; .00082 G/100ML; G/100ML; G/100ML; G/100ML; G/100ML; G/100ML; G/100ML
1000 INJECTION, SOLUTION INTRAVENOUS ONCE
Status: COMPLETED | OUTPATIENT
Start: 2022-12-05 | End: 2022-12-06

## 2022-12-05 RX ORDER — SODIUM CHLORIDE 9 MG/ML
3 INJECTION INTRAVENOUS
Status: DISCONTINUED | OUTPATIENT
Start: 2022-12-05 | End: 2022-12-07 | Stop reason: HOSPADM

## 2022-12-05 RX ADMIN — IOHEXOL 100 ML: 350 INJECTION, SOLUTION INTRAVENOUS at 20:36

## 2022-12-05 RX ADMIN — SODIUM CHLORIDE 2000 ML: 0.9 INJECTION, SOLUTION INTRAVENOUS at 15:59

## 2022-12-05 RX ADMIN — CEFTRIAXONE 1000 MG: 1 INJECTION, POWDER, FOR SOLUTION INTRAMUSCULAR; INTRAVENOUS at 20:53

## 2022-12-05 RX ADMIN — SODIUM CHLORIDE, SODIUM GLUCONATE, SODIUM ACETATE, POTASSIUM CHLORIDE AND MAGNESIUM CHLORIDE 1000 ML: 526; 502; 368; 37; 30 INJECTION, SOLUTION INTRAVENOUS at 21:35

## 2022-12-05 RX ADMIN — SODIUM CHLORIDE 10 UNITS/HR: 9 INJECTION, SOLUTION INTRAVENOUS at 17:48

## 2022-12-05 SDOH — ECONOMIC STABILITY - HOUSING INSECURITY: HOMELESS IN THE PAST 12 MONTHS: Z59.812

## 2022-12-05 NOTE — ED PROVIDER NOTES
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     12/7/2022  1:07 PM Amanda Santizo Add [E10 65] Type 1 diabetes mellitus with hyperglycemia [facility])       ED Disposition     ED Disposition   Admit    Condition   Stable    Date/Time   Mon Dec 5, 2022  9:30 PM    Comment   Case was discussed with Dr Anthony Rodríguez  and the patient's admission status was agreed to be Admission Status: observation status to the service of Dr Ray Marques              Follow-up Information     Follow up With Specialties Details Why Contact Prince Ferguson DO General Practice Schedule an appointment as soon as possible for a visit in 1 week(s)  [address] [facility] Jeremiah Burkett MD Internal Medicine Schedule an appointment as soon as possible for a visit in 2 week(s)  [address] [place] [address]  Sam U  [address]            Discharge Medication List as of 12/7/2022  2:42 PM      CONTINUE these medications which have CHANGED    Details   Insulin Glargine Solostar (Lantus SoloStar) 100 UNIT/ML SOPN Inject 0 3 mL (30 Units total) under the skin in the morning, Starting Wed 12/7/2022, Normal      insulin lispro (HumaLOG KwikPen) 100 units/mL injection pen Inject 6 Units under the skin 3 (three) times a day with meals, Starting Wed 12/7/2022, Normal         CONTINUE these medications which have NOT CHANGED    Details   Accu-Chek FastClix Lancets MISC Use 6 a day and as directed , Normal      Continuous Blood Gluc  (Dexcom G6 ) NERY Use as directed , Normal      Continuous Blood Gluc Sensor (Dexcom G6 Sensor) MISC 1 box=1 month supply or 3 sensors, use 1 sensor every 10 days  , Normal      Continuous Blood Gluc Transmit (Dexcom G6 Transmitter) MISC Transmitter change every 90 days  , Normal      !! Insulin Pen Needle (BD Pen Needle Kerrie 2nd Gen) 32G X 4 MM MISC For use with insulin pen  Pharmacy may dispense brand covered by insurance , Normal      !! Insulin Pen Needle (BD Pen Needle Kerrie 2nd Gen) 32G X 4 MM MISC For use with insulin pen  Pharmacy may dispense brand covered by insurance , Normal      !! Insulin Pen Needle (BD Pen Needle Kerrie 2nd Gen) 32G X 4 MM MISC For use with insulin pen  Pharmacy may dispense brand covered by insurance , Normal       !! - Potential duplicate medications found  Please discuss with provider            Outpatient Discharge Orders   Glucometer test strips     Lancets     Discharge Diet     Activity as tolerated       PDMP Review       Value Time User    PDMP Reviewed  Yes 12/15/2021  8:22 PM Alva Baires PA-C          ED Provider  Electronically Signed by           Codey Alexander PA-C  12/07/22 2101

## 2022-12-06 VITALS
WEIGHT: 141.54 LBS | DIASTOLIC BLOOD PRESSURE: 69 MMHG | OXYGEN SATURATION: 99 % | RESPIRATION RATE: 20 BRPM | SYSTOLIC BLOOD PRESSURE: 105 MMHG | BODY MASS INDEX: 29.58 KG/M2 | HEART RATE: 84 BPM | TEMPERATURE: 98.2 F

## 2022-12-06 PROBLEM — E11.00 HYPEROSMOLAR HYPERGLYCEMIC STATE (HHS) (HCC): Status: ACTIVE | Noted: 2022-12-06
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
## 2022-12-06 PROBLEM — Z59.812 HOUSING INSTABILITY AFTER RECENT HOMELESSNESS: Status: ACTIVE | Noted: 2022-12-06

## 2022-12-06 LAB
ATRIAL RATE: 90 BPM
CHOLEST SERPL-MCNC: 182 MG/DL
EST. AVERAGE GLUCOSE BLD GHB EST-MCNC: 315 MG/DL
GLUCOSE SERPL-MCNC: 103 MG/DL (ref 65–140)
GLUCOSE SERPL-MCNC: 108 MG/DL (ref 65–140)
GLUCOSE SERPL-MCNC: 144 MG/DL (ref 65–140)
GLUCOSE SERPL-MCNC: 150 MG/DL (ref 65–140)
GLUCOSE SERPL-MCNC: 211 MG/DL (ref 65–140)
GLUCOSE SERPL-MCNC: 240 MG/DL (ref 65–140)
GLUCOSE SERPL-MCNC: 295 MG/DL (ref 65–140)
GLUCOSE SERPL-MCNC: 304 MG/DL (ref 65–140)
GLUCOSE SERPL-MCNC: 98 MG/DL (ref 65–140)
HBA1C MFR BLD: 12.6 %
HDLC SERPL-MCNC: 66 MG/DL
LDLC SERPL CALC-MCNC: 49 MG/DL (ref 0–100)
NONHDLC SERPL-MCNC: 116 MG/DL
P AXIS: 78 DEGREES
PR INTERVAL: 130 MS
QRS AXIS: 73 DEGREES
QRSD INTERVAL: 72 MS
QT INTERVAL: 338 MS
QTC INTERVAL: 413 MS
T WAVE AXIS: 62 DEGREES
TRIGL SERPL-MCNC: 336 MG/DL
VENTRICULAR RATE: 90 BPM

## 2022-12-06 RX ORDER — POTASSIUM CHLORIDE 20 MEQ/1
40 TABLET, EXTENDED RELEASE ORAL ONCE
Status: COMPLETED | OUTPATIENT
Start: 2022-12-06 | End: 2022-12-06

## 2022-12-06 RX ORDER — INSULIN LISPRO 100 [IU]/ML
8 INJECTION, SOLUTION INTRAVENOUS; SUBCUTANEOUS
Status: DISCONTINUED | OUTPATIENT
Start: 2022-12-07 | End: 2022-12-07 | Stop reason: HOSPADM

## 2022-12-06 RX ORDER — INSULIN GLARGINE 100 [IU]/ML
30 INJECTION, SOLUTION SUBCUTANEOUS EVERY MORNING
Status: DISCONTINUED | OUTPATIENT
Start: 2022-12-06 | End: 2022-12-07

## 2022-12-06 RX ORDER — INSULIN LISPRO 100 [IU]/ML
1-5 INJECTION, SOLUTION INTRAVENOUS; SUBCUTANEOUS
Status: DISCONTINUED | OUTPATIENT
Start: 2022-12-06 | End: 2022-12-07 | Stop reason: HOSPADM

## 2022-12-06 RX ORDER — INSULIN LISPRO 100 [IU]/ML
6 INJECTION, SOLUTION INTRAVENOUS; SUBCUTANEOUS
Status: DISCONTINUED | OUTPATIENT
Start: 2022-12-06 | End: 2022-12-06

## 2022-12-06 RX ADMIN — INSULIN LISPRO 6 UNITS: 100 INJECTION, SOLUTION INTRAVENOUS; SUBCUTANEOUS at 16:43

## 2022-12-06 RX ADMIN — SODIUM CHLORIDE 0.3 UNITS/HR: 9 INJECTION, SOLUTION INTRAVENOUS at 06:45

## 2022-12-06 RX ADMIN — INSULIN LISPRO 2 UNITS: 100 INJECTION, SOLUTION INTRAVENOUS; SUBCUTANEOUS at 16:43

## 2022-12-06 RX ADMIN — INSULIN GLARGINE 30 UNITS: 100 INJECTION, SOLUTION SUBCUTANEOUS at 09:03

## 2022-12-06 RX ADMIN — INSULIN LISPRO 6 UNITS: 100 INJECTION, SOLUTION INTRAVENOUS; SUBCUTANEOUS at 13:06

## 2022-12-06 RX ADMIN — INSULIN LISPRO 3 UNITS: 100 INJECTION, SOLUTION INTRAVENOUS; SUBCUTANEOUS at 13:06

## 2022-12-06 RX ADMIN — POTASSIUM CHLORIDE 40 MEQ: 1500 TABLET, EXTENDED RELEASE ORAL at 06:57
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
## 2022-12-06 NOTE — ED NOTES
Patient blood sugar 205 @ 23:38  Per algorithm 1, pt remains at 2 units of insulin per hour until recheck in 2 hours             Clayton Mejia RN  12/06/22 6258
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
## 2022-12-06 NOTE — ASSESSMENT & PLAN NOTE
Patient is currently living in her car, without any were to keep her insulin  She is currently looking for shelter  Her 3-3/1year-old child is staying with a trusted friend    Case management recommendations and assistance appreciated
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
## 2022-12-06 NOTE — PLAN OF CARE
Problem: Potential for Falls  Goal: Patient will remain free of falls  Description: INTERVENTIONS:  - Educate patient/family on patient safety including physical limitations  - Instruct patient to call for assistance with activity   - Consult OT/PT to assist with strengthening/mobility   - Keep Call bell within reach  - Keep bed low and locked with side rails adjusted as appropriate  - Keep care items and personal belongings within reach  - Initiate and maintain comfort rounds  - Make Fall Risk Sign visible to staff  - Offer Toileting every  Hours, in advance of need  - Initiate/Maintain alarm  - Obtain necessary fall risk management equipment:   - Apply yellow socks and bracelet for high fall risk patients  - Consider moving patient to room near nurses station  Outcome: Progressing     Problem: PAIN - ADULT  Goal: Verbalizes/displays adequate comfort level or baseline comfort level  Description: Interventions:  - Encourage patient to monitor pain and request assistance  - Assess pain using appropriate pain scale  - Administer analgesics based on type and severity of pain and evaluate response  - Implement non-pharmacological measures as appropriate and evaluate response  - Consider cultural and social influences on pain and pain management  - Notify physician/advanced practitioner if interventions unsuccessful or patient reports new pain  Outcome: Progressing     Problem: INFECTION - ADULT  Goal: Absence or prevention of progression during hospitalization  Description: INTERVENTIONS:  - Assess and monitor for signs and symptoms of infection  - Monitor lab/diagnostic results  - Monitor all insertion sites, i e  indwelling lines, tubes, and drains  - Monitor endotracheal if appropriate and nasal secretions for changes in amount and color  - [place] appropriate cooling/warming therapies per order  - Administer medications as ordered  - Instruct and encourage patient and family to use good hand hygiene technique  - Identify and instruct in appropriate isolation precautions for identified infection/condition  Outcome: Progressing  Goal: Absence of fever/infection during neutropenic period  Description: INTERVENTIONS:  - Monitor WBC    Outcome: Progressing     Problem: SAFETY ADULT  Goal: Patient will remain free of falls  Description: INTERVENTIONS:  - Educate patient/family on patient safety including physical limitations  - Instruct patient to call for assistance with activity   - Consult OT/PT to assist with strengthening/mobility   - Keep Call bell within reach  - Keep bed low and locked with side rails adjusted as appropriate  - Keep care items and personal belongings within reach  - Initiate and maintain comfort rounds  - Make Fall Risk Sign visible to staff  - Offer Toileting every  Hours, in advance of need  - Initiate/Maintain alarm  - Obtain necessary fall risk management equipment:   - Apply yellow socks and bracelet for high fall risk patients  - Consider moving patient to room near nurses station  Outcome: Progressing  Goal: Maintain or return to baseline ADL function  Description: INTERVENTIONS:  -  Assess patient's ability to carry out ADLs; assess patient's baseline for ADL function and identify physical deficits which impact ability to perform ADLs (bathing, care of mouth/teeth, toileting, grooming, dressing, etc )  - Assess/evaluate cause of self-care deficits   - Assess range of motion  - Assess patient's mobility; develop plan if impaired  - Assess patient's need for assistive devices and provide as appropriate  - Encourage maximum independence but intervene and supervise when necessary  - Involve family in performance of ADLs  - Assess for home care needs following discharge   - Consider OT consult to assist with ADL evaluation and planning for discharge  - Provide patient education as appropriate  Outcome: Progressing  Goal: Maintains/Returns to pre admission functional level  Description: INTERVENTIONS:  - Perform BMAT or MOVE assessment daily    - Set and communicate daily mobility goal to care team and patient/family/caregiver  - Collaborate with rehabilitation services on mobility goals if consulted  - Perform Range of Motion  times a day  - Reposition patient every  hours  - Dangle patient  times a day  - Stand patient  times a day  - Ambulate patient  times a day  - Out of bed to chair  times a day   - Out of bed for meals times a day  - Out of bed for toileting  - Record patient progress and toleration of activity level   Outcome: Progressing     Problem: DISCHARGE PLANNING  Goal: Discharge to home or other facility with appropriate resources  Description: INTERVENTIONS:  - Identify barriers to discharge w/patient and caregiver  - Arrange for needed discharge resources and transportation as appropriate  - Identify discharge learning needs (meds, wound care, etc )  - Arrange for interpretive services to assist at discharge as needed  - Refer to Case Management Department for coordinating discharge planning if the patient needs post-hospital services based on physician/advanced practitioner order or complex needs related to functional status, cognitive ability, or social support system  Outcome: Progressing     Problem: Knowledge Deficit  Goal: Patient/family/caregiver demonstrates understanding of disease process, treatment plan, medications, and discharge instructions  Description: Complete learning assessment and assess knowledge base    Interventions:  - Provide teaching at level of understanding  - Provide teaching via preferred learning methods  Outcome: Progressing

## 2022-12-06 NOTE — CONSULTS
Consultation - Emmy Albrecht 29 y o  female MRN: [id number]    Unit/Bed#: W -01 Encounter: [id number]      Assessment/Plan   53-year-old female with type 1 diabetes currently admitted for HHS/lactic acidosis , history of noncompliance with medical therapy, multiple barriers to care including poor social support, poor insight about her disease   -Patient counseled on complications of uncontrolled diabetes including retinopathy, nephropathy, neuropathy, diabetic ketoacidosis  -Case management is involved to help with discharge planning-discussed with the patient that it is very important that she checks her blood sugars and take basal bolus insulin therapy  She  thought that NovoLog needs to be refrigerated all the time-advised her that she can keep 1 pen with her room temperature that she can use before meals  For now continue Lantus 30 units daily-increase Humalog to 8 units before meals  Monitor blood sugars before meals and bedtime and adjust according    She follows up with endocrinology at [facility] [place] however has not seen them recently-encouraged to make a follow-up appointment-she has used personal CGM in the past and is interested in going back on that  CC: Diabetes Consult    History of Present Illness     HPI: Emmy Albrecht is a 29y o  year old female with type 1 diabetes who was admitted to the hospital for HHS, lactic acidosis-she was started on the IV insulin infusion with improvement in her glucose and was switched to subcu insulin therapy earlier today  She has had type 1 diabetes since she was 25years old-has had multiple admissions for diabetic ketoacidosis-she is currently homeless and living in a car-stores her insulin at her friend's house and only takes 1 pen of Lantus with her-she states that she takes 40 units of Lantus daily in the morning, forgets to take Lantus a few times a week as well-does not check her sugars or take Premeal insulin    Currently she is feeling well and tolerating regular meals  Denies any polyuria, polydipsia, blurry vision  Denies any numbness or tingling in her feet  Denies any recent changes in her weight      Inpatient consult to Endocrinology  Consult performed by: Taylor Marino MD  Consult ordered by: Chucho Mena MD          Review of Systems    Historical Information   Past Medical History:   Diagnosis Date   • Diabetes mellitus ([facility] [address] )     uses kwiki pen    • Diabetes mellitus type 1 ([facility] [address] )    • High blood pressure     recently dx/ put on lisinopril    • High cholesterol    • Hypertension      no hypersion    • Miscarriage    • Recurrent pregnancy loss, antepartum condition or complication      Past Surgical History:   Procedure Laterality Date   •  SECTION  01/02/2014     X1   • DILATION AND CURETTAGE OF UTERUS  04/10/2019    Missed AB    • MD  DELIVERY ONLY N/A 2021    Procedure:  SECTION () REPEAT;  Surgeon: Natali Brown MD;  Location: [facility];  Service: Obstetrics   • WISDOM TOOTH EXTRACTION       Social History   Social History     Substance and Sexual Activity   Alcohol Use Never    Comment: Alcohol intake:   None - As per Shad Lindo      Social History     Substance and Sexual Activity   Drug Use Never    Comment: Illicit drugs:   No  - As per Shad Lindo      Social History     Tobacco Use   Smoking Status Never   Smokeless Tobacco Never     Family History:   Family History   Problem Relation Age of Onset   • No Known Problems Mother    • Diabetes Father    • No Known Problems Sister    • No Known Problems Sister    • No Known Problems Brother    • No Known Problems Maternal Grandmother    • Diabetes Paternal Grandmother    • No Known Problems Paternal Grandfather    • No Known Problems Daughter        Meds/Allergies   Current Facility-Administered Medications   Medication Dose Route Frequency Provider Last Rate Last Admin   • insulin glargine (LANTUS) subcutaneous injection 30 Units 0 3 mL  30 Units Subcutaneous QAM Rocky Ricks MD   30 Units at 12/06/22 0903   • insulin lispro (HumaLOG) 100 units/mL subcutaneous injection 1-5 Units  1-5 Units Subcutaneous TID [facility] Javy Roy MD   2 Units at 12/06/22 1643   • insulin lispro (HumaLOG) 100 units/mL subcutaneous injection 6 Units  6 Units Subcutaneous TID With Meals Javy Roy MD   6 Units at 12/06/22 1643   • sodium chloride (PF) 0 9 % injection 3 mL  3 mL Intravenous Q1H PRN Althea Noel Gutzweiler, PA-C         No Known Allergies    Objective   Vitals: Blood pressure 109/68, pulse (!) 108, temperature 99 °F (37 2 °C), resp  rate 16, weight 64 2 kg (141 lb 8 6 oz), SpO2 97 %, not currently breastfeeding  Intake/Output Summary (Last 24 hours) at 12/6/2022 1745  Last data filed at 12/5/2022 1858  Gross per 24 hour   Intake 2000 ml   Output --   Net 2000 ml     Invasive Devices     Peripheral Intravenous Line  Duration           Peripheral IV 12/05/22 Left Antecubital 1 day    Peripheral IV 12/05/22 Left;Ventral (anterior) Hand <1 day                Physical Exam  Vitals reviewed  Constitutional:       General: She is not in acute distress  Appearance: Normal appearance  She is not ill-appearing, toxic-appearing or diaphoretic  HENT:      Head: Normocephalic and atraumatic  Eyes:      General: No scleral icterus  Extraocular Movements: Extraocular movements intact  Cardiovascular:      Rate and Rhythm: Normal rate and regular rhythm  Heart sounds: Normal heart sounds  No murmur heard  Pulmonary:      Effort: Pulmonary effort is normal  No respiratory distress  Breath sounds: Normal breath sounds  No wheezing  Abdominal:      General: There is no distension  Palpations: Abdomen is soft  Musculoskeletal:      Cervical back: Neck supple  Right lower leg: No edema  Left lower leg: No edema  Lymphadenopathy:      Cervical: No cervical adenopathy  Skin:     General: Skin is warm and dry     Neurological: General: No focal deficit present  Mental Status: She is alert and oriented to person, place, and time  Psychiatric:         Thought Content: Thought content normal       Comments: Flat affect         The history was obtained from the review of the chart, patient  Lab Results:   Results from last 7 days   Lab Units 12/05/22  1556   HEMOGLOBIN A1C % 12 6*     Lab Results   Component Value Date    WBC 4 95 12/05/2022    HGB 13 9 12/05/2022    HCT 44 1 12/05/2022    MCV 95 12/05/2022     12/05/2022     Lab Results   Component Value Date/Time    BUN 10 12/05/2022 10:37 PM    BUN 17 05/04/2020 09:30 AM    K 3 4 (L) 12/05/2022 10:37 PM    K 4 1 05/04/2020 09:30 AM     12/05/2022 10:37 PM     05/04/2020 09:30 AM    CO2 25 12/05/2022 10:37 PM    CO2 5 (LL) 01/27/2021 12:27 PM    CO2 20 05/04/2020 09:30 AM    CREATININE 0 39 (L) 12/05/2022 10:37 PM    AST 9 (L) 12/05/2022 03:56 PM    AST 9 (L) 05/04/2020 09:30 AM    ALT 11 12/05/2022 03:56 PM    ALT 10 05/04/2020 09:30 AM    ALB 3 9 12/05/2022 03:56 PM    GLOB 2 1 05/04/2020 09:30 AM     Recent Labs     12/05/22  2237   HDL 66   TRIG 336*     No results found for: MICROALBUR, GLYQ23PDU  POC Glucose (mg/dl)   Date Value   12/06/2022 240 (H)   12/06/2022 304 (H)   12/06/2022 295 (H)   12/06/2022 150 (H)   12/06/2022 98   12/06/2022 103   12/06/2022 108   12/05/2022 205 (H)   12/05/2022 183 (H)   12/05/2022 135       Portions of the record may have been created with voice recognition software

## 2022-12-06 NOTE — ASSESSMENT & PLAN NOTE
Last A1c was 12 6  Will recheck  Patient takes 40 units of Lantus q a m    Currently on insulin drip, will transition to basal tomorrow morning
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
## 2022-12-07 LAB
ANION GAP SERPL CALCULATED.3IONS-SCNC: 8 MMOL/L (ref 4–13)
BUN SERPL-MCNC: 13 MG/DL (ref 5–25)
CALCIUM SERPL-MCNC: 9.1 MG/DL (ref 8.4–10.2)
CHLORIDE SERPL-SCNC: 105 MMOL/L (ref 96–108)
CO2 SERPL-SCNC: 28 MMOL/L (ref 21–32)
CREAT SERPL-MCNC: 0.64 MG/DL (ref 0.6–1.3)
GFR SERPL CREATININE-BSD FRML MDRD: 120 ML/MIN/1.73SQ M
GLUCOSE SERPL-MCNC: 184 MG/DL (ref 65–140)
GLUCOSE SERPL-MCNC: 69 MG/DL (ref 65–140)
GLUCOSE SERPL-MCNC: 71 MG/DL (ref 65–140)
MAGNESIUM SERPL-MCNC: 1.9 MG/DL (ref 1.9–2.7)
POTASSIUM SERPL-SCNC: 3.8 MMOL/L (ref 3.5–5.3)
SODIUM SERPL-SCNC: 141 MMOL/L (ref 135–147)

## 2022-12-07 RX ORDER — INSULIN GLARGINE 100 [IU]/ML
30 INJECTION, SOLUTION SUBCUTANEOUS DAILY
Qty: 15 ML | Refills: 0 | Status: ON HOLD | OUTPATIENT
Start: 2022-12-07 | End: 2022-12-15 | Stop reason: SDUPTHER

## 2022-12-07 RX ORDER — INSULIN GLARGINE 100 [IU]/ML
24 INJECTION, SOLUTION SUBCUTANEOUS EVERY MORNING
Status: DISCONTINUED | OUTPATIENT
Start: 2022-12-08 | End: 2022-12-07 | Stop reason: HOSPADM

## 2022-12-07 RX ORDER — INSULIN LISPRO 100 [IU]/ML
6 INJECTION, SOLUTION INTRAVENOUS; SUBCUTANEOUS
Qty: 15 ML | Refills: 0 | Status: SHIPPED | OUTPATIENT
Start: 2022-12-07 | End: 2022-12-07 | Stop reason: SDUPTHER

## 2022-12-07 RX ORDER — INSULIN LISPRO 100 [IU]/ML
6 INJECTION, SOLUTION INTRAVENOUS; SUBCUTANEOUS
Qty: 15 ML | Refills: 0 | Status: ON HOLD | OUTPATIENT
Start: 2022-12-07 | End: 2022-12-15 | Stop reason: SDUPTHER

## 2022-12-07 RX ORDER — INSULIN GLARGINE 100 [IU]/ML
30 INJECTION, SOLUTION SUBCUTANEOUS DAILY
Qty: 15 ML | Refills: 0 | Status: SHIPPED | OUTPATIENT
Start: 2022-12-07 | End: 2022-12-07 | Stop reason: SDUPTHER

## 2022-12-07 RX ADMIN — INSULIN LISPRO 8 UNITS: 100 INJECTION, SOLUTION INTRAVENOUS; SUBCUTANEOUS at 08:07

## 2022-12-07 RX ADMIN — INSULIN GLARGINE 30 UNITS: 100 INJECTION, SOLUTION SUBCUTANEOUS at 08:05

## 2022-12-07 RX ADMIN — INSULIN LISPRO 1 UNITS: 100 INJECTION, SOLUTION INTRAVENOUS; SUBCUTANEOUS at 08:06

## 2022-12-07 NOTE — PLAN OF CARE
Problem: Potential for Falls  Goal: Patient will remain free of falls  Description: INTERVENTIONS:  - Educate patient/family on patient safety including physical limitations  - Instruct patient to call for assistance with activity   - Consult OT/PT to assist with strengthening/mobility   - Keep Call bell within reach  - Keep bed low and locked with side rails adjusted as appropriate  - Keep care items and personal belongings within reach  - Initiate and maintain comfort rounds  - Make Fall Risk Sign visible to staff  - Apply yellow socks and bracelet for high fall risk patients  - Consider moving patient to room near nurses station  Outcome: Progressing     Problem: PAIN - ADULT  Goal: Verbalizes/displays adequate comfort level or baseline comfort level  Description: Interventions:  - Encourage patient to monitor pain and request assistance  - Assess pain using appropriate pain scale  - Administer analgesics based on type and severity of pain and evaluate response  - Implement non-pharmacological measures as appropriate and evaluate response  - Consider cultural and social influences on pain and pain management  - Notify physician/advanced practitioner if interventions unsuccessful or patient reports new pain  Outcome: Progressing     Problem: INFECTION - ADULT  Goal: Absence or prevention of progression during hospitalization  Description: INTERVENTIONS:  - Assess and monitor for signs and symptoms of infection  - Monitor lab/diagnostic results  - Monitor all insertion sites, i e  indwelling lines, tubes, and drains  - Monitor endotracheal if appropriate and nasal secretions for changes in amount and color  - Novelty appropriate cooling/warming therapies per order  - Administer medications as ordered  - Instruct and encourage patient and family to use good hand hygiene technique  - Identify and instruct in appropriate isolation precautions for identified infection/condition  Outcome: Progressing  Goal: Absence of fever/infection during neutropenic period  Description: INTERVENTIONS:  - Monitor WBC    Outcome: Progressing     Problem: SAFETY ADULT  Goal: Patient will remain free of falls  Description: INTERVENTIONS:  - Educate patient/family on patient safety including physical limitations  - Instruct patient to call for assistance with activity   - Consult OT/PT to assist with strengthening/mobility   - Keep Call bell within reach  - Keep bed low and locked with side rails adjusted as appropriate  - Keep care items and personal belongings within reach  - Initiate and maintain comfort rounds  - Apply yellow socks and bracelet for high fall risk patients  - Consider moving patient to room near nurses station  Outcome: Progressing  Goal: Maintain or return to baseline ADL function  Description: INTERVENTIONS:  -  Assess patient's ability to carry out ADLs; assess patient's baseline for ADL function and identify physical deficits which impact ability to perform ADLs (bathing, care of mouth/teeth, toileting, grooming, dressing, etc )  - Assess/evaluate cause of self-care deficits   - Assess range of motion  - Assess patient's mobility; develop plan if impaired  - Assess patient's need for assistive devices and provide as appropriate  - Encourage maximum independence but intervene and supervise when necessary  - Involve family in performance of ADLs  - Assess for home care needs following discharge   - Consider OT consult to assist with ADL evaluation and planning for discharge  - Provide patient education as appropriate  Outcome: Progressing  Goal: Maintains/Returns to pre admission functional level  Description: INTERVENTIONS:  - Perform BMAT or MOVE assessment daily    - Set and communicate daily mobility goal to care team and patient/family/caregiver     - Collaborate with rehabilitation services on mobility goals if consulted  - Out of bed for toileting  - Record patient progress and toleration of activity level   Outcome: Progressing     Problem: DISCHARGE PLANNING  Goal: Discharge to home or other facility with appropriate resources  Description: INTERVENTIONS:  - Identify barriers to discharge w/patient and caregiver  - Arrange for needed discharge resources and transportation as appropriate  - Identify discharge learning needs (meds, wound care, etc )  - Arrange for interpretive services to assist at discharge as needed  - Refer to Case Management Department for coordinating discharge planning if the patient needs post-hospital services based on physician/advanced practitioner order or complex needs related to functional status, cognitive ability, or social support system  Outcome: Progressing     Problem: Knowledge Deficit  Goal: Patient/family/caregiver demonstrates understanding of disease process, treatment plan, medications, and discharge instructions  Description: Complete learning assessment and assess knowledge base    Interventions:  - Provide teaching at level of understanding  - Provide teaching via preferred learning methods  Outcome: Progressing
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
## 2022-12-07 NOTE — DISCHARGE INSTRUCTIONS
Meal Planning with the Plate Method   AMBULATORY CARE:   Meal planning with the plate method  is a simple way for people with diabetes to plan meals  This method can help you eat the right amount of carbohydrates and keep your blood sugar levels under control  Carbohydrates naturally raise your blood sugar level  Your blood sugar level can rise too high if you eat too much carbohydrate at one time  Carbohydrates are found in starches (bread, cereal, starchy vegetables, and beans), fruit, milk, yogurt, and sweets  How to use the plate method to plan meals:   Draw an imaginary line down the middle of a 9-inch dinner plate  On one side, draw another line to divide that section in half  Your plate will have 3 sections  Fill the largest section of your plate with non-starchy vegetables  These include broccoli, spinach, cucumbers, peppers, cauliflower, and tomatoes  Add a starch to 1 of the small sections of your plate  Starches include pasta, rice, whole-grain bread, tortillas, corn, potatoes, and beans  Add meat or another source of protein to the other small section of your plate  Examples include chicken or turkey without skin, fish, lean beef or pork, low-fat cheese, tofu, or eggs  Add dairy or fruit to the side of your plate if your meal plan allows  Examples of dairy include skim or 1% milk or low-fat yogurt  If you do not drink milk, you may be able to add another serving of starchy food instead  Add a low-calorie or calorie-free drink such as water or unsweetened tea or coffee         Serving sizes of foods:   Non-starchy vegetables:      ½ cup of cooked vegetables or 1 cup of raw vegetables    ½ cup of vegetable juice    Starches:      1 ounce of whole-wheat bread or 1 small (6 inch) flour or corn tortilla    1 small (4 inch) pancake (about ¼ inch thick)    ¾ cup of dry, unsweetened, whole-grain ready-to-eat cereal or ¼ cup of low-fat granola    ½ cup of cooked cereal or oatmeal    ? cup of rice or pasta     ½ cup of corn, green peas, sweet potatoes, or mashed potatoes    ½ cup of cooked beans and peas (garbanzo, ibarra, kidney, white, split, black-eyed)    Meat and other protein sources:      3 to 4 ounces of any lean meat, fish, or poultry    ½ cup of tofu or tempeh    1 large egg    1½ ounces (about 2 tablespoons) of nuts or 2 tablespoons of peanut butter    Fruit:      1 small piece of fresh fruit     ½ cup of canned or fresh fruit or unsweetened fruit juice    ¼ cup of dried fruit    Milk and yogurt:      1 cup (8 ounces) of skim or 1% milk    ¾ cup (6 ounces) of plain, non-fat yogurt    Other guidelines to follow:   Limit salt and sugar  Choose and prepare foods and drinks with less salt and added sugars  Use the nutrition information on food labels to help you make healthy choices  The percent daily value listed on the food label tells you whether a food is low or high in certain nutrients  A percent daily value of 5% or less means that the food is low in a nutrient  A percent daily value of 20% or more means that the food is high in a nutrient  Choose healthy fats  Choose healthy fats such as polyunsaturated and monounsaturated fats in place of unhealthy fats  Healthy fats are found in vegetable oils such as soybean, corn, canola, olive, and sunflower oil  Unhealthy fats are saturated fats, trans fats, and cholesterol  Unhealthy fats are found in shortening, butter, stick margarine, and animal fat  Ask your healthcare provider if alcohol is safe for you  and how much is safe for you  If you choose to drink alcohol, drink it with meals  When you drink alcohol on an empty stomach, your blood sugar may fall to a low level  © Copyright [employer] 2022 Information is for End User's use only and may not be sold, redistributed or otherwise used for commercial purposes   All illustrations and images included in CareNotes® are the copyrighted property of [employer]  or [employer]  The above information is an  only  It is not intended as medical advice for individual conditions or treatments  Talk to your doctor, nurse or pharmacist before following any medical regimen to see if it is safe and effective for you

## 2022-12-07 NOTE — CASE MANAGEMENT
Case Management Progress Note    Patient name Ailin Mena  Location W /W -01 MRN [id number]  : 1990 Date 2022       LOS (days): 1  Geometric Mean LOS (GMLOS) (days):   Days to GMLOS:        OBJECTIVE:        Current admission status: Inpatient  Preferred Pharmacy:   [address] [place] [address]  [id number] [place] [address]  [address]  Phone: [phone] Fax: [phone]    [address] [place] [address] [address] ,  [address] [facility] [address]  Phone: [phone] Fax: [phone]    Primary Care Provider: Jaya Lynn DO    Primary Insurance: [employer]  Secondary Insurance: [employer]    PROGRESS NOTE:  CM met with patient at bedside to provide shelter and other aid resources  CM explained to patient she will need to call the shelters to enquire about availability  Call made to Ulysses Randall from [address] [place] [address] [phone] who confirmed patient child is under appropriate care and supervision  Ulysses Randall confirmed no other needs from hospital CM at this time, and that they would like for patient to follow up with them once discharged  CM to follow up as needed

## 2022-12-07 NOTE — UTILIZATION REVIEW
NOTIFICATION OF INPATIENT ADMISSION   AUTHORIZATION REQUEST   SERVICING FACILITY:   [address] [facility] [place], [address] [place] [address]  Tax ID: [id number]  NPI: [id number]   ATTENDING PROVIDER:  Attending Name and NPI#: Sanford Padron Do [[id number]]  Address: [address] [place] [address] Dr Brian [address]ola  [place] [facility] [address] [place] [address]  Phone: [phone]     ADMISSION INFORMATION:  Place of Service: Inpatient [facility]  [address]  Place of Service Code: 21  Inpatient Admission Date/Time: 12/6/22  8:12 AM  Discharge Date/Time: No discharge date for patient encounter  Admitting Diagnosis Code/Description:  Diabetes mellitus with nonketotic hyperosmolarity ([facility] ) [E11 00]  Noncompliance with diabetes treatment [Z91 199]  Disoriented [R41 0]  Housing instability after recent homelessness [Z59 812]     UTILIZATION REVIEW CONTACT:  Dawit Ross, Utilization   Network Utilization Review Department  Phone: [phone]  Fax: [phone]  Email: [email or web address]  Contact for approvals/pending authorizations, clinical reviews, and discharge  PHYSICIAN ADVISORY SERVICES:  Medical Necessity Denial & Ljjb-jb-Uhbd Review  Phone: [phone]  Fax: [phone]  Email: [email or web address]

## 2022-12-07 NOTE — DISCHARGE INSTR - AVS FIRST PAGE
Dear Melania Landers,     It was our pleasure to care for you here at [facility], [employer]  It is our hope that we were always able to exceed the expected standards for your care during your stay  You were hospitalized due to Diabetes with severe hyperglycemia  You were cared for on the [address] [place] [address] 4th floor under the service of Lester Patel DO with the [facility] who covers for your primary care physician (PCP), Michelle Castellanos DO, while you were hospitalized  If you have any questions or concerns related to this hospitalization, you may contact us at [phone]  For follow up as well as medication refills, we recommend that you follow up with your primary care physician  A registered nurse will reach out to you by phone within a few days after your discharge to answer any additional questions that you may have after going home  However, at this time we provide for you here, the most important instructions / recommendations at discharge:     Notable Medication Adjustments -   Lantus (insulin glargine) should be taken at a dose of 30 units daily in the morning  Humalog (insulin lispro) should be taken at a dose of 6 units 3 times daily with meals  Testing Required after Discharge -   Check your sugars three times daily before meals and also at bedtime  Keep a log of your sugars and bring these to your follow-up with your endocrinologist and primary care physician  Important follow up information -   Follow-up with your endocrinologist within 2 weeks  If your appointment is later than the next 2 weeks then please call to get an earlier follow-up appointment  Please mention that you were hospitalized with diabetes related complications  Follow-up with your primary care physician within the next 1 week    Other Instructions -   Contact your endocrinologist (diabetes specialist) if you have sugars consistently running over 330 or if you have multiple sugars consistently under 80, or any single sugar under 70  It is very important to take not just the long-acting insulin but also all of the mealtime doses as we have seen in the hospital that you do require both to control your sugars better  Try to eat a consistent amount with each meal at breakfast, lunch, and dinner  Follow a diabetic diet with avoidance of concentrated sweets and reduction of concentrated carbohydrates  See also separate instructions for plate method to balance diet  Please review this entire after visit summary as additional general instructions including medication list, appointments, activity, diet, any pertinent wound care, and other additional recommendations from your care team that may be provided for you        Sincerely,     Melisa Moses, DO

## 2022-12-08 RX ORDER — INSULIN GLARGINE 100 [IU]/ML
30 INJECTION, SOLUTION SUBCUTANEOUS DAILY
Qty: 15 ML | Refills: 0 | Status: SHIPPED | OUTPATIENT
Start: 2022-12-08 | End: 2022-12-15

## 2022-12-10 ENCOUNTER — HOSPITAL ENCOUNTER (INPATIENT)
Facility: HOSPITAL | Age: 32
LOS: 2 days | Discharge: HOME | End: 2022-12-15
Attending: EMERGENCY MEDICINE | Admitting: INTERNAL MEDICINE

## 2022-12-10 DIAGNOSIS — E10.65 TYPE 1 DIABETES MELLITUS WITH HYPERGLYCEMIA (HCC): ICD-10-CM

## 2022-12-10 DIAGNOSIS — E11.00 DIABETES MELLITUS WITH NONKETOTIC HYPEROSMOLARITY (HCC): ICD-10-CM

## 2022-12-10 DIAGNOSIS — Z01.89 ENCOUNTER FOR COMPETENCY EVALUATION: ICD-10-CM

## 2022-12-10 DIAGNOSIS — T38.3X1A INSULIN OVERDOSE: Primary | ICD-10-CM

## 2022-12-10 DIAGNOSIS — E10.10 TYPE 1 DIABETES MELLITUS WITH KETOACIDOSIS WITHOUT COMA (HCC): ICD-10-CM

## 2022-12-10 LAB
BACTERIA BLD CULT: NORMAL
BACTERIA BLD CULT: NORMAL
GLUCOSE SERPL-MCNC: 187 MG/DL (ref 65–140)

## 2022-12-10 RX ADMIN — SODIUM CHLORIDE 1000 ML: 0.9 INJECTION, SOLUTION INTRAVENOUS at 23:50

## 2022-12-11 PROBLEM — E87.1 HYPONATREMIA: Status: ACTIVE | Noted: 2022-12-11

## 2022-12-11 PROBLEM — E16.2 HYPOGLYCEMIA: Status: ACTIVE | Noted: 2022-12-11

## 2022-12-11 LAB
ANION GAP SERPL CALCULATED.3IONS-SCNC: 12 MMOL/L (ref 4–13)
ANION GAP SERPL CALCULATED.3IONS-SCNC: 6 MMOL/L (ref 4–13)
BASOPHILS # BLD AUTO: 0.03 THOUSANDS/ÂΜL (ref 0–0.1)
BASOPHILS NFR BLD AUTO: 0 % (ref 0–1)
BUN SERPL-MCNC: 27 MG/DL (ref 5–25)
BUN SERPL-MCNC: 34 MG/DL (ref 5–25)
CALCIUM SERPL-MCNC: 8.1 MG/DL (ref 8.4–10.2)
CALCIUM SERPL-MCNC: 9.2 MG/DL (ref 8.4–10.2)
CHLORIDE SERPL-SCNC: 101 MMOL/L (ref 96–108)
CHLORIDE SERPL-SCNC: 110 MMOL/L (ref 96–108)
CO2 SERPL-SCNC: 16 MMOL/L (ref 21–32)
CO2 SERPL-SCNC: 18 MMOL/L (ref 21–32)
CREAT SERPL-MCNC: 0.57 MG/DL (ref 0.6–1.3)
CREAT SERPL-MCNC: 0.84 MG/DL (ref 0.6–1.3)
EOSINOPHIL # BLD AUTO: 0.04 THOUSAND/ÂΜL (ref 0–0.61)
EOSINOPHIL NFR BLD AUTO: 0 % (ref 0–6)
ERYTHROCYTE [DISTWIDTH] IN BLOOD BY AUTOMATED COUNT: 12.3 % (ref 11.6–15.1)
ERYTHROCYTE [DISTWIDTH] IN BLOOD BY AUTOMATED COUNT: 12.3 % (ref 11.6–15.1)
GFR SERPL CREATININE-BSD FRML MDRD: 124 ML/MIN/1.73SQ M
GFR SERPL CREATININE-BSD FRML MDRD: 93 ML/MIN/1.73SQ M
GLUCOSE SERPL-MCNC: 118 MG/DL (ref 65–140)
GLUCOSE SERPL-MCNC: 148 MG/DL (ref 65–140)
GLUCOSE SERPL-MCNC: 207 MG/DL (ref 65–140)
GLUCOSE SERPL-MCNC: 219 MG/DL (ref 65–140)
GLUCOSE SERPL-MCNC: 219 MG/DL (ref 65–140)
GLUCOSE SERPL-MCNC: 229 MG/DL (ref 65–140)
GLUCOSE SERPL-MCNC: 256 MG/DL (ref 65–140)
GLUCOSE SERPL-MCNC: 284 MG/DL (ref 65–140)
GLUCOSE SERPL-MCNC: 294 MG/DL (ref 65–140)
GLUCOSE SERPL-MCNC: 66 MG/DL (ref 65–140)
GLUCOSE SERPL-MCNC: 77 MG/DL (ref 65–140)
GLUCOSE SERPL-MCNC: 78 MG/DL (ref 65–140)
HCT VFR BLD AUTO: 41.6 % (ref 34.8–46.1)
HCT VFR BLD AUTO: 47.5 % (ref 34.8–46.1)
HGB BLD-MCNC: 14.6 G/DL (ref 11.5–15.4)
HGB BLD-MCNC: 16.6 G/DL (ref 11.5–15.4)
IMM GRANULOCYTES # BLD AUTO: 0.04 THOUSAND/UL (ref 0–0.2)
IMM GRANULOCYTES NFR BLD AUTO: 0 % (ref 0–2)
LYMPHOCYTES # BLD AUTO: 1.13 THOUSANDS/ÂΜL (ref 0.6–4.47)
LYMPHOCYTES NFR BLD AUTO: 10 % (ref 14–44)
MCH RBC QN AUTO: 30.5 PG (ref 26.8–34.3)
MCH RBC QN AUTO: 30.5 PG (ref 26.8–34.3)
MCHC RBC AUTO-ENTMCNC: 34.9 G/DL (ref 31.4–37.4)
MCHC RBC AUTO-ENTMCNC: 35.1 G/DL (ref 31.4–37.4)
MCV RBC AUTO: 87 FL (ref 82–98)
MCV RBC AUTO: 87 FL (ref 82–98)
MONOCYTES # BLD AUTO: 0.55 THOUSAND/ÂΜL (ref 0.17–1.22)
MONOCYTES NFR BLD AUTO: 5 % (ref 4–12)
NEUTROPHILS # BLD AUTO: 9.51 THOUSANDS/ÂΜL (ref 1.85–7.62)
NEUTS SEG NFR BLD AUTO: 85 % (ref 43–75)
NRBC BLD AUTO-RTO: 0 /100 WBCS
PLATELET # BLD AUTO: 304 THOUSANDS/UL (ref 149–390)
PLATELET # BLD AUTO: 334 THOUSANDS/UL (ref 149–390)
PMV BLD AUTO: 8.5 FL (ref 8.9–12.7)
PMV BLD AUTO: 8.6 FL (ref 8.9–12.7)
POTASSIUM SERPL-SCNC: 3.3 MMOL/L (ref 3.5–5.3)
POTASSIUM SERPL-SCNC: 4.5 MMOL/L (ref 3.5–5.3)
RBC # BLD AUTO: 4.78 MILLION/UL (ref 3.81–5.12)
RBC # BLD AUTO: 5.44 MILLION/UL (ref 3.81–5.12)
SODIUM SERPL-SCNC: 129 MMOL/L (ref 135–147)
SODIUM SERPL-SCNC: 134 MMOL/L (ref 135–147)
WBC # BLD AUTO: 11.3 THOUSAND/UL (ref 4.31–10.16)
WBC # BLD AUTO: 8 THOUSAND/UL (ref 4.31–10.16)

## 2022-12-11 RX ORDER — INSULIN GLARGINE 100 [IU]/ML
30 INJECTION, SOLUTION SUBCUTANEOUS EVERY MORNING
Status: DISCONTINUED | OUTPATIENT
Start: 2022-12-11 | End: 2022-12-12

## 2022-12-11 RX ORDER — ACETAMINOPHEN 325 MG/1
650 TABLET ORAL EVERY 6 HOURS PRN
Status: DISCONTINUED | OUTPATIENT
Start: 2022-12-11 | End: 2022-12-15 | Stop reason: HOSPADM

## 2022-12-11 RX ORDER — ONDANSETRON 2 MG/ML
4 INJECTION INTRAMUSCULAR; INTRAVENOUS EVERY 4 HOURS PRN
Status: DISCONTINUED | OUTPATIENT
Start: 2022-12-11 | End: 2022-12-15 | Stop reason: HOSPADM

## 2022-12-11 RX ORDER — POTASSIUM CHLORIDE 20 MEQ/1
40 TABLET, EXTENDED RELEASE ORAL ONCE
Status: COMPLETED | OUTPATIENT
Start: 2022-12-11 | End: 2022-12-11

## 2022-12-11 RX ORDER — INSULIN LISPRO 100 [IU]/ML
6 INJECTION, SOLUTION INTRAVENOUS; SUBCUTANEOUS
Status: DISCONTINUED | OUTPATIENT
Start: 2022-12-11 | End: 2022-12-14

## 2022-12-11 RX ORDER — DEXTROSE AND SODIUM CHLORIDE 5; .9 G/100ML; G/100ML
100 INJECTION, SOLUTION INTRAVENOUS CONTINUOUS
Status: DISCONTINUED | OUTPATIENT
Start: 2022-12-11 | End: 2022-12-11

## 2022-12-11 RX ORDER — INSULIN LISPRO 100 [IU]/ML
1-5 INJECTION, SOLUTION INTRAVENOUS; SUBCUTANEOUS
Status: DISCONTINUED | OUTPATIENT
Start: 2022-12-11 | End: 2022-12-12

## 2022-12-11 RX ORDER — INSULIN LISPRO 100 [IU]/ML
1-5 INJECTION, SOLUTION INTRAVENOUS; SUBCUTANEOUS
Status: DISCONTINUED | OUTPATIENT
Start: 2022-12-11 | End: 2022-12-11

## 2022-12-11 RX ADMIN — INSULIN LISPRO 6 UNITS: 100 INJECTION, SOLUTION INTRAVENOUS; SUBCUTANEOUS at 17:09

## 2022-12-11 RX ADMIN — POTASSIUM CHLORIDE 40 MEQ: 1500 TABLET, EXTENDED RELEASE ORAL at 07:06

## 2022-12-11 RX ADMIN — DEXTROSE AND SODIUM CHLORIDE 100 ML/HR: 5; .9 INJECTION, SOLUTION INTRAVENOUS at 04:47

## 2022-12-11 RX ADMIN — INSULIN LISPRO 2 UNITS: 100 INJECTION, SOLUTION INTRAVENOUS; SUBCUTANEOUS at 11:53

## 2022-12-11 RX ADMIN — INSULIN GLARGINE 30 UNITS: 100 INJECTION, SOLUTION SUBCUTANEOUS at 11:53

## 2022-12-11 NOTE — ASSESSMENT & PLAN NOTE
Patient normally takes 30 units of Lantus in the morning  AM fasting glucose on 12/10 was >500 so patient took 60 units of Lantus  Blood sugar checked in the evening was in the 50's  Only symptom was fatigue  Fingerstick glucose upon arrival to ED was 187-> 294-> 66  Poison control contacted by ED  Per recommendation, will monitor overnight  Received 1 L normal saline bolus in ED  D5  cc/hr x 5 hours for persistent hypoglycemia  Neurocognitive eval revealed that patient has capacity to make medical decisions  Plan:  Carb controlled diet  Lantus reduced to 24 units nightly  Lispro 6 units 3 times daily  Endocrinology team on board

## 2022-12-11 NOTE — ED ATTENDING ATTESTATION
12/10/2022  IAdriane MD, saw and evaluated the patient  I have discussed the patient with the resident/non-physician practitioner and agree with the resident's/non-physician practitioner's findings, Plan of Care, and MDM as documented in the resident's/non-physician practitioner's note, except where noted  All available labs and Radiology studies were reviewed  I was present for key portions of any procedure(s) performed by the resident/non-physician practitioner and I was immediately available to provide assistance  At this point I agree with the current assessment done in the Emergency Department  I have conducted an independent evaluation of this patient a history and physical is as follows:    ED Course         Critical Care Time  Procedures    Patient is a pleasant 29 yof who presents with hypoglycemia  Accidentally took too much insulin, ate food, now feeling better  Took the extra insulin early in the AM      Ate some potatoes tonight  MDM pleasant 29 yof will admit for obs

## 2022-12-11 NOTE — ED NOTES
Patient transported to room [place] [address], receiving RN notified of arrival, PCA at bedside     Dick Carson  12/11/22 [address] [place] [address]

## 2022-12-11 NOTE — ASSESSMENT & PLAN NOTE
Recent Labs     12/10/22  2348 12/11/22  0409   WBC 11 30* 8 00     · WBC 11 3 POA  · No signs of infection  · Likely reactive  · Resolved

## 2022-12-11 NOTE — ASSESSMENT & PLAN NOTE
Recent Labs     12/10/22  2348 12/11/22  0409   SODIUM 129* 134*     · Corrected sodium of 132 at admission    Plan:  · Monitor with labs in a m

## 2022-12-11 NOTE — PLAN OF CARE
Problem: Potential for Falls  Goal: Patient will remain free of falls  Description: INTERVENTIONS:  - Educate patient/family on patient safety including physical limitations  - Instruct patient to call for assistance with activity   - Consult OT/PT to assist with strengthening/mobility   - Keep Call bell within reach  - Keep bed low and locked with side rails adjusted as appropriate  - Keep care items and personal belongings within reach  - Initiate and maintain comfort rounds  - Make Fall Risk Sign visible to staff  - Offer Toileting every  Hours, in advance of need  - Initiate/Maintain alarm  - Obtain necessary fall risk management equipment:   - Apply yellow socks and bracelet for high fall risk patients  - Consider moving patient to room near nurses station  Outcome: Progressing     Problem: DISCHARGE PLANNING  Goal: Discharge to home or other facility with appropriate resources  Description: INTERVENTIONS:  - Identify barriers to discharge w/patient and caregiver  - Arrange for needed discharge resources and transportation as appropriate  - Identify discharge learning needs (meds, wound care, etc )  - Arrange for interpretive services to assist at discharge as needed  - Refer to Case Management Department for coordinating discharge planning if the patient needs post-hospital services based on physician/advanced practitioner order or complex needs related to functional status, cognitive ability, or social support system  Outcome: Progressing     Problem: Knowledge Deficit  Goal: Patient/family/caregiver demonstrates understanding of disease process, treatment plan, medications, and discharge instructions  Description: Complete learning assessment and assess knowledge base    Interventions:  - Provide teaching at level of understanding  - Provide teaching via preferred learning methods  Outcome: Progressing     Problem: METABOLIC, FLUID AND ELECTROLYTES - ADULT  Goal: Electrolytes maintained within normal limits  Description: INTERVENTIONS:  - Monitor labs and assess patient for signs and symptoms of electrolyte imbalances  - Administer electrolyte replacement as ordered  - Monitor response to electrolyte replacements, including repeat lab results as appropriate  - Instruct patient on fluid and nutrition as appropriate  Outcome: Progressing  Goal: Fluid balance maintained  Description: INTERVENTIONS:  - Monitor labs   - Monitor I/O and WT  - Instruct patient on fluid and nutrition as appropriate  - Assess for signs & symptoms of volume excess or deficit  Outcome: Progressing  Goal: Glucose maintained within target range  Description: INTERVENTIONS:  - Monitor Blood Glucose as ordered  - Assess for signs and symptoms of hyperglycemia and hypoglycemia  - Administer ordered medications to maintain glucose within target range  - Assess nutritional intake and initiate nutrition service referral as needed  Outcome: Progressing

## 2022-12-11 NOTE — ASSESSMENT & PLAN NOTE
Patient normally takes 30 units of Lantus in the morning  AM fasting glucose on 12/10 was >500 so patient took 60 units of Lantus  Blood sugar checked in the evening was in the 50's  Only symptom was fatigue  Fingerstick glucose upon arrival to ED was 187, and 294 an hour later    Plan:  · Poison control contacted by ED   Per recommendation, will monitor overnight  · Received 1 L normal saline bolus in ED  · Will start on insulin drip algorithm 1 for better regulation of glucose levels  · Switch to subcutaneous insulin in the morning  · Carb controlled diet  · Frequent glucose checks

## 2022-12-11 NOTE — ASSESSMENT & PLAN NOTE
Lab Results   Component Value Date    HGBA1C 12 6 (H) 12/05/2022       Recent Labs     12/10/22  2214   POCGLU 187*       Blood Sugar Average: Last 72 hrs:  (P) 187     · Recent hospital admission for severe symptomatic hyperglycemia  Seen by inpatient endocrinologist at the time    Discharged home on Lantus 30 units every morning and lispro 6 units 3 times daily  · Needs to follow-up with outpatient endocrinologist  · Insulin plan as above

## 2022-12-11 NOTE — ASSESSMENT & PLAN NOTE
Lab Results   Component Value Date    HGBA1C 12 6 (H) 12/05/2022       Recent Labs     12/10/22  2214 12/11/22  0253 12/11/22  0407 12/11/22  0431   POCGLU 187* 118 66 77       Blood Sugar Average: Last 72 hrs:  (P) 112       Home medication regimen includes Lantus 30 units nightly and lispro 6 units 3 times daily  Patient stated that she was not compliant with medication as she did not have insulin pen needles although she had the insulin pens  She then found 1 pen with a needle and injected 60 units instead of 30      Plan:  · Lantus 24 units nightly  · Lispro 6 units 3 times daily with meals  · SSI  · POCT glucose checks  · Hypoglycemia protocol  · Diabetic diet  · Endocrinology on board

## 2022-12-11 NOTE — H&P
[facility]  H&P- Aydee Williamson 1990, 29 y o  female MRN: [id number]  Unit/Bed#: SELENA Encounter: [id number]  Primary Care Provider: Raymundo Marie DO   Date and time admitted to hospital: 12/10/2022 10:13 PM    * Hypoglycemia  Assessment & Plan  Patient normally takes 30 units of Lantus in the morning  AM fasting glucose on 12/10 was >500 so patient took 60 units of Lantus  Blood sugar checked in the evening was in the 50's  Only symptom was fatigue  Fingerstick glucose upon arrival to ED was 187-> 294-> 66    Plan:  · Poison control contacted by ED  Per recommendation, will monitor overnight  · Received 1 L normal saline bolus in ED  · Frequent glucose checks  · D5  cc/hr x 5 hours for persistent hypoglycemia  · Carb controlled diet  · Insulin sliding scale for if blood sugar is persistently over 250    Type 1 diabetes mellitus [facility])  Assessment & Plan  Lab Results   Component Value Date    HGBA1C 12 6 (H) 12/05/2022       Recent Labs     12/10/22  2214 12/11/22  0253 12/11/22  0407 12/11/22  0431   POCGLU 187* 118 66 77       Blood Sugar Average: Last 72 hrs:  (P) 112     · Recent hospital admission for severe symptomatic hyperglycemia  Seen by inpatient endocrinologist at the time  Discharged home on Lantus 30 units every morning and lispro 6 units 3 times daily  · Needs to follow-up with outpatient endocrinologist  · Plan as above    Hyponatremia  Assessment & Plan  · Corrected sodium of 132  · Monitor with labs in a m  Leukocytosis  Assessment & Plan  · WBC 11 3 POA  · No signs of infection  · Likely reactive  · Monitor with CBC in a m  VTE Pharmacologic Prophylaxis: VTE Score: 1 Low Risk (Score 0-2) - Encourage Ambulation  Code Status: Level 1 - Full Code   Discussion with family: Patient declined call to        Anticipated Length of Stay: Patient will be admitted on an observation basis with an anticipated length of stay of less than 2 midnights secondary to hypoglycemia  Total Time for Visit, including Counseling / Coordination of Care: 45 minutes Greater than 50% of this total time spent on direct patient counseling and coordination of care  Chief Complaint: Low blood sugar, fatigue    History of Present Illness:  El Mantilla is a 29 y o  female with a PMH of type 1 diabetes who presents with hypoglycemia  Patient states that her fasting glucose yesterday morning was greater than 500 therefore she took 60 units of Lantus  She normally takes Lantus 30 units in the morning  She states that afterwards she felt more tired throughout the day and in the evening when she checked her glucose, it was in the 50's  She ate mashed potatoes and presented to the ED  Of note, patient was recently admitted for severe symptomatic hyperglycemia from 12/5-12/7  She was discharged on AM Lantus 30 units and lispro 6 units three times daily with meals  She has not scheduled an outpatient appointment with endocrinology yet  Fingerstick glucose upon arrival to the ED was 187  ED physician spoke with poison control who recommended 24-hour observation  Random glucose checked an hour later was 294  Patient was seen in no acute distress  She had no complaints, states that she feels better but is tired  Denies nausea, vomiting, diarrhea, chest pain, SOB, rash, dysuria, syncope, lightheadedness fever or chills  Review of Systems:  Review of Systems   Constitutional: Positive for fatigue  Negative for appetite change, chills and fever  Respiratory: Negative for shortness of breath  Cardiovascular: Negative for chest pain, palpitations and leg swelling  Gastrointestinal: Negative for abdominal pain, diarrhea, nausea and vomiting  Genitourinary: Negative for dysuria  Skin: Negative for rash  Neurological: Negative for dizziness, syncope and light-headedness         Past Medical and Surgical History:   Past Medical History:   Diagnosis Date   • Diabetes mellitus ([facility] )     uses kwiki pen    • Diabetes mellitus type 1 ([facility] )    • High blood pressure     recently dx/ put on lisinopril    • High cholesterol    • Hypertension      no hypersion    • Miscarriage    • Recurrent pregnancy loss, antepartum condition or complication        Past Surgical History:   Procedure Laterality Date   •  SECTION  01/02/2014     X1   • DILATION AND CURETTAGE OF UTERUS  04/10/2019    Missed AB    • NV  DELIVERY ONLY N/A 2021    Procedure:  SECTION () REPEAT;  Surgeon: Irene Kelsey MD;  Location: [facility] ;  Service: Obstetrics   • WISDOM TOOTH EXTRACTION         Meds/Allergies:  Prior to Admission medications    Medication Sig Start Date End Date Taking? Authorizing Provider   Accu-Chek FastClix Lancets MISC Use 6 a day and as directed  Patient not taking: Reported on 2022   Lavaughn Bath, CRNP   Continuous Blood Gluc  (Dexcom G6 ) NERY Use as directed  Patient not taking: Reported on 2021   Lavaughn Bath, CRNP   Continuous Blood Gluc Sensor (Dexcom G6 Sensor) MISC 1 box=1 month supply or 3 sensors, use 1 sensor every 10 days  Patient not taking: Reported on 2021   Lavaughn Bath, CRNP   Continuous Blood Gluc Transmit (Dexcom G6 Transmitter) MISC Transmitter change every 90 days  Patient not taking: Reported on 2021   Lavaughn Bath, CRNP   Insulin Glargine Solostar (Lantus SoloStar) 100 UNIT/ML SOPN Inject 0 3 mL (30 Units total) under the skin in the morning 22   Kanwal Rosales, DO   Insulin Glargine Solostar 100 UNIT/ML SOPN Inject 0 3 mL (30 Units total) under the skin in the morning 22   Kanwal Rosales, DO   insulin lispro (HumaLOG KwikPen) 100 units/mL injection pen Inject 6 Units under the skin 3 (three) times a day with meals 22   Kanwal Rosales, DO   Insulin Pen Needle (BD Pen Needle Kerrie 2nd Gen) 32G X 4 MM MISC For use with insulin pen   Pharmacy may dispense brand covered by insurance  3/10/22   Alejandro Santana MD   Insulin Pen Needle (BD Pen Needle Kerrie 2nd Gen) 32G X 4 MM MISC For use with insulin pen  Pharmacy may dispense brand covered by insurance  3/10/22   Alejandro Santana MD   Insulin Pen Needle (BD Pen Needle Kerrie 2nd Gen) 32G X 4 MM MISC For use with insulin pen  Pharmacy may dispense brand covered by insurance  6/16/22   Alejandro Santana MD     I have reviewed home medications with patient personally  Allergies: No Known Allergies    Social History:  Marital Status: Legally    Occupation:  Patient Pre-hospital Living Situation: Alone  Patient Pre-hospital Level of Mobility: walks  Patient Pre-hospital Diet Restrictions:   Substance Use History:   Social History     Substance and Sexual Activity   Alcohol Use Never    Comment: Alcohol intake:   None - As per [place]      Social History     Tobacco Use   Smoking Status Never   Smokeless Tobacco Never     Social History     Substance and Sexual Activity   Drug Use Never    Comment: Illicit drugs:   No  - As per [place]        Family History:  Family History   Problem Relation Age of Onset   • No Known Problems Mother    • Diabetes Father    • No Known Problems Sister    • No Known Problems Sister    • No Known Problems Brother    • No Known Problems Maternal Grandmother    • Diabetes Paternal Grandmother    • No Known Problems Paternal Grandfather    • No Known Problems Daughter        Physical Exam:     Vitals:   Blood Pressure: 128/80 (12/11/22 0400)  Pulse: 91 (12/11/22 0400)  Temperature: 98 3 °F (36 8 °C) (12/10/22 2219)  Temp Source: Oral (12/10/22 2219)  Respirations: 15 (12/11/22 0400)  Height: 4' 10" (147 3 cm) (12/10/22 2217)  Weight - Scale: 61 5 kg (135 lb 9 3 oz) (12/10/22 2217)  SpO2: 100 % (12/11/22 0400)    Physical Exam  Vitals reviewed  Constitutional:       General: She is not in acute distress  Appearance: She is not toxic-appearing  HENT:      Head: Normocephalic and atraumatic  Mouth/Throat:      Pharynx: Oropharynx is clear  Eyes:      Extraocular Movements: Extraocular movements intact  Conjunctiva/sclera: Conjunctivae normal       Pupils: Pupils are equal, round, and reactive to light  Cardiovascular:      Rate and Rhythm: Normal rate and regular rhythm  Pulses: Normal pulses  Pulmonary:      Effort: Pulmonary effort is normal  No respiratory distress  Breath sounds: Normal breath sounds  Abdominal:      General: Bowel sounds are normal  There is no distension  Palpations: Abdomen is soft  Tenderness: There is no abdominal tenderness  Musculoskeletal:         General: No swelling or tenderness  Normal range of motion  Cervical back: Normal range of motion and neck supple  Skin:     General: Skin is warm and dry  Neurological:      General: No focal deficit present  Mental Status: She is alert and oriented to person, place, and time  Additional Data:     Lab Results:  Results from last 7 days   Lab Units 12/11/22  0409 12/10/22  2348   WBC Thousand/uL 8 00 11 30*   HEMOGLOBIN g/dL 14 6 16 6*   HEMATOCRIT % 41 6 47 5*   PLATELETS Thousands/uL 304 334   NEUTROS PCT %  --  85*   LYMPHS PCT %  --  10*   MONOS PCT %  --  5   EOS PCT %  --  0     Results from last 7 days   Lab Units 12/11/22  0409 12/05/22  2237 12/05/22  1556   SODIUM mmol/L 134*   < > 127*   POTASSIUM mmol/L 3 3*   < > 4 5   CHLORIDE mmol/L 110*   < > 91*   CO2 mmol/L 18*   < > 28   BUN mg/dL 27*   < > 17   CREATININE mg/dL 0 57*   < > 0 78   ANION GAP mmol/L 6   < > 8   CALCIUM mg/dL 8 1*   < > 9 3   ALBUMIN g/dL  --   --  3 9   TOTAL BILIRUBIN mg/dL  --   --  0 43   ALK PHOS U/L  --   --  127*   ALT U/L  --   --  11   AST U/L  --   --  9*   GLUCOSE RANDOM mg/dL 78   < > 977*    < > = values in this interval not displayed       Results from last 7 days   Lab Units 12/05/22  1556   INR  0 84     Results from last 7 days   Lab Units 12/11/22  0431 12/11/22 0407 12/11/22  0253 12/10/22  2214 12/07/22  1150 12/07/22  0804 12/06/22  2132 12/06/22  1812 12/06/22  1616 12/06/22  1243 12/06/22  1033 12/06/22  0748   POC GLUCOSE mg/dl 77 66 118 187* 71 184* 144* 211* 240* 304* 295* 150*     Results from last 7 days   Lab Units 12/05/22  1556   HEMOGLOBIN A1C % 12 6*     Results from last 7 days   Lab Units 12/05/22  2149 12/05/22  1859 12/05/22  1556   LACTIC ACID mmol/L 1 2 3 9* 2 1*   PROCALCITONIN ng/ml  --   --  0 05       Lines/Drains:  Invasive Devices     Peripheral Intravenous Line  Duration           Peripheral IV 12/10/22 Left Antecubital <1 day                  ** Please Note: This note has been constructed using a voice recognition system   **

## 2022-12-12 LAB
GLUCOSE SERPL-MCNC: 149 MG/DL (ref 65–140)
GLUCOSE SERPL-MCNC: 184 MG/DL (ref 65–140)
GLUCOSE SERPL-MCNC: 324 MG/DL (ref 65–140)
GLUCOSE SERPL-MCNC: 93 MG/DL (ref 65–140)

## 2022-12-12 RX ORDER — INSULIN LISPRO 100 [IU]/ML
1-5 INJECTION, SOLUTION INTRAVENOUS; SUBCUTANEOUS
Status: DISCONTINUED | OUTPATIENT
Start: 2022-12-12 | End: 2022-12-14

## 2022-12-12 RX ORDER — INSULIN LISPRO 100 [IU]/ML
1-6 INJECTION, SOLUTION INTRAVENOUS; SUBCUTANEOUS
Status: DISCONTINUED | OUTPATIENT
Start: 2022-12-12 | End: 2022-12-14

## 2022-12-12 RX ORDER — INSULIN GLARGINE 100 [IU]/ML
24 INJECTION, SOLUTION SUBCUTANEOUS EVERY MORNING
Status: DISCONTINUED | OUTPATIENT
Start: 2022-12-13 | End: 2022-12-13

## 2022-12-12 RX ADMIN — INSULIN LISPRO 3 UNITS: 100 INJECTION, SOLUTION INTRAVENOUS; SUBCUTANEOUS at 12:18

## 2022-12-12 RX ADMIN — INSULIN LISPRO 6 UNITS: 100 INJECTION, SOLUTION INTRAVENOUS; SUBCUTANEOUS at 17:31

## 2022-12-12 RX ADMIN — INSULIN LISPRO 1 UNITS: 100 INJECTION, SOLUTION INTRAVENOUS; SUBCUTANEOUS at 22:17

## 2022-12-12 RX ADMIN — INSULIN GLARGINE 30 UNITS: 100 INJECTION, SOLUTION SUBCUTANEOUS at 08:09

## 2022-12-12 RX ADMIN — INSULIN LISPRO 6 UNITS: 100 INJECTION, SOLUTION INTRAVENOUS; SUBCUTANEOUS at 12:19

## 2022-12-12 NOTE — UTILIZATION REVIEW
Initial Clinical Review    Admission: Date/Time/Statement:   Admission Orders (From admission, onward)     Ordered        12/11/22 0130  Place in Observation  Once                      Orders Placed This Encounter   Procedures   • Place in Observation     Standing Status:   Standing     Number of Occurrences:   1     Order Specific Question:   Level of Care     Answer:   Med Surg [16]     ED Arrival Information     Expected   -    Arrival   12/10/2022 22:05    Acuity   Urgent            Means of arrival   Walk-In    Escorted by   Friend    Service   Hospitalist    Admission type   Emergency            Arrival complaint   LOW BLOOD SUGAR / Jannice Gene / SOB           Chief Complaint   Patient presents with   • Hypoglycemia - no symptoms     Patient reports having low blood sugar at home, ate food and came to Ed  Patient reports that she hasn't had her insulin for a few days and she had it today, her blood sugar was high so she took 60 units of lantus instead of 30 and 8 units of lispro instead of 5  Initial Presentation: 29 y o  female with hx DM1 who presents to ED with hypoglycemia  Pt recently admitted for severe symptomatic hyperglycemia   Pt reports fasting glucose 12/10 morning > 500 therefore she took 60 units of Lantus  (normally takes Lantus 30 units in am)  Afterwards pt felt more tired throughout the day and in the evening when she checked her glucose, it was in the 50's  She ate mashed potatoes and presented to the ED  On exam, pt tired,  some degree of cognitive impairment with slow answers to questions  Pt gives vague brief answers to questions , concern for capacity to make her own medical decisions   Pt homeless w/o family support  Labs - glucose 187-->294, WBC elevated 11 30, K 3 3, low sodium-corrects to 132   Pt given IVF , po potassium in ED  PT admitted w/ hypoglycemia in DM1 pt, hyponatremia   Plan - endocrine and neuro psych consult , frequent acchucheks, SSI, carb controlled diet, IVF-D5NS x 5 hrs   Monitor sodium level   Date: 12/12  Endocrinology consult- recommend reducing Lantus to 24 units every morning   Continue with Humalog 6 units with meals  SSI, Accuchek   Patient needs extensive education on diabetes management, there is a component of lack of education and neuropsych evaluation might be beneficial    Behavioral health consult- Alert, oriented x 3, cooperative with pleasant affect; denied depressed mood and anxiety; no overt evidence of psychotic process; patient appeared aware of reason for hospitalization, medical history and what she is being treated for in hospital  Pt appears to have capacity to make informed medical decisions         ED Triage Vitals   Temperature Pulse Respirations Blood Pressure SpO2   12/10/22 2219 12/10/22 2217 12/10/22 2217 12/10/22 2217 12/10/22 2217   98 3 °F (36 8 °C) (!) 121 16 128/76 98 %      Temp Source Heart Rate Source Patient Position - Orthostatic VS BP Location FiO2 (%)   12/10/22 2219 12/10/22 2217 12/10/22 2217 12/10/22 2217 --   Oral Monitor Sitting Right arm       Pain Score       12/10/22 2217       No Pain          Wt Readings from Last 1 Encounters:   12/10/22 61 5 kg (135 lb 9 3 oz)     Additional Vital Signs:   Date/Time Temp Pulse Resp BP MAP (mmHg) SpO2   12/12/22 08:17:32 98 8 °F (37 1 °C) 112 Abnormal  17 115/71 86 98 %   12/11/22 21:18:48 -- 102 -- 115/70 85 98 %   12/11/22 15:40:09 98 5 °F (36 9 °C) -- -- 115/70 85 --   12/11/22 09:46:16 98 8 °F (37 1 °C) 99 17 120/74 89 99 %   12/11/22 0931 98 4 °F (36 9 °C) 98 16 127/81 -- 99 %   12/11/22 0710 -- 92 16 127/80 -- 100 %   12/11/22 0400 -- 91 15 128/80 97 100 %   12/11/22 0300 -- 95 15 129/81 98 99 %   12/11/22 0126 -- -- -- -- -- --   12/11/22 0100 -- 102 15 123/85 98 99 %     Date and Time Eye Opening Best Verbal Response Best Motor Response Ethan Coma Scale Score   12/12/22 0800 4 5 6 15   12/11/22 1000 4 5 6 15   12/11/22 0140 4 5 6 15   12/11/22 0126 4 5 6 15     Pertinent Labs/Diagnostic Test Results:         Results from last 7 days   Lab Units 12/11/22  0409 12/10/22  2348   WBC Thousand/uL 8 00 11 30*   HEMOGLOBIN g/dL 14 6 16 6*   HEMATOCRIT % 41 6 47 5*   PLATELETS Thousands/uL 304 334   NEUTROS ABS Thousands/µL  --  9 51*         Results from last 7 days   Lab Units 12/11/22  0409 12/10/22  2348 12/07/22  1147 12/05/22  2237   SODIUM mmol/L 134* 129* 141 134*   POTASSIUM mmol/L 3 3* 4 5 3 8 3 4*   CHLORIDE mmol/L 110* 101 105 102   CO2 mmol/L 18* 16* 28 25   ANION GAP mmol/L 6 12 8 7   BUN mg/dL 27* 34* 13 10   CREATININE mg/dL 0 57* 0 84 0 64 0 39*   EGFR ml/min/1 73sq m 124 93 120 141   CALCIUM mg/dL 8 1* 9 2 9 1 7 6*   MAGNESIUM mg/dL  --   --  1 9  --          Results from last 7 days   Lab Units 12/12/22  1623 12/12/22  1108 12/12/22  0804 12/11/22  2110 12/11/22  1631 12/11/22  1115 12/11/22  0949 12/11/22  0909 12/11/22  0709 12/11/22  0530 12/11/22  0431 12/11/22  0407   POC GLUCOSE mg/dl 149* 324* 93 148* 229* 284* 256* 219* 207* 219* 77 66     Results from last 7 days   Lab Units 12/11/22  0409 12/10/22  2348 12/07/22  1147 12/05/22  2237   GLUCOSE RANDOM mg/dL 78 294* 69 207*             BETA-HYDROXYBUTYRATE   Date Value Ref Range Status   12/05/2022 0 2 <0 6 mmol/L Final   06/28/2022 0 2 <0 6 mmol/L Final   06/13/2022 7 4 (H) <0 6 mmol/L Final   03/08/2022 5 6 (H) <0 6 mmol/L Final   06/22/2021 0 1 <0 6 mmol/L Final   04/20/2021 5 3 (H) <0 6 mmol/L Final   04/20/2021 5 6 (H) <0 6 mmol/L Final   01/27/2021 5 2 (H) <0 6 mmol/L Final   11/12/2020 5 7 (H) <0 6 mmol/L Final                      Results from last 7 days   Lab Units 12/05/22  1859   HS TNI 2HR ng/L <2                     Results from last 7 days   Lab Units 12/05/22  2149 12/05/22  1859   LACTIC ACID mmol/L 1 2 3 9*             ED Treatment:   Medication Administration from 12/10/2022 0300 to 12/11/2022 4197       Date/Time Order Dose Route Action Comments     12/10/2022 1768 EST sodium chloride 0 9 % bolus 1,000 mL 1,000 mL Intravenous New Bag --     12/11/2022 0416 EST insulin regular (HumuLIN R,NovoLIN R) 1 Units/mL in sodium chloride 0 9 % 100 mL infusion 0 3 Units/hr Intravenous Not Given --     12/11/2022 0447 EST dextrose 5 % and sodium chloride 0 9 % infusion 100 mL/hr Intravenous New Bag --     12/11/2022 0717 EST insulin lispro (HumaLOG) 100 units/mL subcutaneous injection 1-5 Units 0 Units Subcutaneous Not Given bs 207     12/11/2022 0706 EST potassium chloride (K-DUR,KLOR-CON) CR tablet 40 mEq 40 mEq Oral Given --        Past Medical History:   Diagnosis Date   • Diabetes mellitus ([place] [address] )     uses kwiki pen    • Diabetes mellitus type 1 ([facility] )    • High blood pressure     recently dx/ put on lisinopril    • High cholesterol    • Hypertension      no hypersion    • Miscarriage    • Recurrent pregnancy loss, antepartum condition or complication      Present on Admission:  • Type 1 diabetes mellitus (HCC)  • Leukocytosis      Admitting Diagnosis: Insulin overdose [T38 3X1A]  Hypoglycemia [E16 2]  Type 1 diabetes mellitus with hyperglycemia ([facility] ) [E10 65]  Age/Sex: 29 y o  female  Admission Orders:  Scheduled Medications:  [START ON 12/13/2022] insulin glargine, 24 Units, Subcutaneous, QAM  insulin lispro, 1-5 Units, Subcutaneous, HS  insulin lispro, 1-6 Units, Subcutaneous, TID AC  insulin lispro, 6 Units, Subcutaneous, TID With Meals    insulin glargine (LANTUS) subcutaneous injection 30 Units 0 3 mL  Dose: 30 Units  Freq: Every morning Route: SC  Start: 12/11/22 1045 End: 12/12/22 1712  Continuous IV Infusions:     PRN Meds:  acetaminophen, 650 mg, Oral, Q6H PRN  ondansetron, 4 mg, Intravenous, Q4H PRN      poct glucose   ambulation   monitor for hypoglycemia     IP CONSULT TO ENDOCRINOLOGY  IP CONSULT TO NEUROPSYCHOLOGY    Network Utilization Review Department  ATTENTION: Please call with any questions or concerns to [phone] and carefully listen to the prompts so that you are directed to the right person  All voicemails are confidential   [facility] all requests for admission clinical reviews, approved or denied determinations and any other requests to dedicated fax number below belonging to the campus where the patient is receiving treatment   List of dedicated fax numbers for the Facilities:  [address] DENIALS (Administrative/Medical Necessity) [phone]   [address] (Maternity/NICU/Pediatrics) [phone]   [address] [phone]   [facility]  [phone]   [address] [place] [address] [facility] [phone]   [address] [facility] Alexandria Olav [facility] 134 815 [facility] [phone]
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
## 2022-12-12 NOTE — PLAN OF CARE
Problem: Potential for Falls  Goal: Patient will remain free of falls  Description: INTERVENTIONS:  - Educate patient/family on patient safety including physical limitations  - Instruct patient to call for assistance with activity   - Consult OT/PT to assist with strengthening/mobility   - Keep Call bell within reach  - Keep bed low and locked with side rails adjusted as appropriate  - Keep care items and personal belongings within reach  - Initiate and maintain comfort rounds  - Make Fall Risk Sign visible to staff  - Offer Toileting every 2 Hours, in advance of need  - Initiate/Maintain alarm  - Obtain necessary fall risk management equipment  - Apply yellow socks and bracelet for high fall risk patients  - Consider moving patient to room near nurses station  Outcome: Progressing     Problem: DISCHARGE PLANNING  Goal: Discharge to home or other facility with appropriate resources  Description: INTERVENTIONS:  - Identify barriers to discharge w/patient and caregiver  - Arrange for needed discharge resources and transportation as appropriate  - Identify discharge learning needs (meds, wound care, etc )  - Arrange for interpretive services to assist at discharge as needed  - Refer to Case Management Department for coordinating discharge planning if the patient needs post-hospital services based on physician/advanced practitioner order or complex needs related to functional status, cognitive ability, or social support system  Outcome: Progressing     Problem: Knowledge Deficit  Goal: Patient/family/caregiver demonstrates understanding of disease process, treatment plan, medications, and discharge instructions  Description: Complete learning assessment and assess knowledge base    Interventions:  - Provide teaching at level of understanding  - Provide teaching via preferred learning methods  Outcome: Progressing     Problem: METABOLIC, FLUID AND ELECTROLYTES - ADULT  Goal: Electrolytes maintained within normal limits  Description: INTERVENTIONS:  - Monitor labs and assess patient for signs and symptoms of electrolyte imbalances  - Administer electrolyte replacement as ordered  - Monitor response to electrolyte replacements, including repeat lab results as appropriate  - Instruct patient on fluid and nutrition as appropriate  Outcome: Progressing  Goal: Fluid balance maintained  Description: INTERVENTIONS:  - Monitor labs   - Monitor I/O and WT  - Instruct patient on fluid and nutrition as appropriate  - Assess for signs & symptoms of volume excess or deficit  Outcome: Progressing  Goal: Glucose maintained within target range  Description: INTERVENTIONS:  - Monitor Blood Glucose as ordered  - Assess for signs and symptoms of hyperglycemia and hypoglycemia  - Administer ordered medications to maintain glucose within target range  - Assess nutritional intake and initiate nutrition service referral as needed  Outcome: Progressing
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
## 2022-12-13 LAB
GLUCOSE SERPL-MCNC: 102 MG/DL (ref 65–140)
GLUCOSE SERPL-MCNC: 114 MG/DL (ref 65–140)
GLUCOSE SERPL-MCNC: 196 MG/DL (ref 65–140)
GLUCOSE SERPL-MCNC: 458 MG/DL (ref 65–140)
GLUCOSE SERPL-MCNC: 61 MG/DL (ref 65–140)
GLUCOSE SERPL-MCNC: 83 MG/DL (ref 65–140)

## 2022-12-13 RX ORDER — INSULIN GLARGINE 100 [IU]/ML
20 INJECTION, SOLUTION SUBCUTANEOUS EVERY MORNING
Status: DISCONTINUED | OUTPATIENT
Start: 2022-12-14 | End: 2022-12-14

## 2022-12-13 RX ADMIN — INSULIN LISPRO 6 UNITS: 100 INJECTION, SOLUTION INTRAVENOUS; SUBCUTANEOUS at 12:00

## 2022-12-13 RX ADMIN — INSULIN LISPRO 2 UNITS: 100 INJECTION, SOLUTION INTRAVENOUS; SUBCUTANEOUS at 17:22

## 2022-12-13 RX ADMIN — INSULIN GLARGINE 24 UNITS: 100 INJECTION, SOLUTION SUBCUTANEOUS at 09:36

## 2022-12-13 RX ADMIN — INSULIN LISPRO 6 UNITS: 100 INJECTION, SOLUTION INTRAVENOUS; SUBCUTANEOUS at 17:22

## 2022-12-13 NOTE — PROGRESS NOTES
[facility]  Progress Note - Marychuydiomedes Alisia 1990, 29 y o  female MRN: [id number]  Unit/Bed#: W -01 Encounter: [id number]  Primary Care Provider: Lyle Drake DO   Date and time admitted to hospital: 12/10/2022 10:13 PM    * Hypoglycemia  Assessment & Plan  Patient normally takes 30 units of Lantus in the morning  AM fasting glucose on 12/10 was >500 so patient took 60 units of Lantus  Blood sugar checked in the evening was in the 50's  Only symptom was fatigue  Fingerstick glucose upon arrival to ED was 187-> 294-> 66  Poison control contacted by ED  Per recommendation, will monitor overnight  Received 1 L normal saline bolus in ED  D5  cc/hr x 5 hours for persistent hypoglycemia  Neurocognitive eval revealed that patient has capacity to make medical decisions  Plan:  Carb controlled diet  Lantus reduced to 24 units nightly  Lispro 6 units 3 times daily  Endocrinology team on board  Hyponatremia  Assessment & Plan  Recent Labs     12/10/22  2348 12/11/22  0409   SODIUM 129* 134*     · Corrected sodium of 132 at admission    Plan:  · Monitor with labs in a m  Type 1 diabetes mellitus [facility])  Assessment & Plan  Lab Results   Component Value Date    HGBA1C 12 6 (H) 12/05/2022       Recent Labs     12/10/22  2214 12/11/22  0253 12/11/22  0407 12/11/22  0431   POCGLU 187* 118 66 77       Blood Sugar Average: Last 72 hrs:  (P) 112       Home medication regimen includes Lantus 30 units nightly and lispro 6 units 3 times daily  Patient stated that she was not compliant with medication as she did not have insulin pen needles although she had the insulin pens  She then found 1 pen with a needle and injected 60 units instead of 30      Plan:  · Lantus 24 units nightly  · Lispro 6 units 3 times daily with meals  · SSI  · POCT glucose checks  · Hypoglycemia protocol  · Diabetic diet  · Endocrinology on board      Leukocytosis  Assessment & Plan  Recent Labs 12/10/22  2348 22  0409   WBC 11 30* 8 00     · WBC 11 3 POA  · No signs of infection  · Likely reactive  · Resolved        VTE Pharmacologic Prophylaxis:   VTE Score: 1 Low Risk (Score 0-2) - Encourage Ambulation  Mechanical VTE Prophylaxis in Place: Yes    Patient Centered Rounds: I have performed bedside rounds with nursing staff today  Discussions with Specialists or Other Care Team Provider: Endocrinology team   Neuro psychology team     Education and Discussions with Family / Patient: Attempted to update  (mother) via phone  Left voicemail  Current Length of Stay: 0 day(s)    Current Patient Status: Observation     Discharge Plan / Estimated Discharge Date: Anticipate discharge tomorrow to home  Code Status: Level 1 - Full Code      Subjective:   Patient was awake and sitting up in the chair  Patient states that she was supposed to be on a flight to [place] to her mother at about 6 AM however, had failed to mention it earlier  She was wondering if she could get a letter to present to the airline to get a refund  Patient states that she had trouble with her insulin pen needles as she did not have the pen needles to administer her insulin  She then found 1 pen with a needle and injected double the dose which led her to be readmitted for hypoglycemia  She denies any discomfort  No headaches, nausea, vomiting or lightheadedness    Objective:     Vitals:   Temp (24hrs), Av 8 °F (37 1 °C), Min:98 7 °F (37 1 °C), Max:98 8 °F (37 1 °C)    Temp:  [98 7 °F (37 1 °C)-98 8 °F (37 1 °C)] 98 7 °F (37 1 °C)  HR:  [102-112] 102  Resp:  [17] 17  BP: ()/(54-71) 92/54  SpO2:  [98 %] 98 %  Body mass index is 28 34 kg/m²  Input and Output Summary (last 24 hours):     No intake or output data in the 24 hours ending 22    Physical Exam:     Physical Exam  Vitals and nursing note reviewed  Constitutional:       General: She is not in acute distress       Appearance: Normal appearance  She is not ill-appearing, toxic-appearing or diaphoretic  HENT:      Head: Normocephalic and atraumatic  Nose: Nose normal    Eyes:      General: No scleral icterus  Right eye: No discharge  Left eye: No discharge  Extraocular Movements: Extraocular movements intact  Pupils: Pupils are equal, round, and reactive to light  Neck:      Vascular: No carotid bruit  Cardiovascular:      Rate and Rhythm: Normal rate and regular rhythm  Pulses: Normal pulses  Heart sounds: Normal heart sounds  No murmur heard  No friction rub  No gallop  Pulmonary:      Effort: Pulmonary effort is normal       Breath sounds: Normal breath sounds  No wheezing, rhonchi or rales  Abdominal:      General: Bowel sounds are normal       Palpations: Abdomen is soft  Tenderness: There is no abdominal tenderness  There is no guarding or rebound  Musculoskeletal:         General: No swelling or tenderness  Cervical back: Normal range of motion and neck supple  No rigidity  No muscular tenderness  Right lower leg: No edema  Left lower leg: No edema  Skin:     Capillary Refill: Capillary refill takes less than 2 seconds  Coloration: Skin is not jaundiced  Findings: No bruising, erythema, lesion or rash  Neurological:      General: No focal deficit present  Mental Status: She is alert and oriented to person, place, and time  Motor: No weakness  Gait: Gait normal    Psychiatric:         Mood and Affect: Mood normal          Behavior: Behavior normal          Thought Content:  Thought content normal          Judgment: Judgment normal           Additional Data:     Labs:  Results from last 7 days   Lab Units 12/11/22  0409 12/10/22  2348   WBC Thousand/uL 8 00 11 30*   HEMOGLOBIN g/dL 14 6 16 6*   HEMATOCRIT % 41 6 47 5*   PLATELETS Thousands/uL 304 334   NEUTROS PCT %  --  85*   LYMPHS PCT %  --  10*   MONOS PCT %  --  5   EOS PCT % --  0     Results from last 7 days   Lab Units 12/11/22  0409   SODIUM mmol/L 134*   POTASSIUM mmol/L 3 3*   CHLORIDE mmol/L 110*   CO2 mmol/L 18*   BUN mg/dL 27*   CREATININE mg/dL 0 57*   ANION GAP mmol/L 6   CALCIUM mg/dL 8 1*   GLUCOSE RANDOM mg/dL 78         Results from last 7 days   Lab Units 12/12/22  1623 12/12/22  1108 12/12/22  0804 12/11/22  2110 12/11/22  1631 12/11/22  1115 12/11/22  0949 12/11/22  0909 12/11/22  0709 12/11/22  0530 12/11/22  0431 12/11/22  0407   POC GLUCOSE mg/dl 149* 324* 93 148* 229* 284* 256* 219* 207* 219* 77 66         Results from last 7 days   Lab Units 12/05/22  2149   LACTIC ACID mmol/L 1 2       Imaging: No pertinent imaging reviewed      Recent Cultures (last 7 days):           Lines/Drains:  Invasive Devices     Peripheral Intravenous Line  Duration           Peripheral IV 12/10/22 Left Antecubital 1 day                Telemetry: N/A       Last 24 Hours Medication List:   Current Facility-Administered Medications   Medication Dose Route Frequency Provider Last Rate   • acetaminophen  650 mg Oral Q6H PRN Zac Caraballo MD     • [START ON 12/13/2022] insulin glargine  24 Units Subcutaneous QAM Drea Mar MD     • insulin lispro  1-5 Units Subcutaneous HS Cassandra Cole MD     • insulin lispro  1-6 Units Subcutaneous TID AC Cassandra Cole MD     • insulin lispro  6 Units Subcutaneous TID With Meals Durga Vázquez DO     • ondansetron  4 mg Intravenous Q4H PRN Zac Caraballo MD       Nutrition Assessment and Intervention:     Reviewed food recall journal      Physical Activity Assessment and Intervention:    Activity journal reviewed      Emotional and Mental Well-being, Sleep, Connectedness Assessment and Intervention:    Sleep/stress assessment performed    Depression and anxiety screening performed and reviewed      Tobacco and Toxic Substance Assessment and Intervention:     Tobacco use screening performed    Alcohol and drug use screening performed         Today, Patient Was Seen By: Brendon Link MD    ** Please Note: This note has been constructed using a voice recognition system   **

## 2022-12-13 NOTE — ASSESSMENT & PLAN NOTE
Lab Results   Component Value Date    HGBA1C 12 6 (H) 12/05/2022       Recent Labs     12/12/22  2110 12/13/22  0748 12/13/22  0816 12/13/22  1048   POCGLU 184* 61* 83 458*       Blood Sugar Average: Last 72 hrs:  (P) 186 [id number]       Home medication regimen includes Lantus 30 units nightly and lispro 6 units 3 times daily  Patient stated that she was not compliant with medication as she did not have insulin pen needles although she had the insulin pens  She then found 1 pen with a needle and injected 60 units instead of 30      Plan:  · Lantus 24 units nightly  · Lispro 6 units 3 times daily with meals  · SSI  · POCT glucose checks  · Hypoglycemia protocol  · Diabetic diet  · Endocrinology on board

## 2022-12-13 NOTE — PLAN OF CARE
Problem: Potential for Falls  Goal: Patient will remain free of falls  Description: INTERVENTIONS:  - Educate patient/family on patient safety including physical limitations  - Instruct patient to call for assistance with activity   - Consult OT/PT to assist with strengthening/mobility   - Keep Call bell within reach  - Keep bed low and locked with side rails adjusted as appropriate  - Keep care items and personal belongings within reach  - Initiate and maintain comfort rounds  - Make Fall Risk Sign visible to staff  - Obtain necessary fall risk management equipment:   - Apply yellow socks and bracelet for high fall risk patients  - Consider moving patient to room near nurses station  Outcome: Progressing     Problem: DISCHARGE PLANNING  Goal: Discharge to home or other facility with appropriate resources  Description: INTERVENTIONS:  - Identify barriers to discharge w/patient and caregiver  - Arrange for needed discharge resources and transportation as appropriate  - Identify discharge learning needs (meds, wound care, etc )  - Arrange for interpretive services to assist at discharge as needed  - Refer to Case Management Department for coordinating discharge planning if the patient needs post-hospital services based on physician/advanced practitioner order or complex needs related to functional status, cognitive ability, or social support system  Outcome: Progressing     Problem: Knowledge Deficit  Goal: Patient/family/caregiver demonstrates understanding of disease process, treatment plan, medications, and discharge instructions  Description: Complete learning assessment and assess knowledge base    Interventions:  - Provide teaching at level of understanding  - Provide teaching via preferred learning methods  Outcome: Progressing     Problem: METABOLIC, FLUID AND ELECTROLYTES - ADULT  Goal: Electrolytes maintained within normal limits  Description: INTERVENTIONS:  - Monitor labs and assess patient for signs and symptoms of electrolyte imbalances  - Administer electrolyte replacement as ordered  - Monitor response to electrolyte replacements, including repeat lab results as appropriate  - Instruct patient on fluid and nutrition as appropriate  Outcome: Progressing  Goal: Fluid balance maintained  Description: INTERVENTIONS:  - Monitor labs   - Monitor I/O and WT  - Instruct patient on fluid and nutrition as appropriate  - Assess for signs & symptoms of volume excess or deficit  Outcome: Progressing  Goal: Glucose maintained within target range  Description: INTERVENTIONS:  - Monitor Blood Glucose as ordered  - Assess for signs and symptoms of hyperglycemia and hypoglycemia  - Administer ordered medications to maintain glucose within target range  - Assess nutritional intake and initiate nutrition service referral as needed  Outcome: Progressing

## 2022-12-13 NOTE — PROGRESS NOTES
Progress Note - Prabhjot Alejo 29 y o  female MRN: [id number]    Unit/Bed#: W -01 Encounter: [id number]      CC: diabetes f/u    Subjective: This is a 29y o -year-old female with past medical history of type 1 diabetes mellitus without any complications, who presents to the emergency room for hypoglycemia evaluation  Endocrinology consulted for the management of type 1 diabetes mellitus  Feels well  Pt reported that she wants to go home  Pt was noted to have an episode of hypoglycemia this am with BG of 61, pt reports that she did not have any sx  Pt reports to have good appetite and finishes 100% of her meals    Objective:     Vitals: Blood pressure 102/64, pulse 88, temperature 98 7 °F (37 1 °C), resp  rate 17, height 4' 10" (1 473 m), weight 61 5 kg (135 lb 9 3 oz), SpO2 98 %, not currently breastfeeding  ,Body mass index is 28 34 kg/m²  Intake/Output Summary (Last 24 hours) at 12/13/2022 1336  Last data filed at 12/13/2022 0900  Gross per 24 hour   Intake 240 ml   Output --   Net 240 ml       Physical Exam:  General Appearance: awake, appears stated age and cooperative  Head: Normocephalic, without obvious abnormality, atraumatic  Extremities: moves all extremities  Skin: Skin color and temperature normal    Pulm: no labored breathing    Lab, Imaging and other studies: I have personally reviewed pertinent reports        POC Glucose (mg/dl)   Date Value   12/13/2022 458 (H)   12/13/2022 83   12/13/2022 61 (L)   12/12/2022 184 (H)   12/12/2022 149 (H)   12/12/2022 324 (H)   12/12/2022 93   12/11/2022 148 (H)   12/11/2022 229 (H)   12/11/2022 284 (H)       Assessment and plan:    Type 1 diabetes mellitus with hypoglycemia and hyperglycemia  HbA1c 12 5 December 2022  Home regimen: Lantus 30 units QAM, Humalog 6 units with meals  Inpatient regimen: Lantus 24 units every morning, Humalog 6 units with meals, correctional scale algo 3 with meals and 2 at bedtime     Recommendations:  Decrease Lantus to 20U from Kemi with humalog 6U with meals and correctional scale algo 3 with meals and 2 at bedtime  Close follow-up as outpatient with endocr[facility]ology at [facility] is crucial  Will continue to monitor blood glucose and make adjustments as needed  Monitor for hypoglycemia and treat according to the protocol    Portions of the record may have been created with voice recognition software  Please Tigertext questions to the clinician covering the "[id number]" Role  Thank you
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
## 2022-12-13 NOTE — UTILIZATION REVIEW
Continued Stay Review    Please see initial review completed on 12/12/22 by AJITH Vallecillo RN  OBSERVATION WRITTEN 12/11/22 @ 0130 CONVERTED TO INPATIENT ADMISSION 12/13/22 @ 1608 DUE TO FURTHER DIAGNOSTIC WORKUP REQUIRED FOR GLYCEMIC CONTROL REQUIRING AT LEAST A 2 MIDNIGHT STAY  Admission Orders (From admission, onward)     Ordered        12/13/22 1608  Inpatient Admission  Once            12/11/22 0130  Place in Observation  Once                        Orders Placed This Encounter   Procedures   • Inpatient Admission     Standing Status:   Standing     Number of Occurrences:   1     Order Specific Question:   Level of Care     Answer:   Med Surg [16]     Order Specific Question:   Estimated length of stay     Answer:   More than 2 Midnights     Order Specific Question:   Certification     Answer:   I certify that inpatient services are medically necessary for this patient for a duration of greater than two midnights  See H&P and MD Progress Notes for additional information about the patient's course of treatment  Date: 12/13/22                          HPI:32 y o  female initially admitted on 12/11/22 under Observation then changed to Inpatient dt continued need for glycemic control  Assessment/Plan:  Overnight patient complained of numbness tingling sensation and swelling in her hand  She was evaluated by the night team and left hand was benign upon examination and patient was nontender to palpation  She stated that she had an episode of low glucose levels this morning  She had no symptoms, however when the nursing staff checked her blood sugar it was as low as 61  She was given some juice and crackers and the blood sugar increased to 83  Patient received her insulin as scheduled however, as the day went by the blood sugar levels kept creeping up to 458  Continue carb controlled diet, reduce nightly lantus to 24 units, Lispro 6 units TID, endocrinology following    Monitor electrolytes and replete prn  Date:  12/14  Day 2:  Low glucose again this am, 53  Received juice and crackers and blood sugar increased to 166  Continue to monitor accuchecks and treat accordingly, carb controlled diet, reduce lantus to 20 units qam, consider omitting night dose of lispro 6 units, endocrinology following  Hyponatremia resolved      Vital Signs:   Date/Time Temp Pulse Resp BP MAP (mmHg) SpO2 O2 Device   12/14/22 07:20:56 98 3 °F (36 8 °C) 82 16 108/69 82 98 % --   12/13/22 21:54:50 99 2 °F (37 3 °C) 88 19 102/63 76 100 % --     Date/Time Temp Pulse Resp BP MAP (mmHg) SpO2 O2 Device   12/13/22 15:16:26 99 1 °F (37 3 °C) 96 17 99/64 76 98 % --   12/13/22 0900 -- -- -- -- -- 98 % None (Room air)   12/13/22 07:52:35 -- 88 17 102/64 77 98 % --   12/12/22 15:53:36 98 7 °F (37 1 °C) 102 17 92/54 67 98 % --   12/12/22 08:17:32 98 8 °F (37 1 °C) 112 Abnormal  17 115/71 86 98 % --   12/11/22 21:18:48 -- 102 -- 115/70 85 98 % --   12/11/22 15:40:09 98 5 °F (36 9 °C) -- -- 115/70 85 -- --   12/11/22 09:46:16 98 8 °F (37 1 °C) 99 17 120/74 89 99 % --   12/11/22 0931 98 4 °F (36 9 °C) 98 16 127/81 -- 99 % None (Room air)   12/11/22 0710 -- 92 16 127/80 -- 100 % None (Room air)   12/11/22 0400 -- 91 15 128/80 97 100 % None (Room air)   12/11/22 0300 -- 95 15 129/81 98 99 % None (Room air)   12/11/22 0126 -- -- -- -- -- -- None (Room air)   12/11/22 0100 -- 102 15 123/85 98 99 % None (Room air)   12/11/22 0015 -- 102 -- -- -- 99 % --     Pertinent Labs/Diagnostic Results:       Results from last 7 days   Lab Units 12/14/22  0510 12/11/22  0409 12/10/22  2348   WBC Thousand/uL 4 17* 8 00 11 30*   HEMOGLOBIN g/dL 13 7 14 6 16 6*   HEMATOCRIT % 41 9 41 6 47 5*   PLATELETS Thousands/uL 224 304 334   NEUTROS ABS Thousands/µL  --   --  9 51*         Results from last 7 days   Lab Units 12/14/22  0510 12/11/22  0409 12/10/22  2348   SODIUM mmol/L 140 134* 129*   POTASSIUM mmol/L 3 7 3 3* 4 5   CHLORIDE mmol/L 107 110* 101   CO2 mmol/L 29 18* 16*   ANION GAP mmol/L 4 6 12   BUN mg/dL 15 27* 34*   CREATININE mg/dL 0 51* 0 57* 0 84   EGFR ml/min/1 73sq m 129 124 93   CALCIUM mg/dL 8 2* 8 1* 9 2     Results from last 7 days   Lab Units 12/14/22  0510   AST U/L 90*   ALT U/L 55*   ALK PHOS U/L 80   TOTAL PROTEIN g/dL 5 4*   ALBUMIN g/dL 3 1*   TOTAL BILIRUBIN mg/dL 0 47     Results from last 7 days   Lab Units 12/14/22  1116 12/14/22  0754 12/14/22  0718 12/13/22  2310 12/13/22  2200 12/13/22  1616 12/13/22  1048 12/13/22  0816 12/13/22  0748 12/12/22  2110 12/12/22  1623 12/12/22  1108   POC GLUCOSE mg/dl 218* 116 57* 102 114 196* 458* 83 61* 184* 149* 324*     Results from last 7 days   Lab Units 12/14/22  0510 12/11/22  0409 12/10/22  2348   GLUCOSE RANDOM mg/dL 53* 78 294*     BETA-HYDROXYBUTYRATE   Date Value Ref Range Status   12/05/2022 0 2 <0 6 mmol/L Final   06/28/2022 0 2 <0 6 mmol/L Final   06/13/2022 7 4 (H) <0 6 mmol/L Final   03/08/2022 5 6 (H) <0 6 mmol/L Final   06/22/2021 0 1 <0 6 mmol/L Final   04/20/2021 5 3 (H) <0 6 mmol/L Final   04/20/2021 5 6 (H) <0 6 mmol/L Final   01/27/2021 5 2 (H) <0 6 mmol/L Final   11/12/2020 5 7 (H) <0 6 mmol/L Final      Medications:   Scheduled Medications:  insulin glargine, 20 Units, Subcutaneous, QAM  insulin lispro, 1-5 Units, Subcutaneous, HS  insulin lispro, 1-6 Units, Subcutaneous, TID AC  insulin lispro, 4 Units, Subcutaneous, TID With Meals      Continuous IV Infusions:    dextrose 5 % and sodium chloride 0 9 % infusion  Rate: 100 mL/hr Dose: 100 mL/hr  Freq: Continuous Route: IV  Last Dose: Stopped (12/11/22 0703)  Start: 12/11/22 0445 End: 12/11/22 1800    PRN Meds:  acetaminophen, 650 mg, Oral, Q6H PRN  ondansetron, 4 mg, Intravenous, Q4H PRN        Discharge Plan: d    Network Utilization Review Department  ATTENTION: Please call with any questions or concerns to [phone] and carefully listen to the prompts so that you are directed to the right person   All voicemails are confidential   [place] all requests for admission clinical reviews, approved or denied determinations and any other requests to dedicated fax number below belonging to the campus where the patient is receiving treatment   List of dedicated fax numbers for the Facilities:  [address] DENIALS (Administrative/Medical Necessity) [phone]   [address] (Maternity/NICU/Pediatrics) [phone]   917 Milka Fowler [phone]   [facility] [address] [phone]   [address] 28 [phone]   [phone] [facility] [phone]

## 2022-12-13 NOTE — PROGRESS NOTES
[facility]  Progress Note - Carola Moore 1990, 29 y o  female MRN: [id number]  Unit/Bed#: W -01 Encounter: [id number]  Primary Care Provider: Alayna Gore DO   Date and time admitted to hospital: 12/10/2022 10:13 PM    * Hypoglycemia  Assessment & Plan  Patient normally takes 30 units of Lantus in the morning  AM fasting glucose on 12/10 was >500 so patient took 60 units of Lantus  Blood sugar checked in the evening was in the 50's  Only symptom was fatigue  Fingerstick glucose upon arrival to ED was 187-> 294-> 66  Poison control contacted by ED  Per recommendation, will monitor overnight  Received 1 L normal saline bolus in ED  D5  cc/hr x 5 hours for persistent hypoglycemia  Neurocognitive eval revealed that patient has capacity to make medical decisions  Plan:  Carb controlled diet  Lantus reduced to 24 units nightly  Lispro 6 units 3 times daily  Endocrinology team on board  Hyponatremia  Assessment & Plan  Recent Labs     12/10/22  2348 12/11/22  0409   SODIUM 129* 134*     · Corrected sodium of 132 at admission    Plan:  · Monitor with labs in a m  Type 1 diabetes mellitus [facility])  Assessment & Plan  Lab Results   Component Value Date    HGBA1C 12 6 (H) 12/05/2022       Recent Labs     12/12/22  2110 12/13/22  0748 12/13/22  0816 12/13/22  1048   POCGLU 184* 61* 83 458*       Blood Sugar Average: Last 72 hrs:  (P) 186 [id number]       Home medication regimen includes Lantus 30 units nightly and lispro 6 units 3 times daily  Patient stated that she was not compliant with medication as she did not have insulin pen needles although she had the insulin pens  She then found 1 pen with a needle and injected 60 units instead of 30      Plan:  · Lantus 24 units nightly  · Lispro 6 units 3 times daily with meals  · SSI  · POCT glucose checks  · Hypoglycemia protocol  · Diabetic diet  · Endocrinology on board      Leukocytosis  Assessment & Plan  Recent Labs     12/10/22  2348 22  0409   WBC 11 30* 8 00     · WBC 11 3 POA  · No signs of infection  · Likely reactive  · Resolved        VTE Pharmacologic Prophylaxis:   VTE Score: 1 Low Risk (Score 0-2) - Encourage Ambulation  Mechanical VTE Prophylaxis in Place: Yes    Patient Centered Rounds: I have performed bedside rounds with nursing staff today  Discussions with Specialists or Other Care Team Provider: Endocrinology team     Education and Discussions with Family / Patient: Attempted to update  (mother) via phone  Unable to contact  Current Length of Stay: 0 day(s)    Current Patient Status: Observation     Discharge Plan / Estimated Discharge Date: Anticipate discharge tomorrow to home  Code Status: Level 1 - Full Code      Subjective:   Patient was awake and sitting up in bed  Overnight patient complained of numbness tingling sensation and swelling in her hand  She was evaluated by the night team and left hand was benign upon examination and patient was nontender to palpation  She stated that she had an episode of low glucose levels this morning  She had no symptoms, however when the nursing staff checked her blood sugar it was as low as 61  She was given some juice and crackers and the blood sugar increased to 83  Patient received her insulin as scheduled however, as the day went by the blood sugar levels kept creeping up to 458  She denies fever, chills, HA, CP, SOB,  palpitations, Abd pain, n/v/d  Objective:     Vitals:   Temp (24hrs), Av 9 °F (37 2 °C), Min:98 7 °F (37 1 °C), Max:99 1 °F (37 3 °C)    Temp:  [98 7 °F (37 1 °C)-99 1 °F (37 3 °C)] 99 1 °F (37 3 °C)  HR:  [] 96  Resp:  [17] 17  BP: ()/(54-64) 99/64  SpO2:  [98 %] 98 %  Body mass index is 28 34 kg/m²  Input and Output Summary (last 24 hours):        Intake/Output Summary (Last 24 hours) at 2022 1533  Last data filed at 2022 0900  Gross per 24 hour   Intake 240 ml   Output --   Net 240 ml       Physical Exam:     Physical Exam  Vitals and nursing note reviewed  Constitutional:       General: She is not in acute distress  Appearance: Normal appearance  She is not ill-appearing, toxic-appearing or diaphoretic  HENT:      Head: Normocephalic and atraumatic  Nose: Nose normal    Eyes:      General: No scleral icterus  Right eye: No discharge  Left eye: No discharge  Extraocular Movements: Extraocular movements intact  Pupils: Pupils are equal, round, and reactive to light  Neck:      Vascular: No carotid bruit  Cardiovascular:      Rate and Rhythm: Normal rate and regular rhythm  Pulses: Normal pulses  Heart sounds: Normal heart sounds  No murmur heard  No friction rub  No gallop  Pulmonary:      Effort: Pulmonary effort is normal       Breath sounds: Normal breath sounds  No wheezing, rhonchi or rales  Abdominal:      General: Bowel sounds are normal       Palpations: Abdomen is soft  Tenderness: There is no abdominal tenderness  There is no guarding or rebound  Musculoskeletal:         General: No swelling or tenderness  Cervical back: Normal range of motion and neck supple  No rigidity  No muscular tenderness  Right lower leg: No edema  Left lower leg: No edema  Skin:     Capillary Refill: Capillary refill takes less than 2 seconds  Coloration: Skin is not jaundiced  Findings: No bruising, erythema, lesion or rash  Neurological:      General: No focal deficit present  Mental Status: She is alert and oriented to person, place, and time  Motor: No weakness  Gait: Gait normal    Psychiatric:         Mood and Affect: Mood normal          Behavior: Behavior normal          Thought Content:  Thought content normal          Judgment: Judgment normal           Additional Data:     Labs:  Results from last 7 days   Lab Units 12/11/22  0409 12/10/22  2348   WBC Thousand/uL 8 00 11 30* HEMOGLOBIN g/dL 14 6 16 6*   HEMATOCRIT % 41 6 47 5*   PLATELETS Thousands/uL 304 334   NEUTROS PCT %  --  85*   LYMPHS PCT %  --  10*   MONOS PCT %  --  5   EOS PCT %  --  0     Results from last 7 days   Lab Units 12/11/22  0409   SODIUM mmol/L 134*   POTASSIUM mmol/L 3 3*   CHLORIDE mmol/L 110*   CO2 mmol/L 18*   BUN mg/dL 27*   CREATININE mg/dL 0 57*   ANION GAP mmol/L 6   CALCIUM mg/dL 8 1*   GLUCOSE RANDOM mg/dL 78         Results from last 7 days   Lab Units 12/13/22  1048 12/13/22  0816 12/13/22  0748 12/12/22  2110 12/12/22  1623 12/12/22  1108 12/12/22  0804 12/11/22  2110 12/11/22  1631 12/11/22  1115 12/11/22  0949 12/11/22  0909   POC GLUCOSE mg/dl 458* 83 61* 184* 149* 324* 93 148* 229* 284* 256* 219*               Imaging: No pertinent imaging reviewed      Recent Cultures (last 7 days):           Lines/Drains:  Invasive Devices     Peripheral Intravenous Line  Duration           Peripheral IV 12/10/22 Left Antecubital 2 days                Telemetry:        Last 24 Hours Medication List:   Current Facility-Administered Medications   Medication Dose Route Frequency Provider Last Rate   • acetaminophen  650 mg Oral Q6H PREL Arriaga MD     • insulin glargine  24 Units Subcutaneous CRISTYM Drea Gtz MD     • insulin lispro  1-5 Units Subcutaneous HS Catalino Beyer MD     • insulin lispro  1-6 Units Subcutaneous TID AC Catalino Beyer MD     • insulin lispro  6 Units Subcutaneous TID With Meals General Sugey, DO     • ondansetron  4 mg Intravenous Q4H PREL Arriaga MD        Nutrition Assessment and Intervention:     Reviewed food recall journal      Physical Activity Assessment and Intervention:    Activity journal reviewed      Emotional and Mental Well-being, Sleep, Connectedness Assessment and Intervention:    Sleep/stress assessment performed    Depression and anxiety screening performed and reviewed      Tobacco and Toxic Substance Assessment and Intervention:     Tobacco use screening performed    Alcohol and drug use screening performed      Today, Patient Was Seen By: Janusz Henry MD    ** Please Note: This note has been constructed using a voice recognition system   **

## 2022-12-14 LAB
ALBUMIN SERPL BCP-MCNC: 3.1 G/DL (ref 3.5–5)
ALP SERPL-CCNC: 80 U/L (ref 34–104)
ALT SERPL W P-5'-P-CCNC: 55 U/L (ref 7–52)
ANION GAP SERPL CALCULATED.3IONS-SCNC: 4 MMOL/L (ref 4–13)
AST SERPL W P-5'-P-CCNC: 90 U/L (ref 13–39)
BILIRUB SERPL-MCNC: 0.47 MG/DL (ref 0.2–1)
BUN SERPL-MCNC: 15 MG/DL (ref 5–25)
CALCIUM ALBUM COR SERPL-MCNC: 8.9 MG/DL (ref 8.3–10.1)
CALCIUM SERPL-MCNC: 8.2 MG/DL (ref 8.4–10.2)
CHLORIDE SERPL-SCNC: 107 MMOL/L (ref 96–108)
CO2 SERPL-SCNC: 29 MMOL/L (ref 21–32)
CREAT SERPL-MCNC: 0.51 MG/DL (ref 0.6–1.3)
ERYTHROCYTE [DISTWIDTH] IN BLOOD BY AUTOMATED COUNT: 12.8 % (ref 11.6–15.1)
GFR SERPL CREATININE-BSD FRML MDRD: 129 ML/MIN/1.73SQ M
GLUCOSE SERPL-MCNC: 103 MG/DL (ref 65–140)
GLUCOSE SERPL-MCNC: 116 MG/DL (ref 65–140)
GLUCOSE SERPL-MCNC: 218 MG/DL (ref 65–140)
GLUCOSE SERPL-MCNC: 293 MG/DL (ref 65–140)
GLUCOSE SERPL-MCNC: 53 MG/DL (ref 65–140)
GLUCOSE SERPL-MCNC: 57 MG/DL (ref 65–140)
HCT VFR BLD AUTO: 41.9 % (ref 34.8–46.1)
HGB BLD-MCNC: 13.7 G/DL (ref 11.5–15.4)
MCH RBC QN AUTO: 30.5 PG (ref 26.8–34.3)
MCHC RBC AUTO-ENTMCNC: 32.7 G/DL (ref 31.4–37.4)
MCV RBC AUTO: 93 FL (ref 82–98)
PLATELET # BLD AUTO: 224 THOUSANDS/UL (ref 149–390)
PMV BLD AUTO: 8.9 FL (ref 8.9–12.7)
POTASSIUM SERPL-SCNC: 3.7 MMOL/L (ref 3.5–5.3)
PROT SERPL-MCNC: 5.4 G/DL (ref 6.4–8.4)
RBC # BLD AUTO: 4.49 MILLION/UL (ref 3.81–5.12)
SODIUM SERPL-SCNC: 140 MMOL/L (ref 135–147)
WBC # BLD AUTO: 4.17 THOUSAND/UL (ref 4.31–10.16)

## 2022-12-14 RX ORDER — INSULIN LISPRO 100 [IU]/ML
1-5 INJECTION, SOLUTION INTRAVENOUS; SUBCUTANEOUS
Status: DISCONTINUED | OUTPATIENT
Start: 2022-12-14 | End: 2022-12-15 | Stop reason: HOSPADM

## 2022-12-14 RX ORDER — INSULIN GLARGINE 100 [IU]/ML
16 INJECTION, SOLUTION SUBCUTANEOUS EVERY MORNING
Status: DISCONTINUED | OUTPATIENT
Start: 2022-12-15 | End: 2022-12-15 | Stop reason: HOSPADM

## 2022-12-14 RX ORDER — INSULIN LISPRO 100 [IU]/ML
4 INJECTION, SOLUTION INTRAVENOUS; SUBCUTANEOUS
Status: DISCONTINUED | OUTPATIENT
Start: 2022-12-14 | End: 2022-12-15 | Stop reason: HOSPADM

## 2022-12-14 RX ADMIN — INSULIN GLARGINE 20 UNITS: 100 INJECTION, SOLUTION SUBCUTANEOUS at 09:15

## 2022-12-14 RX ADMIN — INSULIN LISPRO 4 UNITS: 100 INJECTION, SOLUTION INTRAVENOUS; SUBCUTANEOUS at 12:08

## 2022-12-14 RX ADMIN — INSULIN LISPRO 4 UNITS: 100 INJECTION, SOLUTION INTRAVENOUS; SUBCUTANEOUS at 17:40

## 2022-12-14 RX ADMIN — INSULIN LISPRO 2 UNITS: 100 INJECTION, SOLUTION INTRAVENOUS; SUBCUTANEOUS at 12:08

## 2022-12-14 RX ADMIN — INSULIN LISPRO 2 UNITS: 100 INJECTION, SOLUTION INTRAVENOUS; SUBCUTANEOUS at 21:29

## 2022-12-14 RX ADMIN — INSULIN LISPRO 6 UNITS: 100 INJECTION, SOLUTION INTRAVENOUS; SUBCUTANEOUS at 09:16

## 2022-12-14 NOTE — PROGRESS NOTES
Progress Note - Caro Mott 29 y o  female MRN: [id number]    Unit/Bed#: W -01 Encounter: [id number]      CC: diabetes f/u    Subjective: This is a 29y o -year-old female with past medical history of type 1 diabetes mellitus without any complications, who presents to the emergency room for hypoglycemia evaluation   Endocrinology consulted for the management of type 1 diabetes mellitus  Feels well  No complaints  Noted to have hypoglycemia this am did not have any sx    Objective:     Vitals: Blood pressure 108/69, pulse 82, temperature 98 3 °F (36 8 °C), resp  rate 16, height 4' 10" (1 473 m), weight 61 5 kg (135 lb 9 3 oz), SpO2 98 %, not currently breastfeeding  ,Body mass index is 28 34 kg/m²  Intake/Output Summary (Last 24 hours) at 12/14/2022 1511  Last data filed at 12/14/2022 0900  Gross per 24 hour   Intake 240 ml   Output --   Net 240 ml       Physical Exam:  General Appearance: awake, appears stated age and cooperative  Head: Normocephalic, without obvious abnormality, atraumatic  Extremities: moves all extremities  Skin: Skin color and temperature normal    Pulm: no labored breathing    Lab, Imaging and other studies: I have personally reviewed pertinent reports        POC Glucose (mg/dl)   Date Value   12/14/2022 218 (H)   12/14/2022 116   12/14/2022 57 (L)   12/13/2022 102   12/13/2022 114   12/13/2022 196 (H)   12/13/2022 458 (H)   12/13/2022 83   12/13/2022 61 (L)   12/12/2022 184 (H)       Assessment and plan:      Type 1 diabetes mellitus with hypoglycemia and hyperglycemia  HbA1c 12 5 December 2022  Home regimen: Lantus 30 units QAM, Humalog 6 units with meals  Inpatient regimen: Lantus 20 units every morning, Humalog 6 units with meals, correctional scale algo 3 with meals and 2 at bedtime     Recommendations:  Hypoglycemia noted in a m , did not have any symptoms  Decrease Lantus to 16 units every morning from 20 units every morning  Decrease Humalog to 4 units with meals from 6 units with meals  Decrease correctional scale algorithm 2 1 with meals and at bedtime  continue to monitor blood glucose and make adjustments as needed  Monitor for hypoglycemia and treat according to the protocol    Portions of the record may have been created with voice recognition software  Please Tigertext questions to the clinician covering the "[id number]" Role  Thank you

## 2022-12-14 NOTE — UTILIZATION REVIEW
NOTIFICATION OF INPATIENT ADMISSION   AUTHORIZATION REQUEST   SERVICING FACILITY:   [address] [place]  [place], [address] [place] [address]  Tax ID: [id number]  NPI: [id number]   ATTENDING PROVIDER:  Attending Name and NPI#: Fidelina Gonzalez Md [[id number]]  Address: [address] [place] [address] [place]  [place], [address] [place] [address]  Phone: [phone]     ADMISSION INFORMATION:  Place of Service: Inpatient [facility]  [address]  Place of Service Code: 21  Inpatient Admission Date/Time: 12/13/22  4:08 PM  Discharge Date/Time: No discharge date for patient encounter  Admitting Diagnosis Code/Description:  Insulin overdose [T38 3X1A]  Hypoglycemia [E16 2]  Type 1 diabetes mellitus with hyperglycemia ([facility] [address] ) [E10 65]     UTILIZATION REVIEW CONTACT:  Eliezer Vaughan Utilization   Network Utilization Review Department  Phone: [phone]  Fax: [phone]  Email: [email or web address]  Contact for approvals/pending authorizations, clinical reviews, and discharge  PHYSICIAN ADVISORY SERVICES:  Medical Necessity Denial & [employer] Review  Phone: [phone]  Fax: [phone]  Email: [email or web address]

## 2022-12-14 NOTE — ASSESSMENT & PLAN NOTE
Lab Results   Component Value Date    HGBA1C 12 6 (H) 12/05/2022       Recent Labs     12/13/22  0748 12/13/22  0816 12/13/22  1048 12/13/22  1616   POCGLU 61* 83 458* 196*       Blood Sugar Average: Last 72 hrs:  (P) 566 [id number]       Home medication regimen includes Lantus 30 units nightly and lispro 6 units 3 times daily  Patient stated that she was not compliant with medication as she did not have insulin pen needles although she had the insulin pens  She then found 1 pen with a needle and injected 60 units instead of 30  Plan:  · Lantus 20 units qAM  · Lispro 6 units 3 times daily with meals  Consider omitting the night dose    · SSI  · POCT glucose checks  · Hypoglycemia protocol  · Diabetic diet  · Endocrinology on board

## 2022-12-14 NOTE — DISCHARGE SUMMARY
[facility]  Discharge- Treva Miguel 1990, 29 y o  female MRN: [id number]  Unit/Bed#: W -01 Encounter: [id number]  Primary Care Provider: Lucy Rodriguez DO   Date and time admitted to hospital: 12/10/2022 10:13 PM    * Hypoglycemia  Assessment & Plan  Recent Labs     12/14/22  2128 12/15/22  0738 12/15/22  1123   POCGLU 293* 68 168*     Patient normally takes 30 units of Lantus in the morning  AM fasting glucose on 12/10 was >500 so patient took 60 units of Lantus  Blood sugar checked in the evening was in the 50's  Only symptom was fatigue  Fingerstick glucose upon arrival to ED was 187-> 294-> 66  Poison control contacted by ED  Per recommendation, will monitor overnight  Received 1 L normal saline bolus in ED D5  cc/hr x 5 hours for persistent hypoglycemia  Neurocognitive eval revealed that patient has capacity to make medical decisions  Plan:  SLIM team spoke to Endocrinology Team on day of discharge about the blood sugar level of 68 prior to discharge and endocrinology team stated that patient was clear for discharge as she was asymptomatic and the insulin regimen was adjusted to lower meal time regimen  They mentioned that patient will have close follow up  Carb controlled diet  Lantus reduced to 16 units qAM    Lispro 4 units 3 times daily  Follow-up with outpatient endocrinologist in 1 week  Hyponatremia  Assessment & Plan  Recent Labs     12/14/22  0510 12/15/22  0444   SODIUM 140 142     · Corrected sodium of 132 at admission    Plan:  · Resolved  Type 1 diabetes mellitus [facility])  Assessment & Plan  Lab Results   Component Value Date    HGBA1C 12 6 (H) 12/05/2022       Recent Labs     12/14/22  1658 12/14/22 2128 12/15/22  0738 12/15/22  1123   POCGLU 103 293* 68 168*       Blood Sugar Average: Last 72 hrs:  (P) 091 [id number]       Home medication regimen includes Lantus 30 units nightly and lispro 6 units 3 times daily    Patient stated that she was not compliant with medication as she did not have insulin pen needles although she had the insulin pens  She then found 1 pen with a needle and injected 60 units instead of 30  Plan:  · Lantus 16 units qAM  · Lispro 6 units 3 times daily with meals  Consider omitting the night dose  · SSI  · POCT glucose checks  · Hypoglycemia protocol  · Diabetic diet  · Follow-up with outpatient endocrinologist in 1 week  Leukocytosis  Assessment & Plan  Recent Labs     12/14/22  0510   WBC 4 17*     · WBC 11 3 POA  · No signs of infection  · Likely reactive  · Resolved    Discharging Resident Physician: Yris Louis MD  Attending: Renny Babb MD  PCP: Ruchi Lilly DO  Admission Date: 12/10/2022  Discharge Date: 12/15/22    Disposition:     Home    Reason for Admission: Hypoglycemia    Consultations During Hospital Stay:  · Endocrinology team  · Neuropsych  team    Procedures Performed:     · None    Significant Findings / Test Results:     · None     Incidental Findings:   · None     Test Results Pending at Discharge (will require follow up): · None      Outpatient Tests Requested:  · None    Complications:  None    Hospital Course:     Bri Becerril is a 29 y o  female patient with PMH of type I DM, HLD who originally presented to the hospital on 12/10/2022 due to hypoglycemia  Patient was recently discharged home with insulin pens however, did not have insulin needles when she got home  She did not use her insulin for several days and when she went into her car she found one of the pens having a needle and injected twice as much Lantus as prescribed  A few hours later patient started experiencing weakness lethargy and was found by sister who then brought the patient to the ED for evaluation after feeding her some mashed potatoes  In the ED patient was afebrile hemodynamically stable  Blood glucose was 187 and then increased to 294    Patient was admitted for hypoglycemia in the setting of type 1 diabetes  During hospitalization patient remained in stable condition  Neuropsych eval was requested as patient could not give clear history of events and also difficulty in reaching family members  Patient was deemed to have capacity to make medical decisions  Patient's blood glucose was monitored and endocrinology team was on board to manage insulin regimen  After adjusting patient's regimen patient was discharged on a regimen and insulin pen needles refilled  Patient was encouraged to follow-up closely with endocrinology outpatient for further evaluation and management  Condition at Discharge: stable     Discharge Day Visit / Exam:     Subjective: Patient was awake and sitting up in bed  She has no complaints  She states that she slept well  She is looking forward to going home today  She denies fever, chills, HA, CP, SOB,  palpitations, Abd pain, n/v/d  Vitals: Blood Pressure: (!) 87/54 (12/15/22 1501)  Pulse: 87 (12/14/22 2238)  Temperature: 98 4 °F (36 9 °C) (12/15/22 1501)  Temp Source: Oral (12/11/22 0931)  Respirations: 17 (12/15/22 1501)  Height: 4' 10" (147 3 cm) (12/10/22 2217)  Weight - Scale: 61 5 kg (135 lb 9 3 oz) (12/10/22 2217)  SpO2: 99 % (12/14/22 2238)  Exam:   Physical Exam  Vitals and nursing note reviewed  Constitutional:       General: She is not in acute distress  Appearance: Normal appearance  She is not ill-appearing, toxic-appearing or diaphoretic  HENT:      Head: Normocephalic and atraumatic  Nose: Nose normal       Mouth/Throat:      Mouth: Mucous membranes are moist       Pharynx: No oropharyngeal exudate or posterior oropharyngeal erythema  Eyes:      General: No scleral icterus  Right eye: No discharge  Left eye: No discharge  Pupils: Pupils are equal, round, and reactive to light  Neck:      Vascular: No carotid bruit  Cardiovascular:      Rate and Rhythm: Normal rate and regular rhythm  Pulses: Normal pulses  Heart sounds: Normal heart sounds  No murmur heard  No friction rub  No gallop  Pulmonary:      Effort: Pulmonary effort is normal  No respiratory distress  Breath sounds: Normal breath sounds  No stridor  No wheezing, rhonchi or rales  Abdominal:      General: Bowel sounds are normal       Palpations: Abdomen is soft  Tenderness: There is no abdominal tenderness  There is no right CVA tenderness, left CVA tenderness, guarding or rebound  Musculoskeletal:         General: No swelling, tenderness or deformity  Cervical back: Normal range of motion and neck supple  No rigidity  No muscular tenderness  Right lower leg: No edema  Left lower leg: No edema  Lymphadenopathy:      Cervical: No cervical adenopathy  Skin:     General: Skin is warm and dry  Capillary Refill: Capillary refill takes less than 2 seconds  Coloration: Skin is not jaundiced  Findings: No lesion or rash  Neurological:      General: No focal deficit present  Mental Status: She is alert and oriented to person, place, and time  Mental status is at baseline  Motor: No weakness  Psychiatric:         Mood and Affect: Mood normal          Behavior: Behavior normal          Thought Content:  Thought content normal          Judgment: Judgment normal        Nutrition Assessment and Intervention:     Reviewed food recall journal    Recommended completion of food recall journal      Physical Activity Assessment and Intervention:    Activity journal reviewed    Activity journal completion recommended      Emotional and Mental Well-being, Sleep, Connectedness Assessment and Intervention:    Sleep/stress assessment performed    Depression and anxiety screening performed and reviewed      Tobacco and Toxic Substance Assessment and Intervention:     Tobacco use screening performed    Alcohol and drug use screening performed      Discussion with Family:     Discharge instructions/Information to patient and family:   See after visit summary for information provided to patient and family  Provisions for Follow-Up Care:  See after visit summary for information related to follow-up care and any pertinent home health orders  Planned Readmission: None     Discharge Medications:  See after visit summary for reconciled discharge medications provided to patient and family        ** Please Note: This note has been constructed using a voice recognition system **

## 2022-12-14 NOTE — ASSESSMENT & PLAN NOTE
Recent Labs     12/14/22  0510   SODIUM 140     · Corrected sodium of 132 at admission    Plan:  · Resolved

## 2022-12-14 NOTE — PROGRESS NOTES
[facility]  Progress Note - Michael Fuel 1990, 29 y o  female MRN: [id number]  Unit/Bed#: W -01 Encounter: [id number]  Primary Care Provider: Francisca Umana DO   Date and time admitted to hospital: 12/10/2022 10:13 PM    * Hypoglycemia  Assessment & Plan  Patient normally takes 30 units of Lantus in the morning  AM fasting glucose on 12/10 was >500 so patient took 60 units of Lantus  Blood sugar checked in the evening was in the 50's  Only symptom was fatigue  Fingerstick glucose upon arrival to ED was 187-> 294-> 66  Poison control contacted by ED  Per recommendation, will monitor overnight  Received 1 L normal saline bolus in ED  D5  cc/hr x 5 hours for persistent hypoglycemia  Neurocognitive eval revealed that patient has capacity to make medical decisions  Patient still hypoglycemic in the morning  Asymptomatic which is concerning  Plan:  Carb controlled diet  Lantus reduced to 20 units qAM    Lispro 6 units 3 times daily  Consider omitting the night dose  Endocrinology team on board  Hyponatremia  Assessment & Plan  Recent Labs     12/14/22  0510   SODIUM 140     · Corrected sodium of 132 at admission    Plan:  · Resolved  Type 1 diabetes mellitus [facility])  Assessment & Plan  Lab Results   Component Value Date    HGBA1C 12 6 (H) 12/05/2022       Recent Labs     12/13/22  0748 12/13/22  0816 12/13/22  1048 12/13/22  1616   POCGLU 61* 83 458* 196*       Blood Sugar Average: Last 72 hrs:  (P) 958 [id number]       Home medication regimen includes Lantus 30 units nightly and lispro 6 units 3 times daily  Patient stated that she was not compliant with medication as she did not have insulin pen needles although she had the insulin pens  She then found 1 pen with a needle and injected 60 units instead of 30  Plan:  · Lantus 20 units qAM  · Lispro 6 units 3 times daily with meals  Consider omitting the night dose    · SSI  · POCT glucose checks  · Hypoglycemia protocol  · Diabetic diet  · Endocrinology on board      Leukocytosis  Assessment & Plan  Recent Labs     12/10/22  2348 22  0409   WBC 11 30* 8 00     · WBC 11 3 POA  · No signs of infection  · Likely reactive  · Resolved        VTE Pharmacologic Prophylaxis:   VTE Score: 1 Low Risk (Score 0-2) - Encourage Ambulation  Mechanical VTE Prophylaxis in Place: Yes    Patient Centered Rounds: I have performed bedside rounds with nursing staff today  Discussions with Specialists or Other Care Team Provider: Endocrine Team    Education and Discussions with Family / Patient: Attempted to update  (mother) via phone  Unable to contact  Current Length of Stay: 1 day(s)    Current Patient Status: Inpatient     Discharge Plan / Estimated Discharge Date: Anticipate discharge tomorrow to home  Code Status: Level 1 - Full Code      Subjective:   Patient was awake and laying in bed  She says her glucose level was low this morning again  53 but she was totally asymptomatic  She received 2 juices and alcides crackers and the blood sugar increased to 166  She denies fever, chills, HA, CP, SOB,  palpitations, Abd pain, n/v/d  Objective:     Vitals:   Temp (24hrs), Av 9 °F (37 2 °C), Min:98 3 °F (36 8 °C), Max:99 2 °F (37 3 °C)    Temp:  [98 3 °F (36 8 °C)-99 2 °F (37 3 °C)] 98 3 °F (36 8 °C)  HR:  [82-96] 82  Resp:  [16-19] 16  BP: ()/(63-69) 108/69  SpO2:  [98 %-100 %] 98 %  Body mass index is 28 34 kg/m²  Input and Output Summary (last 24 hours): Intake/Output Summary (Last 24 hours) at 2022 0744  Last data filed at 2022 0900  Gross per 24 hour   Intake 240 ml   Output --   Net 240 ml       Physical Exam:     Physical Exam  Vitals and nursing note reviewed  Constitutional:       General: She is not in acute distress  Appearance: Normal appearance  She is not ill-appearing, toxic-appearing or diaphoretic     HENT:      Head: Normocephalic and atraumatic  Nose: Nose normal    Eyes:      General: No scleral icterus  Right eye: No discharge  Left eye: No discharge  Extraocular Movements: Extraocular movements intact  Pupils: Pupils are equal, round, and reactive to light  Neck:      Vascular: No carotid bruit  Cardiovascular:      Rate and Rhythm: Normal rate and regular rhythm  Pulses: Normal pulses  Heart sounds: Normal heart sounds  No murmur heard  No friction rub  No gallop  Pulmonary:      Effort: Pulmonary effort is normal       Breath sounds: Normal breath sounds  No wheezing, rhonchi or rales  Abdominal:      General: Bowel sounds are normal       Palpations: Abdomen is soft  Tenderness: There is no abdominal tenderness  There is no guarding or rebound  Musculoskeletal:         General: No swelling or tenderness  Cervical back: Normal range of motion and neck supple  No rigidity  No muscular tenderness  Right lower leg: No edema  Left lower leg: No edema  Skin:     Capillary Refill: Capillary refill takes less than 2 seconds  Coloration: Skin is not jaundiced  Findings: No bruising, erythema, lesion or rash  Neurological:      General: No focal deficit present  Mental Status: She is alert and oriented to person, place, and time  Motor: No weakness  Gait: Gait normal    Psychiatric:         Mood and Affect: Mood normal          Behavior: Behavior normal          Thought Content: Thought content normal          Judgment: Judgment normal           Additional Data:     Labs:  Results from last 7 days   Lab Units 12/14/22  0510 12/11/22  0409 12/10/22  2348   WBC Thousand/uL 4 17*   < > 11 30*   HEMOGLOBIN g/dL 13 7   < > 16 6*   HEMATOCRIT % 41 9   < > 47 5*   PLATELETS Thousands/uL 224   < > 334   NEUTROS PCT %  --   --  85*   LYMPHS PCT %  --   --  10*   MONOS PCT %  --   --  5   EOS PCT %  --   --  0    < > = values in this interval not displayed  Results from last 7 days   Lab Units 12/14/22  0510   SODIUM mmol/L 140   POTASSIUM mmol/L 3 7   CHLORIDE mmol/L 107   CO2 mmol/L 29   BUN mg/dL 15   CREATININE mg/dL 0 51*   ANION GAP mmol/L 4   CALCIUM mg/dL 8 2*   ALBUMIN g/dL 3 1*   TOTAL BILIRUBIN mg/dL 0 47   ALK PHOS U/L 80   ALT U/L 55*   AST U/L 90*   GLUCOSE RANDOM mg/dL 53*         Results from last 7 days   Lab Units 12/14/22  0718 12/13/22  2310 12/13/22  2200 12/13/22  1616 12/13/22  1048 12/13/22  0816 12/13/22  0748 12/12/22  2110 12/12/22  1623 12/12/22  1108 12/12/22  0804 12/11/22  2110   POC GLUCOSE mg/dl 57* 102 114 196* 458* 83 61* 184* 149* 324* 93 148*               Imaging: No pertinent imaging reviewed      Recent Cultures (last 7 days):           Lines/Drains:  Invasive Devices     Peripheral Intravenous Line  Duration           Peripheral IV 12/10/22 Left Antecubital 3 days                Telemetry: None       Last 24 Hours Medication List:   Current Facility-Administered Medications   Medication Dose Route Frequency Provider Last Rate   • acetaminophen  650 mg Oral Q6H PRN Ching Kent MD     • insulin glargine  20 Units Subcutaneous QAM Drea Quan MD     • insulin lispro  1-5 Units Subcutaneous HS Tommy Dye MD     • insulin lispro  1-6 Units Subcutaneous TID AC Tommy Dye MD     • insulin lispro  6 Units Subcutaneous TID With Meals Ding Peer, DO     • ondansetron  4 mg Intravenous Q4H PRN Ching Kent MD       Nutrition Assessment and Intervention:     Reviewed food recall journal    Recommended completion of food recall journal      Physical Activity Assessment and Intervention:    Activity journal reviewed    Activity journal completion recommended      Emotional and Mental Well-being, Sleep, Connectedness Assessment and Intervention:    Sleep/stress assessment performed    Depression and anxiety screening performed and reviewed      Tobacco and Toxic Substance Assessment and Intervention:     Tobacco use screening performed    Alcohol and drug use screening performed         Today, Patient Was Seen By: La Jackson MD    ** Please Note: This note has been constructed using a voice recognition system   **

## 2022-12-14 NOTE — ASSESSMENT & PLAN NOTE
Patient normally takes 30 units of Lantus in the morning  AM fasting glucose on 12/10 was >500 so patient took 60 units of Lantus  Blood sugar checked in the evening was in the 50's  Only symptom was fatigue  Fingerstick glucose upon arrival to ED was 187-> 294-> 66  Poison control contacted by ED  Per recommendation, will monitor overnight  Received 1 L normal saline bolus in ED  D5  cc/hr x 5 hours for persistent hypoglycemia  Neurocognitive eval revealed that patient has capacity to make medical decisions  Patient still hypoglycemic in the morning  Asymptomatic which is concerning  Plan:  Carb controlled diet  Lantus reduced to 20 units qAM    Lispro 6 units 3 times daily  Consider omitting the night dose  Endocrinology team on board

## 2022-12-14 NOTE — PLAN OF CARE
Problem: Potential for Falls  Goal: Patient will remain free of falls  Description: INTERVENTIONS:  - Educate patient/family on patient safety including physical limitations  - Instruct patient to call for assistance with activity   - Consult OT/PT to assist with strengthening/mobility   - Keep Call bell within reach  - Keep bed low and locked with side rails adjusted as appropriate  - Keep care items and personal belongings within reach  - Initiate and maintain comfort rounds  - Make Fall Risk Sign visible to staff  - Offer Toileting every  Hours, in advance of need  - Initiate/Maintain alarm  - Obtain necessary fall risk management equipment  - Apply yellow socks and bracelet for high fall risk patients  - Consider moving patient to room near nurses station  Outcome: Progressing     Problem: DISCHARGE PLANNING  Goal: Discharge to home or other facility with appropriate resources  Description: INTERVENTIONS:  - Identify barriers to discharge w/patient and caregiver  - Arrange for needed discharge resources and transportation as appropriate  - Identify discharge learning needs (meds, wound care, etc )  - Arrange for interpretive services to assist at discharge as needed  - Refer to Case Management Department for coordinating discharge planning if the patient needs post-hospital services based on physician/advanced practitioner order or complex needs related to functional status, cognitive ability, or social support system  Outcome: Progressing     Problem: Knowledge Deficit  Goal: Patient/family/caregiver demonstrates understanding of disease process, treatment plan, medications, and discharge instructions  Description: Complete learning assessment and assess knowledge base    Interventions:  - Provide teaching at level of understanding  - Provide teaching via preferred learning methods  Outcome: Progressing     Problem: METABOLIC, FLUID AND ELECTROLYTES - ADULT  Goal: Electrolytes maintained within normal limits  Description: INTERVENTIONS:  - Monitor labs and assess patient for signs and symptoms of electrolyte imbalances  - Administer electrolyte replacement as ordered  - Monitor response to electrolyte replacements, including repeat lab results as appropriate  - Instruct patient on fluid and nutrition as appropriate  Outcome: Progressing  Goal: Fluid balance maintained  Description: INTERVENTIONS:  - Monitor labs   - Monitor I/O and WT  - Instruct patient on fluid and nutrition as appropriate  - Assess for signs & symptoms of volume excess or deficit  Outcome: Progressing  Goal: Glucose maintained within target range  Description: INTERVENTIONS:  - Monitor Blood Glucose as ordered  - Assess for signs and symptoms of hyperglycemia and hypoglycemia  - Administer ordered medications to maintain glucose within target range  - Assess nutritional intake and initiate nutrition service referral as needed  Outcome: Progressing

## 2022-12-15 VITALS
HEART RATE: 87 BPM | HEIGHT: 58 IN | RESPIRATION RATE: 17 BRPM | SYSTOLIC BLOOD PRESSURE: 87 MMHG | BODY MASS INDEX: 28.46 KG/M2 | OXYGEN SATURATION: 99 % | DIASTOLIC BLOOD PRESSURE: 54 MMHG | WEIGHT: 135.58 LBS | TEMPERATURE: 98.4 F

## 2022-12-15 LAB
ANION GAP SERPL CALCULATED.3IONS-SCNC: 4 MMOL/L (ref 4–13)
BUN SERPL-MCNC: 16 MG/DL (ref 5–25)
CALCIUM SERPL-MCNC: 8.3 MG/DL (ref 8.4–10.2)
CHLORIDE SERPL-SCNC: 108 MMOL/L (ref 96–108)
CO2 SERPL-SCNC: 30 MMOL/L (ref 21–32)
CREAT SERPL-MCNC: 0.49 MG/DL (ref 0.6–1.3)
GFR SERPL CREATININE-BSD FRML MDRD: 131 ML/MIN/1.73SQ M
GLUCOSE SERPL-MCNC: 168 MG/DL (ref 65–140)
GLUCOSE SERPL-MCNC: 181 MG/DL (ref 65–140)
GLUCOSE SERPL-MCNC: 68 MG/DL (ref 65–140)
GLUCOSE SERPL-MCNC: 86 MG/DL (ref 65–140)
POTASSIUM SERPL-SCNC: 4.2 MMOL/L (ref 3.5–5.3)
SODIUM SERPL-SCNC: 142 MMOL/L (ref 135–147)

## 2022-12-15 RX ORDER — INSULIN GLARGINE 100 [IU]/ML
16 INJECTION, SOLUTION SUBCUTANEOUS DAILY
Qty: 15 ML | Refills: 0 | Status: SHIPPED | OUTPATIENT
Start: 2022-12-15

## 2022-12-15 RX ORDER — PEN NEEDLE, DIABETIC 32GX 5/32"
NEEDLE, DISPOSABLE MISCELLANEOUS
Qty: 100 EACH | Refills: 0 | Status: SHIPPED | OUTPATIENT
Start: 2022-12-15

## 2022-12-15 RX ORDER — INSULIN LISPRO 100 [IU]/ML
4 INJECTION, SOLUTION INTRAVENOUS; SUBCUTANEOUS
Qty: 15 ML | Refills: 0 | Status: SHIPPED | OUTPATIENT
Start: 2022-12-15

## 2022-12-15 RX ADMIN — INSULIN LISPRO 1 UNITS: 100 INJECTION, SOLUTION INTRAVENOUS; SUBCUTANEOUS at 12:43

## 2022-12-15 RX ADMIN — INSULIN LISPRO 4 UNITS: 100 INJECTION, SOLUTION INTRAVENOUS; SUBCUTANEOUS at 17:12

## 2022-12-15 RX ADMIN — INSULIN LISPRO 4 UNITS: 100 INJECTION, SOLUTION INTRAVENOUS; SUBCUTANEOUS at 12:42

## 2022-12-15 RX ADMIN — INSULIN LISPRO 1 UNITS: 100 INJECTION, SOLUTION INTRAVENOUS; SUBCUTANEOUS at 17:11

## 2022-12-15 RX ADMIN — INSULIN GLARGINE 16 UNITS: 100 INJECTION, SOLUTION SUBCUTANEOUS at 08:07

## 2022-12-15 RX ADMIN — INSULIN LISPRO 4 UNITS: 100 INJECTION, SOLUTION INTRAVENOUS; SUBCUTANEOUS at 08:06

## 2022-12-15 NOTE — ASSESSMENT & PLAN NOTE
Lab Results   Component Value Date    HGBA1C 12 6 (H) 12/05/2022       Recent Labs     12/14/22  1658 12/14/22  2128 12/15/22  0738 12/15/22  1123   POCGLU 103 293* 68 168*       Blood Sugar Average: Last 72 hrs:  (P) 767 [id number]       Home medication regimen includes Lantus 30 units nightly and lispro 6 units 3 times daily  Patient stated that she was not compliant with medication as she did not have insulin pen needles although she had the insulin pens  She then found 1 pen with a needle and injected 60 units instead of 30  Plan:  · Lantus 16 units qAM  · Lispro 6 units 3 times daily with meals  Consider omitting the night dose  · SSI  · POCT glucose checks  · Hypoglycemia protocol  · Diabetic diet  · Follow-up with outpatient endocrinologist in 1 week

## 2022-12-15 NOTE — ASSESSMENT & PLAN NOTE
Recent Labs     12/14/22 2128 12/15/22  0738 12/15/22  1123   POCGLU 293* 68 168*     Patient normally takes 30 units of Lantus in the morning  AM fasting glucose on 12/10 was >500 so patient took 60 units of Lantus  Blood sugar checked in the evening was in the 50's  Only symptom was fatigue  Fingerstick glucose upon arrival to ED was 187-> 294-> 66  Poison control contacted by ED  Per recommendation, will monitor overnight  Received 1 L normal saline bolus in ED  D5  cc/hr x 5 hours for persistent hypoglycemia  Neurocognitive eval revealed that patient has capacity to make medical decisions  Patient still hypoglycemic in the morning  Asymptomatic which is concerning  Plan:  Carb controlled diet  Lantus reduced to 16 units qAM    Lispro 4 units 3 times daily  Follow-up with outpatient endocrinologist in 1 week

## 2022-12-15 NOTE — ASSESSMENT & PLAN NOTE
Recent Labs     12/14/22  0510   WBC 4 17*     · WBC 11 3 POA  · No signs of infection  · Likely reactive  · Resolved

## 2022-12-15 NOTE — ASSESSMENT & PLAN NOTE
Recent Labs     12/14/22  0510 12/15/22  0444   SODIUM 140 142     · Corrected sodium of 132 at admission    Plan:  · Resolved

## 2022-12-16 NOTE — UTILIZATION REVIEW
NOTIFICATION OF ADMISSION DISCHARGE   This is a Notification of Discharge from [address]  Please be advised that this patient has been discharge from our facility  Below you will find the admission and discharge date and time including the patient’s disposition  UTILIZATION REVIEW CONTACT:  Eliezer Vaughan  Utilization   Network Utilization Review Department  Phone: [phone] x carefully listen to the prompts  All voicemails are confidential   Email: [email or web address]     ADMISSION INFORMATION  PRESENTATION DATE: 12/10/2022 10:13 PM  OBERVATION ADMISSION DATE:   INPATIENT ADMISSION DATE: 12/13/22  4:08 PM   DISCHARGE DATE: 12/15/2022  5:41 PM   DISPOSITION:Home/Self Care    IMPORTANT INFORMATION:  Send all requests for admission clinical reviews, approved or denied determinations and any other requests to dedicated fax number below belonging to the campus where the patient is receiving treatment   List of dedicated fax numbers:  [address] [place] [address] DENIALS (Administrative/Medical Necessity) [phone]   [address] (Maternity/NICU/Pediatrics) [phone]   [facility] [phone]   [place] [address] [phone]   [address] [phone]   [address] [facility] [phone] [facility] [phone]   [address] [place], [place] [address] [phone]   [facility]  [phone]   [address] [facility] [phone]   [address] [facility] [address] [facility] [phone]

## 2022-12-29 NOTE — QUICK NOTE
Attestation signed by Soheila Shultz MD at 12/29/2022  5:31 PM     [facility] Service Attending Physician Attestation Note - Discharge     I have seen and examined Prabhjot Alejo personally and have reviewed the medical record independently  I have reviewed the case with the resident physician including all assessments and the plan of care for each  I agree with the resident physician and offer the following addendum to the below statements by the resident physician:      Date Evaluated: December 15th 2022  Time Evaluated: morning       Subjective / HPI:      This is a 57-year-old female with labile blood pressures ranging from the hypoglycemic risk range to the hyperglycemic range  She has been seen by endocrinology the case was discussed with the endocrinology team prior to discharge  At this juncture it is determined that the patient would be okay for discharge with her Lantus reduced to 16 units in the morning  Her short acting insulin was reduced to 4 units this in contrast to her being on 30 units long-acting prior to admission as well as 6 units for mealtime coverage            Exam:   Awake alert and oriented  Neurologically intact  GCS 15/15  Extremities no edema  Heart sounds regular  Chest clear     Assessment and Plan:  • See below     Patient Centered Rounds: I have performed bedside rounds with nursing staff today      Discussions with Specialists or Other Care Team Provider: Discussed with endocrinology prior to discharge by the resident     Education and Discussions with Family / Patient: Cussed with the patient resident updated family     Discharge Statement:  I spent 45 minutes discharging the patient  This time was spent on the day of discharge  I had direct contact with the patient on the day of discharge   Greater than 50% of the total time was spent examining patient, answering all patient questions, arranging and discussing plan of care with patient as well as directly providing post-discharge instructions    Additional time then spent on discharge activities      For detailed history, assessment, and plan of care, please review the statements below by Dr Ronna Almodovar MD
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
## 2023-06-20 NOTE — PROGRESS NOTES
Virtual Regular Visit      Assessment/Plan:    Problem List Items Addressed This Visit        Endocrine    Pre-existing type 1 diabetes mellitus with hyperglycemia during pregnancy in second trimester ([facility] [address] ) - Primary     -A1c not at goal  Aim for less than 6% with minimal hypoglycemia    -Currently on basaglar 55 units at 7 to 8 AM daily   -Not SMBG consistently, reports readings of 408, 272 and 63   -Denies readings less than 60   -History of being on Medtronic insulin pump for 2 to 3 years but did not like  -Prefers to inject insulin once a day, informed given T1DM does need use both basal and bolus insulin    -Recommended trying an insulin pump again or adding bolus insulin to regimen   -Interested in CGM, encouraged patient to start testing fasting and 2 hours post start of each meal    -For pre-existing T1DM recommendation is testing fasting, before meals, 1 or 2 hours post start of meal and 3 AM   -Report glucose readings via flowsheet    -Keep dietitian appointment as scheduled   -Schedule an in office appointment to discuss further diabetes and pregnancy management   -Try to eat 3 meals and 3 snacks with combination of carbohydrates, protein and fat per meal/snack   -No more than 8 to 10 hours of fasting overnight   -Glucose goals fasting 60 to 90; before meals 60 to 100; 1 hour post meal 135 or less and 2 hours post meal 120 or less, overnight 60 to 100    -Continue follow up with OB and MFM as recommended  Lab Results   Component Value Date    HGBA1C 10 6 (H) 01/27/2021            Relevant Medications    Glucagon, rDNA, (Glucagon Emergency) 1 MG KIT      -A1c not at goal  Aim for less than 6% with minimal hypoglycemia    -basaglar 55 units at 7 to 8 AM daily    -Both long and quick acting insulin is recommended for type 1 patients  Consider adding bolus insulin or restarting insulin pump therapy    -Start testing fasting and 2 hours post start of each meal for total of 4 times a day    -For pre-existing T1DM recommendation is testing fasting, before meals, 1 or 2 hours post start of meal and 3 AM   -Report glucose readings via flowsheet    -Keep dietitian appointment as scheduled   -Schedule an in office appointment to discuss further diabetes and pregnancy management   -Try to eat 3 meals and 3 snacks with combination of carbohydrates, protein and fat per meal/snack   -No more than 8 to 10 hours of fasting overnight   -Glucose goals fasting 60 to 90; before meals 60 to 100; 1 hour post meal 135 or less and 2 hours post meal 120 or less, overnight 60 to 100    -Continue follow up with OB and MFM as recommended   -Stay in close contact with diabetes education team    -Poorly controlled glucose during pregnancy increase risk factors including fetal macrosomia; birth injury; pre-eclampsia; polyhydramnios; pre-term labor;  hypoglycemia; jaundice and stillbirth  Reason for visit is T1DM  Chief Complaint   Patient presents with    Virtual Regular Visit    Diabetes Type 1    Patient Education        Encounter provider [facility] [place]    Provider located at [address] [place] [id number] [phone]      Recent Visits  Date Type Provider Dept   21 Telephone [facility], [facility] recent visits within past 7 days and meeting all other requirements     Today's Visits  Date Type Provider Dept   21 Telemedicine [facility], [facility] [facility]   Showing today's visits and meeting all other requirements     Future Appointments  No visits were found meeting these conditions  Showing future appointments within next 150 days and meeting all other requirements        The patient was identified by name and date of birth   Justyna Collado was informed that this is a telemedicine visit and that the visit is being conducted through [employer] and patient was informed that this is a secure, HIPAA-compliant platform  She agrees to proceed     My office door was closed  No one else was in the room  She acknowledged consent and understanding of privacy and security of the video platform  The patient has agreed to participate and understands they can discontinue the visit at any time  Patient is aware this is a billable service  Subjective  Christa Lott is a 31 y o  female 17 4/7 weeks gestation pre-existing T1DM, diagnosed in  or  at [facility], has an endocrinologist, history of being on basal/bolus insulin and for 2 to 3 years on Medtronic insulin pump  Currently prefers only injecting herself once a day with pen, made aware given T1DM needs both basal/bolus insulin  Testing glucose once a day, encouraged to increase to at least 4 times a day  Has a 9 year daughter  Reports strong family history of diabetes from father's side  Referred for diabetes and pregnancy management  History of DKA          Past Medical History:   Diagnosis Date    Diabetes mellitus ([facility] [address] )     uses kwiki pen     Diabetes mellitus type 1 ([facility] [address] )     High blood pressure     recently dx/ put on lisinopril     High cholesterol     Hypertension      no hypersion     Miscarriage     Recurrent pregnancy loss, antepartum condition or complication        Past Surgical History:   Procedure Laterality Date     SECTION  01/02/2014     X1    DILATION AND CURETTAGE OF UTERUS  04/10/2019    Missed AB     WISDOM TOOTH EXTRACTION         Current Outpatient Medications   Medication Sig Dispense Refill    glucose blood test strip Contour Next Test Strips   TEST 4 TIMES A DAY      insulin glargine (Basaglar KwikPen) 100 units/mL injection pen Inject 55 Units under the skin daily 6 pen 1    Microlet Lancets MISC Microlet Lancet   USE 3 TIMES A DAY      Prenat-Fe Carbonyl-FA-Omega 3 (One-A-Day Womens Prenatal 1) 28-0 8-235 MG CAPS Take 1 tablet by mouth daily  0    Urine Glucose-Ketones Test STRP Test  Glucagon, rDNA, (Glucagon Emergency) 1 MG KIT Glucagon Emergency Kit (human-recomb) 1 mg solution for injection   GIVE 1 MG IN CASE OF LOW BLOOD SUGAR   glucose blood test strip Contour Next Meter   USE AS DIRECTED       No current facility-administered medications for this visit  No Known Allergies    Review of Systems   Constitutional: Negative for fatigue and fever  HENT: Negative for trouble swallowing  Eyes: Negative for visual disturbance  Cardiovascular: Negative for chest pain and palpitations  Gastrointestinal: Negative for constipation, nausea and vomiting  Endocrine: Positive for polydipsia and polyuria  Negative for polyphagia  Video Exam  It was my intent to perform this visit via video technology but the patient was not able to do a video connection so the visit was completed via audio telephone only  Vitals:    02/09/21 1403   Weight: 59 9 kg (132 lb)   Height: 4' 10" (1 473 m)       Physical Exam   It was my intent to perform this visit via video technology but the patient was not able to do a video connection so the visit was completed via audio telephone only  Connected briefly via Teams but issue with speaker from patient's side and switched to telephone call visit  I spent 45 minutes with patient today in which greater than 50% of the time was spent in counseling/coordination of care regarding 5 minutes  VIRTUAL VISIT DISCLAIMER    Kadenbrad Novak acknowledges that she has consented to an online visit or consultation  She understands that the online visit is based solely on information provided by her, and that, in the absence of a face-to-face physical evaluation by the physician, the diagnosis she receives is both limited and provisional in terms of accuracy and completeness  This is not intended to replace a full medical face-to-face evaluation by the physician  Kaden Novak understands and accepts these terms  Topical Retinoid counseling:  Patient advised to apply a pea-sized amount only at bedtime and wait 30 minutes after washing their face before applying.  If too drying, patient may add a non-comedogenic moisturizer. The patient verbalized understanding of the proper use and possible adverse effects of retinoids.  All of the patient's questions and concerns were addressed.

## 2023-09-06 NOTE — TELEPHONE ENCOUNTER
No VM setup I will try again at a later time to discuss her reporting her blood sugars and Medtronic      Thank you Retention Suture Bite Size: 3 mm

## 2023-09-26 ENCOUNTER — OFFICE VISIT (OUTPATIENT)
Dept: ENDOCRINOLOGY | Facility: CLINIC | Age: 33
End: 2023-09-26
Payer: COMMERCIAL

## 2023-09-26 VITALS
SYSTOLIC BLOOD PRESSURE: 116 MMHG | BODY MASS INDEX: 27.86 KG/M2 | WEIGHT: 138.2 LBS | DIASTOLIC BLOOD PRESSURE: 70 MMHG | HEIGHT: 59 IN | OXYGEN SATURATION: 98 % | HEART RATE: 78 BPM

## 2023-09-26 DIAGNOSIS — E78.2 MIXED HYPERLIPIDEMIA: ICD-10-CM

## 2023-09-26 DIAGNOSIS — E16.2 HYPOGLYCEMIA: ICD-10-CM

## 2023-09-26 DIAGNOSIS — R79.89 ELEVATED LFTS: ICD-10-CM

## 2023-09-26 DIAGNOSIS — E10.65 TYPE 1 DIABETES MELLITUS WITH HYPERGLYCEMIA, WITH LONG-TERM CURRENT USE OF INSULIN (HCC): Primary | ICD-10-CM

## 2023-09-26 PROBLEM — O24.013 TYPE 1 DIABETES MELLITUS DURING PREGNANCY IN THIRD TRIMESTER: Status: RESOLVED | Noted: 2021-04-07 | Resolved: 2023-09-26

## 2023-09-26 PROBLEM — O10.919 CHRONIC HYPERTENSION AFFECTING PREGNANCY: Status: RESOLVED | Noted: 2020-04-30 | Resolved: 2023-09-26

## 2023-09-26 PROBLEM — O24.013 PRE-EXISTING TYPE 1 DIABETES MELLITUS WITH HYPERGLYCEMIA DURING PREGNANCY IN THIRD TRIMESTER (HCC): Status: RESOLVED | Noted: 2020-04-30 | Resolved: 2023-09-26

## 2023-09-26 LAB — SL AMB POCT HEMOGLOBIN AIC: 11.1 (ref ?–6.5)

## 2023-09-26 PROCEDURE — 99215 OFFICE O/P EST HI 40 MIN: CPT | Performed by: INTERNAL MEDICINE

## 2023-09-26 PROCEDURE — 83036 HEMOGLOBIN GLYCOSYLATED A1C: CPT | Performed by: INTERNAL MEDICINE

## 2023-09-26 RX ORDER — INSULIN GLARGINE-YFGN 100 [IU]/ML
INJECTION, SOLUTION SUBCUTANEOUS
Qty: 15 ML | Refills: 3 | Status: SHIPPED | OUTPATIENT
Start: 2023-09-26

## 2023-09-26 RX ORDER — INSULIN GLARGINE-YFGN 100 [IU]/ML
INJECTION, SOLUTION SUBCUTANEOUS
COMMUNITY
Start: 2023-08-22 | End: 2023-09-26 | Stop reason: SDUPTHER

## 2023-09-26 RX ORDER — LANCETS
EACH MISCELLANEOUS
Qty: 400 EACH | Refills: 3 | Status: SHIPPED | OUTPATIENT
Start: 2023-09-26

## 2023-09-26 RX ORDER — BLOOD SUGAR DIAGNOSTIC
STRIP MISCELLANEOUS
Qty: 400 EACH | Refills: 3 | Status: SHIPPED | OUTPATIENT
Start: 2023-09-26 | End: 2023-10-02

## 2023-09-26 RX ORDER — BLOOD-GLUCOSE METER
EACH MISCELLANEOUS 4 TIMES DAILY
Qty: 1 KIT | Refills: 0 | Status: SHIPPED | OUTPATIENT
Start: 2023-09-26

## 2023-09-26 RX ORDER — INSULIN LISPRO 100 [IU]/ML
3 INJECTION, SOLUTION INTRAVENOUS; SUBCUTANEOUS
Qty: 15 ML | Refills: 0 | Status: SHIPPED | OUTPATIENT
Start: 2023-09-26

## 2023-09-26 NOTE — PROGRESS NOTES
Awanda Lefort 34 y.o. female MRN: [id number]    Encounter: [id number]      Assessment/Plan     1. Type 1 diabetes mellitus on long term insulin therapy with hypo and hyperglycemia  Poorly controlled with POCT A1c 11.1%  Using basal insulin only; has not check blood sugars in many months    Recommend the following at this time  decrease glargine to 15 units subcutaneously daily in the morning  Take Humalog 3 units with each meal 3 times a day  Start SSI 1:50>150   - check blood sugars qAC and HS;   - Send log for review in 2 weeks  I have put in a prescription for glucometer with supplies as well as will request for a Dexcom CGM  - referred for diabetes education/medical nutrition therapy and CGM training     Follow-up in 4 weeks  Follow-up with your eye doctor    3. Hyperlipidemia  4. Elevated LFTs  Patient thinks she was previously on a cholesterol-lowering medication or not currently taking  Elevated cholesterol, TG and Elevated LFTs -patient was not aware of these labs from 8/2023. Needs to establish care with a new primary care physician  TSH within normal limits ; No h/o alcohol ; no known hepatitis   - check CMP, CBC  establish care with a new primary care physician    I have spent a total time of 45 minutes on 09/26/23 in caring for this patient. CC: Diabetes    History of Present Illness     HPI:  Awanda Lefort is a 34 y.o. female presents for a follow-up visit regarding diabetes management.    Also has     Last seen in 12/2022 when she was seen in the [facility] she was in  [place] for 6 months, came back in May,     Last Eye exam: within the last 1 year- No DR     Current regimen:   Glargine 22-24 units in the morning   Humalog - does not use alot because she has been experiencing low BG     Ran out of test strips 4-5 months ago and has not checked BG   Sates that she has been feeling ok   Works overnight, c/o fatigue   Appetite is good, no recent weight changes   No polyuria/ polydipsia No numbness/tingling   No changes in BM  No CP/SOB   Has occasional blurriness in vision     Statin:  --   ACE-I/ARB: --      Symptoms of hypoglycemia : shaky, weak,  Sweating   Had on at work today; Once in a few days, usually when at work     Works 11:45 pm-7:45 am   Eats at 4:15 am: cookies   Insulin at 7 am   8-9 am: eggs, blankenship, bread  Sleeps during the day   5-6 pm:  Rice, meat/ beans,  Or spaghetti, mashed potatoes    sometimes eats cereal before going to work   Nothing to drink     Says that she has never used a CGM       All other systems were reviewed and are negative.     Review of Systems      Historical Information   Past Medical History:   Diagnosis Date   • Diabetes mellitus ([address])     uses kwiki pen    • Diabetes mellitus type 1 ([address])    • High blood pressure     recently dx/ put on lisinopril    • High cholesterol    • Hypertension      no hypersion    • Miscarriage    • Recurrent pregnancy loss, antepartum condition or complication      Past Surgical History:   Procedure Laterality Date   •  SECTION  01/02/2014     X1   • DILATION AND CURETTAGE OF UTERUS  04/10/2019    Missed AB    • ME  DELIVERY ONLY N/A 2021    Procedure:  SECTION () REPEAT;  Surgeon: Suhas Morse MD;  Location: [facility] ;  Service: Obstetrics   • WISDOM TOOTH EXTRACTION       Social History   Social History     Substance and Sexual Activity   Alcohol Use Never    Comment: Alcohol intake:   None - As per [facility]      Social History     Substance and Sexual Activity   Drug Use Never    Comment: Illicit drugs:   No  - As per [facility]      Social History     Tobacco Use   Smoking Status Never   Smokeless Tobacco Never     Family History:   Family History   Problem Relation Age of Onset   • No Known Problems Mother    • Diabetes Father    • No Known Problems Sister    • No Known Problems Sister    • No Known Problems Brother    • No Known Problems Maternal Grandmother    • Diabetes Paternal Grandmother    • No Known Problems Paternal Grandfather    • No Known Problems Daughter        Meds/Allergies   Current Outpatient Medications   Medication Sig Dispense Refill   • Insulin Glargine-yfgn 100 UNIT/ML SOPN Using 20-22 units once at in the morning . • Accu-Chek FastClix Lancets MISC Use 6 a day and as directed. (Patient not taking: Reported on 5/12/2022) 102 each 2   • Continuous Blood Gluc  (Dexcom G6 ) NERY Use as directed. (Patient not taking: Reported on 6/11/2021) 1 Device 0   • Continuous Blood Gluc Sensor (Dexcom G6 Sensor) MISC 1 box=1 month supply or 3 sensors, use 1 sensor every 10 days. (Patient not taking: Reported on 6/7/2021) 1 each 12   • Continuous Blood Gluc Transmit (Dexcom G6 Transmitter) MISC Transmitter change every 90 days. (Patient not taking: Reported on 6/7/2021) 1 each 3   • Insulin Glargine Solostar (Lantus SoloStar) 100 UNIT/ML SOPN Inject 0.16 mL (16 Units total) under the skin in the morning (Patient not taking: Reported on 9/26/2023) 15 mL 0   • insulin lispro (HumaLOG KwikPen) 100 units/mL injection pen Inject 4 Units under the skin 3 (three) times a day with meals (Patient not taking: Reported on 9/26/2023) 15 mL 0   • Insulin Pen Needle (BD Pen Needle Kerrie 2nd Gen) 32G X 4 MM MISC For use with insulin pen. Pharmacy may dispense brand covered by insurance. 100 each 0   • Insulin Pen Needle (BD Pen Needle Kerrie 2nd Gen) 32G X 4 MM MISC For use with insulin pen. Pharmacy may dispense brand covered by insurance. 100 each 0   • Insulin Pen Needle (BD Pen Needle Kerrie 2nd Gen) 32G X 4 MM MISC For use with insulin pen. Pharmacy may dispense brand covered by insurance. 100 each 0     No current facility-administered medications for this visit. No Known Allergies    Objective   Vitals: Blood pressure 116/70, pulse 78, height 4' 11" (1.499 m), weight 62.7 kg (138 lb 3.2 oz), last menstrual period 09/19/2023, SpO2 98 %, not currently breastfeeding.     Physical Exam  Constitutional:       Appearance: She is well-developed. HENT:      Head: Normocephalic and atraumatic. Eyes:      Conjunctiva/sclera: Conjunctivae normal.      Pupils: Pupils are equal, round, and reactive to light. Neck:      Thyroid: No thyromegaly. Cardiovascular:      Rate and Rhythm: Normal rate and regular rhythm. Pulses:           Dorsalis pedis pulses are 2+ on the right side and 2+ on the left side. Heart sounds: Normal heart sounds. No murmur heard. Pulmonary:      Effort: Pulmonary effort is normal.      Breath sounds: Normal breath sounds. No wheezing. Abdominal:      General: There is no distension. Palpations: Abdomen is soft. Tenderness: There is no abdominal tenderness. Musculoskeletal:         General: Normal range of motion. Cervical back: Normal range of motion and neck supple. Feet:      Right foot:      Skin integrity: No ulcer, skin breakdown, erythema, warmth, callus or dry skin. Left foot:      Skin integrity: No ulcer, skin breakdown, erythema, warmth, callus or dry skin. Skin:     General: Skin is warm and dry. Neurological:      Mental Status: She is alert and oriented to person, place, and time. Psychiatric:         Behavior: Behavior normal.     Diabetic Foot Exam    Patient's shoes and socks removed. Right Foot/Ankle   Right Foot Inspection  Skin Exam: skin normal and skin intact. No dry skin, no warmth, no callus, no erythema, no maceration, no abnormal color, no pre-ulcer, no ulcer and no callus. Sensory   Vibration: intact  Proprioception: intact  Monofilament testing: intact    Vascular  Capillary refills: < 3 seconds  The right DP pulse is 2+. Left Foot/Ankle  Left Foot Inspection  Skin Exam: skin normal and skin intact. No dry skin, no warmth, no erythema, no maceration, normal color, no pre-ulcer, no ulcer and no callus.      Sensory   Vibration: intact  Proprioception: intact  Monofilament testing: intact    Vascular  Capillary refills: < 3 seconds  The left DP pulse is 2+. 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 Lab Results:   Lab Results   Component Value Date/Time    Hemoglobin A1C 12.6 (H) 12/05/2022 03:56 PM    WBC 4.17 (L) 12/14/2022 05:10 AM    WBC 8.00 12/11/2022 04:09 AM    WBC 11.30 (H) 12/10/2022 11:48 PM    Hemoglobin 13.7 12/14/2022 05:10 AM    Hemoglobin 14.6 12/11/2022 04:09 AM    Hemoglobin 16.6 (H) 12/10/2022 11:48 PM    Hematocrit 41.9 12/14/2022 05:10 AM    Hematocrit 41.6 12/11/2022 04:09 AM    Hematocrit 47.5 (H) 12/10/2022 11:48 PM    MCV 93 12/14/2022 05:10 AM    MCV 87 12/11/2022 04:09 AM    MCV 87 12/10/2022 11:48 PM    Platelets 017 22/31/6990 05:10 AM    Platelets 353 83/42/7510 04:09 AM    Platelets 163 69/32/9992 11:48 PM    BUN 16 12/15/2022 04:44 AM    BUN 15 12/14/2022 05:10 AM    BUN 27 (H) 12/11/2022 04:09 AM    Potassium 4.2 12/15/2022 04:44 AM    Potassium 3.7 12/14/2022 05:10 AM    Potassium 3.3 (L) 12/11/2022 04:09 AM    Chloride 108 12/15/2022 04:44 AM    Chloride 107 12/14/2022 05:10 AM    Chloride 110 (H) 12/11/2022 04:09 AM    CO2 30 12/15/2022 04:44 AM    CO2 29 12/14/2022 05:10 AM    CO2 18 (L) 12/11/2022 04:09 AM    Creatinine 0.49 (L) 12/15/2022 04:44 AM    Creatinine 0.51 (L) 12/14/2022 05:10 AM    Creatinine 0.57 (L) 12/11/2022 04:09 AM    AST 90 (H) 12/14/2022 05:10 AM    AST 9 (L) 12/05/2022 03:56 PM    ALT 55 (H) 12/14/2022 05:10 AM    ALT 11 12/05/2022 03:56 PM    Total Protein 5.4 (L) 12/14/2022 05:10 AM    Total Protein 7.0 12/05/2022 03:56 PM    Albumin 3.1 (L) 12/14/2022 05:10 AM    Albumin 3.9 12/05/2022 03:56 PM    HDL, Direct 66 12/05/2022 10:37 PM    Triglycerides 336 (H) 12/05/2022 10:37 PM         Imaging Studies: I have personally reviewed pertinent reports. Portions of the record may have been created with voice recognition software. Occasional wrong word or "sound a like" substitutions may have occurred due to the inherent limitations of voice recognition software. Read the chart carefully and recognize, using context, where substitutions have occurred.

## 2023-09-26 NOTE — PATIENT INSTRUCTIONS
decrease glargine to 15 units subcutaneously daily in the morning  Take Humalog 3 units with each meal 3 times a day    In addition, please use humalog insulin per the following sliding scale to correct high blood sugars:  BG  151-200: 1 unit  201-250: 2 units  251-300: 3 units  301-350: 4 units  > 350: 5 units  Do not correct bed time highs unless > 200 mg/dl     Checks blood sugars before meals and at bedtime, keep a log  Send log for review in 2 weeks  Follow-up in 4 weeks  Follow-up with your eye doctor  Please have labs done as ordered  Please establish care with a new primary care physician

## 2023-09-28 ENCOUNTER — TELEPHONE (OUTPATIENT)
Dept: DIABETES SERVICES | Facility: CLINIC | Age: 33
End: 2023-09-28

## 2023-09-28 NOTE — TELEPHONE ENCOUNTER
I called patient for her education referral and scheduled her for mnt. The referral also mentioned a dexcom but I did not see an order in epic or parachute. Can you follow up?  Thank you

## 2023-09-29 LAB
DME PARACHUTE DELIVERY DATE REQUESTED: NORMAL
DME PARACHUTE ITEM DESCRIPTION: NORMAL
DME PARACHUTE ITEM DESCRIPTION: NORMAL
DME PARACHUTE ORDER STATUS: NORMAL
DME PARACHUTE SUPPLIER NAME: NORMAL
DME PARACHUTE SUPPLIER PHONE: NORMAL

## 2023-10-02 DIAGNOSIS — E10.65 TYPE 1 DIABETES MELLITUS WITH HYPERGLYCEMIA, WITH LONG-TERM CURRENT USE OF INSULIN (HCC): ICD-10-CM

## 2023-10-02 DIAGNOSIS — E16.2 HYPOGLYCEMIA: ICD-10-CM

## 2023-10-02 DIAGNOSIS — E78.2 MIXED HYPERLIPIDEMIA: ICD-10-CM

## 2023-10-02 DIAGNOSIS — R79.89 ELEVATED LFTS: ICD-10-CM

## 2023-10-02 RX ORDER — BLOOD SUGAR DIAGNOSTIC
STRIP MISCELLANEOUS
Qty: 300 STRIP | Refills: 3 | Status: SHIPPED | OUTPATIENT
Start: 2023-10-02 | End: 2023-10-06

## 2023-10-06 RX ORDER — BLOOD SUGAR DIAGNOSTIC
STRIP MISCELLANEOUS
Qty: 100 STRIP | Refills: 3 | Status: SHIPPED | OUTPATIENT
Start: 2023-10-06

## 2023-10-30 DIAGNOSIS — E10.65 TYPE 1 DIABETES MELLITUS WITH HYPERGLYCEMIA, WITH LONG-TERM CURRENT USE OF INSULIN (HCC): ICD-10-CM

## 2023-10-31 ENCOUNTER — APPOINTMENT (OUTPATIENT)
Dept: LAB | Facility: HOSPITAL | Age: 33
End: 2023-10-31
Payer: COMMERCIAL

## 2023-10-31 DIAGNOSIS — E78.2 MIXED HYPERLIPIDEMIA: ICD-10-CM

## 2023-10-31 DIAGNOSIS — R79.89 ELEVATED LFTS: ICD-10-CM

## 2023-10-31 DIAGNOSIS — E10.65 TYPE 1 DIABETES MELLITUS WITH HYPERGLYCEMIA, WITH LONG-TERM CURRENT USE OF INSULIN (HCC): ICD-10-CM

## 2023-10-31 DIAGNOSIS — E16.2 HYPOGLYCEMIA: ICD-10-CM

## 2023-10-31 LAB
ALBUMIN SERPL BCP-MCNC: 4.2 G/DL (ref 3.5–5)
ALP SERPL-CCNC: 94 U/L (ref 34–104)
ALT SERPL W P-5'-P-CCNC: 9 U/L (ref 7–52)
ANION GAP SERPL CALCULATED.3IONS-SCNC: 9 MMOL/L
AST SERPL W P-5'-P-CCNC: 12 U/L (ref 13–39)
BASOPHILS # BLD AUTO: 0.02 THOUSANDS/ÂΜL (ref 0–0.1)
BASOPHILS NFR BLD AUTO: 0 % (ref 0–1)
BILIRUB SERPL-MCNC: 0.5 MG/DL (ref 0.2–1)
BUN SERPL-MCNC: 11 MG/DL (ref 5–25)
CALCIUM SERPL-MCNC: 9.3 MG/DL (ref 8.4–10.2)
CHLORIDE SERPL-SCNC: 99 MMOL/L (ref 96–108)
CO2 SERPL-SCNC: 28 MMOL/L (ref 21–32)
CREAT SERPL-MCNC: 0.58 MG/DL (ref 0.6–1.3)
EOSINOPHIL # BLD AUTO: 0.02 THOUSAND/ÂΜL (ref 0–0.61)
EOSINOPHIL NFR BLD AUTO: 0 % (ref 0–6)
ERYTHROCYTE [DISTWIDTH] IN BLOOD BY AUTOMATED COUNT: 12 % (ref 11.6–15.1)
GFR SERPL CREATININE-BSD FRML MDRD: 123 ML/MIN/1.73SQ M
GLUCOSE SERPL-MCNC: 362 MG/DL (ref 65–140)
HCT VFR BLD AUTO: 44 % (ref 34.8–46.1)
HGB BLD-MCNC: 15 G/DL (ref 11.5–15.4)
IMM GRANULOCYTES # BLD AUTO: 0.02 THOUSAND/UL (ref 0–0.2)
IMM GRANULOCYTES NFR BLD AUTO: 0 % (ref 0–2)
LYMPHOCYTES # BLD AUTO: 1.49 THOUSANDS/ÂΜL (ref 0.6–4.47)
LYMPHOCYTES NFR BLD AUTO: 27 % (ref 14–44)
MCH RBC QN AUTO: 31.3 PG (ref 26.8–34.3)
MCHC RBC AUTO-ENTMCNC: 34.1 G/DL (ref 31.4–37.4)
MCV RBC AUTO: 92 FL (ref 82–98)
MONOCYTES # BLD AUTO: 0.21 THOUSAND/ÂΜL (ref 0.17–1.22)
MONOCYTES NFR BLD AUTO: 4 % (ref 4–12)
NEUTROPHILS # BLD AUTO: 3.73 THOUSANDS/ÂΜL (ref 1.85–7.62)
NEUTS SEG NFR BLD AUTO: 69 % (ref 43–75)
NRBC BLD AUTO-RTO: 0 /100 WBCS
PLATELET # BLD AUTO: 256 THOUSANDS/UL (ref 149–390)
PMV BLD AUTO: 9.1 FL (ref 8.9–12.7)
POTASSIUM SERPL-SCNC: 4 MMOL/L (ref 3.5–5.3)
PROT SERPL-MCNC: 7 G/DL (ref 6.4–8.4)
RBC # BLD AUTO: 4.8 MILLION/UL (ref 3.81–5.12)
SODIUM SERPL-SCNC: 136 MMOL/L (ref 135–147)
WBC # BLD AUTO: 5.49 THOUSAND/UL (ref 4.31–10.16)

## 2023-10-31 PROCEDURE — 36415 COLL VENOUS BLD VENIPUNCTURE: CPT

## 2023-10-31 PROCEDURE — 80053 COMPREHEN METABOLIC PANEL: CPT

## 2023-10-31 PROCEDURE — 85025 COMPLETE CBC W/AUTO DIFF WBC: CPT

## 2023-10-31 RX ORDER — INSULIN LISPRO 100 [IU]/ML
3 INJECTION, SOLUTION INTRAVENOUS; SUBCUTANEOUS
Qty: 25 ML | Refills: 3 | Status: SHIPPED | OUTPATIENT
Start: 2023-10-31

## 2023-11-01 DIAGNOSIS — E10.65 TYPE 1 DIABETES MELLITUS WITH HYPERGLYCEMIA, WITH LONG-TERM CURRENT USE OF INSULIN (HCC): ICD-10-CM

## 2023-11-01 NOTE — TELEPHONE ENCOUNTER
Please ask patient to call suad insurance and inquire which of the following would be covered  - short-acting insulin:  NovoLog, Humalog, apidra, admelog

## 2023-12-14 ENCOUNTER — OFFICE VISIT (OUTPATIENT)
Dept: ENDOCRINOLOGY | Facility: CLINIC | Age: 33
End: 2023-12-14
Payer: COMMERCIAL

## 2023-12-14 VITALS
DIASTOLIC BLOOD PRESSURE: 72 MMHG | SYSTOLIC BLOOD PRESSURE: 116 MMHG | BODY MASS INDEX: 28.05 KG/M2 | HEART RATE: 86 BPM | WEIGHT: 138.9 LBS

## 2023-12-14 DIAGNOSIS — E10.65 TYPE 1 DIABETES MELLITUS WITH HYPERGLYCEMIA (HCC): ICD-10-CM

## 2023-12-14 DIAGNOSIS — Z13.29 SCREENING FOR THYROID DISORDER: ICD-10-CM

## 2023-12-14 DIAGNOSIS — E78.2 MIXED HYPERLIPIDEMIA: ICD-10-CM

## 2023-12-14 DIAGNOSIS — E10.65 TYPE 1 DIABETES MELLITUS WITH HYPERGLYCEMIA, WITH LONG-TERM CURRENT USE OF INSULIN (HCC): Primary | ICD-10-CM

## 2023-12-14 PROCEDURE — 99214 OFFICE O/P EST MOD 30 MIN: CPT | Performed by: INTERNAL MEDICINE

## 2023-12-14 RX ORDER — INSULIN LISPRO 100 [IU]/ML
3 INJECTION, SOLUTION INTRAVENOUS; SUBCUTANEOUS
Qty: 15 ML | Refills: 1 | Status: SHIPPED | OUTPATIENT
Start: 2023-12-14

## 2023-12-14 NOTE — PROGRESS NOTES
Aimee Barragan 34 y.o. female MRN: [id number]    Encounter: [id number]  Referring Provider  Referral Self  No address on file    Assessment/Plan     Type 1 diabetes mellitus, on insulin, with hyperglycemia.   -Poor diabetes control. Not monitoring her sugars at home. Assistant use of glargine, increased on her own to 20 units daily. Prescribed Humalog 3 units with meals, but given insurance not covering did not have enough supply and has not been using prior to meals. Recommend the following at this time  -Continue Lantus 20 units at bedtime  -Switched to baseclick pen 3 units 3 times daily with meals  -Encouraged to check fingerstick glucose before meals and at bedtime. Home glucose monitoring and goals emphasized, requested patient bring in glucose monitor or log sheets to next visit   -Patient will call her insurance to check on CGM coverage  -Lifestyle modifications: Recommended a consistent carbohydrate diet and weight control and exercise as discussed ( ideal is 30 min, at least 5 times a week)   -Discussed the risk of not monitoring her sugars and inconsistent use of insulin, including risk of DKA of which she has a history of in the past  - discussed signs and symptoms of hypoglycemia and how to correct them appropriately  - counseled about the long term complications of uncontrolled diabetes, including, Nephropathy, Neuropathy, CVD, Retinopathy  - importance of following up with Opthalmology -encouraged to schedule a follow-up appointment with ophthalmology. - glycohemoglobin and other lab monitoring discussed, A1c goal value reviewed  - labs in 1 week, hemoglobin A1c, CMP, lipid panel. Also checking TSH and free T4.    2. Hyperlipidemia  -Lipid panel and December 2022 showed hypertriglyceridemia  -Not on cholesterol-lowering medication currently  -Check lipid panel    3.   Elevated LFTs  - Reviewed prior CMP, showed resolution of elevated AST and ALT  - Follow-up with PCP      CC: Diabetes    History of Present Illness   HPI:  Raul Segura is a 34 y.o. female presents for a follow-up visit regarding type I diabetes melitis management. Also has a h/o hospitalizations for DKA, hyperlipidemia. Last Eye exam: follow-up pending   Last foot exam: 9/26/2023, Risk Category Risk: 0    Current regimen:   Lantus 40 units at bedtime  Humalog 3 units with meals-consistent use due to not covered by insurance    Statin: None  ACE-I/ARB: None    Appetite is good. Denies recent weight gain/ loss. Denies numbness or tingling in the hands or feet. Denies changes in vision. No known retinopathy. No chest pain/ SOB. No diarrhea/ constipation. No urinary complaints. Diet: Breakfast-eggs, blankenship, pancakes. Lunch-usually skips. Dinner-rice, beans, pork, mashed potatoes, plantains. Snacks-bread with butter, PB&J, crackers, Oreos. Exercise: no established physical activity regimen    Home glucose monitoring: are not performed. Has supplies at home. Does not frequently check. Recalls that the last time she checked was greater than 200. Recalls that when she would frequently check would have readings from 300 up to 400, and sometimes the machine would not give her the number suggesting that likely higher than 400 glucose. In terms of hyperglycemia: She experiences symptoms when her sugars are around the 300-400 range with polydipsia and polyuria. Symptoms of hypoglycemia: Denies symptoms    All other systems were reviewed and are negative. Review of Systems   Constitutional:  Negative for chills, diaphoresis, fatigue and fever. HENT:  Negative for ear pain and sore throat. Eyes:  Negative for pain and visual disturbance. Respiratory:  Negative for cough and shortness of breath. Cardiovascular:  Negative for chest pain and palpitations. Gastrointestinal:  Negative for abdominal pain, constipation, nausea and vomiting. Endocrine: Positive for polydipsia and polyuria. Genitourinary:  Negative for difficulty urinating, dysuria and hematuria. Musculoskeletal:  Negative for arthralgias and back pain. Skin:  Negative for color change and rash. Neurological:  Negative for seizures, syncope, weakness, light-headedness and headaches. All other systems reviewed and are negative.         Historical Information   Past Medical History:   Diagnosis Date   • Diabetes mellitus ([address])     uses kwiki pen    • Diabetes mellitus type 1 ([address])    • High blood pressure     recently dx/ put on lisinopril    • High cholesterol    • Hypertension      no hypersion    • Miscarriage    • Recurrent pregnancy loss, antepartum condition or complication      Past Surgical History:   Procedure Laterality Date   •  SECTION  01/02/2014     X1   • DILATION AND CURETTAGE OF UTERUS  04/10/2019    Missed AB    • [place]  DELIVERY ONLY N/A 2021    Procedure:  SECTION () REPEAT;  Surgeon: Prosper Zamora MD;  Location: [facility] ;  Service: Obstetrics   • WISDOM TOOTH EXTRACTION       Social History   Social History     Substance and Sexual Activity   Alcohol Use Never    Comment: Alcohol intake:   None - As per Mariana Krishna      Social History     Substance and Sexual Activity   Drug Use Never    Comment: Illicit drugs:   No  - As per Mariana Krishna      Social History     Tobacco Use   Smoking Status Never   Smokeless Tobacco Never     Family History:   Family History   Problem Relation Age of Onset   • No Known Problems Mother    • Diabetes Father    • No Known Problems Sister    • No Known Problems Sister    • No Known Problems Brother    • No Known Problems Maternal Grandmother    • Diabetes Paternal Grandmother    • No Known Problems Paternal Grandfather    • No Known Problems Daughter        Meds/Allergies   Current Outpatient Medications   Medication Sig Dispense Refill   • insulin lispro (Admelog SoloStar) 100 units/mL injection pen Inject 3 Units under the skin 3 (three) times a day with meals 15 mL 1   • Blood Glucose Monitoring Suppl (OneTouch Verio) w/Device KIT Use 4 (four) times a day 1 kit 0   • Continuous Blood Gluc  (Dexcom G6 ) NERY Use as directed. (Patient not taking: Reported on 6/11/2021) 1 Device 0   • Continuous Blood Gluc Sensor (Dexcom G6 Sensor) MISC 1 box=1 month supply or 3 sensors, use 1 sensor every 10 days. (Patient not taking: Reported on 6/7/2021) 1 each 12   • Continuous Blood Gluc Transmit (Dexcom G6 Transmitter) MISC Transmitter change every 90 days. (Patient not taking: Reported on 6/7/2021) 1 each 3   • Insulin Glargine Solostar (Lantus SoloStar) 100 UNIT/ML SOPN Inject 0.16 mL (16 Units total) under the skin in the morning (Patient not taking: Reported on 9/26/2023) 15 mL 0   • Insulin Glargine-yfgn 100 UNIT/ML SOPN Inject 15 units subcutaneously daily 15 mL 3   • Insulin Pen Needle (BD Pen Needle Kerrie 2nd Gen) 32G X 4 MM MISC For use with insulin pen. Pharmacy may dispense brand covered by insurance. 100 each 0   • Insulin Pen Needle (BD Pen Needle Kerrie 2nd Gen) 32G X 4 MM MISC For use with insulin pen. Pharmacy may dispense brand covered by insurance. 100 each 0   • Insulin Pen Needle (BD Pen Needle Kerrie 2nd Gen) 32G X 4 MM MISC For use with insulin pen. Pharmacy may dispense brand covered by insurance. 100 each 0   • Lancets (onetouch ultrasoft) lancets Use as instructed 4 times a day 400 each 3   • OneTouch Verio test strip PLS CHECK SUGAR 3 TIME DAILY . 300 100 strip 3     No current facility-administered medications for this visit. No Known Allergies    Objective   Vitals: Blood pressure 116/72, pulse 86, weight 63 kg (138 lb 14.4 oz), not currently breastfeeding. Physical Exam  Vitals reviewed. Constitutional:       General: She is not in acute distress. Appearance: Normal appearance. She is overweight. She is not ill-appearing or diaphoretic. HENT:      Head: Normocephalic and atraumatic.       Mouth/Throat:      Mouth: Mucous membranes are moist.      Pharynx: Oropharynx is clear. Eyes:      General: No scleral icterus. Conjunctiva/sclera: Conjunctivae normal.   Cardiovascular:      Rate and Rhythm: Normal rate and regular rhythm. Pulses: Normal pulses. Heart sounds: Normal heart sounds. No murmur heard. No gallop. Pulmonary:      Effort: Pulmonary effort is normal. No respiratory distress. Breath sounds: Normal breath sounds. No wheezing or rales. Abdominal:      General: Bowel sounds are normal. There is no distension. Palpations: Abdomen is soft. There is no mass. Tenderness: There is no abdominal tenderness. Musculoskeletal:         General: No tenderness. Normal range of motion. Cervical back: Normal range of motion and neck supple. Right lower leg: No edema. Left lower leg: No edema. Comments: Right fourth toe with lateral callus, tender to touch   Skin:     General: Skin is warm and dry. Capillary Refill: Capillary refill takes less than 2 seconds. Coloration: Skin is not jaundiced or pale. Neurological:      Mental Status: She is alert and oriented to person, place, and time. Mental status is at baseline. Sensory: No sensory deficit. Motor: No weakness. Psychiatric:         Mood and Affect: Mood normal.         Behavior: Behavior normal.         The history was obtained from the review of the chart, patient.     Lab Results:   Lab Results   Component Value Date/Time    Hemoglobin A1C 11.1 (A) 09/26/2023 11:13 AM    WBC 5.49 10/31/2023 09:15 AM    Hemoglobin 15.0 10/31/2023 09:15 AM    Hematocrit 44.0 10/31/2023 09:15 AM    MCV 92 10/31/2023 09:15 AM    Platelets 455 12/75/9047 09:15 AM    BUN 11 10/31/2023 09:15 AM    BUN 16 12/15/2022 04:44 AM    Potassium 4.0 10/31/2023 09:15 AM    Potassium 4.2 12/15/2022 04:44 AM    Chloride 99 10/31/2023 09:15 AM    Chloride 108 12/15/2022 04:44 AM    CO2 28 10/31/2023 09:15 AM    CO2 30 12/15/2022 04:44 AM Creatinine 0.58 (L) 10/31/2023 09:15 AM    Creatinine 0.49 (L) 12/15/2022 04:44 AM    AST 12 (L) 10/31/2023 09:15 AM    ALT 9 10/31/2023 09:15 AM    Total Protein 7.0 10/31/2023 09:15 AM    Albumin 4.2 10/31/2023 09:15 AM         Portions of the record may have been created with voice recognition software. Occasional wrong word or "sound a like" substitutions may have occurred due to the inherent limitations of voice recognition software. Read the chart carefully and recognize, using context, where substitutions have occurred.

## 2023-12-14 NOTE — PATIENT INSTRUCTIONS
Please check blood sugars before meals and at bedtime and keep a log. Please get log for review at next visit. If you are unable to obtain your insulin call us.        Please call your insurance and inquire which of the following would be covered  - short-acting insulin:  NovoLog, Humalog, apidra, admelog   - also ask about CGM - Dexcom and freestyle [place]

## 2023-12-15 PROBLEM — E10.65 TYPE 1 DIABETES MELLITUS WITH HYPERGLYCEMIA, WITH LONG-TERM CURRENT USE OF INSULIN (HCC): Status: ACTIVE | Noted: 2022-06-13

## 2023-12-15 PROBLEM — Z13.29 SCREENING FOR THYROID DISORDER: Status: ACTIVE | Noted: 2021-01-17

## 2023-12-21 DIAGNOSIS — E10.65 TYPE 1 DIABETES MELLITUS WITH HYPERGLYCEMIA, WITH LONG-TERM CURRENT USE OF INSULIN (HCC): Primary | ICD-10-CM

## 2023-12-21 RX ORDER — INSULIN LISPRO 100 [IU]/ML
INJECTION, SOLUTION INTRAVENOUS; SUBCUTANEOUS
Refills: 3 | OUTPATIENT
Start: 2023-12-21

## 2024-01-04 ENCOUNTER — APPOINTMENT (OUTPATIENT)
Dept: LAB | Facility: HOSPITAL | Age: 34
End: 2024-01-04
Attending: INTERNAL MEDICINE
Payer: COMMERCIAL

## 2024-01-04 DIAGNOSIS — E10.65 TYPE 1 DIABETES MELLITUS WITH HYPERGLYCEMIA, WITH LONG-TERM CURRENT USE OF INSULIN (HCC): ICD-10-CM

## 2024-01-04 DIAGNOSIS — E10.69 TYPE I DIABETES MELLITUS WITH HYPEROSMOLAR COMA: ICD-10-CM

## 2024-01-04 DIAGNOSIS — E78.2 MIXED HYPERLIPIDEMIA: ICD-10-CM

## 2024-01-04 DIAGNOSIS — Z13.29 SCREENING FOR THYROID DISORDER: ICD-10-CM

## 2024-01-04 DIAGNOSIS — E87.0 TYPE I DIABETES MELLITUS WITH HYPEROSMOLAR COMA: ICD-10-CM

## 2024-01-04 DIAGNOSIS — E10.65 TYPE I DIABETES MELLITUS WITH HYPEROSMOLAR COMA: ICD-10-CM

## 2024-01-04 LAB
ALBUMIN SERPL BCP-MCNC: 4.2 G/DL (ref 3.5–5)
ALP SERPL-CCNC: 78 U/L (ref 34–104)
ALT SERPL W P-5'-P-CCNC: 10 U/L (ref 7–52)
ANION GAP SERPL CALCULATED.3IONS-SCNC: 11 MMOL/L
AST SERPL W P-5'-P-CCNC: 10 U/L (ref 13–39)
BILIRUB SERPL-MCNC: 0.74 MG/DL (ref 0.2–1)
BUN SERPL-MCNC: 15 MG/DL (ref 5–25)
CALCIUM SERPL-MCNC: 9.5 MG/DL (ref 8.4–10.2)
CHLORIDE SERPL-SCNC: 97 MMOL/L (ref 96–108)
CHOLEST SERPL-MCNC: 254 MG/DL
CO2 SERPL-SCNC: 26 MMOL/L (ref 21–32)
CREAT SERPL-MCNC: 0.67 MG/DL (ref 0.6–1.3)
EST. AVERAGE GLUCOSE BLD GHB EST-MCNC: 369 MG/DL
GFR SERPL CREATININE-BSD FRML MDRD: 117 ML/MIN/1.73SQ M
GLUCOSE P FAST SERPL-MCNC: 420 MG/DL (ref 65–99)
HBA1C MFR BLD: 14.5 %
HDLC SERPL-MCNC: 92 MG/DL
LDLC SERPL CALC-MCNC: 142 MG/DL (ref 0–100)
NONHDLC SERPL-MCNC: 162 MG/DL
POTASSIUM SERPL-SCNC: 4.5 MMOL/L (ref 3.5–5.3)
PROT SERPL-MCNC: 6.9 G/DL (ref 6.4–8.4)
SODIUM SERPL-SCNC: 134 MMOL/L (ref 135–147)
T4 FREE SERPL-MCNC: 0.85 NG/DL (ref 0.61–1.12)
TRIGL SERPL-MCNC: 101 MG/DL
TSH SERPL DL<=0.05 MIU/L-ACNC: 1.5 UIU/ML (ref 0.45–4.5)

## 2024-01-04 PROCEDURE — 36415 COLL VENOUS BLD VENIPUNCTURE: CPT

## 2024-01-04 PROCEDURE — 83036 HEMOGLOBIN GLYCOSYLATED A1C: CPT

## 2024-01-04 PROCEDURE — 80053 COMPREHEN METABOLIC PANEL: CPT

## 2024-01-04 PROCEDURE — 84439 ASSAY OF FREE THYROXINE: CPT

## 2024-01-04 PROCEDURE — 84443 ASSAY THYROID STIM HORMONE: CPT

## 2024-01-04 PROCEDURE — 80061 LIPID PANEL: CPT

## 2024-02-13 PROBLEM — Z13.29 SCREENING FOR THYROID DISORDER: Status: RESOLVED | Noted: 2021-01-17 | Resolved: 2024-02-13

## 2024-03-14 ENCOUNTER — OFFICE VISIT (OUTPATIENT)
Dept: ENDOCRINOLOGY | Facility: CLINIC | Age: 34
End: 2024-03-14
Payer: COMMERCIAL

## 2024-03-14 VITALS
SYSTOLIC BLOOD PRESSURE: 120 MMHG | HEART RATE: 91 BPM | OXYGEN SATURATION: 97 % | HEIGHT: 58 IN | DIASTOLIC BLOOD PRESSURE: 80 MMHG | BODY MASS INDEX: 28.55 KG/M2 | WEIGHT: 136 LBS

## 2024-03-14 DIAGNOSIS — E16.2 HYPOGLYCEMIA: ICD-10-CM

## 2024-03-14 DIAGNOSIS — Z91.199 NONCOMPLIANCE WITH DIABETES TREATMENT: Primary | Chronic | ICD-10-CM

## 2024-03-14 DIAGNOSIS — E10.10 TYPE 1 DIABETES MELLITUS WITH KETOACIDOSIS WITHOUT COMA (HCC): ICD-10-CM

## 2024-03-14 DIAGNOSIS — E10.65 TYPE 1 DIABETES MELLITUS WITH HYPERGLYCEMIA, WITH LONG-TERM CURRENT USE OF INSULIN (HCC): ICD-10-CM

## 2024-03-14 DIAGNOSIS — E78.2 MIXED HYPERLIPIDEMIA: ICD-10-CM

## 2024-03-14 DIAGNOSIS — R79.89 ELEVATED LFTS: ICD-10-CM

## 2024-03-14 PROCEDURE — 99214 OFFICE O/P EST MOD 30 MIN: CPT | Performed by: NURSE PRACTITIONER

## 2024-03-14 RX ORDER — PEN NEEDLE, DIABETIC 32GX 5/32"
NEEDLE, DISPOSABLE MISCELLANEOUS
Qty: 100 EACH | Refills: 0 | Status: SHIPPED | OUTPATIENT
Start: 2024-03-14

## 2024-03-14 RX ORDER — ACYCLOVIR 400 MG/1
1 TABLET ORAL CONTINUOUS
Qty: 1 EACH | Refills: 0 | Status: SHIPPED | OUTPATIENT
Start: 2024-03-14

## 2024-03-14 RX ORDER — ACYCLOVIR 400 MG/1
1 TABLET ORAL
Qty: 3 EACH | Refills: 2 | Status: SHIPPED | OUTPATIENT
Start: 2024-03-14

## 2024-03-14 RX ORDER — INSULIN LISPRO 100 [IU]/ML
3 INJECTION, SOLUTION INTRAVENOUS; SUBCUTANEOUS
Qty: 15 ML | Refills: 2 | Status: SHIPPED | OUTPATIENT
Start: 2024-03-14

## 2024-03-14 RX ORDER — INSULIN GLARGINE-YFGN 100 [IU]/ML
INJECTION, SOLUTION SUBCUTANEOUS
Qty: 15 ML | Refills: 3 | Status: SHIPPED | OUTPATIENT
Start: 2024-03-14

## 2024-03-14 NOTE — PROGRESS NOTES
Established Patient Progress Note    Chief Complaint:  Diabetes follow up visit    Impression & Plan:    Problem List Items Addressed This Visit          Endocrine    Type 1 diabetes mellitus with hyperglycemia, with long-term current use of insulin ([facility])       Lab Results   Component Value Date    HGBA1C 14.5 (H) 01/04/2024     HGA1C indicates very poor control with noncompliance with insulin administration and glucose monitoring. CGM prescribed. Recommended referral to diabetes education with close follow up with endocrinology.     BGL Reviewed: Compliance with glucose monitoring    Treatment regimen: Recommend starting admelog at mealtime.     Discussed risks/complications associated with uncontrolled diabetes including organ involvement, heart attack, stroke, death.    Advised lifestyle modifications including attention to diet including the amount and types of carbohydrates consumed and regular activity.     Call for blood sugars less than 70 mg/dl or patterns over 250 mg/dl.     Monitor blood glucose levels before all insulin administration times.     Discussed use of CGM to collect additional blood glucose data to reveal patterns that can be used to improve glucose levels and adjust insulin regimen.    Discussed symptoms and treatment of hypoglycemia.  Reviewed risks associated with hypoglycemia. Always carry rapid acting carbohydrates and a glucometer (a way to check your blood sugar).    Recommendation for medical identification either bracelet, necklace.    Recommendation for glucagon if on insulin. Declines    Routine follow up for diabetic eye and foot exams.     Ordered blood work to complete prior to next visit.    Send glucose logs/CGM download in 1-2 weeks for review    Follow up in 1 month           Relevant Medications    Insulin Glargine-yfgn 100 UNIT/ML SOPN    insulin lispro (Admelog SoloStar) 100 units/mL injection pen    Continuous Blood Gluc Sensor (Dexcom G7 Sensor)    Continuous Blood  Gluc  (Dexcom G7 ) NERY    Insulin Pen Needle (BD Pen Needle Kerrie 2nd Gen) 32G X 4 MM MISC    Hypoglycemia     Denies.  Recommend CGM  Declines glucagon.  Reviewed identification and treatment of hypoglycemia.          Relevant Medications    Insulin Glargine-yfgn 100 UNIT/ML SOPN       Behavioral Health    Noncompliance with diabetes treatment - Primary (Chronic)     Not taking mealtime insulin or checking blood sugars regularly.             Other    Mixed hyperlipidemia     Reviewed lipid panel. Will work on glycemic control and repeat after next appointment.     Component      Latest Ref Rng 1/4/2024   Cholesterol      See Comment mg/dL 254 (H)    Triglycerides      See Comment mg/dL 101    HDL      >=50 mg/dL 92    LDL Calculated      0 - 100 mg/dL 142 (H)    Non-HDL Cholesterol      mg/dl 162             Relevant Medications    Insulin Glargine-yfgn 100 UNIT/ML SOPN    RESOLVED: Elevated LFTs    Relevant Medications    Insulin Glargine-yfgn 100 UNIT/ML SOPN     Other Visit Diagnoses       Type 1 diabetes mellitus with ketoacidosis without coma (HCC)        Relevant Medications    Insulin Glargine-yfgn 100 UNIT/ML SOPN    insulin lispro (Admelog SoloStar) 100 units/mL injection pen    Insulin Pen Needle (BD Pen Needle Kerrie 2nd Gen) 32G X 4 MM MISC    Other Relevant Orders    Ambulatory Referral to Diabetic Education    Ambulatory referral to Podiatry            History of Present Illness:   Gloria Lloyd is a 33 y.o. female with a history of type 1 diabetes mellitus. Reports complications: denies complications.  Was on an insulin pump (Medtronic + sensor) for about 1.5 years currently on basal bolus insulin. Has not been taking Humalog regularly, requested insulin pen.     Home blood glucose readings: Not checking currently     Current regimen:  Glargine 20 units daily  Humalog 3 units + scale     Denies hypoglycemia.      Thyroid disorders: No history of thyroid disease    Patient Active Problem  List   Diagnosis    Mixed hyperlipidemia    Uterine synechiae    Polyhydramnios affecting pregnancy in third trimester    Noncompliance with diabetes treatment    S/P repeat low transverse     Leukocytosis    Type 1 diabetes mellitus with hyperglycemia, with long-term current use of insulin (HCC)    Housing instability after recent homelessness    Hypoglycemia      Past Medical History:   Diagnosis Date    Diabetes mellitus (HCC)     uses kwiki pen     Diabetes mellitus type 1 (HCC)     High blood pressure     recently dx/ put on lisinopril     High cholesterol     Hypertension      no hypersion     Miscarriage     Recurrent pregnancy loss, antepartum condition or complication       Past Surgical History:   Procedure Laterality Date     SECTION  01/02/2014     X1    DILATION AND CURETTAGE OF UTERUS  04/10/2019    Missed AB     [place]  DELIVERY ONLY N/A 2021    Procedure:  SECTION () REPEAT;  Surgeon: Bradly Daniels MD;  Location: [facility] ;  Service: Obstetrics    WISDOM TOOTH EXTRACTION        Family History   Problem Relation Age of Onset    No Known Problems Mother     Diabetes Father     No Known Problems Sister     No Known Problems Sister     No Known Problems Brother     No Known Problems Maternal Grandmother     Diabetes Paternal Grandmother     No Known Problems Paternal Grandfather     No Known Problems Daughter      Social History     Tobacco Use    Smoking status: Never    Smokeless tobacco: Never   Substance Use Topics    Alcohol use: Never     Comment: Alcohol intake:   None - As per [place]      No Known Allergies      Current Outpatient Medications:     Blood Glucose Monitoring Suppl (OneTouch Verio) w/Device KIT, Use 4 (four) times a day, Disp: 1 kit, Rfl: 0    Continuous Blood Gluc  (Dexcom G7 ) NERY, Use 1 each continuous, Disp: 1 each, Rfl: 0    Continuous Blood Gluc Sensor (Dexcom G7 Sensor), Use 1 Device every 10 days, Disp: 3 each, Rfl: 2    " Insulin Glargine-yfgn 100 UNIT/ML SOPN, Inject 20 units subcutaneously daily, Disp: 15 mL, Rfl: 3    insulin lispro (Admelog SoloStar) 100 units/mL injection pen, Inject 3 Units under the skin 3 (three) times a day with meals, Disp: 15 mL, Rfl: 2    Insulin Pen Needle (BD Pen Needle Kerrie 2nd Gen) 32G X 4 MM MISC, For use with insulin pen 4 times per day. Pharmacy may dispense brand covered by insurance., Disp: 100 each, Rfl: 0    Lancets (onetouch ultrasoft) lancets, Use as instructed 4 times a day, Disp: 400 each, Rfl: 3    OneTouch Verio test strip, PLS CHECK SUGAR 3 TIME DAILY .300, Disp: 100 strip, Rfl: 3    Insulin Pen Needle (BD Pen Needle Kerrie 2nd Gen) 32G X 4 MM MISC, For use with insulin pen. Pharmacy may dispense brand covered by insurance., Disp: 100 each, Rfl: 0    Review of Systems  Constitutional: Negative for activity change, appetite change, fatigue and unexpected weight change.   HENT: Negative for ear pain, sore throat, trouble swallowing and voice change.    Eyes: Negative for visual disturbance.   Respiratory: Negative for cough and shortness of breath.    Cardiovascular: Negative for chest pain and palpitations.   Gastrointestinal: Negative for abdominal distention, abdominal pain, constipation, diarrhea and vomiting.   Endocrine: Negative for cold intolerance, heat intolerance, polydipsia and polyuria.   Musculoskeletal: Negative for arthralgias and back pain.   Skin: Negative for color change and rash.   Neurological: Negative for weakness or tremors.   All other systems reviewed and are negative.    Physical Exam:  Body mass index is 28.42 kg/m².  /80 (BP Location: Left arm, Patient Position: Sitting, Cuff Size: Large)   Pulse 91   Ht 4' 10\" (1.473 m)   Wt 61.7 kg (136 lb)   LMP 02/27/2024   SpO2 97%   BMI 28.42 kg/m²    Wt Readings from Last 3 Encounters:   03/14/24 61.7 kg (136 lb)   12/14/23 63 kg (138 lb 14.4 oz)   09/26/23 62.7 kg (138 lb 3.2 oz)        Physical Exam  Vitals " reviewed.   Constitutional:       Appearance: Normal appearance.   Cardiovascular:      Rate and Rhythm: Normal rate and regular rhythm.      Pulses: Normal pulses.   Pulmonary:      Effort: Pulmonary effort is normal.   Skin:     General: Skin is warm and dry.      Capillary Refill: Capillary refill takes less than 2 seconds.   Neurological:      General: No focal deficit present.      Mental Status: She is alert and oriented to person, place, and time.   Psychiatric:         Mood and Affect: Mood normal.         Behavior: Behavior normal.     Labs:   Lab Results   Component Value Date    HGBA1C 14.5 (H) 01/04/2024    HGBA1C 11.1 (A) 09/26/2023    HGBA1C 12.6 (H) 12/05/2022     Lab Results   Component Value Date    CREATININE 0.67 01/04/2024    CREATININE 0.58 (L) 10/31/2023    CREATININE 0.49 (L) 12/15/2022    BUN 15 01/04/2024    K 4.5 01/04/2024    CL 97 01/04/2024    CO2 26 01/04/2024     eGFR   Date Value Ref Range Status   01/04/2024 117 ml/min/1.73sq m Final     Lab Results   Component Value Date    HDL 92 01/04/2024    TRIG 101 01/04/2024     Lab Results   Component Value Date    ALT 10 01/04/2024    AST 10 (L) 01/04/2024    ALKPHOS 78 01/04/2024     Lab Results   Component Value Date    [id number] 1.502 01/04/2024    [id number] 2.607 04/16/2021     Lab Results   Component Value Date    FREET4 0.85 01/04/2024       Discussed with the patient and all questioned fully answered. She will call me if any problems arise.    Follow-up appointment in 6 weeks.     Counseled patient on diagnostic results, prognosis, risk and benefit of treatment options, instruction for management, importance of treatment compliance, Risk  factor reduction and impressions    CECILIO Correia

## 2024-03-14 NOTE — PATIENT INSTRUCTIONS
Recommend checking blood sugars before all meals and at bedtime.   Would continue current doses and transition to Admelog pen with pen needles  Lantus 20 units daily  Admelog 3 units three times per day with scale     Call for blood sugar patterns less than 70 or over 250 mg/dL   Goal blood sugars between  mg/dL    Hypoglycemia in a Person with Diabetes   WHAT YOU NEED TO KNOW:   Hypoglycemia is a serious condition that happens when your blood glucose (sugar) level drops too low. The blood sugar level is usually too high in a person with diabetes, but the level can also drop too low. It is important to follow your diabetes management plan to keep your blood sugar level steady.  DISCHARGE INSTRUCTIONS:   Have someone call your local emergency number (911 in the [place]) if:   You have a seizure or pass out.    Your blood sugar is less than 50 mg/dL and does not respond to treatment.    You feel you are going to pass out.    You have trouble thinking clearly.    Call your doctor or diabetes care team provider if:   You have had symptoms of low blood sugar several times.    You have questions about the amount of insulin or diabetes medicine you are taking.    You have questions or concerns about your condition or care.    Medicines:   Insulin or diabetes medicine  help to keep your blood sugar under control.    Glucagon  may be needed if you have severe hypoglycemia.    Take your medicine as directed.  Contact your healthcare provider if you think your medicine is not helping or if you have side effects. Tell your provider if you are allergic to any medicine. Keep a list of the medicines, vitamins, and herbs you take. Include the amounts, and when and why you take them. Bring the list or the pill bottles to follow-up visits. Carry your medicine list with you in case of an emergency.    Manage hypoglycemia:   Check your blood sugar level right away if you have symptoms of hypoglycemia.  Hypoglycemia usually happens  when your blood sugar level is 70 mg/dL or below. Ask your diabetes care team provider what blood sugar level is too low for you.    If your blood sugar level is too low, eat or drink 15 grams of fast-acting carbohydrate.  Examples of this amount of fast-acting carbohydrate are 4 ounces (½ cup) of fruit juice or 4 ounces of regular soda. Other examples are 2 tablespoons of raisins or 1 tube of glucose gel.     Check your blood sugar level 15 minutes later. Sit still as you wait. If the level is still low (less than 100 mg/dL), have another 15 grams of carbohydrate. When the level returns to 100 mg/dL, eat a meal if it is time. If your meal time is more than 1 hour away, eat a snack. The snack should contain carbohydrates, such as the following:    3/4 cup of cereal    1 cup of skim or low fat milk    6 soda crackers    1/2 of a turkey sandwich    15 fat-free chips  This will help prevent another drop in blood sugar. Always carefully follow your provider's instructions on how to treat low blood sugar levels.  Always carry a source of fast-acting carbohydrate.  If you have symptoms of hypoglycemia and you do not have a blood glucose meter, have a source of fast-acting carbohydrate anyway. Avoid carbohydrate foods that are high in fat. The fat content may make the carbohydrate take longer to increase your blood sugar level. Ask your provider if you should carry a glucagon kit. Glucagon is a medicine that is injected when you develop severe hypoglycemia and become unconscious. Check the expiration date every month and replace it before it expires.    Teach others how to help you if you have symptoms of hypoglycemia.  Tell them about the symptoms of hypoglycemia. Ask them to give you a source of fast-acting carbohydrate if you cannot get it yourself. Ask them to give you a glucagon injection if you have signs of hypoglycemia and you become unconscious or have a seizure. Ask them to call the local emergency number (893 in  the [place]) . This is an emergency. Tell them never to try to make you swallow anything if you faint or have a seizure.    Wear medical alert jewelry  or carry a card that says you have diabetes. Ask where to get these items.       Prevent hypoglycemia:   Take diabetes medicine as directed.  Take your medicine at the right time and in the right amount. Do not  double the amount of medicine you take unless instructed by your diabetes care team provider. You may need oral diabetes medicine, insulin, or both to help control your blood sugar levels. Your healthcare provider will teach you how and when to take oral diabetes medicine. You will also be taught about side effects oral diabetes medicine can cause. Insulin may be added if oral diabetes medicine becomes less effective over time. Insulin may be injected, or given through a pump or pen. You and your care team will discuss which method is best for you.    An insulin pump  is an implanted device that gives your insulin 24 hours a day. An insulin pump prevents the need for multiple insulin injections in a day.         An insulin pen  is a device prefilled with the right amount of insulin.       Eat meals and snacks as directed.  Talk to your dietitian or provider about a meal plan that is right for you. Do not skip meals.         Check your blood sugar level as directed.  Ask your provider what your blood sugar levels should be before and after you eat. Ask when and how often to check your blood sugar level. You may need to check a drop of blood in a glucose test machine. You may need to check at least 3 times each day. Record your blood sugar level results and take the record with you when you see your care team. They may use it to make changes to your medicine, food, or exercise schedules. Your care team provider may recommend a continuous glucose monitor (CGM). A CGM is a device that is worn at all times. The CGM checks your blood sugar every 5 minutes. It sends  results to an electronic device such as a smart phone.            Check your blood sugar level before you exercise.  Physical activity, such as exercise, can decrease your blood sugar level. If your blood sugar level is less than 100 mg/dL, have a carbohydrate snack. Examples are 4 to 6 crackers, ½ banana, 8 ounces (1 cup) of nonfat or 1% milk, or 4 ounces (½ cup) of juice. If you will be active for more than 1 hour, you may need to check your blood sugar level every 30 minutes. Your provider may also recommend that you check your blood sugar level after your activity.    Know the risks if you choose to drink alcohol.  Alcohol can cause your blood sugar levels to be low if you use insulin. Alcohol can cause high blood sugar levels and weight gain if you drink too much. Women 21 years or older and men 65 years or older should limit alcohol to 1 drink a day. Men aged 21 to 64 years should limit alcohol to 2 drinks a day. A drink of alcohol is 12 ounces of beer, 5 ounces of wine, or 1½ ounces of liquor.    Follow up with your doctor or diabetes care team provider as directed:  You may need your insulin or diabetes medicine changed if you continue to have hypoglycemia episodes. Write down your questions so you remember to ask them during your visits.  © Copyright [employer] 2023 Information is for End User's use only and may not be sold, redistributed or otherwise used for commercial purposes.  The above information is an  only. It is not intended as medical advice for individual conditions or treatments. Talk to your doctor, nurse or pharmacist before following any medical regimen to see if it is safe and effective for you.

## 2024-03-15 PROBLEM — E87.1 HYPONATREMIA: Status: RESOLVED | Noted: 2022-12-11 | Resolved: 2024-03-15

## 2024-03-15 PROBLEM — R79.89 ELEVATED LFTS: Status: RESOLVED | Noted: 2023-09-26 | Resolved: 2024-03-15

## 2024-03-15 PROBLEM — E11.00 HYPEROSMOLAR HYPERGLYCEMIC STATE (HHS) (HCC): Status: RESOLVED | Noted: 2022-12-06 | Resolved: 2024-03-15

## 2024-03-15 PROBLEM — E87.8 ELECTROLYTE ABNORMALITY: Status: RESOLVED | Noted: 2022-06-15 | Resolved: 2024-03-15

## 2024-03-15 PROBLEM — E78.5 DYSLIPIDEMIA: Status: RESOLVED | Noted: 2022-06-13 | Resolved: 2024-03-15

## 2024-03-15 NOTE — ASSESSMENT & PLAN NOTE
Denies.  Recommend CGM  Declines glucagon.  Reviewed identification and treatment of hypoglycemia.

## 2024-03-15 NOTE — ASSESSMENT & PLAN NOTE
Reviewed lipid panel. Will work on glycemic control and repeat after next appointment.     Component      Latest Ref Rng 1/4/2024   Cholesterol      See Comment mg/dL 254 (H)    Triglycerides      See Comment mg/dL 101    HDL      >=50 mg/dL 92    LDL Calculated      0 - 100 mg/dL 142 (H)    Non-HDL Cholesterol      mg/dl 162

## 2024-03-15 NOTE — ASSESSMENT & PLAN NOTE
Lab Results   Component Value Date    HGBA1C 14.5 (H) 01/04/2024     HGA1C indicates very poor control with noncompliance with insulin administration and glucose monitoring. CGM prescribed. Recommended referral to diabetes education with close follow up with endocrinology.     BGL Reviewed: Compliance with glucose monitoring    Treatment regimen: Recommend starting admelog at mealtime.     Discussed risks/complications associated with uncontrolled diabetes including organ involvement, heart attack, stroke, death.    Advised lifestyle modifications including attention to diet including the amount and types of carbohydrates consumed and regular activity.     Call for blood sugars less than 70 mg/dl or patterns over 250 mg/dl.     Monitor blood glucose levels before all insulin administration times.     Discussed use of CGM to collect additional blood glucose data to reveal patterns that can be used to improve glucose levels and adjust insulin regimen.    Discussed symptoms and treatment of hypoglycemia.  Reviewed risks associated with hypoglycemia. Always carry rapid acting carbohydrates and a glucometer (a way to check your blood sugar).    Recommendation for medical identification either bracelet, necklace.    Recommendation for glucagon if on insulin. Declines    Routine follow up for diabetic eye and foot exams.     Ordered blood work to complete prior to next visit.    Send glucose logs/CGM download in 1-2 weeks for review    Follow up in 1 month

## 2024-04-18 ENCOUNTER — OFFICE VISIT (OUTPATIENT)
Age: 34
End: 2024-04-18
Payer: COMMERCIAL

## 2024-04-18 ENCOUNTER — HOSPITAL ENCOUNTER (OUTPATIENT)
Dept: RADIOLOGY | Facility: HOSPITAL | Age: 34
Discharge: HOME | End: 2024-04-18
Payer: COMMERCIAL

## 2024-04-18 VITALS
HEART RATE: 81 BPM | WEIGHT: 136 LBS | SYSTOLIC BLOOD PRESSURE: 109 MMHG | HEIGHT: 58 IN | DIASTOLIC BLOOD PRESSURE: 76 MMHG | RESPIRATION RATE: 17 BRPM | BODY MASS INDEX: 28.55 KG/M2

## 2024-04-18 DIAGNOSIS — E10.9 TYPE 1 DIABETES MELLITUS WITHOUT COMPLICATION (HCC): ICD-10-CM

## 2024-04-18 DIAGNOSIS — M20.42 HAMMER TOES OF BOTH FEET: ICD-10-CM

## 2024-04-18 DIAGNOSIS — M20.41 HAMMER TOES OF BOTH FEET: Primary | ICD-10-CM

## 2024-04-18 DIAGNOSIS — M21.962 ACQUIRED DEFORMITY OF BOTH FEET: ICD-10-CM

## 2024-04-18 DIAGNOSIS — M79.671 PAIN IN BOTH FEET: ICD-10-CM

## 2024-04-18 DIAGNOSIS — E10.10 TYPE 1 DIABETES MELLITUS WITH KETOACIDOSIS WITHOUT COMA (HCC): ICD-10-CM

## 2024-04-18 DIAGNOSIS — M79.672 PAIN IN BOTH FEET: ICD-10-CM

## 2024-04-18 DIAGNOSIS — M20.42 HAMMER TOES OF BOTH FEET: Primary | ICD-10-CM

## 2024-04-18 DIAGNOSIS — M21.961 ACQUIRED DEFORMITY OF BOTH FEET: ICD-10-CM

## 2024-04-18 DIAGNOSIS — M20.41 HAMMER TOES OF BOTH FEET: ICD-10-CM

## 2024-04-18 PROCEDURE — 99204 OFFICE O/P NEW MOD 45 MIN: CPT | Performed by: PODIATRIST

## 2024-04-18 PROCEDURE — 73630 X-RAY EXAM OF FOOT: CPT

## 2024-04-18 NOTE — PROGRESS NOTES
Assessment/Plan: Toes 4 and 5, bilateral with secondary corn formation.  Acquired deformity foot.  Acquired pes planus.  Pain upon ambulation.  Patient with diabetes without pedal complication.    Plan.  Chart reviewed.  PCP notes reviewed.  Diabetic foot exam performed.  Patient educated on care of the diabetic foot.  Today patient educated on hammertoes and corn formation.  She is interested in surgery.  X-rays ordered.  Will plan for hammertoe surgery 4 and 5 bilateral.  Patient will pad corns as directed.  Proper shoe style recommendations made.  Aftercare instruction given.  Watch for signs of infection.  Return for follow-up.         Diagnoses and all orders for this visit:    Hammer toes of both feet  -     X-ray foot right 3+ views; Future  -     X-ray foot left 3+ views; Future    Type 1 diabetes mellitus with ketoacidosis without coma (HCC)  -     Ambulatory referral to Podiatry  -     X-ray foot right 3+ views; Future  -     X-ray foot left 3+ views; Future    Pain in both feet  -     X-ray foot right 3+ views; Future  -     X-ray foot left 3+ views; Future    Acquired deformity of both feet    Type 1 diabetes mellitus without complication (HCC)          Subjective: Patient has pain in her feet.  She has pain in her toes with ambulation and shoewear.  Patient is diabetic.            No Known Allergies      Current Outpatient Medications:     Blood Glucose Monitoring Suppl (OneTouch Verio) w/Device KIT, Use 4 (four) times a day, Disp: 1 kit, Rfl: 0    Continuous Blood Gluc  (Dexcom G7 ) NERY, Use 1 each continuous, Disp: 1 each, Rfl: 0    Continuous Blood Gluc Sensor (Dexcom G7 Sensor), Use 1 Device every 10 days, Disp: 3 each, Rfl: 2    Insulin Glargine-yfgn 100 UNIT/ML SOPN, Inject 20 units subcutaneously daily, Disp: 15 mL, Rfl: 3    insulin lispro (Admelog SoloStar) 100 units/mL injection pen, Inject 3 Units under the skin 3 (three) times a day with meals, Disp: 15 mL, Rfl: 2    Insulin  Pen Needle (BD Pen Needle Kerrie 2nd Gen) 32G X 4 MM MISC, For use with insulin pen. Pharmacy may dispense brand covered by insurance., Disp: 100 each, Rfl: 0    Insulin Pen Needle (BD Pen Needle Kerrie 2nd Gen) 32G X 4 MM MISC, For use with insulin pen 4 times per day. Pharmacy may dispense brand covered by insurance., Disp: 100 each, Rfl: 0    Lancets (onetouch ultrasoft) lancets, Use as instructed 4 times a day, Disp: 400 each, Rfl: 3    OneTouch Verio test strip, PLS CHECK SUGAR 3 TIME DAILY .300, Disp: 100 strip, Rfl: 3    Patient Active Problem List   Diagnosis    Mixed hyperlipidemia    Uterine synechiae    Polyhydramnios affecting pregnancy in third trimester    Noncompliance with diabetes treatment    S/P repeat low transverse     Leukocytosis    Type 1 diabetes mellitus with hyperglycemia, with long-term current use of insulin (HCC)    Housing instability after recent homelessness    Hypoglycemia          Patient ID: Gloria Lloyd is a 34 y.o. female.    HPI    The following portions of the patient's history were reviewed and updated as appropriate: She  has a past medical history of Diabetes mellitus (HCC), Diabetes mellitus type 1 (HCC), High blood pressure, High cholesterol, Hypertension, Miscarriage, and Recurrent pregnancy loss, antepartum condition or complication.  She   Patient Active Problem List    Diagnosis Date Noted    Hypoglycemia 2022    Housing instability after recent homelessness 2022    Leukocytosis 2022    Type 1 diabetes mellitus with hyperglycemia, with long-term current use of insulin (HCC) 2022    Noncompliance with diabetes treatment 2021    S/P repeat low transverse  2021    Polyhydramnios affecting pregnancy in third trimester 2021    Uterine synechiae 2021    Mixed hyperlipidemia 2020     She  has a past surgical history that includes Dilation and curettage of uterus (04/10/2019);  section (2014);  [place] tooth extraction; and pr  delivery only (N/A, 2021).  Her family history includes Diabetes in her father and paternal grandmother; No Known Problems in her brother, daughter, maternal grandmother, mother, paternal grandfather, sister, and sister.  She  reports that she has never smoked. She has never used smokeless tobacco. She reports that she does not drink alcohol and does not use drugs.  Current Outpatient Medications   Medication Sig Dispense Refill    Blood Glucose Monitoring Suppl (OneTouch Verio) w/Device KIT Use 4 (four) times a day 1 kit 0    Continuous Blood Gluc  (Dexcom G7 ) NERY Use 1 each continuous 1 each 0    Continuous Blood Gluc Sensor (Dexcom G7 Sensor) Use 1 Device every 10 days 3 each 2    Insulin Glargine-yfgn 100 UNIT/ML SOPN Inject 20 units subcutaneously daily 15 mL 3    insulin lispro (Admelog SoloStar) 100 units/mL injection pen Inject 3 Units under the skin 3 (three) times a day with meals 15 mL 2    Insulin Pen Needle (BD Pen Needle Kerrie 2nd Gen) 32G X 4 MM MISC For use with insulin pen. Pharmacy may dispense brand covered by insurance. 100 each 0    Insulin Pen Needle (BD Pen Needle Kerrie 2nd Gen) 32G X 4 MM MISC For use with insulin pen 4 times per day. Pharmacy may dispense brand covered by insurance. 100 each 0    Lancets (onetouch ultrasoft) lancets Use as instructed 4 times a day 400 each 3    OneTouch Verio test strip PLS CHECK SUGAR 3 TIME DAILY .300 100 strip 3     No current facility-administered medications for this visit.     Current Outpatient Medications on File Prior to Visit   Medication Sig    Blood Glucose Monitoring Suppl (OneTouch Verio) w/Device KIT Use 4 (four) times a day    Continuous Blood Gluc  (Dexcom G7 ) NERY Use 1 each continuous    Continuous Blood Gluc Sensor (Dexcom G7 Sensor) Use 1 Device every 10 days    Insulin Glargine-yfgn 100 UNIT/ML SOPN Inject 20 units subcutaneously daily    insulin lispro  (Admelog SoloStar) 100 units/mL injection pen Inject 3 Units under the skin 3 (three) times a day with meals    Insulin Pen Needle (BD Pen Needle Ekrrie 2nd Gen) 32G X 4 MM MISC For use with insulin pen. Pharmacy may dispense brand covered by insurance.    Insulin Pen Needle (BD Pen Needle Kerrie 2nd Gen) 32G X 4 MM MISC For use with insulin pen 4 times per day. Pharmacy may dispense brand covered by insurance.    Lancets (onetouch ultrasoft) lancets Use as instructed 4 times a day    OneTouch Verio test strip PLS CHECK SUGAR 3 TIME DAILY .300     No current facility-administered medications on file prior to visit.     She has No Known Allergies..    Vitals:    04/18/24 0950   BP: 109/76   Pulse: 81   Resp: 17       Review of Systems      Objective:  Patient's shoes and socks removed.   Foot Exam    General  General Appearance: appears stated age and healthy   Orientation: alert and oriented to person, place, and time   Affect: appropriate       Right Foot/Ankle     Inspection and Palpation  Swelling: dorsum   Arch: pes planus  Claw Toes: fourth toe and fifth toe  Hallux limitus: yes    Neurovascular  Dorsalis pedis: 3+  Posterior tibial: 3+  Saphenous nerve sensation: normal  Tibial nerve sensation: normal  Superficial peroneal nerve sensation: normal  Deep peroneal nerve sensation: normal  Sural nerve sensation: normal      Left Foot/Ankle      Inspection and Palpation  Swelling: dorsum   Arch: pes planus  Claw toes: fourth toe and fifth toe  Hallux limitus: yes    Neurovascular  Dorsalis pedis: 3+  Posterior tibial: 3+  Saphenous nerve sensation: normal  Tibial nerve sensation: normal  Superficial peroneal nerve sensation: normal  Deep peroneal nerve sensation: normal  Sural nerve sensation: normal      Physical Exam  Vitals and nursing note reviewed.   Constitutional:       Appearance: Normal appearance.   Cardiovascular:      Rate and Rhythm: Normal rate and regular rhythm.      Pulses: no weak pulses.            Dorsalis pedis pulses are 3+ on the right side and 3+ on the left side.        Posterior tibial pulses are 3+ on the right side and 3+ on the left side.   Skin:     Capillary Refill: Capillary refill takes less than 2 seconds.      Comments: Soft corn fourth PIPJ, lateral bilateral.   Neurological:      Mental Status: She is alert.   Psychiatric:         Mood and Affect: Mood normal.         Behavior: Behavior normal.         Thought Content: Thought content normal.         Judgment: Judgment normal.     Patient's shoes and socks removed.    Right Foot/Ankle   Right Foot Inspection      Toe Exam: tenderness and right toe deformity.     Sensory   Vibration: intact  Proprioception: intact  Monofilament testing: intact    Vascular  Capillary refills: < 3 seconds  The right DP pulse is 3+. The right PT pulse is 3+.     Right Toe  - Comprehensive Exam  Arch: pes planus  Claw Toes: fourth toe and fifth toe  Hallux limitus: yes  Swelling: dorsum       Left Foot/Ankle  Left Foot Inspection      Toe Exam: tenderness and left toe deformity.     Sensory   Vibration: intact  Proprioception: intact  Monofilament testing: intact    Vascular  Capillary refills: < 3 seconds  The left DP pulse is 3+. The left PT pulse is 3+.     Left Toe  - Comprehensive Exam  Arch: pes planus  Claw toes: fourth toe and fifth toe  Hallux limitus: yes  Swelling: dorsum       Assign Risk Category  Deformity present  No loss of protective sensation  No weak pulses  Risk: 0

## 2024-04-29 ENCOUNTER — TELEPHONE (OUTPATIENT)
Dept: OBGYN CLINIC | Facility: HOSPITAL | Age: 34
End: 2024-04-29

## 2024-04-29 NOTE — TELEPHONE ENCOUNTER
Caller: Patient    Doctor: Letty    Reason for call: Patient calling for xray results.  Please advise.    Call back#: [phone]

## 2024-05-06 NOTE — PROGRESS NOTES
[facility] Ms Sadia Garcia was seen today at 20w0d for anatomic survey and cervical length screening ultrasound  See ultrasound report under "OB Procedures" tab    Please don't hesitate to contact our office with any concerns or questions   -Valentina Chanel MD Provider Recommendation Follow Up:   Reached patient/caregiver. Informed of provider's recommendations. Patient verbalized understanding and agrees with the plan. Patient plans to go to [facility].

## 2024-05-28 ENCOUNTER — OFFICE VISIT (OUTPATIENT)
Age: 34
End: 2024-05-28
Payer: COMMERCIAL

## 2024-05-28 VITALS
RESPIRATION RATE: 16 BRPM | SYSTOLIC BLOOD PRESSURE: 109 MMHG | WEIGHT: 136 LBS | HEIGHT: 58 IN | DIASTOLIC BLOOD PRESSURE: 75 MMHG | HEART RATE: 71 BPM | BODY MASS INDEX: 28.55 KG/M2

## 2024-05-28 DIAGNOSIS — M79.672 PAIN IN BOTH FEET: ICD-10-CM

## 2024-05-28 DIAGNOSIS — M20.41 HAMMER TOES OF BOTH FEET: Primary | ICD-10-CM

## 2024-05-28 DIAGNOSIS — M79.671 PAIN IN BOTH FEET: ICD-10-CM

## 2024-05-28 DIAGNOSIS — E10.9 TYPE 1 DIABETES MELLITUS WITHOUT COMPLICATION (HCC): ICD-10-CM

## 2024-05-28 DIAGNOSIS — M21.962 ACQUIRED DEFORMITY OF BOTH FEET: ICD-10-CM

## 2024-05-28 DIAGNOSIS — M21.961 ACQUIRED DEFORMITY OF BOTH FEET: ICD-10-CM

## 2024-05-28 DIAGNOSIS — M20.42 HAMMER TOES OF BOTH FEET: Primary | ICD-10-CM

## 2024-05-28 PROCEDURE — 99213 OFFICE O/P EST LOW 20 MIN: CPT | Performed by: PODIATRIST

## 2024-05-28 NOTE — PROGRESS NOTES
Assessment/Plan: Toes 4 and 5, bilateral with secondary corn formation.  Acquired deformity foot.  Acquired pes planus.  Pain upon ambulation.  Patient with diabetes without pedal complication.     Plan.  Chart reviewed.  PCP notes reviewed.  Diabetic foot exam performed.  Patient educated on care of the diabetic foot.  Today patient educated on hammertoes and corn formation.  She is interested in surgery.  X-rays ordered.  Will plan for hammertoe surgery 4 and 5 bilateral.  Patient will need pinning of toes.  She has been advised she will need 4 to 6 weeks out of work.  Patient will pad corns as directed.  Proper shoe style recommendations made.  Aftercare instruction given.  Watch for signs of infection.  Return for follow-up.            Diagnoses and all orders for this visit:     Hammer toes of both feet       Type 1 diabetes mellitus with ketoacidosis without coma (HCC)       Pain in both feet     Acquired deformity of both feet     Type 1 diabetes mellitus without complication (HCC)            Subjective: Patient has pain in her feet.  She has pain in her toes with ambulation and shoewear.  Patient is diabetic.                 No Known Allergies       Current Medications      Current Outpatient Medications:     Blood Glucose Monitoring Suppl (OneTouch Verio) w/Device KIT, Use 4 (four) times a day, Disp: 1 kit, Rfl: 0    Continuous Blood Gluc  (Dexcom G7 ) NERY, Use 1 each continuous, Disp: 1 each, Rfl: 0    Continuous Blood Gluc Sensor (Dexcom G7 Sensor), Use 1 Device every 10 days, Disp: 3 each, Rfl: 2    Insulin Glargine-yfgn 100 UNIT/ML SOPN, Inject 20 units subcutaneously daily, Disp: 15 mL, Rfl: 3    insulin lispro (Admelog SoloStar) 100 units/mL injection pen, Inject 3 Units under the skin 3 (three) times a day with meals, Disp: 15 mL, Rfl: 2    Insulin Pen Needle (BD Pen Needle Kerrie 2nd Gen) 32G X 4 MM MISC, For use with insulin pen. Pharmacy may dispense brand covered by insurance.,  Disp: 100 each, Rfl: 0    Insulin Pen Needle (BD Pen Needle Kerrie 2nd Gen) 32G X 4 MM MISC, For use with insulin pen 4 times per day. Pharmacy may dispense brand covered by insurance., Disp: 100 each, Rfl: 0    Lancets (onetouch ultrasoft) lancets, Use as instructed 4 times a day, Disp: 400 each, Rfl: 3    OneTouch Verio test strip, PLS CHECK SUGAR 3 TIME DAILY .300, Disp: 100 strip, Rfl: 3        Problem List       Patient Active Problem List   Diagnosis    Mixed hyperlipidemia    Uterine synechiae    Polyhydramnios affecting pregnancy in third trimester    Noncompliance with diabetes treatment    S/P repeat low transverse     Leukocytosis    Type 1 diabetes mellitus with hyperglycemia, with long-term current use of insulin (HCC)    Housing instability after recent homelessness    Hypoglycemia                Patient ID: Gloria Lloyd is a 34 y.o. female.     HPI     The following portions of the patient's history were reviewed and updated as appropriate: She  has a past medical history of Diabetes mellitus ([facility]), Diabetes mellitus type 1 ([facility]), High blood pressure, High cholesterol, Hypertension, Miscarriage, and Recurrent pregnancy loss, antepartum condition or complication.  She        Patient Active Problem List     Diagnosis Date Noted    Hypoglycemia 2022    Housing instability after recent homelessness 2022    Leukocytosis 2022    Type 1 diabetes mellitus with hyperglycemia, with long-term current use of insulin ([facility]) 2022    Noncompliance with diabetes treatment 2021    S/P repeat low transverse  2021    Polyhydramnios affecting pregnancy in third trimester 2021    Uterine synechiae 2021    Mixed hyperlipidemia 2020      She  has a past surgical history that includes Dilation and curettage of uterus (04/10/2019);  section (2014); [place] tooth extraction; and pr  delivery only (N/A, 2021).  Her family history  includes Diabetes in her father and paternal grandmother; No Known Problems in her brother, daughter, maternal grandmother, mother, paternal grandfather, sister, and sister.  She  reports that she has never smoked. She has never used smokeless tobacco. She reports that she does not drink alcohol and does not use drugs.  Current Rx          Current Outpatient Medications   Medication Sig Dispense Refill    Blood Glucose Monitoring Suppl (OneTouch Verio) w/Device KIT Use 4 (four) times a day 1 kit 0    Continuous Blood Gluc  (Dexcom G7 ) NERY Use 1 each continuous 1 each 0    Continuous Blood Gluc Sensor (Dexcom G7 Sensor) Use 1 Device every 10 days 3 each 2    Insulin Glargine-yfgn 100 UNIT/ML SOPN Inject 20 units subcutaneously daily 15 mL 3    insulin lispro (Admelog SoloStar) 100 units/mL injection pen Inject 3 Units under the skin 3 (three) times a day with meals 15 mL 2    Insulin Pen Needle (BD Pen Needle Kerrie 2nd Gen) 32G X 4 MM MISC For use with insulin pen. Pharmacy may dispense brand covered by insurance. 100 each 0    Insulin Pen Needle (BD Pen Needle Kerrie 2nd Gen) 32G X 4 MM MISC For use with insulin pen 4 times per day. Pharmacy may dispense brand covered by insurance. 100 each 0    Lancets (onetouch ultrasoft) lancets Use as instructed 4 times a day 400 each 3    OneTouch Verio test strip PLS CHECK SUGAR 3 TIME DAILY .300 100 strip 3      No current facility-administered medications for this visit.              Current Outpatient Medications on File Prior to Visit   Medication Sig    Blood Glucose Monitoring Suppl (OneTouch Verio) w/Device KIT Use 4 (four) times a day    Continuous Blood Gluc  (Dexcom G7 ) NERY Use 1 each continuous    Continuous Blood Gluc Sensor (Dexcom G7 Sensor) Use 1 Device every 10 days    Insulin Glargine-yfgn 100 UNIT/ML SOPN Inject 20 units subcutaneously daily    insulin lispro (Admelog SoloStar) 100 units/mL injection pen Inject 3 Units under  the skin 3 (three) times a day with meals    Insulin Pen Needle (BD Pen Needle Kerrie 2nd Gen) 32G X 4 MM MISC For use with insulin pen. Pharmacy may dispense brand covered by insurance.    Insulin Pen Needle (BD Pen Needle Kerrie 2nd Gen) 32G X 4 MM MISC For use with insulin pen 4 times per day. Pharmacy may dispense brand covered by insurance.    Lancets (onetouch ultrasoft) lancets Use as instructed 4 times a day    OneTouch Verio test strip PLS CHECK SUGAR 3 TIME DAILY .300      No current facility-administered medications on file prior to visit.      She has No Known Allergies..        Objective:  Patient's shoes and socks removed.   Foot Exam     General  General Appearance: appears stated age and healthy   Orientation: alert and oriented to person, place, and time   Affect: appropriate         Right Foot/Ankle      Inspection and Palpation  Swelling: dorsum   Arch: pes planus  Claw Toes: fourth toe and fifth toe  Hallux limitus: yes     Neurovascular  Dorsalis pedis: 3+  Posterior tibial: 3+  Saphenous nerve sensation: normal  Tibial nerve sensation: normal  Superficial peroneal nerve sensation: normal  Deep peroneal nerve sensation: normal  Sural nerve sensation: normal        Left Foot/Ankle       Inspection and Palpation  Swelling: dorsum   Arch: pes planus  Claw toes: fourth toe and fifth toe  Hallux limitus: yes     Neurovascular  Dorsalis pedis: 3+  Posterior tibial: 3+  Saphenous nerve sensation: normal  Tibial nerve sensation: normal  Superficial peroneal nerve sensation: normal  Deep peroneal nerve sensation: normal  Sural nerve sensation: normal        Physical Exam  Vitals and nursing note reviewed.   Constitutional:       Appearance: Normal appearance.   Cardiovascular:      Rate and Rhythm: Normal rate and regular rhythm.      Pulses: no weak pulses.           Dorsalis pedis pulses are 3+ on the right side and 3+ on the left side.        Posterior tibial pulses are 3+ on the right side and 3+ on the  left side.   Skin:     Capillary Refill: Capillary refill takes less than 2 seconds.      Comments: Soft corn fourth PIPJ, lateral bilateral.   Neurological:      Mental Status: She is alert.   Psychiatric:         Mood and Affect: Mood normal.         Behavior: Behavior normal.         Thought Content: Thought content normal.         Judgment: Judgment normal.      Patient's shoes and socks removed.     Right Foot/Ankle   Right Foot Inspection        Toe Exam: tenderness and right toe deformity.      Sensory   Vibration: intact  Proprioception: intact  Monofilament testing: intact     Vascular  Capillary refills: < 3 seconds  The right DP pulse is 3+. The right PT pulse is 3+.      Right Toe  - Comprehensive Exam  Arch: pes planus  Claw Toes: fourth toe and fifth toe  Hallux limitus: yes  Swelling: dorsum         Left Foot/Ankle  Left Foot Inspection        Toe Exam: tenderness and left toe deformity.      Sensory   Vibration: intact  Proprioception: intact  Monofilament testing: intact     Vascular  Capillary refills: < 3 seconds  The left DP pulse is 3+. The left PT pulse is 3+.      Left Toe  - Comprehensive Exam  Arch: pes planus  Claw toes: fourth toe and fifth toe  Hallux limitus: yes  Swelling: dorsum         Assign Risk Category  Deformity present  No loss of protective sensation  No weak pulses  Risk: 0

## 2024-09-29 ENCOUNTER — HOSPITAL ENCOUNTER (EMERGENCY)
Facility: HOSPITAL | Age: 34
Discharge: HOME | End: 2024-09-29
Attending: EMERGENCY MEDICINE
Payer: COMMERCIAL

## 2024-09-29 VITALS
TEMPERATURE: 98 F | OXYGEN SATURATION: 98 % | RESPIRATION RATE: 18 BRPM | SYSTOLIC BLOOD PRESSURE: 136 MMHG | DIASTOLIC BLOOD PRESSURE: 85 MMHG | HEART RATE: 77 BPM

## 2024-09-29 DIAGNOSIS — H60.90 OTITIS EXTERNA: ICD-10-CM

## 2024-09-29 DIAGNOSIS — H66.90 OTITIS MEDIA: Primary | ICD-10-CM

## 2024-09-29 LAB — S PYO DNA THROAT QL NAA+PROBE: NOT DETECTED

## 2024-09-29 PROCEDURE — 99284 EMERGENCY DEPT VISIT MOD MDM: CPT | Performed by: STUDENT IN AN ORGANIZED HEALTH CARE EDUCATION/TRAINING PROGRAM

## 2024-09-29 PROCEDURE — 99283 EMERGENCY DEPT VISIT LOW MDM: CPT

## 2024-09-29 PROCEDURE — 87651 STREP A DNA AMP PROBE: CPT | Performed by: STUDENT IN AN ORGANIZED HEALTH CARE EDUCATION/TRAINING PROGRAM

## 2024-09-29 RX ORDER — CIPROFLOXACIN AND DEXAMETHASONE 3; 1 MG/ML; MG/ML
4 SUSPENSION/ DROPS AURICULAR (OTIC) ONCE
Status: COMPLETED | OUTPATIENT
Start: 2024-09-29 | End: 2024-09-29

## 2024-09-29 RX ORDER — CIPROFLOXACIN AND DEXAMETHASONE 3; 1 MG/ML; MG/ML
4 SUSPENSION/ DROPS AURICULAR (OTIC) 2 TIMES DAILY
Qty: 3 ML | Refills: 0 | Status: SHIPPED | OUTPATIENT
Start: 2024-09-29 | End: 2024-10-06

## 2024-09-29 RX ADMIN — CIPROFLOXACIN AND DEXAMETHASONE 4 DROP: 3; 1 SUSPENSION/ DROPS AURICULAR (OTIC) at 07:07

## 2024-09-29 RX ADMIN — AMOXICILLIN AND CLAVULANATE POTASSIUM 1 TABLET: 875; 125 TABLET, FILM COATED ORAL at 07:08

## 2024-09-29 NOTE — DISCHARGE INSTRUCTIONS
You were evaluated in the Emergency Department today for ear pain. Your evaluation suggests your pain is due to an ear infection.    Please take your prescribed antibiotics AND use your antibiotic drops as directed for the full course of the medication.    Please follow up with your primary care physician within two days.    Return to the Emergency Department if you experience hearing loss, discharge from your ear, headaches, fevers, recurrent vomiting, or any other concerning symptoms.3

## 2024-09-29 NOTE — ED PROVIDER NOTES
Final diagnoses:   Otitis media   Otitis externa     ED Disposition       ED Disposition   Discharge    Condition   Stable    Date/Time   Sun Sep 29, 2024  7:05 AM    Comment   Gloria Lloyd discharge to home/self care.                   Assessment & Plan       Medical Decision Making  Patient is a 34 y.o. female who presents to the ED for ear pain.  Patient is nontoxic, well-appearing.     Differential includes but is not limited to: Otitis externa left, otitis media right.  Presentation not consistent with mastoiditis, malignant otitis externa    Plan: Antibiotics, eardrops, discharge with return precautions                   Amount and/or Complexity of Data Reviewed  Labs: ordered.    Risk  Prescription drug management.             Medications   amoxicillin-clavulanate (AUGMENTIN) 875-125 mg per tablet 1 tablet (1 tablet Oral Given 24 0708)   ciprofloxacin-dexamethasone (CIPRODEX) 0.3-0.1 % otic suspension 4 drop (4 drops Left Ear Given 24 0707)       ED Risk Strat Scores                                               History of Present Illness       Chief Complaint   Patient presents with    Earache     Pt c/o right side earache.       Past Medical History:   Diagnosis Date    Diabetes mellitus (HCC)     uses kwiki pen     Diabetes mellitus type 1 (HCC)     High blood pressure     recently dx/ put on lisinopril     High cholesterol     Hypertension      no hypersion     Miscarriage     Recurrent pregnancy loss, antepartum condition or complication       Past Surgical History:   Procedure Laterality Date     SECTION  01/02/2014     X1    DILATION AND CURETTAGE OF UTERUS  04/10/2019    Missed AB     SC  DELIVERY ONLY N/A 2021    Procedure:  SECTION () REPEAT;  Surgeon: Bradly Daniels MD;  Location: [facility] ;  Service: Obstetrics    WISDOM TOOTH EXTRACTION        Family History   Problem Relation Age of Onset    No Known Problems Mother     Diabetes Father     No Known  Problems Sister     No Known Problems Sister     No Known Problems Brother     No Known Problems Maternal Grandmother     Diabetes Paternal Grandmother     No Known Problems Paternal Grandfather     No Known Problems Daughter       Social History     Tobacco Use    Smoking status: Never    Smokeless tobacco: Never   Vaping Use    Vaping status: Never Used   Substance Use Topics    Alcohol use: Never     Comment: Alcohol intake:   None - As per [place]     Drug use: Never     Comment: Illicit drugs:   No  - As per [place]       E-Cigarette/Vaping    E-Cigarette Use Never User       E-Cigarette/Vaping Substances    Nicotine No     THC No     CBD No     Flavoring No     Other No     Unknown No       I have reviewed and agree with the history as documented.     Patient is a 34-year-old female, past medical history including diabetes, high cholesterol, and high blood pressure, who presents to the emergency room for right-sided ear pain.  Started yesterday.  No modifying factors.  No associated symptoms.  No headaches, dizziness, visual changes.  No changes to hearing.  Has had similar pain in the past that ultimately resolved on its own.  No other complaints or concerns.      Earache      Review of Systems   HENT:  Positive for ear pain.    All other systems reviewed and are negative.          Objective       ED Triage Vitals [09/29/24 0702]   Temperature Pulse Blood Pressure Respirations SpO2 Patient Position - Orthostatic VS   98 °F (36.7 °C) 77 136/85 18 98 % Sitting      Temp Source Heart Rate Source BP Location FiO2 (%) Pain Score    Tympanic Monitor Right arm -- --      Vitals      Date and Time Temp Pulse SpO2 Resp BP Pain Score FACES Pain Rating User   09/29/24 0702 98 °F (36.7 °C) 77 98 % 18 136/85 -- -- AG            Physical Exam  Vitals and nursing note reviewed.   Constitutional:       General: She is not in acute distress.     Appearance: She is well-developed. She is not ill-appearing, toxic-appearing or  diaphoretic.   HENT:      Head: Normocephalic and atraumatic.      Right Ear: External ear normal.      Left Ear: External ear normal.      Ears:      Comments: Otorrhea noted from the left ear.  The right TM is bulging.  No mastoid tenderness or redness.     Nose: Nose normal.   Eyes:      General: Lids are normal. No scleral icterus.  Cardiovascular:      Rate and Rhythm: Normal rate and regular rhythm.   Pulmonary:      Effort: Pulmonary effort is normal. No respiratory distress.   Musculoskeletal:         General: No deformity. Normal range of motion.      Cervical back: Normal range of motion and neck supple.   Skin:     General: Skin is warm and dry.   Neurological:      General: No focal deficit present.      Mental Status: She is alert.   Psychiatric:         Mood and Affect: Mood normal.         Behavior: Behavior normal.         Results Reviewed       Procedure Component Value Units Date/Time    Strep A PCR [[id number]]  (Normal) Collected: 09/29/24 0707    Lab Status: Final result Specimen: Throat Updated: 09/29/24 0741     STREP A PCR Not Detected            No orders to display       Procedures    ED Medication and Procedure Management   Prior to Admission Medications   Prescriptions Last Dose Informant Patient Reported? Taking?   Blood Glucose Monitoring Suppl (OneTouch Verio) w/Device KIT   No No   Sig: Use 4 (four) times a day   Continuous Blood Gluc  (Dexcom G7 ) NERY   No No   Sig: Use 1 each continuous   Continuous Blood Gluc Sensor (Dexcom G7 Sensor)   No No   Sig: Use 1 Device every 10 days   Insulin Glargine-yfgn 100 UNIT/ML SOPN   No No   Sig: Inject 20 units subcutaneously daily   Insulin Pen Needle (BD Pen Needle Kerrie 2nd Gen) 32G X 4 MM MISC   No No   Sig: For use with insulin pen. Pharmacy may dispense brand covered by insurance.   Insulin Pen Needle (BD Pen Needle Kerrie 2nd Gen) 32G X 4 MM MISC   No No   Sig: For use with insulin pen 4 times per day. Pharmacy may dispense  brand covered by insurance.   Lancets (onetouch ultrasoft) lancets   No No   Sig: Use as instructed 4 times a day   OneTouch Verio test strip   No No   Sig: PLS CHECK SUGAR 3 TIME DAILY .300   insulin lispro (Admelog SoloStar) 100 units/mL injection pen   No No   Sig: Inject 3 Units under the skin 3 (three) times a day with meals      Facility-Administered Medications: None     Discharge Medication List as of 9/29/2024  7:06 AM        START taking these medications    Details   amoxicillin-clavulanate (AUGMENTIN) 875-125 mg per tablet Take 1 tablet by mouth every 12 (twelve) hours for 7 days, Starting Sun 9/29/2024, Until Sun 10/6/2024, Normal      ciprofloxacin-dexamethasone (CIPRODEX) otic suspension Administer 4 drops into the left ear 2 (two) times a day for 7 days, Starting Sun 9/29/2024, Until Sun 10/6/2024, Normal           CONTINUE these medications which have NOT CHANGED    Details   Blood Glucose Monitoring Suppl (OneTouch Verio) w/Device KIT Use 4 (four) times a day, Starting Tue 9/26/2023, Normal      Continuous Blood Gluc  (Dexcom G7 ) NERY Use 1 each continuous, Starting u 3/14/2024, Normal      Continuous Blood Gluc Sensor (Dexcom G7 Sensor) Use 1 Device every 10 days, Starting Thu 3/14/2024, Normal      Insulin Glargine-yfgn 100 UNIT/ML SOPN Inject 20 units subcutaneously daily, Fill Later      insulin lispro (Admelog SoloStar) 100 units/mL injection pen Inject 3 Units under the skin 3 (three) times a day with meals, Starting Thu 3/14/2024, Normal      !! Insulin Pen Needle (BD Pen Needle Kerrie 2nd Gen) 32G X 4 MM MISC For use with insulin pen. Pharmacy may dispense brand covered by insurance., Normal      !! Insulin Pen Needle (BD Pen Needle Kerrie 2nd Gen) 32G X 4 MM MISC For use with insulin pen 4 times per day. Pharmacy may dispense brand covered by insurance., Normal      Lancets (onetouch ultrasoft) lancets Use as instructed 4 times a day, Normal      OneTouch Verio test strip  PLS CHECK SUGAR 3 TIME DAILY .300, Normal       !! - Potential duplicate medications found. Please discuss with provider.        No discharge procedures on file.  ED SEPSIS DOCUMENTATION   Time reflects when diagnosis was documented in both MDM as applicable and the Disposition within this note       Time User Action Codes Description Comment    9/29/2024  7:05 AM Issa Jones Add [H66.90] Otitis media     9/29/2024  7:05 AM Issa Jones Add [H60.90] Otitis externa                  Issa Jones,   09/29/24 1145

## 2024-10-01 ENCOUNTER — HOSPITAL ENCOUNTER (EMERGENCY)
Facility: HOSPITAL | Age: 34
Discharge: HOME | End: 2024-10-01
Attending: EMERGENCY MEDICINE
Payer: COMMERCIAL

## 2024-10-01 VITALS
SYSTOLIC BLOOD PRESSURE: 136 MMHG | OXYGEN SATURATION: 98 % | HEART RATE: 106 BPM | RESPIRATION RATE: 18 BRPM | DIASTOLIC BLOOD PRESSURE: 85 MMHG | TEMPERATURE: 97.5 F

## 2024-10-01 DIAGNOSIS — H61.20 IMPACTED CERUMEN: Primary | ICD-10-CM

## 2024-10-01 PROCEDURE — 99282 EMERGENCY DEPT VISIT SF MDM: CPT

## 2024-10-01 PROCEDURE — 99284 EMERGENCY DEPT VISIT MOD MDM: CPT | Performed by: PHYSICIAN ASSISTANT

## 2024-10-01 NOTE — ED PROVIDER NOTES
"Final diagnoses:   Impacted cerumen     ED Disposition       ED Disposition   Discharge    Condition   Stable    Date/Time   Tue Oct 1, 2024  9:23 AM    Comment   Gloria Lloyd discharge to home/self care.                   Assessment & Plan       Medical Decision Making  Bump patient is describing that she feels in the right ear was a small ball-like concretion of wax that was easily removed with an ear curette.  There was no bleeding in the ear canal post removal.  Patient was advised to continue the Ciprodex and Augmentin.  She was given [facility]'s ENT group for follow-up if any persistent concerns.  Return precautions were reviewed        ED Course as of 10/01/24 0938   Tue Oct 01, 2024   0936 Hard wax ball like concretion removed from R ear canal with ear curette. No bleeding post removal        Medications - No data to display    ED Risk Strat Scores                                               History of Present Illness       Chief Complaint   Patient presents with    Ear Problem     Pt reports \" feeling of a ball  in the r ear.\" Its draining more. Pt being treated for an ear infection       Past Medical History:   Diagnosis Date    Diabetes mellitus (HCC)     uses kwiki pen     Diabetes mellitus type 1 (HCC)     High blood pressure     recently dx/ put on lisinopril     High cholesterol     Hypertension      no hypersion     Miscarriage     Recurrent pregnancy loss, antepartum condition or complication       Past Surgical History:   Procedure Laterality Date     SECTION  01/02/2014     X1    DILATION AND CURETTAGE OF UTERUS  04/10/2019    Missed AB     RI  DELIVERY ONLY N/A 2021    Procedure:  SECTION () REPEAT;  Surgeon: Bradly Daniels MD;  Location: [facility] ;  Service: Obstetrics    WISDOM TOOTH EXTRACTION        Family History   Problem Relation Age of Onset    No Known Problems Mother     Diabetes Father     No Known Problems Sister     No Known Problems Sister     No " "Known Problems Brother     No Known Problems Maternal Grandmother     Diabetes Paternal Grandmother     No Known Problems Paternal Grandfather     No Known Problems Daughter       Social History     Tobacco Use    Smoking status: Never    Smokeless tobacco: Never   Vaping Use    Vaping status: Never Used   Substance Use Topics    Alcohol use: Never     Comment: Alcohol intake:   None - As per [place]     Drug use: Never     Comment: Illicit drugs:   No  - As per [place]       E-Cigarette/Vaping    E-Cigarette Use Never User       E-Cigarette/Vaping Substances    Nicotine No     THC No     CBD No     Flavoring No     Other No     Unknown No       I have reviewed and agree with the history as documented.     Patient is a 33 yo  female with history of DM, HTN, hypercholesterolemia who reports \"feeling a ball\" in R ear canal this morning. Pt was seen in this ED 2 days ago for R ear pain and prescribed augmentin and ciprodex . She reports the ear pain has improved. States she is having some brown drainage on pillow case at night. No fever. No R mastoid pain. No sore throat. No other complaints          Review of Systems   Constitutional:  Negative for chills and fever.   HENT:  Positive for ear discharge. Negative for ear pain, hearing loss and sore throat.    Respiratory:  Negative for shortness of breath.    Cardiovascular:  Negative for chest pain.           Objective       ED Triage Vitals [10/01/24 0911]   Temperature Pulse Blood Pressure Respirations SpO2 Patient Position - Orthostatic VS   97.5 °F (36.4 °C) (!) 106 136/85 18 98 % Sitting      Temp Source Heart Rate Source BP Location FiO2 (%) Pain Score    Tympanic -- Left arm -- --      Vitals      Date and Time Temp Pulse SpO2 Resp BP Pain Score FACES Pain Rating User   10/01/24 0911 97.5 °F (36.4 °C) 106 98 % 18 136/85 -- -- TAYLOR            Physical Exam  Vitals and nursing note reviewed.   Constitutional:       General: She is not in acute distress.     " Appearance: Normal appearance. She is not ill-appearing, toxic-appearing or diaphoretic.   HENT:      Head: Normocephalic and atraumatic.      Right Ear: External ear normal.      Left Ear: Tympanic membrane, ear canal and external ear normal.      Ears:      Comments: Hard brown cerumen in R auditory canal      Nose: Nose normal.      Mouth/Throat:      Mouth: Mucous membranes are moist.      Pharynx: Oropharynx is clear.   Eyes:      Extraocular Movements: Extraocular movements intact.      Conjunctiva/sclera: Conjunctivae normal.      Pupils: Pupils are equal, round, and reactive to light.   Cardiovascular:      Rate and Rhythm: Normal rate and regular rhythm.      Pulses: Normal pulses.      Heart sounds: Normal heart sounds.   Pulmonary:      Effort: Pulmonary effort is normal.      Breath sounds: Normal breath sounds.   Musculoskeletal:      Cervical back: Normal range of motion and neck supple.   Lymphadenopathy:      Cervical: No cervical adenopathy.   Neurological:      Mental Status: She is alert.         Results Reviewed       None            No orders to display       Procedures    ED Medication and Procedure Management   Prior to Admission Medications   Prescriptions Last Dose Informant Patient Reported? Taking?   Blood Glucose Monitoring Suppl (OneTouch Verio) w/Device KIT   No No   Sig: Use 4 (four) times a day   Continuous Blood Gluc  (Dexcom G7 ) NERY   No No   Sig: Use 1 each continuous   Continuous Blood Gluc Sensor (Dexcom G7 Sensor)   No No   Sig: Use 1 Device every 10 days   Insulin Glargine-yfgn 100 UNIT/ML SOPN   No No   Sig: Inject 20 units subcutaneously daily   Insulin Pen Needle (BD Pen Needle Kerrie 2nd Gen) 32G X 4 MM MISC   No No   Sig: For use with insulin pen. Pharmacy may dispense brand covered by insurance.   Insulin Pen Needle (BD Pen Needle Kerrie 2nd Gen) 32G X 4 MM MISC   No No   Sig: For use with insulin pen 4 times per day. Pharmacy may dispense brand covered by  insurance.   Lancets (onetouch ultrasoft) lancets   No No   Sig: Use as instructed 4 times a day   OneTouch Verio test strip   No No   Sig: PLS CHECK SUGAR 3 TIME DAILY .300   amoxicillin-clavulanate (AUGMENTIN) 875-125 mg per tablet   No No   Sig: Take 1 tablet by mouth every 12 (twelve) hours for 7 days   ciprofloxacin-dexamethasone (CIPRODEX) otic suspension   No No   Sig: Administer 4 drops into the left ear 2 (two) times a day for 7 days   insulin lispro (Admelog SoloStar) 100 units/mL injection pen   No No   Sig: Inject 3 Units under the skin 3 (three) times a day with meals      Facility-Administered Medications: None     Discharge Medication List as of 10/1/2024  9:24 AM        CONTINUE these medications which have NOT CHANGED    Details   amoxicillin-clavulanate (AUGMENTIN) 875-125 mg per tablet Take 1 tablet by mouth every 12 (twelve) hours for 7 days, Starting Sun 9/29/2024, Until Sun 10/6/2024, Normal      Blood Glucose Monitoring Suppl (OneTouch Verio) w/Device KIT Use 4 (four) times a day, Starting Tue 9/26/2023, Normal      ciprofloxacin-dexamethasone (CIPRODEX) otic suspension Administer 4 drops into the left ear 2 (two) times a day for 7 days, Starting Sun 9/29/2024, Until Sun 10/6/2024, Normal      Continuous Blood Gluc  (Dexcom G7 ) NERY Use 1 each continuous, Starting Thu 3/14/2024, Normal      Continuous Blood Gluc Sensor (Dexcom G7 Sensor) Use 1 Device every 10 days, Starting Thu 3/14/2024, Normal      Insulin Glargine-yfgn 100 UNIT/ML SOPN Inject 20 units subcutaneously daily, Fill Later      insulin lispro (Admelog SoloStar) 100 units/mL injection pen Inject 3 Units under the skin 3 (three) times a day with meals, Starting Thu 3/14/2024, Normal      !! Insulin Pen Needle (BD Pen Needle Kerrie 2nd Gen) 32G X 4 MM MISC For use with insulin pen. Pharmacy may dispense brand covered by insurance., Normal      !! Insulin Pen Needle (BD Pen Needle Kerrie 2nd Gen) 32G X 4 MM MISC For use  with insulin pen 4 times per day. Pharmacy may dispense brand covered by insurance., Normal      Lancets (onetouch ultrasoft) lancets Use as instructed 4 times a day, Normal      OneTouch Verio test strip PLS CHECK SUGAR 3 TIME DAILY .300, Normal       !! - Potential duplicate medications found. Please discuss with provider.        No discharge procedures on file.  ED SEPSIS DOCUMENTATION   Time reflects when diagnosis was documented in both MDM as applicable and the Disposition within this note       Time User Action Codes Description Comment    10/1/2024  9:24 AM Bradly Howell Add [H61.20] Impacted cooper Howell PA-C  10/01/24 0938

## 2024-10-01 NOTE — DISCHARGE INSTRUCTIONS
Continue present medications    Follow up with [facility] for any persistent concerns    Return to ED for new or worsening symptoms

## 2024-10-21 ENCOUNTER — TELEPHONE (OUTPATIENT)
Age: 34
End: 2024-10-21

## 2024-10-21 NOTE — TELEPHONE ENCOUNTER
Pt called Pulmonary for insulin refill. Advised we do not prescribe insulin. Advised she would need to call Endocrine. Pt acknowledged instructions. Patient had no further questions and/or concerns at this time.

## 2024-10-22 ENCOUNTER — HOSPITAL ENCOUNTER (OUTPATIENT)
Facility: HOSPITAL | Age: 34
Setting detail: OBSERVATION
LOS: 1 days | Discharge: HOME | End: 2024-10-23
Attending: EMERGENCY MEDICINE | Admitting: INTERNAL MEDICINE
Payer: COMMERCIAL

## 2024-10-22 ENCOUNTER — OFFICE VISIT (OUTPATIENT)
Dept: ENDOCRINOLOGY | Facility: CLINIC | Age: 34
End: 2024-10-22
Payer: COMMERCIAL

## 2024-10-22 VITALS
WEIGHT: 138.8 LBS | DIASTOLIC BLOOD PRESSURE: 60 MMHG | SYSTOLIC BLOOD PRESSURE: 123 MMHG | OXYGEN SATURATION: 100 % | HEART RATE: 91 BPM | BODY MASS INDEX: 29.14 KG/M2 | HEIGHT: 58 IN

## 2024-10-22 DIAGNOSIS — E10.65 TYPE 1 DIABETES MELLITUS WITH HYPERGLYCEMIA, WITH LONG-TERM CURRENT USE OF INSULIN (HCC): ICD-10-CM

## 2024-10-22 DIAGNOSIS — R79.89 ELEVATED LFTS: ICD-10-CM

## 2024-10-22 DIAGNOSIS — E11.65 HYPERGLYCEMIA DUE TO DIABETES MELLITUS (HCC): ICD-10-CM

## 2024-10-22 DIAGNOSIS — E78.2 MIXED HYPERLIPIDEMIA: Primary | ICD-10-CM

## 2024-10-22 DIAGNOSIS — E16.2 HYPOGLYCEMIA: ICD-10-CM

## 2024-10-22 PROBLEM — E87.20 METABOLIC ACIDOSIS: Status: ACTIVE | Noted: 2024-10-22

## 2024-10-22 PROBLEM — I10 HYPERTENSION: Status: ACTIVE | Noted: 2024-10-22

## 2024-10-22 LAB
ALBUMIN SERPL BCG-MCNC: 4.1 G/DL (ref 3.5–5)
ALP SERPL-CCNC: 89 U/L (ref 34–104)
ALT SERPL W P-5'-P-CCNC: 9 U/L (ref 7–52)
AMORPH URATE CRY URNS QL MICRO: ABNORMAL
ANION GAP SERPL CALCULATED.3IONS-SCNC: 12 MMOL/L (ref 4–13)
ANION GAP SERPL CALCULATED.3IONS-SCNC: 8 MMOL/L (ref 4–13)
APTT PPP: 24 SECONDS (ref 23–34)
AST SERPL W P-5'-P-CCNC: 11 U/L (ref 13–39)
B-OH-BUTYR SERPL-MCNC: 0.36 MMOL/L (ref 0.02–0.27)
BACTERIA UR QL AUTO: ABNORMAL /HPF
BASE EX.OXY STD BLDV CALC-SCNC: 61.5 % (ref 60–80)
BASE EXCESS BLDV CALC-SCNC: -3.8 MMOL/L
BASOPHILS # BLD AUTO: 0.03 THOUSANDS/ΜL (ref 0–0.1)
BASOPHILS NFR BLD AUTO: 0 % (ref 0–1)
BILIRUB SERPL-MCNC: 0.35 MG/DL (ref 0.2–1)
BILIRUB UR QL STRIP: NEGATIVE
BUN SERPL-MCNC: 10 MG/DL (ref 5–25)
BUN SERPL-MCNC: 6 MG/DL (ref 5–25)
CALCIUM SERPL-MCNC: 8.3 MG/DL (ref 8.4–10.2)
CALCIUM SERPL-MCNC: 8.5 MG/DL (ref 8.4–10.2)
CHLORIDE SERPL-SCNC: 107 MMOL/L (ref 96–108)
CHLORIDE SERPL-SCNC: 92 MMOL/L (ref 96–108)
CHOLEST SERPL-MCNC: 200 MG/DL
CLARITY UR: CLEAR
CO2 SERPL-SCNC: 23 MMOL/L (ref 21–32)
CO2 SERPL-SCNC: 24 MMOL/L (ref 21–32)
COLOR UR: COLORLESS
CREAT SERPL-MCNC: 0.53 MG/DL (ref 0.6–1.3)
CREAT SERPL-MCNC: 0.65 MG/DL (ref 0.6–1.3)
EOSINOPHIL # BLD AUTO: 0.02 THOUSAND/ΜL (ref 0–0.61)
EOSINOPHIL NFR BLD AUTO: 0 % (ref 0–6)
ERYTHROCYTE [DISTWIDTH] IN BLOOD BY AUTOMATED COUNT: 12.3 % (ref 11.6–15.1)
EXT PREGNANCY TEST URINE: NEGATIVE
EXT. CONTROL: NORMAL
GFR SERPL CREATININE-BSD FRML MDRD: 117 ML/MIN/1.73SQ M
GFR SERPL CREATININE-BSD FRML MDRD: 126 ML/MIN/1.73SQ M
GLUCOSE SERPL-MCNC: 213 MG/DL (ref 65–140)
GLUCOSE SERPL-MCNC: 217 MG/DL (ref 65–140)
GLUCOSE SERPL-MCNC: 231 MG/DL (ref 65–140)
GLUCOSE SERPL-MCNC: 56 MG/DL (ref 65–140)
GLUCOSE SERPL-MCNC: 579 MG/DL (ref 65–140)
GLUCOSE SERPL-MCNC: 710 MG/DL (ref 65–140)
GLUCOSE SERPL-MCNC: 87 MG/DL (ref 65–140)
GLUCOSE SERPL-MCNC: 99 MG/DL (ref 65–140)
GLUCOSE UR STRIP-MCNC: ABNORMAL MG/DL
HCO3 BLDV-SCNC: 21.8 MMOL/L (ref 24–30)
HCT VFR BLD AUTO: 40.6 % (ref 34.8–46.1)
HDLC SERPL-MCNC: 83 MG/DL
HGB BLD-MCNC: 14 G/DL (ref 11.5–15.4)
HGB UR QL STRIP.AUTO: ABNORMAL
IMM GRANULOCYTES # BLD AUTO: 0.02 THOUSAND/UL (ref 0–0.2)
IMM GRANULOCYTES NFR BLD AUTO: 0 % (ref 0–2)
INR PPP: 0.85 (ref 0.85–1.19)
KETONES UR STRIP-MCNC: ABNORMAL MG/DL
LACTATE SERPL-SCNC: 2.6 MMOL/L (ref 0.5–2)
LDLC SERPL CALC-MCNC: 104 MG/DL (ref 0–100)
LEUKOCYTE ESTERASE UR QL STRIP: ABNORMAL
LIPASE SERPL-CCNC: 127 U/L (ref 11–82)
LYMPHOCYTES # BLD AUTO: 1.59 THOUSANDS/ΜL (ref 0.6–4.47)
LYMPHOCYTES NFR BLD AUTO: 23 % (ref 14–44)
MAGNESIUM SERPL-MCNC: 1.7 MG/DL (ref 1.9–2.7)
MCH RBC QN AUTO: 31.4 PG (ref 26.8–34.3)
MCHC RBC AUTO-ENTMCNC: 34.5 G/DL (ref 31.4–37.4)
MCV RBC AUTO: 91 FL (ref 82–98)
MONOCYTES # BLD AUTO: 0.25 THOUSAND/ΜL (ref 0.17–1.22)
MONOCYTES NFR BLD AUTO: 4 % (ref 4–12)
NEUTROPHILS # BLD AUTO: 4.9 THOUSANDS/ΜL (ref 1.85–7.62)
NEUTS SEG NFR BLD AUTO: 73 % (ref 43–75)
NITRITE UR QL STRIP: NEGATIVE
NON-SQ EPI CELLS URNS QL MICRO: ABNORMAL /HPF
NONHDLC SERPL-MCNC: 117 MG/DL
NRBC BLD AUTO-RTO: 0 /100 WBCS
O2 CT BLDV-SCNC: 13.2 ML/DL
PCO2 BLDV: 41.3 MM HG (ref 42–50)
PH BLDV: 7.34 [PH] (ref 7.3–7.4)
PH UR STRIP.AUTO: 5.5 [PH]
PLATELET # BLD AUTO: 251 THOUSANDS/UL (ref 149–390)
PMV BLD AUTO: 9.4 FL (ref 8.9–12.7)
PO2 BLDV: 32.1 MM HG (ref 35–45)
POTASSIUM SERPL-SCNC: 3.6 MMOL/L (ref 3.5–5.3)
POTASSIUM SERPL-SCNC: 4 MMOL/L (ref 3.5–5.3)
PROT SERPL-MCNC: 6.5 G/DL (ref 6.4–8.4)
PROT UR STRIP-MCNC: NEGATIVE MG/DL
PROTHROMBIN TIME: 12.1 SECONDS (ref 12.3–15)
RBC # BLD AUTO: 4.46 MILLION/UL (ref 3.81–5.12)
RBC #/AREA URNS AUTO: ABNORMAL /HPF
SL AMB POCT GLUCOSE BLD: ABNORMAL
SL AMB POCT HEMOGLOBIN AIC: 13.7 (ref ?–6.5)
SODIUM SERPL-SCNC: 127 MMOL/L (ref 135–147)
SODIUM SERPL-SCNC: 139 MMOL/L (ref 135–147)
SP GR UR STRIP.AUTO: <1.005 (ref 1–1.03)
TRIGL SERPL-MCNC: 64 MG/DL
UROBILINOGEN UR STRIP-ACNC: <2 MG/DL
WBC # BLD AUTO: 6.81 THOUSAND/UL (ref 4.31–10.16)
WBC #/AREA URNS AUTO: ABNORMAL /HPF

## 2024-10-22 PROCEDURE — 82805 BLOOD GASES W/O2 SATURATION: CPT | Performed by: EMERGENCY MEDICINE

## 2024-10-22 PROCEDURE — 99205 OFFICE O/P NEW HI 60 MIN: CPT | Performed by: NURSE PRACTITIONER

## 2024-10-22 PROCEDURE — 99254 IP/OBS CNSLTJ NEW/EST MOD 60: CPT | Performed by: INTERNAL MEDICINE

## 2024-10-22 PROCEDURE — 81025 URINE PREGNANCY TEST: CPT | Performed by: EMERGENCY MEDICINE

## 2024-10-22 PROCEDURE — 83036 HEMOGLOBIN GLYCOSYLATED A1C: CPT | Performed by: NURSE PRACTITIONER

## 2024-10-22 PROCEDURE — 36415 COLL VENOUS BLD VENIPUNCTURE: CPT | Performed by: EMERGENCY MEDICINE

## 2024-10-22 PROCEDURE — 83605 ASSAY OF LACTIC ACID: CPT

## 2024-10-22 PROCEDURE — 80061 LIPID PANEL: CPT

## 2024-10-22 PROCEDURE — 85610 PROTHROMBIN TIME: CPT | Performed by: EMERGENCY MEDICINE

## 2024-10-22 PROCEDURE — 99285 EMERGENCY DEPT VISIT HI MDM: CPT | Performed by: EMERGENCY MEDICINE

## 2024-10-22 PROCEDURE — 99223 1ST HOSP IP/OBS HIGH 75: CPT | Performed by: INTERNAL MEDICINE

## 2024-10-22 PROCEDURE — 85730 THROMBOPLASTIN TIME PARTIAL: CPT | Performed by: EMERGENCY MEDICINE

## 2024-10-22 PROCEDURE — 96360 HYDRATION IV INFUSION INIT: CPT

## 2024-10-22 PROCEDURE — 80048 BASIC METABOLIC PNL TOTAL CA: CPT

## 2024-10-22 PROCEDURE — 85025 COMPLETE CBC W/AUTO DIFF WBC: CPT | Performed by: EMERGENCY MEDICINE

## 2024-10-22 PROCEDURE — 80053 COMPREHEN METABOLIC PANEL: CPT | Performed by: EMERGENCY MEDICINE

## 2024-10-22 PROCEDURE — 82948 REAGENT STRIP/BLOOD GLUCOSE: CPT

## 2024-10-22 PROCEDURE — 82010 KETONE BODYS QUAN: CPT | Performed by: EMERGENCY MEDICINE

## 2024-10-22 PROCEDURE — 81001 URINALYSIS AUTO W/SCOPE: CPT | Performed by: EMERGENCY MEDICINE

## 2024-10-22 PROCEDURE — 99284 EMERGENCY DEPT VISIT MOD MDM: CPT

## 2024-10-22 PROCEDURE — 83735 ASSAY OF MAGNESIUM: CPT | Performed by: EMERGENCY MEDICINE

## 2024-10-22 PROCEDURE — 83690 ASSAY OF LIPASE: CPT | Performed by: EMERGENCY MEDICINE

## 2024-10-22 PROCEDURE — 82948 REAGENT STRIP/BLOOD GLUCOSE: CPT | Performed by: NURSE PRACTITIONER

## 2024-10-22 RX ORDER — SODIUM CHLORIDE 9 MG/ML
100 INJECTION, SOLUTION INTRAVENOUS CONTINUOUS
Status: DISCONTINUED | OUTPATIENT
Start: 2024-10-22 | End: 2024-10-23 | Stop reason: HOSPADM

## 2024-10-22 RX ORDER — INSULIN LISPRO 100 [IU]/ML
3 INJECTION, SOLUTION INTRAVENOUS; SUBCUTANEOUS
Status: DISCONTINUED | OUTPATIENT
Start: 2024-10-22 | End: 2024-10-23 | Stop reason: HOSPADM

## 2024-10-22 RX ORDER — INSULIN GLARGINE 100 [IU]/ML
24 INJECTION, SOLUTION SUBCUTANEOUS EVERY MORNING
Status: DISCONTINUED | OUTPATIENT
Start: 2024-10-23 | End: 2024-10-23 | Stop reason: HOSPADM

## 2024-10-22 RX ORDER — POTASSIUM CHLORIDE 1500 MG/1
40 TABLET, EXTENDED RELEASE ORAL ONCE
Status: COMPLETED | OUTPATIENT
Start: 2024-10-22 | End: 2024-10-22

## 2024-10-22 RX ORDER — INSULIN LISPRO 100 [IU]/ML
1-5 INJECTION, SOLUTION INTRAVENOUS; SUBCUTANEOUS
Status: DISCONTINUED | OUTPATIENT
Start: 2024-10-22 | End: 2024-10-23 | Stop reason: HOSPADM

## 2024-10-22 RX ORDER — LANCETS
EACH MISCELLANEOUS
Qty: 400 EACH | Refills: 3 | Status: SHIPPED | OUTPATIENT
Start: 2024-10-22

## 2024-10-22 RX ORDER — POTASSIUM CHLORIDE 14.9 MG/ML
20 INJECTION INTRAVENOUS ONCE
Status: COMPLETED | OUTPATIENT
Start: 2024-10-22 | End: 2024-10-22

## 2024-10-22 RX ORDER — MAGNESIUM SULFATE HEPTAHYDRATE 40 MG/ML
4 INJECTION, SOLUTION INTRAVENOUS ONCE
Status: COMPLETED | OUTPATIENT
Start: 2024-10-22 | End: 2024-10-22

## 2024-10-22 RX ORDER — INSULIN LISPRO 100 [IU]/ML
1-5 INJECTION, SOLUTION INTRAVENOUS; SUBCUTANEOUS
Status: DISCONTINUED | OUTPATIENT
Start: 2024-10-23 | End: 2024-10-23 | Stop reason: HOSPADM

## 2024-10-22 RX ORDER — ACETAMINOPHEN 325 MG/1
650 TABLET ORAL EVERY 6 HOURS PRN
Status: DISCONTINUED | OUTPATIENT
Start: 2024-10-22 | End: 2024-10-23 | Stop reason: HOSPADM

## 2024-10-22 RX ORDER — INSULIN LISPRO 100 [IU]/ML
3 INJECTION, SOLUTION INTRAVENOUS; SUBCUTANEOUS
Qty: 15 ML | Refills: 2 | Status: ON HOLD | OUTPATIENT
Start: 2024-10-22 | End: 2024-10-23

## 2024-10-22 RX ORDER — ONDANSETRON 2 MG/ML
4 INJECTION INTRAMUSCULAR; INTRAVENOUS EVERY 6 HOURS PRN
Status: DISCONTINUED | OUTPATIENT
Start: 2024-10-22 | End: 2024-10-23 | Stop reason: HOSPADM

## 2024-10-22 RX ORDER — BLOOD SUGAR DIAGNOSTIC
STRIP MISCELLANEOUS
Qty: 100 STRIP | Refills: 3 | Status: SHIPPED | OUTPATIENT
Start: 2024-10-22

## 2024-10-22 RX ADMIN — SODIUM CHLORIDE 1000 ML: 0.9 INJECTION, SOLUTION INTRAVENOUS at 10:36

## 2024-10-22 RX ADMIN — MAGNESIUM SULFATE HEPTAHYDRATE 4 G: 40 INJECTION, SOLUTION INTRAVENOUS at 12:16

## 2024-10-22 RX ADMIN — SODIUM CHLORIDE 3 UNITS/HR: 9 INJECTION, SOLUTION INTRAVENOUS at 13:30

## 2024-10-22 RX ADMIN — SODIUM CHLORIDE 12 UNITS/HR: 9 INJECTION, SOLUTION INTRAVENOUS at 12:35

## 2024-10-22 RX ADMIN — SODIUM CHLORIDE 100 ML/HR: 0.9 INJECTION, SOLUTION INTRAVENOUS at 12:14

## 2024-10-22 RX ADMIN — INSULIN LISPRO 1 UNITS: 100 INJECTION, SOLUTION INTRAVENOUS; SUBCUTANEOUS at 22:15

## 2024-10-22 RX ADMIN — POTASSIUM CHLORIDE 40 MEQ: 1500 TABLET, EXTENDED RELEASE ORAL at 12:13

## 2024-10-22 RX ADMIN — POTASSIUM CHLORIDE 20 MEQ: 14.9 INJECTION, SOLUTION INTRAVENOUS at 12:21

## 2024-10-22 RX ADMIN — SODIUM CHLORIDE 1000 ML: 0.9 INJECTION, SOLUTION INTRAVENOUS at 11:06

## 2024-10-22 NOTE — H&P
"H&P - Hospitalist   Name: Gloria Lloyd 34 y.o. female I MRN: [id number]  Unit/Bed#: ED CT2 I Date of Admission: 10/22/2024   Date of Service: 10/22/2024 I Hospital Day: 0     Assessment & Plan  DKA (diabetic ketoacidosis) (HCC)  Patient presents from Endocrine office after large urinary ketones and blood sugar \"Hi\" in office. Patient presented to ED with blood glucose 579 and serum blood glucose of 710. Serum bicarb 23, normal AG 12, ABG pH 7.34/CO2 41.3/O2 32.1/Bicarb 21.8 consistent with metabolic acidosis. Patient received 2L NS in ED and insulin drip was started. Patient takes Glargine 24 units daily which she last took this morning and Humalog 3 units + scale which she has not been taking for a few weeks. She states that she has not been checking her glucose at all for the last few weeks. She endorses mild polydipsia. She denies any polyuria, N/V/D, abdominal pain, fever, chills. Denies CP, SOB, or dizziness. Denies any sick contacts or recent travel. Doubt any infectious trigger as patient had no leukocytosis. Patient will be admitted for close monitoring of DKA secondary to noncompliance to insulin regimen.  Continue with administration of IV regular insulin   Trend q2hr blood sugars and titrate insulin drip accordingly  Hold home pta Glargine 24 units daily and Humalog 3 units + scale for now while on IV insulin drip  S/p 2L NS bolus in ED, continue NS 100ml/hr  UA with ketonuria, trace blood and trace leukocytes  Check lactic acid  Magnesium 1.7, repleted with 4g magnesium sulfate; likewise replete potassium in the setting of volume depletion likely masking electrolyte disturbance  Recheck BMP 1600  Controlled carbs diet  Endocrine consult appreciated  Metabolic acidosis  Serum bicarb 23, AG 12 secondary to uncomplicated NAGMA due to hyperglycemia  ABG pH 7.34/CO2 41.3/O2 32.1/Bicarb 21.8 consistent with metabolic acidosis  Beta-hydroxybutyrate 0.36  Check lactic  Continue IVFs  Management as " "above  Hyponatremia  Noted with Sodium 127  Sodium corrected for hyperglycemia 137  BMP am  Type 1 diabetes mellitus with hyperglycemia, with long-term current use of insulin ([facility])  Lab Results   Component Value Date    HGBA1C 13.7 (A) 10/22/2024     Recent Labs     10/22/24  1015   POCGLU 579*     Blood Sugar Average: Last 72 hrs:  (P) 579  Home regimen: Glargine 24 units daily which she last took this morning and Humalog 3 units + scale which patient states she has not been taking for a few weeks. She states that she has not been checking her glucose at all for the last few weeks. States that the endocrine office was supposed to supply her with a Dexcom; however, was sent to the ER here from the office and did not get the sensor.  HgbA1c 13.7 with history of noncompliance   Continue Insulin drip with titration  Fingerstick Q2 hr at this time  Mixed hyperlipidemia  Noted on chart review.   Not on any cholesterol lowering medications currently.  Check lipid panel.  Hypertension  Noted on chart review. Patient states she does not take any medications for this.  Stable, /77  Currently hemodynamically stable.      VTE Pharmacologic Prophylaxis: VTE Score: 1 Low Risk (Score 0-2) - Encourage Ambulation.  Code Status: Level 1 - Full Code   Discussion with family: Patient declined call to .     Anticipated Length of Stay: Patient will be admitted on an inpatient basis with an anticipated length of stay of greater than 2 midnights secondary to management of DKA.    History of Present Illness   Chief Complaint: sent to ER from endocrine office with hyperglycemia    Gloria Lloyd is a pleasant 34 y.o. female with a PMH of Type 1 Diabetes, hyperlipidemia, and hypertension who presents from Endocrine office after large urinary ketones and blood sugar \"Hi\" in office. Patient presented to ED with blood glucose 579 and serum blood glucose of 710. Serum bicarb 23, normal AG 12, ABG pH 7.34/CO2 41.3/O2 " 32.1/Bicarb 21.8 consistent with metabolic acidosis. Patient received 2L NS in ED and insulin drip was started. Patient takes Glargine 24 units daily which she last took this morning and Humalog 3 units + scale which she has not been taking for a few weeks. She states that she has not been checking her glucose at all for the last few weeks. She endorses mild polydipsia. She denies any polyuria, N/V/D, abdominal pain, fever, chills. Denies CP, SOB, or dizziness. Denies any sick contacts or recent travel. Doubt any infectious trigger as patient had no leukocytosis. Patient will be admitted for close monitoring of DKA secondary to noncompliance to insulin regimen.      Review of Systems   Constitutional:  Negative for chills, diaphoresis, fatigue and fever.   Eyes:  Negative for visual disturbance.   Respiratory:  Negative for cough and shortness of breath.    Cardiovascular:  Negative for chest pain and palpitations.   Gastrointestinal:  Negative for abdominal pain, constipation, diarrhea, nausea and vomiting.   Endocrine: Negative for polydipsia and polyuria.   Genitourinary:  Negative for dysuria, frequency and hematuria.   Musculoskeletal:  Negative for arthralgias.   Neurological:  Negative for weakness.   All other systems reviewed and are negative.      Historical Information   Past Medical History:   Diagnosis Date    Diabetes mellitus (HCC)     uses kwiki pen     Diabetes mellitus type 1 (HCC)     High blood pressure     recently dx/ put on lisinopril     High cholesterol     Hypertension      no hypersion     Miscarriage     Recurrent pregnancy loss, antepartum condition or complication      Past Surgical History:   Procedure Laterality Date     SECTION  01/02/2014     X1    DILATION AND CURETTAGE OF UTERUS  04/10/2019    Missed AB     AR  DELIVERY ONLY N/A 2021    Procedure:  SECTION () REPEAT;  Surgeon: Bradly Daniels MD;  Location: [facility] ;  Service: Obstetrics    [place]  TOOTH EXTRACTION       Social History     Tobacco Use    Smoking status: Never    Smokeless tobacco: Never   Vaping Use    Vaping status: Never Used   Substance and Sexual Activity    Alcohol use: Never     Comment: Alcohol intake:   None - As per [place]     Drug use: Never     Comment: Illicit drugs:   No  - As per [place]     Sexual activity: Not Currently     Partners: Male     Birth control/protection: None     Comment: Pt reports no recent sexual activity and LMP approx 2 wks ago     E-Cigarette/Vaping    E-Cigarette Use Never User      E-Cigarette/Vaping Substances    Nicotine No     THC No     CBD No     Flavoring No     Other No     Unknown No      Family history non-contributory  Social History:  Marital Status: Legally    Occupation: Works in [employer]  Patient Pre-hospital Living Situation: Home  Patient Pre-hospital Level of Mobility: walks  Patient Pre-hospital Diet Restrictions: None    Meds/Allergies   I have reviewed home medications with patient personally.  Prior to Admission medications    Medication Sig Start Date End Date Taking? Authorizing Provider   Insulin Glargine-yfgn 100 UNIT/ML SOPN Inject 20 units subcutaneously daily 3/14/24  Yes CECILIO Goldberg   Blood Glucose Monitoring Suppl (OneTouch Verio) w/Device KIT Use 4 (four) times a day 9/26/23   Zaida Bowman MD   glucose blood (OneTouch Verio) test strip pls check sugar 3 time daily .300 10/22/24   CECILIO Goldberg   insulin lispro (Admelog SoloStar) 100 units/mL injection pen Inject 3 Units under the skin 3 (three) times a day with meals 10/22/24   CECILIO Goldberg   Insulin Pen Needle (BD Pen Needle Kerrie 2nd Gen) 32G X 4 MM MISC For use with insulin pen. Pharmacy may dispense brand covered by insurance. 12/15/22   Macrina Jimenez MD   Insulin Pen Needle (BD Pen Needle Kerrie 2nd Gen) 32G X 4 MM MISC For use with insulin pen 4 times per day. Pharmacy may dispense brand covered by insurance. 3/14/24   CECILIO Goldberg    Lancets (onetouch ultrasoft) lancets Use as instructed 4 times a day 10/22/24   CECILIO Goldberg   ciprofloxacin-dexamethasone (CIPRODEX) otic suspension Administer 4 drops into the left ear 2 (two) times a day for 7 days 9/29/24 10/22/24  Issa Jones DO   Continuous Blood Gluc  (Dexcom G7 ) NERY Use 1 each continuous  Patient not taking: Reported on 10/22/2024 3/14/24 10/22/24  CECILIO Goldberg   Continuous Blood Gluc Sensor (Dexcom G7 Sensor) Use 1 Device every 10 days  Patient not taking: Reported on 10/22/2024 3/14/24 10/22/24  CECILIO Goldberg   insulin lispro (Admelog SoloStar) 100 units/mL injection pen Inject 3 Units under the skin 3 (three) times a day with meals  Patient not taking: Reported on 10/22/2024 3/14/24 10/22/24  CECILIO Goldberg   Lancets (onetouch ultrasoft) lancets Use as instructed 4 times a day 9/26/23 10/22/24  Zaida Bowman MD   OneTouch Verio test strip PLS CHECK SUGAR 3 TIME DAILY .300 10/6/23 10/22/24  Zaida Bowman MD     No Known Allergies    Objective :  Temp:  [97.9 °F (36.6 °C)] 97.9 °F (36.6 °C)  HR:  [82-91] 86  BP: (118-127)/(60-78) 118/76  Resp:  [18] 18  SpO2:  [100 %] 100 %  O2 Device: None (Room air)    Physical Exam  Vitals and nursing note reviewed.   Constitutional:       General: She is not in acute distress.     Appearance: Normal appearance. She is well-developed. She is not ill-appearing or toxic-appearing.   HENT:      Head: Normocephalic and atraumatic.      Mouth/Throat:      Mouth: Mucous membranes are moist.      Pharynx: Oropharynx is clear.   Eyes:      Conjunctiva/sclera: Conjunctivae normal.   Cardiovascular:      Rate and Rhythm: Normal rate and regular rhythm.      Pulses: Normal pulses.      Heart sounds: Normal heart sounds. No murmur heard.  Pulmonary:      Effort: Pulmonary effort is normal. No respiratory distress.      Breath sounds: Normal breath sounds. No wheezing or rhonchi.   Abdominal:      General: Abdomen is  flat. Bowel sounds are normal.      Palpations: Abdomen is soft.      Tenderness: There is no abdominal tenderness.   Musculoskeletal:         General: No swelling.      Cervical back: Neck supple.   Lymphadenopathy:      Cervical: No cervical adenopathy.   Skin:     General: Skin is warm and dry.      Capillary Refill: Capillary refill takes less than 2 seconds.   Neurological:      General: No focal deficit present.      Mental Status: She is alert and oriented to person, place, and time.   Psychiatric:         Mood and Affect: Mood normal.       Lines/Drains:      Lab Results: I have reviewed the following results:  Results from last 7 days   Lab Units 10/22/24  1029   WBC Thousand/uL 6.81   HEMOGLOBIN g/dL 14.0   HEMATOCRIT % 40.6   PLATELETS Thousands/uL 251   SEGS PCT % 73   LYMPHO PCT % 23   MONO PCT % 4   EOS PCT % 0     Results from last 7 days   Lab Units 10/22/24  1029   SODIUM mmol/L 127*   POTASSIUM mmol/L 3.6   CHLORIDE mmol/L 92*   CO2 mmol/L 23   BUN mg/dL 10   CREATININE mg/dL 0.65   ANION GAP mmol/L 12   CALCIUM mg/dL 8.3*   ALBUMIN g/dL 4.1   TOTAL BILIRUBIN mg/dL 0.35   ALK PHOS U/L 89   ALT U/L 9   AST U/L 11*   GLUCOSE RANDOM mg/dL 710*     Results from last 7 days   Lab Units 10/22/24  1029   INR  0.85     Results from last 7 days   Lab Units 10/22/24  1015   POC GLUCOSE mg/dl 579*     Lab Results   Component Value Date    HGBA1C 13.7 (A) 10/22/2024    HGBA1C 14.5 (H) 01/04/2024    HGBA1C 11.1 (A) 09/26/2023           Imaging Results Review: No pertinent imaging studies reviewed.  Other Study Results Review: No additional pertinent studies reviewed.    Administrative Statements   I have spent a total time of 55 minutes in caring for this patient on the day of the visit/encounter including Diagnostic results, Prognosis, Risks and benefits of tx options, Instructions for management, Patient and family education, Importance of tx compliance, Risk factor reductions, Impressions, Counseling /  Coordination of care, Documenting in the medical record, Reviewing / ordering tests, medicine, procedures  , Obtaining or reviewing history  , and Communicating with other healthcare professionals .    ** Please Note: This note has been constructed using a voice recognition system. **

## 2024-10-22 NOTE — ASSESSMENT & PLAN NOTE
Noted on chart review.   Not on any cholesterol lowering medications currently.  Check lipid panel.

## 2024-10-22 NOTE — ASSESSMENT & PLAN NOTE
Lab Results   Component Value Date    HGBA1C 13.7 (A) 10/22/2024     Recent Labs     10/22/24  1015   POCGLU 579*     Blood Sugar Average: Last 72 hrs:  (P) 579  Home regimen: Glargine 24 units daily which she last took this morning and Humalog 3 units + scale which patient states she has not been taking for a few weeks. She states that she has not been checking her glucose at all for the last few weeks. States that the endocrine office was supposed to supply her with a Dexcom; however, was sent to the ER here from the office and did not get the sensor.  HgbA1c 13.7 with history of noncompliance   Continue Insulin drip with titration  Fingerstick Q2 hr at this time

## 2024-10-22 NOTE — ASSESSMENT & PLAN NOTE
Serum bicarb 23, AG 12 secondary to uncomplicated NAGMA due to hyperglycemia  ABG pH 7.34/CO2 41.3/O2 32.1/Bicarb 21.8 consistent with metabolic acidosis  Beta-hydroxybutyrate 0.36  Check lactic  Continue IVFs  Management as above

## 2024-10-22 NOTE — LETTER
[address] [address]  Dept: [phone]    October 23, 2024     Patient: Gloria Lloyd   YOB: 1990   Date of Visit: 10/22/2024       To Whom it May Concern:      Gloria Lloyd is currently hospitalized under my professional care. She was hospitalized from 10/22/2024 to 10/23/24. Please excuse from work during this time. Patient may return to work at previous activity level this evening 10/23/2024.    Feel free to call with any questions.    Sincerely,            Floyd Eason,  FACP  [facility]  Diplomate, American Board of Internal Medicine

## 2024-10-22 NOTE — ASSESSMENT & PLAN NOTE
"Patient presents from Endocrine office after large urinary ketones and blood sugar \"Hi\" in office. Patient presented to ED with blood glucose 579 and serum blood glucose of 710. Serum bicarb 23, normal AG 12, ABG pH 7.34/CO2 41.3/O2 32.1/Bicarb 21.8 consistent with metabolic acidosis. Patient received 2L NS in ED and insulin drip was started. Patient takes Glargine 24 units daily which she last took this morning and Humalog 3 units + scale which she has not been taking for a few weeks. She states that she has not been checking her glucose at all for the last few weeks. She endorses mild polydipsia. She denies any polyuria, N/V/D, abdominal pain, fever, chills. Denies CP, SOB, or dizziness. Denies any sick contacts or recent travel. Doubt any infectious trigger as patient had no leukocytosis. Patient will be admitted for close monitoring of DKA secondary to noncompliance to insulin regimen.  Continue with administration of IV regular insulin   Trend q2hr blood sugars and titrate insulin drip accordingly  Hold home pta Glargine 24 units daily and Humalog 3 units + scale for now while on IV insulin drip  S/p 2L NS bolus in ED, continue NS 100ml/hr  UA with ketonuria, trace blood and trace leukocytes  Check lactic acid  Magnesium 1.7, repleted with 4g magnesium sulfate; likewise replete potassium in the setting of volume depletion likely masking electrolyte disturbance  Recheck BMP 1600  Controlled carbs diet  Endocrine consult appreciated  "

## 2024-10-22 NOTE — ED PROVIDER NOTES
Time reflects when diagnosis was documented in both MDM as applicable and the Disposition within this note       Time User Action Codes Description Comment    10/22/2024 11:40 AM AnepuMarcelloa Add [E78.2] Mixed hyperlipidemia     10/22/2024 11:41 AM AnepuMarcelloa Add [E11.65] Hyperglycemia due to diabetes mellitus (HCC)     10/23/2024 10:08 AM Jenaro Floyd Add [E10.65] Type 1 diabetes mellitus with hyperglycemia, with long-term current use of insulin (HCC)     10/23/2024 10:08 AM Jenaro Floyd Add [R79.89] Elevated LFTs     10/23/2024 10:08 AM Mal Easone Add [E16.2] Hypoglycemia           ED Disposition       ED Disposition   Admit    Condition   Stable    Date/Time   Tue Oct 22, 2024 11:41 AM    Comment    .               Assessment & Plan       Medical Decision Making  Patient evaluated in the ED with labs she had hyperglycemia not quite in DKA given IV fluids started on nonpregnant non-DKA insulin drip admitted to the hospitalist service for further evaluation and management patient verbalized understanding of the plan    Problems Addressed:  Hyperglycemia due to diabetes mellitus (HCC): acute illness or injury    Amount and/or Complexity of Data Reviewed  External Data Reviewed: notes.  Labs: ordered. Decision-making details documented in ED Course.    Risk  Prescription drug management.  Decision regarding hospitalization.             Medications   sodium chloride 0.9 % bolus 1,000 mL (0 mL Intravenous Stopped 10/22/24 1210)   sodium chloride 0.9 % bolus 1,000 mL (1,000 mL Intravenous New Bag 10/22/24 1106)   magnesium sulfate 4 g/100 mL IVPB (premix) 4 g (4 g Intravenous New Bag 10/22/24 1216)   potassium chloride 20 mEq IVPB (premix) (20 mEq Intravenous New Bag 10/22/24 1221)   potassium chloride (Klor-Con M20) CR tablet 40 mEq (40 mEq Oral Given 10/22/24 1213)       ED Risk Strat Scores                           SBIRT 22yo+      Flowsheet Row Most Recent Value   Initial Alcohol Screen: US AUDIT-C      1. How often do you have a drink containing alcohol? 0 Filed at: 10/22/2024 1017   2. How many drinks containing alcohol do you have on a typical day you are drinking?  0 Filed at: 10/22/2024 1017   3a. Male UNDER 65: How often do you have five or more drinks on one occasion? 0 Filed at: 10/22/2024 1017   3b. FEMALE Any Age, or MALE 65+: How often do you have 4 or more drinks on one occassion? 0 Filed at: 10/22/2024 1017   Audit-C Score 0 Filed at: 10/22/2024 1017                            History of Present Illness       Chief Complaint   Patient presents with    Abnormal Lab     Sent from Dr office. Pt states BS was > 500 and positive for ketones. Pt. Offers no complaints       Past Medical History:   Diagnosis Date    Diabetes mellitus (HCC)     uses kwiki pen     Diabetes mellitus type 1 (HCC)     High blood pressure     recently dx/ put on lisinopril     High cholesterol     Hypertension      no hypersion     Miscarriage     Recurrent pregnancy loss, antepartum condition or complication       Past Surgical History:   Procedure Laterality Date     SECTION  01/02/2014     X1    DILATION AND CURETTAGE OF UTERUS  04/10/2019    Missed AB     [place]  DELIVERY ONLY N/A 2021    Procedure:  SECTION () REPEAT;  Surgeon: Bradly Daniels MD;  Location: [facility] ;  Service: Obstetrics    WISDOM TOOTH EXTRACTION        Family History   Problem Relation Age of Onset    No Known Problems Mother     Diabetes Father     No Known Problems Sister     No Known Problems Sister     No Known Problems Brother     No Known Problems Maternal Grandmother     Diabetes Paternal Grandmother     No Known Problems Paternal Grandfather     No Known Problems Daughter       Social History     Tobacco Use    Smoking status: Never    Smokeless tobacco: Never   Vaping Use    Vaping status: Never Used   Substance Use Topics    Alcohol use: Never     Comment: Alcohol intake:   None - As per [place]     Drug use: Never      Comment: Illicit drugs:   No  - As per [place]       E-Cigarette/Vaping    E-Cigarette Use Never User       E-Cigarette/Vaping Substances    Nicotine No     THC No     CBD No     Flavoring No     Other No     Unknown No       I have reviewed and agree with the history as documented.     34-year-old female type I diabetic noncompliant sent in by her endocrinologist because of elevated blood sugars above 500 denies any nausea vomiting abdominal pain chest pain shortness of breath no infectious symptoms no other complaints.      History provided by:  Patient   used: No        Review of Systems   Constitutional: Negative.    HENT: Negative.     Eyes: Negative.    Respiratory: Negative.     Cardiovascular: Negative.    Gastrointestinal: Negative.    Endocrine: Negative.    Genitourinary: Negative.    Musculoskeletal: Negative.    Skin: Negative.    Allergic/Immunologic: Negative.    Neurological: Negative.    Hematological: Negative.    Psychiatric/Behavioral: Negative.     All other systems reviewed and are negative.          Objective       ED Triage Vitals   Temperature Pulse Blood Pressure Respirations SpO2 Patient Position - Orthostatic VS   10/22/24 1015 10/22/24 1015 10/22/24 1015 10/22/24 1015 10/22/24 1015 10/22/24 1100   97.9 °F (36.6 °C) 82 127/78 18 100 % Lying      Temp Source Heart Rate Source BP Location FiO2 (%) Pain Score    10/22/24 1352 10/22/24 1100 10/22/24 1100 -- 10/22/24 1015    Oral Monitor Left arm  No Pain      Vitals      Date and Time Temp Pulse SpO2 Resp BP Pain Score FACES Pain Rating User   10/23/24 0900 97.5 °F (36.4 °C) 88 99 % 18 115/60 No Pain -- AC   10/22/24 2239 97.6 °F (36.4 °C) 78 98 % 18 105/57 -- -- JT   10/22/24 1933 97.9 °F (36.6 °C) 95 98 % 18 100/57 No Pain -- IE   10/22/24 1652 97.2 °F (36.2 °C) 90 98 % 18 113/65 -- -- JT   10/22/24 1352 98 °F (36.7 °C) 98 -- 18 121/68 No Pain -- JE   10/22/24 1300 -- -- -- -- -- No Pain -- JE   10/22/24 1230 -- 86 100 %  19 132/77 -- -- KR   10/22/24 1215 -- 87 99 % 18 127/85 -- -- KR   10/22/24 1115 -- 86 100 % 18 118/76 -- -- KR   10/22/24 1100 -- 85 100 % 18 126/76 -- -- KR   10/22/24 1015 97.9 °F (36.6 °C) 82 100 % 18 127/78 No Pain -- KATHY            Physical Exam  Vitals and nursing note reviewed.   Constitutional:       General: She is not in acute distress.     Appearance: She is well-developed.   HENT:      Head: Normocephalic and atraumatic.   Eyes:      Conjunctiva/sclera: Conjunctivae normal.   Cardiovascular:      Rate and Rhythm: Normal rate and regular rhythm.      Heart sounds: No murmur heard.  Pulmonary:      Effort: Pulmonary effort is normal. No respiratory distress.      Breath sounds: Normal breath sounds.   Abdominal:      Palpations: Abdomen is soft.      Tenderness: There is no abdominal tenderness.   Musculoskeletal:         General: No swelling.      Cervical back: Normal range of motion and neck supple.   Skin:     General: Skin is warm and dry.      Capillary Refill: Capillary refill takes less than 2 seconds.   Neurological:      General: No focal deficit present.      Mental Status: She is alert and oriented to person, place, and time.      Cranial Nerves: No cranial nerve deficit.      Sensory: No sensory deficit.      Motor: No weakness.      Coordination: Coordination normal.      Gait: Gait normal.      Deep Tendon Reflexes: Reflexes normal.   Psychiatric:         Mood and Affect: Mood normal.         Results Reviewed       Procedure Component Value Units Date/Time    Magnesium [[id number]]  (Normal) Collected: 10/23/24 0607    Lab Status: Final result Specimen: Blood from Arm, Left Updated: 10/23/24 0641     Magnesium 2.3 mg/dL     CBC and differential [[id number]]  (Abnormal) Collected: 10/23/24 0607    Lab Status: Final result Specimen: Blood from Arm, Left Updated: 10/23/24 0624     WBC 5.34 Thousand/uL      RBC 4.65 Million/uL      Hemoglobin 14.6 g/dL      Hematocrit 44.1 %      MCV 95 fL       MCH 31.4 pg      MCHC 33.1 g/dL      RDW 12.6 %      MPV 9.1 fL      Platelets 261 Thousands/uL      nRBC 0 /100 WBCs      Segmented % 48 %      Immature Grans % 0 %      Lymphocytes % 45 %      Monocytes % 5 %      Eosinophils Relative 1 %      Basophils Relative 1 %      Absolute Neutrophils 2.56 Thousands/µL      Absolute Immature Grans 0.01 Thousand/uL      Absolute Lymphocytes 2.39 Thousands/µL      Absolute Monocytes 0.28 Thousand/µL      Eosinophils Absolute 0.06 Thousand/µL      Basophils Absolute 0.04 Thousands/µL     Basic metabolic panel [[id number]]  (Abnormal) Collected: 10/22/24 1428    Lab Status: Final result Specimen: Blood from Hand, Left Updated: 10/22/24 1523     Sodium 139 mmol/L      Potassium 4.0 mmol/L      Chloride 107 mmol/L      CO2 24 mmol/L      ANION GAP 8 mmol/L      BUN 6 mg/dL      Creatinine 0.53 mg/dL      Glucose 99 mg/dL      Calcium 8.5 mg/dL      eGFR 126 ml/min/1.73sq m     Narrative:      National Kidney Disease Foundation guidelines for Chronic Kidney Disease (CKD):     Stage 1 with normal or high GFR (GFR > 90 mL/min/1.73 square meters)    Stage 2 Mild CKD (GFR = 60-89 mL/min/1.73 square meters)    Stage 3A Moderate CKD (GFR = 45-59 mL/min/1.73 square meters)    Stage 3B Moderate CKD (GFR = 30-44 mL/min/1.73 square meters)    Stage 4 Severe CKD (GFR = 15-29 mL/min/1.73 square meters)    Stage 5 End Stage CKD (GFR <15 mL/min/1.73 square meters)  Note: GFR calculation is accurate only with a steady state creatinine    Lactic acid, plasma (w/reflex if result > 2.0) [[id number]]  (Abnormal) Collected: 10/22/24 1428    Lab Status: Final result Specimen: Blood from Arm, Left Updated: 10/22/24 1511     LACTIC ACID 2.6 mmol/L     Narrative:      Result may be elevated if tourniquet was used during collection.    Lipid panel [[id number]]  (Abnormal) Collected: 10/22/24 1029    Lab Status: Final result Specimen: Blood from Arm, Left Updated: 10/22/24 1323     Cholesterol 200 mg/dL       Triglycerides 64 mg/dL      HDL, Direct 83 mg/dL      LDL Calculated 104 mg/dL      Non-HDL-Chol (CHOL-HDL) 117 mg/dl     Fingerstick Glucose (POCT) [[id number]]  (Abnormal) Collected: 10/22/24 1255    Lab Status: Final result Specimen: Blood Updated: 10/22/24 1256     POC Glucose 231 mg/dl     Urine Microscopic [[id number]]  (Abnormal) Collected: 10/22/24 1101    Lab Status: Final result Specimen: Urine, Clean Catch Updated: 10/22/24 1123     RBC, UA 4-10 /hpf      WBC, UA 0-1 /hpf      Epithelial Cells Moderate /hpf      Bacteria, UA None Seen /hpf      Amorphous Crystals, UA Moderate    UA (URINE) with reflex to Scope [[id number]]  (Abnormal) Collected: 10/22/24 1101    Lab Status: Final result Specimen: Urine, Clean Catch Updated: 10/22/24 1109     Color, UA Colorless     Clarity, UA Clear     Specific Gravity, UA <1.005     pH, UA 5.5     Leukocytes, UA Trace     Nitrite, UA Negative     Protein, UA Negative mg/dl      Glucose,  (3/10%) mg/dl      Ketones, UA Trace mg/dl      Urobilinogen, UA <2.0 mg/dl      Bilirubin, UA Negative     Occult Blood, UA Trace    POCT pregnancy, urine [[id number]]  (Normal) Resulted: 10/22/24 1105    Lab Status: Final result Updated: 10/22/24 1105     EXT Preg Test, Ur Negative     Control Valid    Comprehensive metabolic panel [[id number]]  (Abnormal) Collected: 10/22/24 1029    Lab Status: Final result Specimen: Blood from Arm, Left Updated: 10/22/24 1105     Sodium 127 mmol/L      Potassium 3.6 mmol/L      Chloride 92 mmol/L      CO2 23 mmol/L      ANION GAP 12 mmol/L      BUN 10 mg/dL      Creatinine 0.65 mg/dL      Glucose 710 mg/dL      Calcium 8.3 mg/dL      AST 11 U/L      ALT 9 U/L      Alkaline Phosphatase 89 U/L      Total Protein 6.5 g/dL      Albumin 4.1 g/dL      Total Bilirubin 0.35 mg/dL      eGFR 117 ml/min/1.73sq m     Narrative:      National Kidney Disease Foundation guidelines for Chronic Kidney Disease (CKD):     Stage 1 with normal or high GFR (GFR > 90  mL/min/1.73 square meters)    Stage 2 Mild CKD (GFR = 60-89 mL/min/1.73 square meters)    Stage 3A Moderate CKD (GFR = 45-59 mL/min/1.73 square meters)    Stage 3B Moderate CKD (GFR = 30-44 mL/min/1.73 square meters)    Stage 4 Severe CKD (GFR = 15-29 mL/min/1.73 square meters)    Stage 5 End Stage CKD (GFR <15 mL/min/1.73 square meters)  Note: GFR calculation is accurate only with a steady state creatinine    Magnesium [[id number]]  (Abnormal) Collected: 10/22/24 1029    Lab Status: Final result Specimen: Blood from Arm, Left Updated: 10/22/24 1103     Magnesium 1.7 mg/dL     Lipase [[id number]]  (Abnormal) Collected: 10/22/24 1029    Lab Status: Final result Specimen: Blood from Arm, Left Updated: 10/22/24 1103     Lipase 127 u/L     Beta Hydroxybutyrate [[id number]]  (Abnormal) Collected: 10/22/24 1029    Lab Status: Final result Specimen: Blood from Arm, Left Updated: 10/22/24 1103     Beta- Hydroxybutyrate 0.36 mmol/L     Protime-INR [[id number]]  (Abnormal) Collected: 10/22/24 1029    Lab Status: Final result Specimen: Blood from Arm, Left Updated: 10/22/24 1053     Protime 12.1 seconds      INR 0.85    Narrative:      INR Therapeutic Range    Indication                                             INR Range      Atrial Fibrillation                                               2.0-3.0  Hypercoagulable State                                    2.0.2.3  Left Ventricular Asist Device                            2.0-3.0  Mechanical Heart Valve                                  -    Aortic(with afib, MI, embolism, HF, LA enlargement,    and/or coagulopathy)                                     2.0-3.0 (2.5-3.5)     Mitral                                                             2.5-3.5  Prosthetic/Bioprosthetic Heart Valve               2.0-3.0  Venous thromboembolism (VTE: VT, PE        2.0-3.0    APTT [[id number]]  (Normal) Collected: 10/22/24 1029    Lab Status: Final result Specimen: Blood from Arm, Left Updated:  10/22/24 1053     PTT 24 seconds     CBC and differential [[id number]] Collected: 10/22/24 1029    Lab Status: Final result Specimen: Blood from Arm, Left Updated: 10/22/24 1046     WBC 6.81 Thousand/uL      RBC 4.46 Million/uL      Hemoglobin 14.0 g/dL      Hematocrit 40.6 %      MCV 91 fL      MCH 31.4 pg      MCHC 34.5 g/dL      RDW 12.3 %      MPV 9.4 fL      Platelets 251 Thousands/uL      nRBC 0 /100 WBCs      Segmented % 73 %      Immature Grans % 0 %      Lymphocytes % 23 %      Monocytes % 4 %      Eosinophils Relative 0 %      Basophils Relative 0 %      Absolute Neutrophils 4.90 Thousands/µL      Absolute Immature Grans 0.02 Thousand/uL      Absolute Lymphocytes 1.59 Thousands/µL      Absolute Monocytes 0.25 Thousand/µL      Eosinophils Absolute 0.02 Thousand/µL      Basophils Absolute 0.03 Thousands/µL     Blood gas, venous [[id number]]  (Abnormal) Collected: 10/22/24 1029    Lab Status: Final result Specimen: Blood from Arm, Left Updated: 10/22/24 1043     pH, Alexandre 7.340     pCO2, Alexandre 41.3 mm Hg      pO2, Alexandre 32.1 mm Hg      HCO3, Alexandre 21.8 mmol/L      Base Excess, Alexandre -3.8 mmol/L      O2 Content, Alexandre 13.2 ml/dL      O2 HGB, VENOUS 61.5 %     Fingerstick Glucose (POCT) [[id number]]  (Abnormal) Collected: 10/22/24 1015    Lab Status: Final result Specimen: Blood Updated: 10/22/24 1019     POC Glucose 579 mg/dl             No orders to display       Procedures    ED Medication and Procedure Management   Prior to Admission Medications   Prescriptions Last Dose Informant Patient Reported? Taking?   Blood Glucose Monitoring Suppl (OneTouch Verio) w/Device KIT More than a month  No No   Sig: Use 4 (four) times a day   Insulin Glargine-yfgn 100 UNIT/ML SOPN 10/22/2024 at 0700  No Yes   Sig: Inject 20 units subcutaneously daily   Insulin Pen Needle (BD Pen Needle Kerrie 2nd Gen) 32G X 4 MM MISC More than a month  No No   Sig: For use with insulin pen. Pharmacy may dispense brand covered by insurance.   Insulin Pen  Needle (BD Pen Needle Kerrie 2nd Gen) 32G X 4 MM MISC More than a month  No No   Sig: For use with insulin pen 4 times per day. Pharmacy may dispense brand covered by insurance.   Lancets (onetouch ultrasoft) lancets More than a month  No No   Sig: Use as instructed 4 times a day   glucose blood (OneTouch Verio) test strip More than a month  No No   Sig: pls check sugar 3 time daily .300   insulin lispro (Admelog SoloStar) 100 units/mL injection pen More than a month  No No   Sig: Inject 3 Units under the skin 3 (three) times a day with meals      Facility-Administered Medications: None     Discharge Medication List as of 10/23/2024 12:38 PM        START taking these medications    Details   !! Insulin Pen Needle (BD Pen Needle Kerrie 2nd Gen) 32G X 4 MM MISC For use with insulin pen. Pharmacy may dispense brand covered by insurance., Normal       !! - Potential duplicate medications found. Please discuss with provider.        CONTINUE these medications which have CHANGED    Details   insulin lispro (Admelog SoloStar) 100 units/mL injection pen Inject 3 Units under the skin 3 (three) times a day with meals, Starting Wed 10/23/2024, Normal      Insulin Glargine-yfgn 100 UNIT/ML SOPN Inject 20 units subcutaneously daily, Normal           CONTINUE these medications which have NOT CHANGED    Details   Blood Glucose Monitoring Suppl (OneTouch Verio) w/Device KIT Use 4 (four) times a day, Starting Tue 9/26/2023, Normal      glucose blood (OneTouch Verio) test strip pls check sugar 3 time daily .300, Normal      !! Insulin Pen Needle (BD Pen Needle Kerrie 2nd Gen) 32G X 4 MM MISC For use with insulin pen. Pharmacy may dispense brand covered by insurance., Normal      !! Insulin Pen Needle (BD Pen Needle Kerrie 2nd Gen) 32G X 4 MM MISC For use with insulin pen 4 times per day. Pharmacy may dispense brand covered by insurance., Normal      Lancets (onetouch ultrasoft) lancets Use as instructed 4 times a day, Normal       !! -  Potential duplicate medications found. Please discuss with provider.        No discharge procedures on file.  ED SEPSIS DOCUMENTATION   Time reflects when diagnosis was documented in both MDM as applicable and the Disposition within this note       Time User Action Codes Description Comment    10/22/2024 11:40 AM Nelida Krishna [E78.2] Mixed hyperlipidemia     10/22/2024 11:41 AM Nelida Krishna [E11.65] Hyperglycemia due to diabetes mellitus (HCC)     10/23/2024 10:08 AM Floyd Eason [E10.65] Type 1 diabetes mellitus with hyperglycemia, with long-term current use of insulin ([facility])     10/23/2024 10:08 AM Floyd Eason [R79.89] Elevated LFTs     10/23/2024 10:08 AM Floyd Eason [E16.2] Hypoglycemia                  Nelida Krishna, DO  10/23/24 1834

## 2024-10-22 NOTE — ASSESSMENT & PLAN NOTE
"  Lab Results   Component Value Date    HGBA1C 13.7 (A) 10/22/2024     Transfer to ER ADT-21 placed has large urinary ketones and blood sugar \"Hi\" in office. Has not been checking blood sugars or taking Admelog/Humalog regimen.     Recommend RTC in one week after dispo from ER/Hospital.     Will place CGM at this time.     Orders:    Ambulatory Referral to Ophthalmology; Future    POCT hemoglobin A1c    TSH, 3rd generation; Future    T4, free; Future    Comprehensive metabolic panel; Future    Lipid panel; Future    Albumin / creatinine urine ratio; Future    insulin lispro (Admelog SoloStar) 100 units/mL injection pen; Inject 3 Units under the skin 3 (three) times a day with meals    Lancets (onetouch ultrasoft) lancets; Use as instructed 4 times a day    glucose blood (OneTouch Verio) test strip; pls check sugar 3 time daily .300    POCT blood glucose    Transfer to other facility    "

## 2024-10-22 NOTE — ASSESSMENT & PLAN NOTE
Noted on chart review. Patient states she does not take any medications for this.  Stable, /77  Currently hemodynamically stable.

## 2024-10-22 NOTE — CONSULTS
Consultation - Gloria Lloyd 34 y.o. female MRN: [id number]    Unit/Bed#: [address] [place] [address] Encounter: [id number]      Assessment & Plan     Type 1 diabetes mellitus on long-term insulin therapy with hyperglycemia  Poorly controlled with A1c 13.7%  Has not been compliant with basal bolus insulin regimen  Patient with severe hyperglycemia, may have been tending towards diabetic ketoacidosis however did not have metabolic acidosis on labs  -Can continue insulin drip, non-DKA protocol for now  -Start Humalog 3 units with meals 3 times a day  -Plan will be to transition to subcutaneous basal bolus insulin tomorrow morning at the time that the patient typically takes her long-acting insulin.  It would also give us an opportunity to calculate basal requirements overnight  As an outpatient, when the patient's blood sugars are better, will plan to check a C-peptide level    Recommend following up with endocrinology as an outpatient.  She may consider a continuous glucose monitor and/or insulin pump in the future.  She has been on an insulin pump in the past.    CC: Diabetes Consult    History of Present Illness     HPI: Gloria Lloyd is a 34 y.o. year old female with type 1 diabetes mellitus diagnosed at age 22, hypertension, hyperlipidemia who was seen earlier this morning in the endocrinology office for an outpatient follow-up, advised to go to the ER in view of markedly elevated blood sugars, concern for DKA.    Home regimen-Lantus 25 units subcutaneously once a day in the morning  Patient has been using basal insulin only and has not been taking Humalog consistently for a long time  On labs, blood sugar 710 mg/dL, bicarb 23, anion gap 12  Beta-hydroxybutyrate 0.36-mildly elevated  Lipase 127; Venous blood pH 7.3, trace ketones in the urine.    Patient denies nausea, vomiting, abdominal pain  P.o. intake has been good  Does not check blood sugars regularly at home    Patient was started on an insulin drip, noted to have  improvement in blood sugars, last blood sugar 231 mg/dL    Inpatient consult to Endocrinology  Consult performed by: Zaida Bowman MD  Consult ordered by: Nelida Krishna DO          Review of Systems    Historical Information   Past Medical History:   Diagnosis Date    Diabetes mellitus (HCC)     uses kwiki pen     Diabetes mellitus type 1 (HCC)     High blood pressure     recently dx/ put on lisinopril     High cholesterol     Hypertension      no hypersion     Miscarriage     Recurrent pregnancy loss, antepartum condition or complication      Past Surgical History:   Procedure Laterality Date     SECTION  01/02/2014     X1    DILATION AND CURETTAGE OF UTERUS  04/10/2019    Missed AB     LA  DELIVERY ONLY N/A 2021    Procedure:  SECTION () REPEAT;  Surgeon: Bradly Daniels MD;  Location: [facility] ;  Service: Obstetrics    WISDOM TOOTH EXTRACTION       Social History   Social History     Substance and Sexual Activity   Alcohol Use Never    Comment: Alcohol intake:   None - As per [place]      Social History     Substance and Sexual Activity   Drug Use Never    Comment: Illicit drugs:   No  - As per [place]      Social History     Tobacco Use   Smoking Status Never   Smokeless Tobacco Never     Family History:   Family History   Problem Relation Age of Onset    No Known Problems Mother     Diabetes Father     No Known Problems Sister     No Known Problems Sister     No Known Problems Brother     No Known Problems Maternal Grandmother     Diabetes Paternal Grandmother     No Known Problems Paternal Grandfather     No Known Problems Daughter        Meds/Allergies   Current Facility-Administered Medications   Medication Dose Route Frequency Provider Last Rate Last Admin    acetaminophen (TYLENOL) tablet 650 mg  650 mg Oral Q6H PRN CECILIO Castro        influenza vaccine (PF) (Fluarix) IM injection 0.5 mL  0.5 mL Intramuscular Prior to discharge Floyd Eason DO         "insulin regular (HumuLIN R,NovoLIN R) 1 Units/mL in sodium chloride 0.9 % 100 mL infusion  0.3-21 Units/hr Intravenous Titrated Ana Victorjamarna Kovalev, CRNP 3 mL/hr at 10/22/24 1330 3 Units/hr at 10/22/24 1330    magnesium sulfate 4 g/100 mL IVPB (premix) 4 g  4 g Intravenous Once Ana Victorovna Kovalev, CRNP 25 mL/hr at 10/22/24 1216 4 g at 10/22/24 1216    ondansetron (ZOFRAN) injection 4 mg  4 mg Intravenous Q6H PRN Ana Victorovna Kovalev, CRNP        sodium chloride 0.9 % infusion  100 mL/hr Intravenous Continuous Ana Victorovna Kovalev, CRNP 100 mL/hr at 10/22/24 1214 100 mL/hr at 10/22/24 1214     No Known Allergies    Objective   Vitals: Blood pressure 121/68, pulse 98, temperature 98 °F (36.7 °C), temperature source Oral, resp. rate 18, height 4' 10\" (1.473 m), weight 57.3 kg (126 lb 4.8 oz), SpO2 100%, not currently breastfeeding.    Intake/Output Summary (Last 24 hours) at 10/22/2024 1436  Last data filed at 10/22/2024 1210  Gross per 24 hour   Intake 1000 ml   Output --   Net 1000 ml     Invasive Devices       Peripheral Intravenous Line  Duration             Peripheral IV 10/22/24 Left Antecubital <1 day    Peripheral IV 10/22/24 Right Antecubital <1 day                    Physical Exam  Constitutional:Oriented to person, place, and time  Appears well-developed and well-nourished. Not in any acute distress.   HENT:   Head: Normocephalic and atraumatic.   Neck: Normal range of motion.   Pulmonary/Chest: Effort normal/ breathing comfortably on room air   Musculoskeletal: Normal range of motion.   Neurological: Alert and oriented to person, place, and time.   Skin: Not diaphoretic.   Psychiatric: Normal mood and affect. Behavior is normal.     The history was obtained from the review of the chart, patient.    Lab Results:   Results from last 7 days   Lab Units 10/22/24  0919   HEMOGLOBIN A1C  13.7*     Lab Results   Component Value Date    WBC 6.81 10/22/2024    HGB 14.0 10/22/2024    HCT 40.6 " "10/22/2024    MCV 91 10/22/2024     10/22/2024     Lab Results   Component Value Date/Time    BUN 10 10/22/2024 10:29 AM    BUN 18 01/04/2022 05:29 AM    K 3.6 10/22/2024 10:29 AM    K 4.4 01/04/2022 05:29 AM    CL 92 (L) 10/22/2024 10:29 AM     01/04/2022 05:29 AM    CO2 23 10/22/2024 10:29 AM    CO2 26 01/04/2022 05:29 AM    CREATININE 0.65 10/22/2024 10:29 AM    CREATININE 0.88 01/04/2022 05:29 AM    AST 11 (L) 10/22/2024 10:29 AM    AST 21 01/03/2022 09:55 AM    ALT 9 10/22/2024 10:29 AM    ALT 17 01/03/2022 09:55 AM    TP 6.5 10/22/2024 10:29 AM    TP 6.7 01/03/2022 09:55 AM    ALB 4.1 10/22/2024 10:29 AM    ALB 4.2 01/03/2022 09:55 AM    GLOB 2.3 11/06/2020 01:38 PM     Recent Labs     10/22/24  1029   HDL 83   TRIG 64     No results found for: \"MICROALBUR\", \"[id number]\"  POC Glucose (mg/dl)   Date Value   10/22/2024 231 (H)   10/22/2024 579 (HH)   12/15/2022 181 (H)   12/15/2022 168 (H)   12/15/2022 68   12/14/2022 293 (H)   12/14/2022 103   12/14/2022 218 (H)   12/14/2022 116   12/14/2022 57 (L)       Imaging Studies: Results Review Statement: No pertinent imaging studies reviewed.    Portions of the record may have been created with voice recognition software.  Occasional wrong word or \"sound a like\" substitutions may have occurred due to the inherent limitations of voice recognition software.  Read the chart carefully and recognize, using context, where substitutions have occurred.      "

## 2024-10-23 VITALS
RESPIRATION RATE: 18 BRPM | DIASTOLIC BLOOD PRESSURE: 60 MMHG | HEIGHT: 58 IN | TEMPERATURE: 97.5 F | HEART RATE: 88 BPM | WEIGHT: 126.3 LBS | SYSTOLIC BLOOD PRESSURE: 115 MMHG | OXYGEN SATURATION: 99 % | BODY MASS INDEX: 26.51 KG/M2

## 2024-10-23 LAB
ANION GAP SERPL CALCULATED.3IONS-SCNC: 4 MMOL/L (ref 4–13)
BASOPHILS # BLD AUTO: 0.04 THOUSANDS/ΜL (ref 0–0.1)
BASOPHILS NFR BLD AUTO: 1 % (ref 0–1)
BUN SERPL-MCNC: 8 MG/DL (ref 5–25)
CALCIUM SERPL-MCNC: 8 MG/DL (ref 8.4–10.2)
CHLORIDE SERPL-SCNC: 109 MMOL/L (ref 96–108)
CO2 SERPL-SCNC: 23 MMOL/L (ref 21–32)
CREAT SERPL-MCNC: 0.44 MG/DL (ref 0.6–1.3)
EOSINOPHIL # BLD AUTO: 0.06 THOUSAND/ΜL (ref 0–0.61)
EOSINOPHIL NFR BLD AUTO: 1 % (ref 0–6)
ERYTHROCYTE [DISTWIDTH] IN BLOOD BY AUTOMATED COUNT: 12.6 % (ref 11.6–15.1)
GFR SERPL CREATININE-BSD FRML MDRD: 133 ML/MIN/1.73SQ M
GLUCOSE SERPL-MCNC: 229 MG/DL (ref 65–140)
GLUCOSE SERPL-MCNC: 262 MG/DL (ref 65–140)
GLUCOSE SERPL-MCNC: 61 MG/DL (ref 65–140)
GLUCOSE SERPL-MCNC: 68 MG/DL (ref 65–140)
GLUCOSE SERPL-MCNC: 83 MG/DL (ref 65–140)
GLUCOSE SERPL-MCNC: 98 MG/DL (ref 65–140)
HCT VFR BLD AUTO: 44.1 % (ref 34.8–46.1)
HGB BLD-MCNC: 14.6 G/DL (ref 11.5–15.4)
IMM GRANULOCYTES # BLD AUTO: 0.01 THOUSAND/UL (ref 0–0.2)
IMM GRANULOCYTES NFR BLD AUTO: 0 % (ref 0–2)
LACTATE SERPL-SCNC: 1.3 MMOL/L (ref 0.5–2)
LYMPHOCYTES # BLD AUTO: 2.39 THOUSANDS/ΜL (ref 0.6–4.47)
LYMPHOCYTES NFR BLD AUTO: 45 % (ref 14–44)
MAGNESIUM SERPL-MCNC: 2.3 MG/DL (ref 1.9–2.7)
MCH RBC QN AUTO: 31.4 PG (ref 26.8–34.3)
MCHC RBC AUTO-ENTMCNC: 33.1 G/DL (ref 31.4–37.4)
MCV RBC AUTO: 95 FL (ref 82–98)
MONOCYTES # BLD AUTO: 0.28 THOUSAND/ΜL (ref 0.17–1.22)
MONOCYTES NFR BLD AUTO: 5 % (ref 4–12)
NEUTROPHILS # BLD AUTO: 2.56 THOUSANDS/ΜL (ref 1.85–7.62)
NEUTS SEG NFR BLD AUTO: 48 % (ref 43–75)
NRBC BLD AUTO-RTO: 0 /100 WBCS
PLATELET # BLD AUTO: 261 THOUSANDS/UL (ref 149–390)
PMV BLD AUTO: 9.1 FL (ref 8.9–12.7)
POTASSIUM SERPL-SCNC: 4.6 MMOL/L (ref 3.5–5.3)
RBC # BLD AUTO: 4.65 MILLION/UL (ref 3.81–5.12)
SODIUM SERPL-SCNC: 136 MMOL/L (ref 135–147)
WBC # BLD AUTO: 5.34 THOUSAND/UL (ref 4.31–10.16)

## 2024-10-23 PROCEDURE — 85025 COMPLETE CBC W/AUTO DIFF WBC: CPT

## 2024-10-23 PROCEDURE — 99239 HOSP IP/OBS DSCHRG MGMT >30: CPT | Performed by: INTERNAL MEDICINE

## 2024-10-23 PROCEDURE — 83605 ASSAY OF LACTIC ACID: CPT

## 2024-10-23 PROCEDURE — 82948 REAGENT STRIP/BLOOD GLUCOSE: CPT

## 2024-10-23 PROCEDURE — 83735 ASSAY OF MAGNESIUM: CPT

## 2024-10-23 PROCEDURE — 80048 BASIC METABOLIC PNL TOTAL CA: CPT

## 2024-10-23 RX ORDER — INSULIN GLARGINE-YFGN 100 [IU]/ML
INJECTION, SOLUTION SUBCUTANEOUS
Qty: 15 ML | Refills: 0 | Status: SHIPPED | OUTPATIENT
Start: 2024-10-23

## 2024-10-23 RX ORDER — INSULIN GLARGINE-YFGN 100 [IU]/ML
INJECTION, SOLUTION SUBCUTANEOUS
Qty: 15 ML | Refills: 0 | Status: SHIPPED | OUTPATIENT
Start: 2024-10-23 | End: 2024-10-23

## 2024-10-23 RX ORDER — PEN NEEDLE, DIABETIC 32GX 5/32"
NEEDLE, DISPOSABLE MISCELLANEOUS
Qty: 100 EACH | Refills: 0 | Status: SHIPPED | OUTPATIENT
Start: 2024-10-23

## 2024-10-23 RX ORDER — INSULIN LISPRO 100 [IU]/ML
3 INJECTION, SOLUTION INTRAVENOUS; SUBCUTANEOUS
Qty: 15 ML | Refills: 0 | Status: SHIPPED | OUTPATIENT
Start: 2024-10-23

## 2024-10-23 RX ADMIN — INSULIN LISPRO 2 UNITS: 100 INJECTION, SOLUTION INTRAVENOUS; SUBCUTANEOUS at 12:07

## 2024-10-23 RX ADMIN — INSULIN LISPRO 2 UNITS: 100 INJECTION, SOLUTION INTRAVENOUS; SUBCUTANEOUS at 10:01

## 2024-10-23 RX ADMIN — INSULIN GLARGINE 24 UNITS: 100 INJECTION, SOLUTION SUBCUTANEOUS at 10:00

## 2024-10-23 RX ADMIN — INSULIN LISPRO 3 UNITS: 100 INJECTION, SOLUTION INTRAVENOUS; SUBCUTANEOUS at 12:05

## 2024-10-23 RX ADMIN — INSULIN LISPRO 3 UNITS: 100 INJECTION, SOLUTION INTRAVENOUS; SUBCUTANEOUS at 10:01

## 2024-10-23 NOTE — PLAN OF CARE
Problem: METABOLIC, FLUID AND ELECTROLYTES - ADULT  Goal: Electrolytes maintained within normal limits  Description: INTERVENTIONS:  - Monitor labs and assess patient for signs and symptoms of electrolyte imbalances  - Administer electrolyte replacement as ordered  - Monitor response to electrolyte replacements, including repeat lab results as appropriate  - Instruct patient on fluid and nutrition as appropriate  Outcome: Progressing  Goal: Fluid balance maintained  Description: INTERVENTIONS:  - Monitor labs   - Monitor I/O and WT  - Instruct patient on fluid and nutrition as appropriate  - Assess for signs & symptoms of volume excess or deficit  Outcome: Progressing  Goal: Glucose maintained within target range  Description: INTERVENTIONS:  - Monitor Blood Glucose as ordered  - Assess for signs and symptoms of hyperglycemia and hypoglycemia  - Administer ordered medications to maintain glucose within target range  - Assess nutritional intake and initiate nutrition service referral as needed  Outcome: Progressing     Problem: HEMATOLOGIC - ADULT  Goal: Maintains hematologic stability  Description: INTERVENTIONS  - Assess for signs and symptoms of bleeding or hemorrhage  - Monitor labs  - Administer supportive blood products/factors as ordered and appropriate  Outcome: Progressing

## 2024-10-23 NOTE — DISCHARGE SUMMARY
Discharge Summary - Hospitalist   Name: Gloria Lloyd 34 y.o. female I MRN: [id number]  Unit/Bed#: [address] [place] [address] I Date of Admission: 10/22/2024   Date of Service: 10/23/2024 I Hospital Day: 1    Assessment & Plan  Diabetes mellitus with hyperglycemia (HCC)  Presentation from endocrine office for hyperglycemia  Patient has not been taking glargine/lispro as directed  Patient also has not been checking her glucose  In the ED no evidence of anion gap acidosis  Seen by endocrinology.  Patient was briefly on insulin infusion but has been transitioned over to home insulin regimen.  Prescriptions for glargine and lispro sent to pharmacy along with pen needles.    Results from last 7 days   Lab Units 10/23/24  1131 10/23/24  0912 10/23/24  0257 10/23/24  0158 10/23/24  0135 10/22/24  2112 10/22/24  1813 10/22/24  1645 10/22/24  1604 10/22/24  1255   POC GLUCOSE mg/dl 229* 262* 98 83 61* 213* 217* 87 56* 231*     Hyponatremia  Pseudohyponatremia from hyperglycemia    Results from last 7 days   Lab Units 10/23/24  0607 10/22/24  1428 10/22/24  1029   SODIUM mmol/L 136 139 127*     Mixed hyperlipidemia  History of hyperlipidemia not on medications  Total cholesterol 200 and       Medical Problems       Resolved Problems  Date Reviewed: 10/22/2024   None        Discharging Physician / Practitioner: Floyd Eason DO  PCP: Samuel Redmond DO  Admission Date:   Admission Orders (From admission, onward)       Ordered        10/22/24 1140  INPATIENT ADMISSION  Once                        Discharge Date: 10/23/24    Consultations During Hospital Stay:  IP CONSULT TO ENDOCRINOLOGY     Procedures Performed:   * No surgery found *     Images:   No results found.    Lab Results: I have reviewed the following results:  Results from last 7 days   Lab Units 10/23/24  0607 10/22/24  1029   WBC Thousand/uL 5.34 6.81   HEMOGLOBIN g/dL 14.6 14.0   HEMATOCRIT % 44.1 40.6   MCV fL 95 91   PLATELETS Thousands/uL 261 251   INR   --  0.85      Results from last 7 days   Lab Units 10/23/24  0607 10/22/24  1428 10/22/24  1029 10/22/24  0931   SODIUM mmol/L 136 139 127*  --    POTASSIUM mmol/L 4.6 4.0 3.6  --    CHLORIDE mmol/L 109* 107 92*  --    CO2 mmol/L 23 24 23  --    BUN mg/dL 8 6 10  --    CREATININE mg/dL 0.44* 0.53* 0.65  --    CALCIUM mg/dL 8.0* 8.5 8.3*  --    ALBUMIN g/dL  --   --  4.1  --    TOTAL BILIRUBIN mg/dL  --   --  0.35  --    ALK PHOS U/L  --   --  89  --    ALT U/L  --   --  9  --    AST U/L  --   --  11*  --    EGFR ml/min/1.73sq m 133 126 117  --    GLUCOSE RANDOM mg/dL 68 99 710* HIGH           Results from last 7 days   Lab Units 10/23/24  1131 10/23/24  0912 10/23/24  0257 10/23/24  0158 10/23/24  0135 10/22/24  2112 10/22/24  1813 10/22/24  1645 10/22/24  1604 10/22/24  1255   POC GLUCOSE mg/dl 229* 262* 98 83 61* 213* 217* 87 56* 231*     Results from last 7 days   Lab Units 10/22/24  0919   HEMOGLOBIN A1C  13.7*         Results from last 7 days   Lab Units 10/23/24  0607 10/22/24  1428   LACTIC ACID mmol/L 1.3 2.6*     Results from last 7 days   Lab Units 10/22/24  1029   TRIGLYCERIDES mg/dL 64   CHOLESTEROL mg/dL 200*   LDL CALC mg/dL 104*   HDL mg/dL 83       Incidental Findings:      Test Results Pending at Discharge (will require follow up):      Reason for Admission:   Abnormal Lab (Sent from Dr office. Pt states BS was > 500 and positive for ketones. Pt. Offers no complaints)    Hospital Course:   Gloria Lloyd is a 34 y.o. female patient who originally presented to the hospital on 10/22/2024 due to hyperglycemia at endocrinology office.  The patient has not been compliant with medications or checks.  In the ED she was started on insulin infusion.  There was no evidence of anion gap or metabolic acidosis.  Her glucose quickly corrected with infusion and IV fluids.  She was seen by endocrinology.  She has been transition to home insulin and will be discharged in good condition    Please see above list of diagnoses and  "related plan for additional information.     Condition at Discharge: stable     Discharge Day Visit / Exam:   Subjective: Patient seen and examined.  Tolerating meals    Vitals: Blood Pressure: 115/60 (10/23/24 0900)  Pulse: 88 (10/23/24 0900)  Temperature: 97.5 °F (36.4 °C) (10/23/24 0900)  Temp Source: Temporal (10/23/24 0900)  Respirations: 18 (10/23/24 0900)  Height: 4' 10\" (147.3 cm) (10/22/24 1352)  Weight - Scale: 57.3 kg (126 lb 4.8 oz) (10/22/24 1352)  SpO2: 99 % (10/23/24 0900)    Exam:   Physical Exam  Vitals reviewed.   Constitutional:       General: She is not in acute distress.     Appearance: Normal appearance.   HENT:      Head: Atraumatic.   Cardiovascular:      Rate and Rhythm: Regular rhythm.      Heart sounds: Normal heart sounds.   Pulmonary:      Breath sounds: Normal breath sounds. No wheezing.   Abdominal:      General: Bowel sounds are normal.      Palpations: Abdomen is soft.      Tenderness: There is no guarding or rebound.   Musculoskeletal:         General: No swelling.   Skin:     General: Skin is warm.   Neurological:      General: No focal deficit present.      Mental Status: She is alert.   Psychiatric:         Mood and Affect: Mood normal.       Discussion with Family:     Discharge instructions/Information to patient and family:   See after visit summary for information provided to patient and family.      Provisions for Follow-Up Care:  See after visit summary for information related to follow-up care and any pertinent home health orders.      Mobility at time of Discharge:  Basic Mobility Inpatient Raw Score: 24  JH-HLM Goal: 8: Walk 250 feet or more  JH-HLM Achieved: 7: Walk 25 feet or more  JH-HLM Goal achieved. Continue to encourage appropriate mobility.    Disposition:   Home    Planned Readmission: No     Discharge Medications:  See after visit summary for reconciled discharge medications provided to patient and family.      Administrative Statements   I spent 35 minutes " discharging the patient. This time was spent on the day of discharge. I had direct contact with the patient on the day of discharge. Greater than 50% of the total time was spent examining patient, answering all patient questions, arranging and discussing plan of care with patient as well as directly providing post-discharge instructions.  Additional time then spent on discharge activities.    **Please Note: This note may have been constructed using a voice recognition system**

## 2024-10-23 NOTE — ASSESSMENT & PLAN NOTE
Presentation from endocrine office for hyperglycemia  Patient has not been taking glargine/lispro as directed  Patient also has not been checking her glucose  In the ED no evidence of anion gap acidosis  Seen by endocrinology.  Patient was briefly on insulin infusion but has been transitioned over to home insulin regimen.  Prescriptions for glargine and lispro sent to pharmacy along with pen needles.    Results from last 7 days   Lab Units 10/23/24  1131 10/23/24  0912 10/23/24  0257 10/23/24  0158 10/23/24  0135 10/22/24  2112 10/22/24  1813 10/22/24  1645 10/22/24  1604 10/22/24  1255   POC GLUCOSE mg/dl 229* 262* 98 83 61* 213* 217* 87 56* 231*

## 2024-10-23 NOTE — UTILIZATION REVIEW
"Initial Clinical Review    Admission: Date/Time/Statement:   Admission Orders (From admission, onward)       Ordered        10/22/24 1140  INPATIENT ADMISSION  Once                          Orders Placed This Encounter   Procedures    INPATIENT ADMISSION     Standing Status:   Standing     Number of Occurrences:   1     Order Specific Question:   Level of Care     Answer:   Med Surg [16]     Order Specific Question:   Estimated length of stay     Answer:   More than 2 Midnights     Order Specific Question:   Certification     Answer:   I certify that inpatient services are medically necessary for this patient for a duration of greater than two midnights. See H&P and MD Progress Notes for additional information about the patient's course of treatment.     ED Arrival Information       Expected   10/22/2024     Arrival   10/22/2024 10:06    Acuity   Urgent              Means of arrival   Walk-In    Escorted by   Self    Service   Hospitalist    Admission type   Emergency              Arrival complaint   possible ketoacidosis             Chief Complaint   Patient presents with    Abnormal Lab     Sent from Dr office. Pt states BS was > 500 and positive for ketones. Pt. Offers no complaints       Initial Presentation:   34 yof to ER from endocrinology office for large urinary ketones and blood sugar \"Hi\" in office. Patient takes Glargine 24 units daily which she last took this morning and Humalog 3 units + scale which she has not been taking for a few weeks. She states that she has not been checking her glucose at all for the last few weeks. Hx Type 1 Diabetes, hyperlipidemia, and hypertension. Presents with polydipsia. Admission labs: glucose 579 and serum blood glucose of 710. Serum bicarb 23, normal AG 12, ABG pH 7.34/CO2 41.3/O2 32.1/Bicarb 21.8.  Admitted to inpatient status for DKA. Started on IV insulin gtt, IVF, accuchecks, endocrinology consulted.  Per endocrinology: DKA  Type 1 diabetes mellitus on long-term " insulin therapy with hyperglycemia. Poorly controlled with A1c 13.7%. Has not been compliant with basal bolus insulin regimen. Patient with severe hyperglycemia, may have been tending towards diabetic ketoacidosis however did not have metabolic acidosis on labs. Continue insulin drip, non-DKA protocol for now. Start Humalog 3 units with meals 3 times a day      Anticipated Length of Stay/Certification Statement:   Patient will be admitted on an inpatient basis with an anticipated length of stay of greater than 2 midnights secondary to management of DKA       ED Treatment-Medication Administration from 10/22/2024 1002 to 10/22/2024 1326         Date/Time Order Dose Route Action     10/22/2024 1036 sodium chloride 0.9 % bolus 1,000 mL 1,000 mL Intravenous New Bag     10/22/2024 1106 sodium chloride 0.9 % bolus 1,000 mL 1,000 mL Intravenous New Bag     10/22/2024 1235 insulin regular (HumuLIN R,NovoLIN R) 1 Units/mL in sodium chloride 0.9 % 100 mL infusion 12 Units/hr Intravenous New Bag     10/22/2024 1214 sodium chloride 0.9 % infusion 100 mL/hr Intravenous New Bag     10/22/2024 1216 magnesium sulfate 4 g/100 mL IVPB (premix) 4 g 4 g Intravenous New Bag     10/22/2024 1221 potassium chloride 20 mEq IVPB (premix) 20 mEq Intravenous New Bag     10/22/2024 1213 potassium chloride (Klor-Con M20) CR tablet 40 mEq 40 mEq Oral Given            Scheduled Medications:  Medications 10/14 10/15 10/16 10/17 10/18 10/19 10/20 10/21 10/22 10/23   insulin glargine (LANTUS) subcutaneous injection 24 Units 0.24 mL  Dose: 24 Units  Freq: Every morning Route: SC  Start: 10/23/24 0900   Admin Instructions:                0900        insulin lispro (HumALOG/ADMELOG) 100 units/mL subcutaneous injection 1-5 Units  Dose: 1-5 Units  Freq: Daily  (0200) Route: SC  Start: 10/23/24 0200   Admin Instructions:                (0138)        insulin lispro (HumALOG/ADMELOG) 100 units/mL subcutaneous injection 1-5 Units  Dose: 1-5 Units  Freq: Daily  at bedtime Route: SC  Start: 10/22/24 2200   Admin Instructions:               2215      2200         insulin lispro (HumALOG/ADMELOG) 100 units/mL subcutaneous injection 1-5 Units  Dose: 1-5 Units  Freq: 3 times daily before meals Route: SC  Start: 10/22/24 1600   Admin Instructions:               (1648) [C]      0700     1130     1600        insulin lispro (HumALOG/ADMELOG) 100 units/mL subcutaneous injection 3 Units  Dose: 3 Units  Freq: 3 times daily with meals Route: SC  Start: 10/22/24 1630   Admin Instructions:               (1649) [C]      0730     1200     1630        magnesium sulfate 4 g/100 mL IVPB (premix) 4 g  Dose: 4 g  Freq: Once Route: IV  Last Dose: 4 g (10/22/24 1216)  Start: 10/22/24 1215 End: 10/22/24 1616   Admin Instructions:               1216         potassium chloride (Klor-Con M20) CR tablet 40 mEq  Dose: 40 mEq  Freq: Once Route: PO  Start: 10/22/24 1215 End: 10/22/24 1213   Admin Instructions:               1213         potassium chloride 20 mEq IVPB (premix)  Dose: 20 mEq  Freq: Once Route: IV  Last Dose: 20 mEq (10/22/24 1221)  Start: 10/22/24 1215 End: 10/22/24 1421   Admin Instructions:               1221         sodium chloride 0.9 % bolus 1,000 mL  Dose: 1,000 mL  Freq: Once Route: IV  Last Dose: 1,000 mL (10/22/24 1106)  Start: 10/22/24 1045 End: 10/22/24 1206            1106         sodium chloride 0.9 % bolus 1,000 mL  Dose: 1,000 mL  Freq: Once Route: IV  Last Dose: Stopped (10/22/24 1210)  Start: 10/22/24 1045 End: 10/22/24 1210            1036     1210                     Continuous Meds Sorted by Name  for Lloyd Gloria as of 10/14/24 through 10/23/24  Legend:       Medications 10/14 10/15 10/16 10/17 10/18 10/19 10/20 10/21 10/22 10/23   insulin regular (HumuLIN R,NovoLIN R) 1 Units/mL in sodium chloride 0.9 % 100 mL infusion  Rate: 0.3-21 mL/hr Dose: 0.3-21 Units/hr  Freq: Titrated Route: IV  Last Dose: 3 Units/hr (10/22/24 1330)  Start: 10/22/24 1145 End: 10/22/24 1549    Admin Instructions:      Order specific questions:               1235     1330     1546-D/C'd       sodium chloride 0.9 % infusion  Rate: 100 mL/hr Dose: 100 mL/hr  Freq: Continuous Route: IV  Indications of Use: IV Hydration  Last Dose: 100 mL/hr (10/22/24 1214)  Start: 10/22/24 1215            1214         Legend:       [id number]/1810/1910/2010/2110/2210/23        PRN Meds Sorted by Name  for Gloria Lloyd as of 10/14/24 through 10/23/24  Legend:       Medications 10/14 10/15 10/16 10/17 10/18 10/19 10/20 10/21 10/22 10/23   acetaminophen (TYLENOL) tablet 650 mg  Dose: 650 mg  Freq: Every 6 hours PRN Route: PO  PRN Reason: mild pain  Indications of Use: FEVER,HEADACHE,MILD PAIN  Start: 10/22/24 1202                influenza vaccine (PF) (Fluarix) IM injection 0.5 mL  Dose: 0.5 mL  Freq: Prior to discharge Route: IM  PRN Reason: immunization  Indications of Use: VACCINATION  Start: 10/22/24 1413   Admin Instructions:      Order specific questions:                   ondansetron (ZOFRAN) injection 4 mg  Dose: 4 mg  Freq: Every 6 hours PRN Route: IV  PRN Reasons: nausea,vomiting  Start: 10/22/24 1202   Admin Instructions:                                   ED Triage Vitals [10/22/24 1015]   Temperature Pulse Respirations Blood Pressure SpO2 Pain Score   97.9 °F (36.6 °C) 82 18 127/78 100 % No Pain     Weight (last 2 days)       Date/Time Weight    10/22/24 1352 57.3 (126.3)    10/22/24 1015 58.1 (128)            Vital Signs (last 3 days)       Date/Time Temp Pulse Resp BP MAP (mmHg) SpO2 O2 Device Patient Position - Orthostatic VS Ethan Coma Scale Score Pain    10/23/24 0900 97.5 °F (36.4 °C) 88 18 115/60 82 99 % None (Room air) Sitting -- No Pain    10/22/24 2239 97.6 °F (36.4 °C) 78 18 105/57 75 98 % None (Room air) Lying -- --    10/22/24 1933 97.9 °F (36.6 °C) 95 18 100/57 70 98 % None (Room air) Lying -- No Pain    10/22/24 1652 97.2 °F (36.2 °C) 90 18 113/65 85 98 % -- Lying -- --     10/22/24 1352 98 °F (36.7 °C) 98 18 121/68 -- -- -- -- -- No Pain    10/22/24 1300 -- -- -- -- -- -- -- -- -- No Pain    10/22/24 1230 -- 86 19 132/77 100 100 % None (Room air) Lying -- --    10/22/24 1215 -- 87 18 127/85 99 99 % None (Room air) Lying -- --    10/22/24 1115 -- 86 18 118/76 90 100 % None (Room air) Lying -- --    10/22/24 1100 -- 85 18 126/76 96 100 % None (Room air) Lying -- --    10/22/24 1030 -- -- -- -- -- -- None (Room air) -- 15 --    10/22/24 1015 97.9 °F (36.6 °C) 82 18 127/78 -- 100 % None (Room air) -- -- No Pain              Pertinent Labs/Diagnostic Test Results:   Radiology:  No orders to display     Cardiology:  No orders to display     GI:  No orders to display           Results from last 7 days   Lab Units 10/23/24  0607 10/22/24  1029   WBC Thousand/uL 5.34 6.81   HEMOGLOBIN g/dL 14.6 14.0   HEMATOCRIT % 44.1 40.6   PLATELETS Thousands/uL 261 251   TOTAL NEUT ABS Thousands/µL 2.56 4.90         Results from last 7 days   Lab Units 10/23/24  0607 10/22/24  1428 10/22/24  1029   SODIUM mmol/L 136 139 127*   POTASSIUM mmol/L 4.6 4.0 3.6   CHLORIDE mmol/L 109* 107 92*   CO2 mmol/L 23 24 23   ANION GAP mmol/L 4 8 12   BUN mg/dL 8 6 10   CREATININE mg/dL 0.44* 0.53* 0.65   EGFR ml/min/1.73sq m 133 126 117   CALCIUM mg/dL 8.0* 8.5 8.3*   MAGNESIUM mg/dL 2.3  --  1.7*     Results from last 7 days   Lab Units 10/22/24  1029   AST U/L 11*   ALT U/L 9   ALK PHOS U/L 89   TOTAL PROTEIN g/dL 6.5   ALBUMIN g/dL 4.1   TOTAL BILIRUBIN mg/dL 0.35     Results from last 7 days   Lab Units 10/23/24  1131 10/23/24  0912 10/23/24  0257 10/23/24  0158 10/23/24  0135 10/22/24  2112 10/22/24  1813 10/22/24  1645 10/22/24  1604 10/22/24  1255 10/22/24  1015   POC GLUCOSE mg/dl 229* 262* 98 83 61* 213* 217* 87 56* 231* 579*     Results from last 7 days   Lab Units 10/23/24  0607 10/22/24  1428 10/22/24  1029 10/22/24  0931   GLUCOSE RANDOM mg/dL 68 99 710* HIGH         Results from last 7 days   Lab Units  10/22/24  0919   HEMOGLOBIN A1C  13.7*     Beta- Hydroxybutyrate   Date Value Ref Range Status   10/22/2024 0.36 (H) 0.02 - 0.27 mmol/L Final     BETA-HYDROXYBUTYRATE   Date Value Ref Range Status   12/05/2022 0.2 <0.6 mmol/L Final   06/28/2022 0.2 <0.6 mmol/L Final   06/13/2022 7.4 (H) <0.6 mmol/L Final          Results from last 7 days   Lab Units 10/22/24  1029   PH ALEXSANDER  7.340   PCO2 ALEXSANDER mm Hg 41.3*   PO2 ALEXSANDER mm Hg 32.1*   HCO3 ALEXSANDER mmol/L 21.8*   BASE EXC ALEXSANDER mmol/L -3.8   O2 CONTENT ALEXSANDER ml/dL 13.2   O2 HGB, VENOUS % 61.5                     Results from last 7 days   Lab Units 10/22/24  1029   PROTIME seconds 12.1*   INR  0.85   PTT seconds 24             Results from last 7 days   Lab Units 10/23/24  0607 10/22/24  1428   LACTIC ACID mmol/L 1.3 2.6*                                 Results from last 7 days   Lab Units 10/22/24  1029   LIPASE u/L 127*                 Results from last 7 days   Lab Units 10/22/24  1101   CLARITY UA  Clear   COLOR UA  Colorless   SPEC GRAV UA  <1.005*   PH UA  5.5   GLUCOSE UA mg/dl 300 (3/10%)*   KETONES UA mg/dl Trace*   BLOOD UA  Trace*   PROTEIN UA mg/dl Negative   NITRITE UA  Negative   BILIRUBIN UA  Negative   UROBILINOGEN UA (BE) mg/dl <2.0   LEUKOCYTES UA  Trace*   WBC UA /hpf 0-1   RBC UA /hpf 4-10*   BACTERIA UA /hpf None Seen   EPITHELIAL CELLS WET PREP /hpf Moderate*                                                   Past Medical History:   Diagnosis Date    Diabetes mellitus (HCC)     uses kwiki pen     Diabetes mellitus type 1 (HCC)     High blood pressure     recently dx/ put on lisinopril     High cholesterol     Hypertension      no hypersion     Miscarriage     Recurrent pregnancy loss, antepartum condition or complication      Present on Admission:   Diabetes mellitus with hyperglycemia (HCC)   Mixed hyperlipidemia   Type 1 diabetes mellitus with hyperglycemia, with long-term current use of insulin (HCC)   Metabolic acidosis   Hyponatremia      Admitting  Diagnosis: Mixed hyperlipidemia [E78.2]  Abnormal laboratory test [R89.9]  Hyperglycemia due to diabetes mellitus (HCC) [E11.65]  Age/Sex: 34 y.o. female    Network Utilization Review Department  ATTENTION: Please call with any questions or concerns to [phone] and carefully listen to the prompts so that you are directed to the right person. All voicemails are confidential.   For Discharge needs, contact Care Management DC Support Team at [phone] opt. 2  Send all requests for admission clinical reviews, approved or denied determinations and any other requests to dedicated fax number below belonging to the campus where the patient is receiving treatment. List of dedicated fax numbers for the Facilities:  FACILITY NAME UR FAX NUMBER   ADMISSION DENIALS (Administrative/Medical Necessity) [phone]   DISCHARGE SUPPORT TEAM (NETWORK) [phone]   PARENT CHILD HEALTH (Maternity/NICU/Pediatrics) [phone]   [facility] [phone]   [facility] [phone]   [facility] [phone]   [facility] [phone]   [facility] [phone]   [facility] [phone]   [facility] [phone]   [facility] [phone]   [facility] [phone]   [facility] [phone]   [facility] [phone]   [facility] [phone]

## 2024-10-23 NOTE — ASSESSMENT & PLAN NOTE
Pseudohyponatremia from hyperglycemia    Results from last 7 days   Lab Units 10/23/24  0607 10/22/24  1428 10/22/24  1029   SODIUM mmol/L 136 139 127*

## 2024-11-12 ENCOUNTER — TELEPHONE (OUTPATIENT)
Dept: ENDOCRINOLOGY | Facility: CLINIC | Age: 34
End: 2024-11-12

## 2024-11-12 NOTE — TELEPHONE ENCOUNTER
Attempted to reach patient in regards to rescheduling No Show Visit that occurred on 11/12/2024 with Tracey Becerra, but phone call picked up with no response

## 2024-11-22 ENCOUNTER — OFFICE VISIT (OUTPATIENT)
Dept: ENDOCRINOLOGY | Facility: CLINIC | Age: 34
End: 2024-11-22
Payer: COMMERCIAL

## 2024-11-22 VITALS
HEART RATE: 101 BPM | DIASTOLIC BLOOD PRESSURE: 72 MMHG | OXYGEN SATURATION: 100 % | HEIGHT: 58 IN | BODY MASS INDEX: 26.4 KG/M2 | SYSTOLIC BLOOD PRESSURE: 121 MMHG

## 2024-11-22 DIAGNOSIS — E10.65 TYPE 1 DIABETES MELLITUS WITH HYPERGLYCEMIA, WITH LONG-TERM CURRENT USE OF INSULIN (HCC): Primary | ICD-10-CM

## 2024-11-22 DIAGNOSIS — E78.2 MIXED HYPERLIPIDEMIA: ICD-10-CM

## 2024-11-22 DIAGNOSIS — Z13.29 SCREENING FOR THYROID DISORDER: ICD-10-CM

## 2024-11-22 PROCEDURE — 99204 OFFICE O/P NEW MOD 45 MIN: CPT | Performed by: NURSE PRACTITIONER

## 2024-11-22 RX ORDER — INSULIN ASPART 100 [IU]/ML
3 INJECTION, SOLUTION INTRAVENOUS; SUBCUTANEOUS
Qty: 15 ML | Refills: 2 | Status: SHIPPED | OUTPATIENT
Start: 2024-11-22

## 2024-11-22 NOTE — PATIENT INSTRUCTIONS
"Low blood sugar in people with diabetes   The Basics   Written by the doctors and editors at [facility]   What is low blood sugar? -- This is when the level of sugar in a person's blood gets too low. It is also called \"hypoglycemia.\"  Low blood sugar can cause symptoms ranging from sweating and feeling hungry to passing out.  Low blood sugar can happen in people with diabetes who take certain medicines, including insulin, other medicines given as shots, and some types of pills.  When can people with diabetes get low blood sugar? -- People with diabetes can get low blood sugar when they:   Take too much medicine, including insulin, other medicines given as shots, or certain diabetes pills   Do not eat enough food   Exercise too much without eating a snack or reducing their insulin dose   Wait too long between meals   Drink too much alcohol or drink alcohol on an empty stomach  What are the symptoms of low blood sugar? -- The symptoms can be different from person to person, and can change over time. During the early stages of low blood sugar, a person can:   Sweat or tremble   Feel hungry   Feel worried  People who have early symptoms should check their blood sugar level to see if it is low and needs to be treated. If low blood sugar levels are not treated, severe symptoms can occur. These can include:   Trouble walking or feeling weak   Trouble seeing clearly   Being confused or acting in a strange way   Passing out or having a seizure  Some people do not get symptoms during the early stages of low blood sugar. Doctors sometimes call this \"hypoglycemia unawareness.\" People with hypoglycemia unawareness are more likely to have severe symptoms, because they might not know that they have low blood sugar until they have severe symptoms. Hypoglycemia unawareness is more likely in people who:   Have had type 1 diabetes for more than 5 to 10 years   Have frequent episodes of low blood sugar   Use insulin to keep their blood " sugar level tightly managed   Are tired   Drink a lot of alcohol   Take certain medicines for high blood pressure or diabetes  How is low blood sugar treated? -- It can be treated with:   Quick sources of sugar - People can eat or drink quick sources of sugar (table 1). Foods that have fat, such as chocolate or cheese, do not treat low blood sugar as quickly. You and a family member should carry a quick source of sugar at all times.   A dose of glucagon - Glucagon is a hormone that can quickly raise blood sugar levels and stop severe symptoms. It comes as a shot (figure 1) or a nose spray. If your doctor recommends that you carry glucagon with you, they will tell you when and how to use it. If possible, it's also a good idea to have a family member, friend, or roommate learn how to give you glucagon. That way, they can give it to you if you can't do it yourself.  After treating low blood sugar, it is very important to recheck your blood sugar level to make sure that it rises and stays in the normal range. Once your blood sugar is normal, eat a small snack that contains protein, fat, and carbohydrate. This can help keep your blood sugar stable.  What should I do after treatment? -- After treatment for low blood sugar, most people can get back to their usual routine. But your doctor or nurse might recommend that you check your blood sugar level more often during the next 2 to 3 days.  If your low blood sugar was treated with glucagon, call your doctor or nurse. They might change the dose of your diabetes medicine.  How can I prevent low blood sugar? -- The best way is to:   Check your blood sugar levels often - Your doctor or nurse will tell you how and when to check your blood sugar levels at home. They will also tell you what your blood sugar levels should be, and when to treat low blood sugar.   Learn the symptoms of low blood sugar, and be ready to treat it in the early stages. Treating low blood sugar early can  "prevent severe symptoms.  When should I go to a hospital or call for an ambulance? -- A family member or friend should take you to a hospital or call for an ambulance (in the [place] and [place], call 9-1-1) if you:   Are still confused 15 minutes after being treated with a dose of glucagon   Have passed out, and there is no glucagon nearby   Still have low blood sugar after treatment  If you have low blood sugar, do not try to drive yourself to the hospital. Driving with low blood sugar can be dangerous.  All topics are updated as new evidence becomes available and our peer review process is complete.  This topic retrieved from [employer] on: Apr 11, 2024.  Topic 18257 Version 22.0  Release: 32.3.2 - C32.100  © 2024 UpToDate, Inc. and/or its affiliates. All rights reserved.  table 1: Quick sources of sugar to treat low blood sugar  3 or 4 glucose tablets   ½ cup of juice or regular soda (not sugar-free)   2 tablespoons of raisins   4 or 5 saltine crackers   1 tablespoon of sugar   1 tablespoon of honey or corn syrup   6 to 8 hard candies   These sources of sugar act quickly to treat low blood sugar levels. People with diabetes who use insulin or certain other diabetes medicines should carry at least 1 of these items at all times.  Graphic 51976 Version 4.0  figure 1: Glucagon autoinjector     Some people carry glucagon in the form of an autoinjector \"pen.\" This makes it easy to give a dose into the upper arm, thigh, or belly.  Graphic 245860 Version 2.0  Consumer Information Use and Disclaimer   Disclaimer: This generalized information is a limited summary of diagnosis, treatment, and/or medication information. It is not meant to be comprehensive and should be used as a tool to help the user understand and/or assess potential diagnostic and treatment options. It does NOT include all information about conditions, treatments, medications, side effects, or risks that may apply to a specific patient. It is not intended to be " medical advice or a substitute for the medical advice, diagnosis, or treatment of a health care provider based on the health care provider's examination and assessment of a patient's specific and unique circumstances. Patients must speak with a health care provider for complete information about their health, medical questions, and treatment options, including any risks or benefits regarding use of medications. This information does not endorse any treatments or medications as safe, effective, or approved for treating a specific patient. Up[employer]Date, Inc. and its affiliates disclaim any warranty or liability relating to this information or the use thereof.The use of this information is governed by the Terms of Use, available at https://www.[employer].com/en/know/clinical-effectiveness-terms. 2024© UpToDate, Inc. and its affiliates and/or licensors. All rights reserved.  Copyright   © 2024 Up[employer]Date, Inc. and/or its affiliates. All rights reserved.

## 2024-11-22 NOTE — PROGRESS NOTES
Name: Gloria Lloyd      : 1990      MRN: [id number]  Encounter Provider: CECILIO Correia  Encounter Date: 2024   Encounter department: [facility]  :  Assessment & Plan  Type 1 diabetes mellitus with hyperglycemia, with long-term current use of insulin (HCC)    Lab Results   Component Value Date    HGBA1C 13.7 (A) 10/22/2024     HGA1C above goal-- no blood sugars for review. Continue glargine and start novolog 3 units TID with meals.     Reviewed symptoms of DKA and when to proceed to ER for evaluation for hyperglycemia.     Dexcom CGM trial sensor placed.     Discussed risks/complications associated with uncontrolled diabetes including organ involvement, heart attack, stroke, death    Advised lifestyle modifications including attention to diet including the amount and types of carbohydrates consumed and regular activity.     Call for blood sugars less than 70 mg/dl or patterns over 300 mg/dl.     Discussed symptoms and treatment of hypoglycemia.  Reviewed risks associated with hypoglycemia. Always carry rapid acting carbohydrates and a glucometer (a way to check your blood sugar).    Recommendation for medical identification either bracelet, necklace.    Routine follow up for diabetic eye and foot exams.     Ordered blood work to complete prior to next visit.    Send glucose logs/CGM download in 1-2 weeks for review    Follow up in 3 months.     Orders:    insulin aspart (NovoLOG FlexPen) 100 UNIT/ML injection pen; Inject 3 Units under the skin 3 (three) times a day with meals    Albumin / creatinine urine ratio; Future    Comprehensive metabolic panel; Future    Mixed hyperlipidemia  Recommend checking lipid panel.   Orders:    Lipid panel; Future    Screening for thyroid disorder  Annual screening for thyroid disease.   Orders:    TSH, 3rd generation; Future    T4, free; Future        History of Present Illness     HPI  Gloria Lloyd is a 34 y.o.  female who presents for follow up of type 1 diabetes mellitus without known complications. Recently hospitalized in October 2024 for hyperglycemia.     Denies polyuria, polydipsia, blurry vision, or changes in sensation in feet.    Denies abdominal pain, nausea, vomiting.    Denies mental status change and does not appear altered in office.     Not currently checking blood sugar levels since recent hospitalization.    Only taking Glargine 24 units daily. States mealtime insulin was not covered and was not picked up. No call placed to office.     No history of thyroid disease.     History obtained from: patient    Review of Systems  See HPI.   All other systems reviewed and are negative.    Medical History Reviewed by provider this encounter:     .  Current Outpatient Medications on File Prior to Visit   Medication Sig Dispense Refill    Blood Glucose Monitoring Suppl (OneTouch Verio) w/Device KIT Use 4 (four) times a day 1 kit 0    glucose blood (OneTouch Verio) test strip pls check sugar 3 time daily .300 100 strip 3    Insulin Glargine-yfgn 100 UNIT/ML SOPN Inject 26 units subcutaneously daily 15 mL 0    Insulin Pen Needle (BD Pen Needle Kerrie 2nd Gen) 32G X 4 MM MISC For use with insulin pen. Pharmacy may dispense brand covered by insurance. 100 each 0    Insulin Pen Needle (BD Pen Needle Kerrie 2nd Gen) 32G X 4 MM MISC For use with insulin pen 4 times per day. Pharmacy may dispense brand covered by insurance. 100 each 0    Insulin Pen Needle (BD Pen Needle Kerrie 2nd Gen) 32G X 4 MM MISC For use with insulin pen. Pharmacy may dispense brand covered by insurance. 100 each 0    Lancets (onetouch ultrasoft) lancets Use as instructed 4 times a day 400 each 3    [DISCONTINUED] insulin lispro (Admelog SoloStar) 100 units/mL injection pen Inject 3 Units under the skin 3 (three) times a day with meals (Patient not taking: Reported on 11/22/2024) 15 mL 0     No current facility-administered medications on file prior to visit.  "        Objective   /72 (BP Location: Left arm, Patient Position: Sitting, Cuff Size: Large)   Pulse 101   Ht 4' 10\" (1.473 m)   SpO2 100%   BMI 26.40 kg/m²         Physical Exam  Vitals reviewed.   Constitutional:       Appearance: Normal appearance.   Cardiovascular:      Rate and Rhythm: Normal rate and regular rhythm.      Pulses: Normal pulses.      Heart sounds: Normal heart sounds.   Pulmonary:      Effort: Pulmonary effort is normal.      Breath sounds: Normal breath sounds.   Skin:     General: Skin is warm and dry.      Capillary Refill: Capillary refill takes less than 2 seconds.   Neurological:      General: No focal deficit present.      Mental Status: She is alert and oriented to person, place, and time.   Psychiatric:         Mood and Affect: Mood normal.         Behavior: Behavior normal.     Labs:  Component      Latest Ref Rng 10/22/2024   Sodium      135 - 147 mmol/L 139    Potassium      3.5 - 5.3 mmol/L 4.0    Chloride      96 - 108 mmol/L 107    Carbon Dioxide      21 - 32 mmol/L 24    ANION GAP      4 - 13 mmol/L 8    BUN      5 - 25 mg/dL 6    Creatinine      0.60 - 1.30 mg/dL 0.53 (L)    GLUCOSE      65 - 140 mg/dL 99    Calcium      8.4 - 10.2 mg/dL 8.5    GFR, Calculated      ml/min/1.73sq m 126    Cholesterol      See Comment mg/dL 200 (H)    Triglycerides      See Comment mg/dL 64    HDL      >=50 mg/dL 83    LDL Calculated      0 - 100 mg/dL 104 (H)    Non-HDL Cholesterol      mg/dl 117    Hemoglobin A1C      <=6.5  13.7 !    Beta- Hydroxybutyrate      0.02 - 0.27 mmol/L 0.36 (H)        Administrative Statements     "

## 2024-11-22 NOTE — ASSESSMENT & PLAN NOTE
Lab Results   Component Value Date    HGBA1C 13.7 (A) 10/22/2024     HGA1C above goal-- no blood sugars for review. Continue glargine and start novolog 3 units TID with meals.     Reviewed symptoms of DKA and when to proceed to ER for evaluation for hyperglycemia.     Dexcom CGM trial sensor placed.     Discussed risks/complications associated with uncontrolled diabetes including organ involvement, heart attack, stroke, death    Advised lifestyle modifications including attention to diet including the amount and types of carbohydrates consumed and regular activity.     Call for blood sugars less than 70 mg/dl or patterns over 300 mg/dl.     Discussed symptoms and treatment of hypoglycemia.  Reviewed risks associated with hypoglycemia. Always carry rapid acting carbohydrates and a glucometer (a way to check your blood sugar).    Recommendation for medical identification either bracelet, necklace.    Routine follow up for diabetic eye and foot exams.     Ordered blood work to complete prior to next visit.    Send glucose logs/CGM download in 1-2 weeks for review    Follow up in 3 months.     Orders:    insulin aspart (NovoLOG FlexPen) 100 UNIT/ML injection pen; Inject 3 Units under the skin 3 (three) times a day with meals    Albumin / creatinine urine ratio; Future    Comprehensive metabolic panel; Future

## 2024-12-03 ENCOUNTER — TELEPHONE (OUTPATIENT)
Age: 34
End: 2024-12-03

## 2024-12-03 ENCOUNTER — TELEPHONE (OUTPATIENT)
Dept: ENDOCRINOLOGY | Facility: CLINIC | Age: 34
End: 2024-12-03

## 2024-12-03 NOTE — TELEPHONE ENCOUNTER
Pt. Will stop in the office with Dexcom G7 meter for Download at 4 pm since her sensor  2 days ago. Thanks

## 2024-12-03 NOTE — TELEPHONE ENCOUNTER
Addended by: PHOENIX TRAN on: 9/23/2021 04:03 PM     Modules accepted: Orders     Patient will be stopping by with sensor that fell off. Should this be scheduled as a nurse visit since it was a trial CGM.

## 2024-12-06 ENCOUNTER — OFFICE VISIT (OUTPATIENT)
Age: 34
End: 2024-12-06
Payer: COMMERCIAL

## 2024-12-06 VITALS
HEART RATE: 98 BPM | HEIGHT: 58 IN | WEIGHT: 126 LBS | DIASTOLIC BLOOD PRESSURE: 72 MMHG | SYSTOLIC BLOOD PRESSURE: 110 MMHG | RESPIRATION RATE: 18 BRPM | BODY MASS INDEX: 26.45 KG/M2

## 2024-12-06 DIAGNOSIS — M21.962 ACQUIRED DEFORMITY OF BOTH FEET: ICD-10-CM

## 2024-12-06 DIAGNOSIS — E10.9 TYPE 1 DIABETES MELLITUS WITHOUT COMPLICATION (HCC): ICD-10-CM

## 2024-12-06 DIAGNOSIS — M79.672 PAIN IN BOTH FEET: ICD-10-CM

## 2024-12-06 DIAGNOSIS — M21.961 ACQUIRED DEFORMITY OF BOTH FEET: ICD-10-CM

## 2024-12-06 DIAGNOSIS — M20.42 HAMMER TOES OF BOTH FEET: Primary | ICD-10-CM

## 2024-12-06 DIAGNOSIS — M20.41 HAMMER TOES OF BOTH FEET: Primary | ICD-10-CM

## 2024-12-06 DIAGNOSIS — M79.671 PAIN IN BOTH FEET: ICD-10-CM

## 2024-12-06 PROCEDURE — 99213 OFFICE O/P EST LOW 20 MIN: CPT | Performed by: PODIATRIST

## 2024-12-06 NOTE — PROGRESS NOTES
Assessment/Plan: Toes 4 and 5, bilateral with secondary corn formation.  Acquired deformity foot.  Acquired pes planus.  Pain upon ambulation.  Patient with diabetes without pedal complication.     Plan.  Chart reviewed.  PCP notes reviewed.  Diabetic foot exam performed.  Patient educated on care of the diabetic foot.  Today patient educated on hammertoes and corn formation.  She is interested in surgery.  X-rays reviewed with patient.  Will plan for hammertoe surgery 4 and 5 bilateral.  Patient will need pinning of toes.  She has been advised she will need 4 to 6 weeks out of work.  Patient will pad corns as directed.  Proper shoe style recommendations made.  Aftercare instruction given.  Watch for signs of infection.  Return for follow-up.            Diagnoses and all orders for this visit:     Hammer toes of both feet        Type 1 diabetes mellitus with ketoacidosis without coma (HCC)        Pain in both feet     Acquired deformity of both feet     Type 1 diabetes mellitus without complication (HCC)            Subjective: Patient has pain in her feet.  She has pain in her toes with ambulation and shoewear.  Patient is diabetic.                 No Known Allergies        Current Medications      Current Outpatient Medications:     Blood Glucose Monitoring Suppl (OneTouch Verio) w/Device KIT, Use 4 (four) times a day, Disp: 1 kit, Rfl: 0    Continuous Blood Gluc  (Dexcom G7 ) NERY, Use 1 each continuous, Disp: 1 each, Rfl: 0    Continuous Blood Gluc Sensor (Dexcom G7 Sensor), Use 1 Device every 10 days, Disp: 3 each, Rfl: 2    Insulin Glargine-yfgn 100 UNIT/ML SOPN, Inject 20 units subcutaneously daily, Disp: 15 mL, Rfl: 3    insulin lispro (Admelog SoloStar) 100 units/mL injection pen, Inject 3 Units under the skin 3 (three) times a day with meals, Disp: 15 mL, Rfl: 2    Insulin Pen Needle (BD Pen Needle Kerrie 2nd Gen) 32G X 4 MM MISC, For use with insulin pen. Pharmacy may dispense brand covered  by insurance., Disp: 100 each, Rfl: 0    Insulin Pen Needle (BD Pen Needle Kerrie 2nd Gen) 32G X 4 MM MISC, For use with insulin pen 4 times per day. Pharmacy may dispense brand covered by insurance., Disp: 100 each, Rfl: 0    Lancets (onetouch ultrasoft) lancets, Use as instructed 4 times a day, Disp: 400 each, Rfl: 3    OneTouch Verio test strip, PLS CHECK SUGAR 3 TIME DAILY .300, Disp: 100 strip, Rfl: 3         Problem List         Patient Active Problem List   Diagnosis    Mixed hyperlipidemia    Uterine synechiae    Polyhydramnios affecting pregnancy in third trimester    Noncompliance with diabetes treatment    S/P repeat low transverse     Leukocytosis    Type 1 diabetes mellitus with hyperglycemia, with long-term current use of insulin (HCC)    Housing instability after recent homelessness    Hypoglycemia                Patient ID: Gloria Lloyd is a 34 y.o. female.     HPI     The following portions of the patient's history were reviewed and updated as appropriate: She  has a past medical history of Diabetes mellitus ([facility]), Diabetes mellitus type 1 (HCC), High blood pressure, High cholesterol, Hypertension, Miscarriage, and Recurrent pregnancy loss, antepartum condition or complication.  She           Patient Active Problem List     Diagnosis Date Noted    Hypoglycemia 2022    Housing instability after recent homelessness 2022    Leukocytosis 2022    Type 1 diabetes mellitus with hyperglycemia, with long-term current use of insulin ([facility]) 2022    Noncompliance with diabetes treatment 2021    S/P repeat low transverse  2021    Polyhydramnios affecting pregnancy in third trimester 2021    Uterine synechiae 2021    Mixed hyperlipidemia 2020      She  has a past surgical history that includes Dilation and curettage of uterus (04/10/2019);  section (2014); [place] tooth extraction; and pr  delivery only (N/A,  6/22/2021).  Her family history includes Diabetes in her father and paternal grandmother; No Known Problems in her brother, daughter, maternal grandmother, mother, paternal grandfather, sister, and sister.  She  reports that she has never smoked. She has never used smokeless tobacco. She reports that she does not drink alcohol and does not use drugs.  Current Rx               Current Outpatient Medications   Medication Sig Dispense Refill    Blood Glucose Monitoring Suppl (OneTouch Verio) w/Device KIT Use 4 (four) times a day 1 kit 0    Continuous Blood Gluc  (Dexcom G7 ) NERY Use 1 each continuous 1 each 0    Continuous Blood Gluc Sensor (Dexcom G7 Sensor) Use 1 Device every 10 days 3 each 2    Insulin Glargine-yfgn 100 UNIT/ML SOPN Inject 20 units subcutaneously daily 15 mL 3    insulin lispro (Admelog SoloStar) 100 units/mL injection pen Inject 3 Units under the skin 3 (three) times a day with meals 15 mL 2    Insulin Pen Needle (BD Pen Needle Kerrie 2nd Gen) 32G X 4 MM MISC For use with insulin pen. Pharmacy may dispense brand covered by insurance. 100 each 0    Insulin Pen Needle (BD Pen Needle Kerrie 2nd Gen) 32G X 4 MM MISC For use with insulin pen 4 times per day. Pharmacy may dispense brand covered by insurance. 100 each 0    Lancets (onetouch ultrasoft) lancets Use as instructed 4 times a day 400 each 3    OneTouch Verio test strip PLS CHECK SUGAR 3 TIME DAILY .300 100 strip 3      No current facility-administered medications for this visit.                 Current Outpatient Medications on File Prior to Visit   Medication Sig    Blood Glucose Monitoring Suppl (OneTouch Verio) w/Device KIT Use 4 (four) times a day    Continuous Blood Gluc  (Dexcom G7 ) NERY Use 1 each continuous    Continuous Blood Gluc Sensor (Dexcom G7 Sensor) Use 1 Device every 10 days    Insulin Glargine-yfgn 100 UNIT/ML SOPN Inject 20 units subcutaneously daily    insulin lispro (Admelog SoloStar) 100  units/mL injection pen Inject 3 Units under the skin 3 (three) times a day with meals    Insulin Pen Needle (BD Pen Needle Kerrie 2nd Gen) 32G X 4 MM MISC For use with insulin pen. Pharmacy may dispense brand covered by insurance.    Insulin Pen Needle (BD Pen Needle Kerrie 2nd Gen) 32G X 4 MM MISC For use with insulin pen 4 times per day. Pharmacy may dispense brand covered by insurance.    Lancets (onetouch ultrasoft) lancets Use as instructed 4 times a day    OneTouch Verio test strip PLS CHECK SUGAR 3 TIME DAILY .300      No current facility-administered medications on file prior to visit.      She has No Known Allergies..        Objective:  Patient's shoes and socks removed.   Foot Exam     General  General Appearance: appears stated age and healthy   Orientation: alert and oriented to person, place, and time   Affect: appropriate         Right Foot/Ankle      Inspection and Palpation  Swelling: dorsum   Arch: pes planus  Claw Toes: fourth toe and fifth toe  Hallux limitus: yes     Neurovascular  Dorsalis pedis: 3+  Posterior tibial: 3+  Saphenous nerve sensation: normal  Tibial nerve sensation: normal  Superficial peroneal nerve sensation: normal  Deep peroneal nerve sensation: normal  Sural nerve sensation: normal        Left Foot/Ankle       Inspection and Palpation  Swelling: dorsum   Arch: pes planus  Claw toes: fourth toe and fifth toe  Hallux limitus: yes     Neurovascular  Dorsalis pedis: 3+  Posterior tibial: 3+  Saphenous nerve sensation: normal  Tibial nerve sensation: normal  Superficial peroneal nerve sensation: normal  Deep peroneal nerve sensation: normal  Sural nerve sensation: normal        Physical Exam  Vitals and nursing note reviewed.   Constitutional:       Appearance: Normal appearance.   Cardiovascular:      Rate and Rhythm: Normal rate and regular rhythm.      Pulses: no weak pulses.           Dorsalis pedis pulses are 3+ on the right side and 3+ on the left side.        Posterior tibial  pulses are 3+ on the right side and 3+ on the left side.   Skin:     Capillary Refill: Capillary refill takes less than 2 seconds.      Comments: Soft corn fourth PIPJ, lateral bilateral.   Neurological:      Mental Status: She is alert.   Psychiatric:         Mood and Affect: Mood normal.         Behavior: Behavior normal.         Thought Content: Thought content normal.         Judgment: Judgment normal.      Patient's shoes and socks removed.     Right Foot/Ankle   Right Foot Inspection        Toe Exam: tenderness and right toe deformity.      Sensory   Vibration: intact  Proprioception: intact  Monofilament testing: intact     Vascular  Capillary refills: < 3 seconds  The right DP pulse is 3+. The right PT pulse is 3+.      Right Toe  - Comprehensive Exam  Arch: pes planus  Claw Toes: fourth toe and fifth toe  Hallux limitus: yes  Swelling: dorsum         Left Foot/Ankle  Left Foot Inspection        Toe Exam: tenderness and left toe deformity.      Sensory   Vibration: intact  Proprioception: intact  Monofilament testing: intact     Vascular  Capillary refills: < 3 seconds  The left DP pulse is 3+. The left PT pulse is 3+.      Left Toe  - Comprehensive Exam  Arch: pes planus  Claw toes: fourth toe and fifth toe  Hallux limitus: yes  Swelling: dorsum         Assign Risk Category  Deformity present  No loss of protective sensation  No weak pulses  Risk: 0

## 2024-12-09 NOTE — TELEPHONE ENCOUNTER
Increase Novolog to 5 units with meals. Continue Semglee. Call in with blood sugars in 2-3 days or sooner if blood sugars less than 70 or patterns of blood sugars above 300 mg/dL. Thanks!

## 2024-12-09 NOTE — TELEPHONE ENCOUNTER
Patient calling back, she states she takes the Novolog 3 units before meals. She states she does not eat a lot so she does not always take it three times a day

## 2024-12-09 NOTE — TELEPHONE ENCOUNTER
I called and spoke with the patient and she stated that she is taking her Semglee 25 units in the morning and at time she does for to give herself insulin and she stated that she do not eat a lot so there is times that she is taking her Novolog 3 units once a day. Please advise thank you

## 2024-12-09 NOTE — TELEPHONE ENCOUNTER
I called and spoke with the patient and she stated that she is going to try to check her fingerstick and send us the reading. Thank you

## 2025-02-10 ENCOUNTER — TELEPHONE (OUTPATIENT)
Age: 35
End: 2025-02-10

## 2025-02-12 ENCOUNTER — OFFICE VISIT (OUTPATIENT)
Dept: OBGYN CLINIC | Facility: CLINIC | Age: 35
End: 2025-02-12
Payer: COMMERCIAL

## 2025-02-12 VITALS
SYSTOLIC BLOOD PRESSURE: 122 MMHG | WEIGHT: 128 LBS | BODY MASS INDEX: 26.87 KG/M2 | HEIGHT: 58 IN | DIASTOLIC BLOOD PRESSURE: 84 MMHG

## 2025-02-12 DIAGNOSIS — Z01.419 WOMEN'S ANNUAL ROUTINE GYNECOLOGICAL EXAMINATION: Primary | ICD-10-CM

## 2025-02-12 DIAGNOSIS — B37.31 VULVOVAGINITIS DUE TO YEAST: ICD-10-CM

## 2025-02-12 PROCEDURE — 99395 PREV VISIT EST AGE 18-39: CPT | Performed by: NURSE PRACTITIONER

## 2025-02-12 RX ORDER — NYSTATIN AND TRIAMCINOLONE ACETONIDE 100000; 1 [USP'U]/G; MG/G
OINTMENT TOPICAL 2 TIMES DAILY
Qty: 30 G | Refills: 0 | Status: SHIPPED | OUTPATIENT
Start: 2025-02-12 | End: 2025-02-22

## 2025-02-12 NOTE — PROGRESS NOTES
"  Subjective    HPI:     Gloria Lloyd is a 34 y.o. female. She is a  3 Para 2, with C/S x 2. Her menstrual cycles are monthly, lasting 4-5 days. Her current method of contraception includes none. She is , and is seldomly intimate.. She denies /GI complaints. She complains of external vaginal comes and goes. She has tried vagisil and monistat which temporarily helps. She feels safe at home. She denies depression/anxiety.  Medical, surgical and family history reviewed. She is an uncontrolled DM. Her dental care is up-to-date. She eats a regular diet and does not exercise regularly. She is happy with her weight.     Visit Vitals  /84 (BP Location: Right arm, Patient Position: Sitting)   Ht 4' 10\" (1.473 m)   Wt 58.1 kg (128 lb)   LMP 2025 (Exact Date)   BMI 26.75 kg/m²   OB Status Having periods   Smoking Status Never   BSA 1.51 m²       Gynecologic History    Patient's last menstrual period was 2025 (exact date).  Gardasil Vaccine Series: vaccinated  Last Pap: 2022. Results were: normal    Obstetric and Medical History    OB History    Para Term  AB Living   4 2 1 1 1 2   SAB IAB Ectopic Multiple Live Births   1 0 0 0 2      # Outcome Date GA Lbr Bill/2nd Weight Sex Type Anes PTL Lv   4  21 36w4d  2935 g (6 lb 7.5 oz) F    JAMILA   3 SAB  8w0d    SAB      2 Term 14 40w0d  3629 g (8 lb) F CS-Classical Spinal N JAMILA      Complications: Failure to Progress in First Stage   1                 Past Medical History:   Diagnosis Date    Diabetes mellitus (HCC)     uses kwiki pen     Diabetes mellitus type 1 (HCC)     High blood pressure     recently dx/ put on lisinopril     High cholesterol     Hypertension      no hypersion     Miscarriage     Recurrent pregnancy loss, antepartum condition or complication        Past Surgical History:   Procedure Laterality Date     SECTION  01/02/2014     X1    DILATION AND CURETTAGE OF UTERUS  " 04/10/2019    Missed AB     [place]  DELIVERY ONLY N/A 2021    Procedure:  SECTION () REPEAT;  Surgeon: Bradly Daniels MD;  Location: [facility] ;  Service: Obstetrics    WISDOM TOOTH EXTRACTION         The following portions of the patient's history were reviewed and updated as appropriate: allergies, current medications, past family history, past medical history, past social history, past surgical history, and problem list.    Review of Systems    Pertinent items are noted in HPI.      Objective    Physical Exam  Constitutional:       Appearance: Normal appearance. She is well-developed.   Genitourinary:      Vulva, bladder and urethral meatus normal.      Genitourinary Comments: Speculum exam deferred at this time due to being on menses and tampon in place. Patient prefers to wait  for speculum exam for when she returns for pap smear collection.       Right Labia: rash.      Right Labia: No tenderness, lesions, skin changes or Bartholin's cyst.     Left Labia: rash.      Left Labia: No tenderness, lesions, skin changes or Bartholin's cyst.     No labial fusion noted.            No inguinal adenopathy present in the right or left side.     Pelvic exam was performed with patient in the lithotomy position.   Breasts:     Breasts are symmetrical.      Right: No inverted nipple, mass, nipple discharge, skin change or tenderness.      Left: No inverted nipple, mass, nipple discharge, skin change or tenderness.   HENT:      Head: Normocephalic and atraumatic.   Neck:      Thyroid: No thyromegaly.   Cardiovascular:      Rate and Rhythm: Normal rate and regular rhythm.      Heart sounds: Normal heart sounds, S1 normal and S2 normal.   Pulmonary:      Effort: Pulmonary effort is normal.      Breath sounds: Normal breath sounds.   Abdominal:      General: Bowel sounds are normal. There is no distension.      Palpations: Abdomen is soft. There is no mass.      Tenderness: There is no abdominal tenderness.  There is no guarding.      Hernia: There is no hernia in the left inguinal area or right inguinal area.   Musculoskeletal:      Cervical back: Neck supple.   Lymphadenopathy:      Cervical: No cervical adenopathy.      Upper Body:      Right upper body: No supraclavicular or axillary adenopathy.      Left upper body: No supraclavicular or axillary adenopathy.      Lower Body: No right inguinal adenopathy. No left inguinal adenopathy.   Neurological:      Mental Status: She is alert.   Skin:     General: Skin is warm and dry.      Findings: No rash.   Psychiatric:         Attention and Perception: Attention and perception normal.         Mood and Affect: Mood and affect normal.         Speech: Speech normal.         Behavior: Behavior is cooperative.         Thought Content: Thought content normal.         Cognition and Memory: Cognition and memory normal.         Judgment: Judgment normal.   Vitals and nursing note reviewed.          Assessment and Plan    Gloria was seen today for gynecologic exam.    Diagnoses and all orders for this visit:    Women's annual routine gynecological examination    Vulvovaginitis due to yeast  -     nystatin-triamcinolone (MYCOLOG-II) ointment; Apply topically 2 (two) times a day for 10 days      Mycolog-II prescribed to be applied externally for her vulvovaginitis.  A pap smear was not performed due to patient having her menses and using a tampon. Will return to office for pap smear only.     We discussed importance of glycemic control to prevent further vaginal symptoms.     I have discussed the importance of exercise and healthy diet as well as adequate intake of calcium and vitamin D. The current ASCCP guidelines were reviewed. The low risk patient will receive pap smear screening every 3 years until the age of 29 and then every 3 to 5 years with HPV co-testing from the ages of 30-65. I emphasized the importance of an annual pelvic and breast exam. A yearly mammogram is  recommended for breast cancer screening starting at age 40.       Results will be released to [facility], if abnormal will call to review and discuss treatment plan.     All questions have been answered to her satisfaction.       Contraception: none.  Follow up for pap smear only.

## 2025-03-06 ENCOUNTER — OFFICE VISIT (OUTPATIENT)
Age: 35
End: 2025-03-06
Payer: COMMERCIAL

## 2025-03-06 VITALS — HEIGHT: 58 IN | BODY MASS INDEX: 26.87 KG/M2 | WEIGHT: 128 LBS | RESPIRATION RATE: 16 BRPM

## 2025-03-06 DIAGNOSIS — E10.9 TYPE 1 DIABETES MELLITUS WITHOUT COMPLICATION (HCC): ICD-10-CM

## 2025-03-06 DIAGNOSIS — M21.962 ACQUIRED DEFORMITY OF BOTH FEET: ICD-10-CM

## 2025-03-06 DIAGNOSIS — M79.671 PAIN IN BOTH FEET: ICD-10-CM

## 2025-03-06 DIAGNOSIS — M79.672 PAIN IN BOTH FEET: ICD-10-CM

## 2025-03-06 DIAGNOSIS — M20.62 ACQUIRED DEFORMITY OF LEFT TOE: ICD-10-CM

## 2025-03-06 DIAGNOSIS — M20.61 ACQUIRED DEFORMITY OF RIGHT TOE: ICD-10-CM

## 2025-03-06 DIAGNOSIS — M20.41 HAMMER TOES OF BOTH FEET: Primary | ICD-10-CM

## 2025-03-06 DIAGNOSIS — M21.961 ACQUIRED DEFORMITY OF BOTH FEET: ICD-10-CM

## 2025-03-06 DIAGNOSIS — M20.42 HAMMER TOES OF BOTH FEET: Primary | ICD-10-CM

## 2025-03-06 PROCEDURE — 99214 OFFICE O/P EST MOD 30 MIN: CPT | Performed by: PODIATRIST

## 2025-03-06 RX ORDER — OXYCODONE AND ACETAMINOPHEN 5; 325 MG/1; MG/1
1 TABLET ORAL EVERY 8 HOURS PRN
Qty: 15 TABLET | Refills: 0 | Status: SHIPPED | OUTPATIENT
Start: 2025-03-06 | End: 2025-03-11

## 2025-03-06 RX ORDER — CLINDAMYCIN HYDROCHLORIDE 300 MG/1
300 CAPSULE ORAL 4 TIMES DAILY
Qty: 40 CAPSULE | Refills: 0 | Status: SHIPPED | OUTPATIENT
Start: 2025-03-06 | End: 2025-03-16

## 2025-03-06 RX ORDER — HYDROXYZINE HYDROCHLORIDE 25 MG/1
25 TABLET, FILM COATED ORAL 3 TIMES DAILY
Qty: 9 TABLET | Refills: 0 | Status: SHIPPED | OUTPATIENT
Start: 2025-03-06 | End: 2025-03-10

## 2025-03-06 RX ORDER — CHLORHEXIDINE GLUCONATE ORAL RINSE 1.2 MG/ML
15 SOLUTION DENTAL ONCE
OUTPATIENT
Start: 2025-03-06 | End: 2025-03-06

## 2025-03-06 NOTE — PROGRESS NOTES
Assessment/Plan: Continued pain from deformed contracted hammertoes 4 and 5 bilateral foot.  Secondary interdigital corn fourth space bilateral foot.    Plan.  Chart reviewed.  PCP notes reviewed.  X-rays reviewed with patient.  At this time patient states she is tired of the pain.  She is asking about surgical intervention.  We will plan for hammertoe surgery 4 and 5 bilateral.  Patient advised on this.  Perioperative instruction given.  Consent form signed.         Diagnoses and all orders for this visit:    Hammer toes of both feet  -     Case request operating room: REPAIR HAMMERTOE / MALLET TOE / CLAW TOE.  Straightening of deformed painful toes 4 and 5 of each foot.; Standing  -     Ambulatory referral to Family Practice; Future  -     Basic metabolic panel; Future  -     CBC and differential; Future  -     hCG, quantitative; Future  -     Basic metabolic panel  -     CBC and differential  -     hCG, quantitative  -     Case request operating room: REPAIR HAMMERTOE / MALLET TOE / CLAW TOE.  Straightening of deformed painful toes 4 and 5 of each foot.  -     Post Op Shoe  -     Post Op Shoe  -     clindamycin (CLEOCIN) 300 MG capsule; Take 1 capsule (300 mg total) by mouth 4 (four) times a day for 10 days  -     hydrOXYzine HCL (ATARAX) 25 mg tablet; Take 1 tablet (25 mg total) by mouth 3 (three) times a day for 3 days  -     oxyCODONE-acetaminophen (PERCOCET) 5-325 mg per tablet; Take 1 tablet by mouth every 8 (eight) hours as needed for moderate pain for up to 5 days Max Daily Amount: 3 tablets    Pain in both feet  -     Case request operating room: REPAIR HAMMERTOE / MALLET TOE / CLAW TOE.  Straightening of deformed painful toes 4 and 5 of each foot.; Standing  -     Ambulatory referral to Family Practice; Future  -     Basic metabolic panel; Future  -     CBC and differential; Future  -     hCG, quantitative; Future  -     Basic metabolic panel  -     CBC and differential  -     hCG, quantitative  -      Case request operating room: REPAIR HAMMERTOE / MALLET TOE / CLAW TOE.  Straightening of deformed painful toes 4 and 5 of each foot.  -     Post Op Shoe  -     Post Op Shoe  -     clindamycin (CLEOCIN) 300 MG capsule; Take 1 capsule (300 mg total) by mouth 4 (four) times a day for 10 days  -     hydrOXYzine HCL (ATARAX) 25 mg tablet; Take 1 tablet (25 mg total) by mouth 3 (three) times a day for 3 days  -     oxyCODONE-acetaminophen (PERCOCET) 5-325 mg per tablet; Take 1 tablet by mouth every 8 (eight) hours as needed for moderate pain for up to 5 days Max Daily Amount: 3 tablets    Acquired deformity of both feet  -     Case request operating room: REPAIR HAMMERTOE / MALLET TOE / CLAW TOE.  Straightening of deformed painful toes 4 and 5 of each foot.; Standing  -     Ambulatory referral to Family Practice; Future  -     Basic metabolic panel; Future  -     CBC and differential; Future  -     hCG, quantitative; Future  -     Basic metabolic panel  -     CBC and differential  -     hCG, quantitative  -     Case request operating room: REPAIR HAMMERTOE / MALLET TOE / CLAW TOE.  Straightening of deformed painful toes 4 and 5 of each foot.  -     clindamycin (CLEOCIN) 300 MG capsule; Take 1 capsule (300 mg total) by mouth 4 (four) times a day for 10 days  -     hydrOXYzine HCL (ATARAX) 25 mg tablet; Take 1 tablet (25 mg total) by mouth 3 (three) times a day for 3 days  -     oxyCODONE-acetaminophen (PERCOCET) 5-325 mg per tablet; Take 1 tablet by mouth every 8 (eight) hours as needed for moderate pain for up to 5 days Max Daily Amount: 3 tablets    Type 1 diabetes mellitus without complication (HCC)  -     Ambulatory referral to Family Practice; Future  -     Basic metabolic panel; Future  -     CBC and differential; Future  -     hCG, quantitative; Future  -     Basic metabolic panel  -     CBC and differential  -     hCG, quantitative  -     clindamycin (CLEOCIN) 300 MG capsule; Take 1 capsule (300 mg total) by mouth  4 (four) times a day for 10 days  -     hydrOXYzine HCL (ATARAX) 25 mg tablet; Take 1 tablet (25 mg total) by mouth 3 (three) times a day for 3 days  -     oxyCODONE-acetaminophen (PERCOCET) 5-325 mg per tablet; Take 1 tablet by mouth every 8 (eight) hours as needed for moderate pain for up to 5 days Max Daily Amount: 3 tablets    Acquired deformity of right toe    Acquired deformity of left toe    Other orders  -     Nursing Communication Warmimg Interventions Implemented; Standing  -     Nursing Communication CHG bath, have staff wash entire body (neck down) per pre-op bathing protocol. Routine, evening prior to, and day of surgery.; Standing  -     Nursing Communication Swab both nares with Povidone-Iodine solution, EXCLUDE if patient has shellfish/Iodine allergy, and replace with nasal alcohol swabstick. Routine, day of surgery, on call to OR; Standing  -     chlorhexidine (PERIDEX) 0.12 % oral rinse 15 mL  -     Void on call to OR; Standing  -     Insert peripheral IV; Standing  -     Diet NPO; Sips with meds; Standing  -     Height and weight upon arrival; Standing  -     Fingerstick Glucose (POCT); Standing          Subjective: Patient has continued pain of her feet.  She has pain in the small toes of each foot.  She is tired of the pain.  She is requesting surgery in the hope of alleviating her complaint of pain.    No Known Allergies      Current Outpatient Medications:     clindamycin (CLEOCIN) 300 MG capsule, Take 1 capsule (300 mg total) by mouth 4 (four) times a day for 10 days, Disp: 40 capsule, Rfl: 0    hydrOXYzine HCL (ATARAX) 25 mg tablet, Take 1 tablet (25 mg total) by mouth 3 (three) times a day for 3 days, Disp: 9 tablet, Rfl: 0    oxyCODONE-acetaminophen (PERCOCET) 5-325 mg per tablet, Take 1 tablet by mouth every 8 (eight) hours as needed for moderate pain for up to 5 days Max Daily Amount: 3 tablets, Disp: 15 tablet, Rfl: 0    Blood Glucose Monitoring Suppl (OneTouch Verio) w/Device KIT, Use  4 (four) times a day, Disp: 1 kit, Rfl: 0    glucose blood (OneTouch Verio) test strip, pls check sugar 3 time daily .300, Disp: 100 strip, Rfl: 3    insulin aspart (NovoLOG FlexPen) 100 UNIT/ML injection pen, Inject 3 Units under the skin 3 (three) times a day with meals, Disp: 15 mL, Rfl: 2    Insulin Glargine-yfgn 100 UNIT/ML SOPN, Inject 26 units subcutaneously daily, Disp: 15 mL, Rfl: 0    Insulin Pen Needle (BD Pen Needle Kerrie 2nd Gen) 32G X 4 MM MISC, For use with insulin pen. Pharmacy may dispense brand covered by insurance., Disp: 100 each, Rfl: 0    Insulin Pen Needle (BD Pen Needle Kerrie 2nd Gen) 32G X 4 MM MISC, For use with insulin pen 4 times per day. Pharmacy may dispense brand covered by insurance., Disp: 100 each, Rfl: 0    Insulin Pen Needle (BD Pen Needle Kerrie 2nd Gen) 32G X 4 MM MISC, For use with insulin pen. Pharmacy may dispense brand covered by insurance., Disp: 100 each, Rfl: 0    Lancets (onetouch ultrasoft) lancets, Use as instructed 4 times a day, Disp: 400 each, Rfl: 3    nystatin-triamcinolone (MYCOLOG-II) ointment, Apply topically 2 (two) times a day for 10 days, Disp: 30 g, Rfl: 0    Patient Active Problem List   Diagnosis    Mixed hyperlipidemia    Diabetes mellitus with hyperglycemia (HCC)    Uterine synechiae    Polyhydramnios affecting pregnancy in third trimester    Noncompliance with diabetes treatment    S/P repeat low transverse     Leukocytosis    Type 1 diabetes mellitus with hyperglycemia, with long-term current use of insulin (HCC)    Housing instability after recent homelessness    Hypoglycemia    Hyponatremia    Metabolic acidosis    Hypertension    Pain in both feet    Hammer toes of both feet          Patient ID: Gloria Lloyd is a 34 y.o. female.    HPI    The following portions of the patient's history were reviewed and updated as appropriate:     family history includes Diabetes in her father and paternal grandmother; No Known Problems in her brother,  daughter, maternal grandmother, mother, paternal grandfather, sister, and sister.      reports that she has never smoked. She has never used smokeless tobacco. She reports that she does not drink alcohol and does not use drugs.    Vitals:    03/06/25 0901   Resp: 16       Review of Systems      Objective:  Patient's shoes and socks removed.   Foot ExamPhysical Exam        General  General Appearance: appears stated age and healthy   Orientation: alert and oriented to person, place, and time   Affect: appropriate         Right Foot/Ankle      Inspection and Palpation  Swelling: dorsum   Arch: pes planus  Claw Toes: fourth toe and fifth toe  Hallux limitus: yes     Neurovascular  Dorsalis pedis: 3+  Posterior tibial: 3+  Saphenous nerve sensation: normal  Tibial nerve sensation: normal  Superficial peroneal nerve sensation: normal  Deep peroneal nerve sensation: normal  Sural nerve sensation: normal        Left Foot/Ankle       Inspection and Palpation  Swelling: dorsum   Arch: pes planus  Claw toes: fourth toe and fifth toe  Hallux limitus: yes     Neurovascular  Dorsalis pedis: 3+  Posterior tibial: 3+  Saphenous nerve sensation: normal  Tibial nerve sensation: normal  Superficial peroneal nerve sensation: normal  Deep peroneal nerve sensation: normal  Sural nerve sensation: normal        Physical Exam  Vitals and nursing note reviewed.   Constitutional:       Appearance: Normal appearance.   Cardiovascular:      Rate and Rhythm: Normal rate and regular rhythm.      Pulses: no weak pulses.           Dorsalis pedis pulses are 3+ on the right side and 3+ on the left side.        Posterior tibial pulses are 3+ on the right side and 3+ on the left side.   Skin:     Capillary Refill: Capillary refill takes less than 2 seconds.      Comments: Soft corn fourth PIPJ, lateral bilateral.   Neurological:      Mental Status: She is alert.   Psychiatric:         Mood and Affect: Mood normal.         Behavior: Behavior normal.          Thought Content: Thought content normal.         Judgment: Judgment normal.      Patient's shoes and socks removed.     Right Foot/Ankle   Right Foot Inspection        Toe Exam: tenderness and right toe deformity.      Sensory   Vibration: intact  Proprioception: intact  Monofilament testing: intact     Vascular  Capillary refills: < 3 seconds  The right DP pulse is 3+. The right PT pulse is 3+.      Right Toe  - Comprehensive Exam  Arch: pes planus  Claw Toes: fourth toe and fifth toe  Hallux limitus: yes  Swelling: dorsum         Left Foot/Ankle  Left Foot Inspection        Toe Exam: tenderness and left toe deformity.      Sensory   Vibration: intact  Proprioception: intact  Monofilament testing: intact     Vascular  Capillary refills: < 3 seconds  The left DP pulse is 3+. The left PT pulse is 3+.      Left Toe  - Comprehensive Exam  Arch: pes planus  Claw toes: fourth toe and fifth toe  Hallux limitus: yes  Swelling: dorsum         Assign Risk Category  Deformity present  No loss of protective sensation  No weak pulses  Risk: 0

## 2025-03-10 ENCOUNTER — RESULTS FOLLOW-UP (OUTPATIENT)
Dept: ENDOCRINOLOGY | Facility: CLINIC | Age: 35
End: 2025-03-10

## 2025-03-10 ENCOUNTER — OFFICE VISIT (OUTPATIENT)
Dept: ENDOCRINOLOGY | Facility: CLINIC | Age: 35
End: 2025-03-10
Payer: COMMERCIAL

## 2025-03-10 ENCOUNTER — APPOINTMENT (OUTPATIENT)
Dept: LAB | Facility: HOSPITAL | Age: 35
End: 2025-03-10
Attending: PODIATRIST
Payer: COMMERCIAL

## 2025-03-10 VITALS
DIASTOLIC BLOOD PRESSURE: 79 MMHG | HEART RATE: 70 BPM | BODY MASS INDEX: 27.08 KG/M2 | SYSTOLIC BLOOD PRESSURE: 122 MMHG | OXYGEN SATURATION: 98 % | WEIGHT: 129 LBS | HEIGHT: 58 IN

## 2025-03-10 DIAGNOSIS — E10.9 TYPE 1 DIABETES MELLITUS WITHOUT COMPLICATION (HCC): ICD-10-CM

## 2025-03-10 DIAGNOSIS — E78.2 MIXED HYPERLIPIDEMIA: Primary | ICD-10-CM

## 2025-03-10 DIAGNOSIS — E78.2 MIXED HYPERLIPIDEMIA: ICD-10-CM

## 2025-03-10 DIAGNOSIS — I10 HYPERTENSION, UNSPECIFIED TYPE: ICD-10-CM

## 2025-03-10 DIAGNOSIS — E10.65 TYPE 1 DIABETES MELLITUS WITH HYPERGLYCEMIA, WITH LONG-TERM CURRENT USE OF INSULIN (HCC): ICD-10-CM

## 2025-03-10 DIAGNOSIS — Z13.29 SCREENING FOR THYROID DISORDER: ICD-10-CM

## 2025-03-10 PROBLEM — E16.2 HYPOGLYCEMIA: Status: RESOLVED | Noted: 2022-12-11 | Resolved: 2025-03-10

## 2025-03-10 LAB
ALBUMIN SERPL BCG-MCNC: 4.7 G/DL (ref 3.5–5)
ALP SERPL-CCNC: 96 U/L (ref 34–104)
ALT SERPL W P-5'-P-CCNC: 14 U/L (ref 7–52)
ANION GAP SERPL CALCULATED.3IONS-SCNC: 13 MMOL/L (ref 4–13)
AST SERPL W P-5'-P-CCNC: 16 U/L (ref 13–39)
BACTERIA UR QL AUTO: ABNORMAL /HPF
BILIRUB SERPL-MCNC: 0.45 MG/DL (ref 0.2–1)
BILIRUB UR QL STRIP: NEGATIVE
BUN SERPL-MCNC: 13 MG/DL (ref 5–25)
CALCIUM SERPL-MCNC: 9.6 MG/DL (ref 8.4–10.2)
CHLORIDE SERPL-SCNC: 94 MMOL/L (ref 96–108)
CLARITY UR: CLEAR
CO2 SERPL-SCNC: 22 MMOL/L (ref 21–32)
COLOR UR: COLORLESS
CREAT SERPL-MCNC: 0.61 MG/DL (ref 0.6–1.3)
CREAT UR-MCNC: 12.7 MG/DL
EST. AVERAGE GLUCOSE BLD GHB EST-MCNC: 355 MG/DL
GFR SERPL CREATININE-BSD FRML MDRD: 120 ML/MIN/1.73SQ M
GLUCOSE SERPL-MCNC: 557 MG/DL (ref 65–140)
GLUCOSE UR STRIP-MCNC: ABNORMAL MG/DL
HBA1C MFR BLD: 14 %
HGB UR QL STRIP.AUTO: NEGATIVE
KETONES UR STRIP-MCNC: ABNORMAL MG/DL
LEUKOCYTE ESTERASE UR QL STRIP: NEGATIVE
MICROALBUMIN UR-MCNC: <7 MG/L
NITRITE UR QL STRIP: NEGATIVE
NON-SQ EPI CELLS URNS QL MICRO: ABNORMAL /HPF
PH UR STRIP.AUTO: 6.5 [PH]
POTASSIUM SERPL-SCNC: 3.9 MMOL/L (ref 3.5–5.3)
PROT SERPL-MCNC: 7.8 G/DL (ref 6.4–8.4)
PROT UR STRIP-MCNC: NEGATIVE MG/DL
RBC #/AREA URNS AUTO: ABNORMAL /HPF
SODIUM SERPL-SCNC: 129 MMOL/L (ref 135–147)
SP GR UR STRIP.AUTO: <1.005 (ref 1–1.03)
T4 FREE SERPL-MCNC: 0.67 NG/DL (ref 0.61–1.12)
TSH SERPL DL<=0.05 MIU/L-ACNC: 1.33 UIU/ML (ref 0.45–4.5)
UROBILINOGEN UR STRIP-ACNC: <2 MG/DL
WBC #/AREA URNS AUTO: ABNORMAL /HPF

## 2025-03-10 PROCEDURE — 87086 URINE CULTURE/COLONY COUNT: CPT

## 2025-03-10 PROCEDURE — 82043 UR ALBUMIN QUANTITATIVE: CPT

## 2025-03-10 PROCEDURE — 83036 HEMOGLOBIN GLYCOSYLATED A1C: CPT

## 2025-03-10 PROCEDURE — 87147 CULTURE TYPE IMMUNOLOGIC: CPT

## 2025-03-10 PROCEDURE — 84439 ASSAY OF FREE THYROXINE: CPT

## 2025-03-10 PROCEDURE — 82570 ASSAY OF URINE CREATININE: CPT

## 2025-03-10 PROCEDURE — 84443 ASSAY THYROID STIM HORMONE: CPT

## 2025-03-10 PROCEDURE — 80053 COMPREHEN METABOLIC PANEL: CPT

## 2025-03-10 PROCEDURE — 81001 URINALYSIS AUTO W/SCOPE: CPT

## 2025-03-10 PROCEDURE — 99204 OFFICE O/P NEW MOD 45 MIN: CPT | Performed by: NURSE PRACTITIONER

## 2025-03-10 RX ORDER — ACYCLOVIR 400 MG/1
1 TABLET ORAL
Qty: 3 EACH | Refills: 2 | Status: SHIPPED | OUTPATIENT
Start: 2025-03-10

## 2025-03-10 RX ORDER — ACYCLOVIR 400 MG/1
1 TABLET ORAL CONTINUOUS
Qty: 1 EACH | Refills: 0 | Status: SHIPPED | OUTPATIENT
Start: 2025-03-10

## 2025-03-10 NOTE — ASSESSMENT & PLAN NOTE
Lab Results   Component Value Date    HGBA1C 13.7 (A) 10/22/2024     HGA1C above goal. No blood glucose levels for review at today's appointment. Recommend CGM due to multiple daily injections of insulin with hyperglycemia. Continue current regimen. Check lab work as soon as possible. This includes metabolic panel and urinalysis to work up for acidosis.     Discussed risks/complications associated with uncontrolled diabetes including organ involvement, heart attack, stroke, death.    Advised lifestyle modifications including attention to diet including the amount and types of carbohydrates consumed and regular activity.     Call for blood sugars less than 70 mg/dl or patterns over 250 mg/dl.     Monitor blood glucose levels before every meal and at bedtime.     Discussed symptoms and treatment of hypoglycemia.  Reviewed risks associated with hypoglycemia. Always carry rapid acting carbohydrates and a glucometer (a way to check your blood sugar).    Recommendation for medical identification either bracelet, necklace.      Routine follow up for diabetic eye and foot exams.     Ordered blood work to complete prior to next visit.    Send glucose logs/CGM download in 1-2 weeks for review    Follow up in 3 months.       Orders:    Hemoglobin A1C; Future    Albumin / creatinine urine ratio; Future    Comprehensive metabolic panel; Future    Urinalysis, Screen; Future    TSH, 3rd generation; Future    T4, free; Future    Continuous Glucose Sensor (Dexcom G7 Sensor); Use 1 Device every 10 days    Continuous Glucose  (Dexcom G7 ) NERY; Use 1 each continuous

## 2025-03-10 NOTE — PROGRESS NOTES
Name: Gloria Lloyd      : 1990      MRN: [id number]  Encounter Provider: CECILIO Correia  Encounter Date: 3/10/2025   Encounter department: [facility]  :  Assessment & Plan  Mixed hyperlipidemia  Recommend repeat lipid panel.          Type 1 diabetes mellitus with hyperglycemia, with long-term current use of insulin (HCC)    Lab Results   Component Value Date    HGBA1C 13.7 (A) 10/22/2024     HGA1C above goal. No blood glucose levels for review at today's appointment. Recommend CGM due to multiple daily injections of insulin with hyperglycemia. Continue current regimen. Check lab work as soon as possible. This includes metabolic panel and urinalysis to work up for acidosis.     Discussed risks/complications associated with uncontrolled diabetes including organ involvement, heart attack, stroke, death.    Advised lifestyle modifications including attention to diet including the amount and types of carbohydrates consumed and regular activity.     Call for blood sugars less than 70 mg/dl or patterns over 250 mg/dl.     Monitor blood glucose levels before every meal and at bedtime.     Discussed symptoms and treatment of hypoglycemia.  Reviewed risks associated with hypoglycemia. Always carry rapid acting carbohydrates and a glucometer (a way to check your blood sugar).    Recommendation for medical identification either bracelet, necklace.      Routine follow up for diabetic eye and foot exams.     Ordered blood work to complete prior to next visit.    Send glucose logs/CGM download in 1-2 weeks for review    Follow up in 3 months.       Orders:    Hemoglobin A1C; Future    Albumin / creatinine urine ratio; Future    Comprehensive metabolic panel; Future    Urinalysis, Screen; Future    TSH, 3rd generation; Future    T4, free; Future    Continuous Glucose Sensor (Dexcom G7 Sensor); Use 1 Device every 10 days    Continuous Glucose  (Dexcom G7 )  "NERY; Use 1 each continuous    Hypertension, unspecified type  BP under good control.             History of Present Illness   HPI  Gloria Lloyd is a 34 y.o. female who presents for follow up of type 1 diabetes mellitus without known complications. Recently hospitalized in October 2024 for hyperglycemia.      Denies polyuria, polydipsia, blurry vision, or changes in sensation in feet.    Feels overall well.      Denies abdominal pain, nausea, vomiting.     Denies mental status change and does not appear altered in office.      Not currently checking blood sugar levels but is due for lab work.      Only taking Glargine 26 units daily and novolog 3 units TID.      No history of thyroid disease.      History obtained from: patient    Objective   /79 (BP Location: Right arm, Patient Position: Sitting, Cuff Size: Large)   Pulse 70   Ht 4' 10\" (1.473 m)   Wt 58.5 kg (129 lb)   SpO2 98%   BMI 26.96 kg/m²         Physical Exam  Vitals reviewed.   Constitutional:       Appearance: Normal appearance.   Cardiovascular:      Rate and Rhythm: Normal rate and regular rhythm.   Pulmonary:      Effort: Pulmonary effort is normal.   Skin:     General: Skin is warm and dry.   Neurological:      General: No focal deficit present.      Mental Status: She is alert and oriented to person, place, and time.   Psychiatric:         Mood and Affect: Mood normal.         Behavior: Behavior normal.     Labs:   Component      Latest Ref Rng 10/31/2023 1/4/2024 10/22/2024 10/23/2024   Sodium      135 - 147 mmol/L 136  134 (L)   136    Potassium      3.5 - 5.3 mmol/L 4.0  4.5   4.6    Chloride      96 - 108 mmol/L 99  97   109 (H)    Carbon Dioxide      21 - 32 mmol/L 28  26   23    ANION GAP      4 - 13 mmol/L 9  11   4    BUN      5 - 25 mg/dL 11  15   8    Creatinine      0.60 - 1.30 mg/dL 0.58 (L)  0.67   0.44 (L)    GLUCOSE      65 - 140 mg/dL 362 (H)    68    Calcium      8.4 - 10.2 mg/dL 9.3  9.5   8.0 (L)    AST      13 - 39 " U/L 12 (L)  10 (L)      ALT      7 - 52 U/L 9  10      ALK PHOS      34 - 104 U/L 94  78      Total Protein      6.4 - 8.4 g/dL 7.0  6.9      Albumin      3.5 - 5.0 g/dL 4.2  4.2      Total Bilirubin      0.20 - 1.00 mg/dL 0.50  0.74      GFR, Calculated      ml/min/1.73sq m 123  117   133    GLUCOSE, FASTING      65 - 99 mg/dL  420 (H)      Cholesterol      See Comment mg/dL  254 (H)      Triglycerides      See Comment mg/dL  101      HDL      >=50 mg/dL  92      LDL Calculated      0 - 100 mg/dL  142 (H)      Non-HDL Cholesterol      mg/dl  162      Hemoglobin A1C      <=6.5   14.5 (H)  13.7 !     eAG, EST AVG Glucose      mg/dl  369      TSH 3RD GENERATON      0.450 - 4.500 uIU/mL  1.502      FREE T4      0.61 - 1.12 ng/dL  0.85           Administrative Statements

## 2025-03-10 NOTE — TELEPHONE ENCOUNTER
Called patient  and LMOM to see if she is seeing her PCP? Asked her to call back to let us know.----- Message from Omer Saenz DPM sent at 3/10/2025  1:21 PM EDT -----  Concerning this patient, can we call her and find out if she is seeing her medical doctor.  This will be prior to surgery.  I suspect surgery will be canceled.  ----- Message -----  From: Lab, Background User  Sent: 3/10/2025  11:58 AM EDT  To: Omer Saenz DPM

## 2025-03-11 ENCOUNTER — TELEPHONE (OUTPATIENT)
Dept: OBGYN CLINIC | Facility: CLINIC | Age: 35
End: 2025-03-11

## 2025-03-11 LAB
BACTERIA UR CULT: ABNORMAL
BACTERIA UR CULT: ABNORMAL

## 2025-03-14 ENCOUNTER — TELEPHONE (OUTPATIENT)
Age: 35
End: 2025-03-14

## 2025-03-14 NOTE — TELEPHONE ENCOUNTER
Pt called in to schedule an appt for CGM training. Her insurance approved it and she would like to set something up. Please call patient to schedule.

## 2025-03-18 ENCOUNTER — TELEPHONE (OUTPATIENT)
Age: 35
End: 2025-03-18

## 2025-03-18 DIAGNOSIS — E10.65 TYPE 1 DIABETES MELLITUS WITH HYPERGLYCEMIA, WITH LONG-TERM CURRENT USE OF INSULIN (HCC): Primary | ICD-10-CM

## 2025-03-18 NOTE — TELEPHONE ENCOUNTER
Patient called diabetes education to schedule dexcom training but there is not a referral in the system for her.    She would like the referral placed and then would like a call back with an update so she knows that she is ok to call diabetes education back to schedule.    Please advise.

## 2025-03-28 NOTE — TELEPHONE ENCOUNTER
In reviewing this patient's chart after rescheduling her appointment I saw she had a previously scheduled appointment but I noticed there was no note from you with instructions. Can you please make sure everything is ok for the appointment.

## 2025-04-11 ENCOUNTER — TELEPHONE (OUTPATIENT)
Age: 35
End: 2025-04-11

## 2025-04-22 ENCOUNTER — OFFICE VISIT (OUTPATIENT)
Age: 35
End: 2025-04-22

## 2025-04-22 VITALS
OXYGEN SATURATION: 98 % | SYSTOLIC BLOOD PRESSURE: 120 MMHG | WEIGHT: 125 LBS | RESPIRATION RATE: 18 BRPM | HEIGHT: 57 IN | BODY MASS INDEX: 26.97 KG/M2 | DIASTOLIC BLOOD PRESSURE: 90 MMHG | TEMPERATURE: 97.8 F | HEART RATE: 106 BPM

## 2025-04-22 DIAGNOSIS — Z11.59 NEED FOR HEPATITIS C SCREENING TEST: ICD-10-CM

## 2025-04-22 DIAGNOSIS — G25.81 RESTLESS LEG: ICD-10-CM

## 2025-04-22 DIAGNOSIS — E10.65 TYPE 1 DIABETES MELLITUS WITH HYPERGLYCEMIA, WITH LONG-TERM CURRENT USE OF INSULIN (HCC): Primary | ICD-10-CM

## 2025-04-22 DIAGNOSIS — I10 HYPERTENSION, UNSPECIFIED TYPE: ICD-10-CM

## 2025-04-22 LAB
LEFT EYE DIABETIC RETINOPATHY: ABNORMAL
LEFT EYE IMAGE QUALITY: ABNORMAL
LEFT EYE MACULAR EDEMA: ABNORMAL
LEFT EYE OTHER RETINOPATHY: ABNORMAL
RIGHT EYE DIABETIC RETINOPATHY: ABNORMAL
RIGHT EYE IMAGE QUALITY: ABNORMAL
RIGHT EYE MACULAR EDEMA: ABNORMAL
RIGHT EYE OTHER RETINOPATHY: ABNORMAL
SEVERITY (EYE EXAM): ABNORMAL

## 2025-04-22 PROCEDURE — 99204 OFFICE O/P NEW MOD 45 MIN: CPT | Performed by: FAMILY MEDICINE

## 2025-04-22 NOTE — PROGRESS NOTES
Name: Gloria Lloyd      : 1990      MRN: [id number]  Encounter Provider: Giovana Gavin MD  Encounter Date: 2025   Encounter department: [facility]  :  Assessment & Plan  Type 1 diabetes mellitus with hyperglycemia, with long-term current use of insulin (HCC)    Lab Results   Component Value Date    HGBA1C 14.0 (H) 03/10/2025     Patient has history of poorly controlled T1DM and follows with endocrinology regularly. Reports she takes Long acting is 26 units qhs  Short acting is 5-6 unitsl with meals, but doesn't take often as she doesn't check her sugars  Reports she now has a CGM and hoping this will help her control, when asked if she had it on she reports it was at home.   We had a long conversation about the importance of better control of her diabetes if she desires corrective surgery for her hammer toe with podiatry. Discussed that she will not be approved with a high HbA1C and recommend that it is below 8.5 in future if she would like an elective procedure. We discussed potential side effects of completing a surgery with poorly controlled diabetes including poor wound healing, infection of site, osteomyelitis and even limb amputation as well as anesthesia complications or heart attack or stroke.      She did have some confusion thinking if her diabetes is better controlled it will cause her to lose weight as she does not want to lose any weight. We discussed in T2DM when it is high reliant on diet for sugar levels and poor control, once better diet it can subsequently result in weight loss, however that insulin itself especially with T1DM does not cause significant weight loss. This may have been a barrier to compliance with insulin.     Patient verbalized her understanding and plans to improve her sugar control.   Advised patient to get lipid panel ordered by endo as well as continue to follow up with them     Orders:    IRIS Diabetic eye  "exam    Need for hepatitis C screening test    Orders:    Hepatitis C antibody; Future    Restless leg  Reports her legs have a funny feeling lately, especially at night. Denies that it is numbness or tingling  Follow up labs   Orders:    Iron Panel (Includes Ferritin, Iron Sat%, Iron, and TIBC); Future    Vitamin B12/Folate, Serum Panel; Future      Hypertension, unspecified type  No medication - okay today   Patient unsure when or by whom she was diagnosed with HTN   Advised low salt diet   Continue to monitor                   History of Present Illness   35 F new to our practice. Reports here due to her podiatrist requesting pre=operative clearance for hammer toe surgery. She reports she lives at home with her two daughters. She denies any SDOH needs at this time.       Review of Systems   Constitutional:  Negative for activity change, appetite change and fatigue.   HENT:  Negative for congestion, hearing loss, postnasal drip and sore throat.    Eyes:  Negative for visual disturbance.   Respiratory:  Negative for cough, shortness of breath and wheezing.    Cardiovascular:  Negative for chest pain, palpitations and leg swelling.   Genitourinary:  Negative for difficulty urinating.   Musculoskeletal:  Positive for arthralgias (bilateral hammertoe pain). Negative for gait problem and neck pain.   Skin:  Negative for rash and wound.   Neurological:  Negative for dizziness, weakness, numbness and headaches.   Psychiatric/Behavioral:  Negative for agitation, dysphoric mood and sleep disturbance. The patient is not nervous/anxious.        Objective   /90 (BP Location: Left arm, Patient Position: Sitting, Cuff Size: Standard)   Pulse (!) 106   Temp 97.8 °F (36.6 °C) (Tympanic)   Resp 18   Ht 4' 9\" (1.448 m)   Wt 56.7 kg (125 lb)   SpO2 98%   BMI 27.05 kg/m²      Physical Exam  Constitutional:       General: She is not in acute distress.     Appearance: Normal appearance. She is not ill-appearing.   HENT:    "   Head: Normocephalic and atraumatic.      Comments: Hair thinning     Right Ear: External ear normal.      Left Ear: External ear normal.      Nose: Nose normal. No congestion.      Mouth/Throat:      Mouth: Mucous membranes are moist.      Pharynx: Oropharynx is clear. No oropharyngeal exudate or posterior oropharyngeal erythema.   Eyes:      Extraocular Movements: Extraocular movements intact.      Conjunctiva/sclera: Conjunctivae normal.      Comments: Eye glasses   Cardiovascular:      Rate and Rhythm: Normal rate and regular rhythm.      Pulses: Normal pulses.      Heart sounds: Normal heart sounds. No murmur heard.  Pulmonary:      Effort: Pulmonary effort is normal. No respiratory distress.      Breath sounds: Normal breath sounds. No wheezing, rhonchi or rales.   Abdominal:      General: There is no distension.   Musculoskeletal:      Cervical back: Normal range of motion.   Neurological:      Mental Status: She is alert.      Motor: No weakness.      Gait: Gait normal.   Psychiatric:         Attention and Perception: Attention normal.         Mood and Affect: Mood is anxious.         Speech: Speech normal.         Behavior: Behavior normal.

## 2025-04-22 NOTE — PATIENT INSTRUCTIONS
Please get your cholesterol blood test completed when you have the time, this needs to be fasting. You can get the other tests we ordered today as well.   Please pay attention to your sugars as they need to be better controlled to get cleared for any elective surgery.

## 2025-04-22 NOTE — ASSESSMENT & PLAN NOTE
No medication - okay today   Patient unsure when or by whom she was diagnosed with HTN   Advised low salt diet   Continue to monitor

## 2025-04-22 NOTE — ASSESSMENT & PLAN NOTE
Lab Results   Component Value Date    HGBA1C 14.0 (H) 03/10/2025     Patient has history of poorly controlled T1DM and follows with endocrinology regularly. Reports she takes Long acting is 26 units qhs  Short acting is 5-6 unitsl with meals, but doesn't take often as she doesn't check her sugars  Reports she now has a CGM and hoping this will help her control, when asked if she had it on she reports it was at home.   We had a long conversation about the importance of better control of her diabetes if she desires corrective surgery for her hammer toe with podiatry. Discussed that she will not be approved with a high HbA1C and recommend that it is below 8.5 in future if she would like an elective procedure. We discussed potential side effects of completing a surgery with poorly controlled diabetes including poor wound healing, infection of site, osteomyelitis and even limb amputation as well as anesthesia complications or heart attack or stroke.      She did have some confusion thinking if her diabetes is better controlled it will cause her to lose weight as she does not want to lose any weight. We discussed in T2DM when it is high reliant on diet for sugar levels and poor control, once better diet it can subsequently result in weight loss, however that insulin itself especially with T1DM does not cause significant weight loss. This may have been a barrier to compliance with insulin.     Patient verbalized her understanding and plans to improve her sugar control.   Advised patient to get lipid panel ordered by amanda as well as continue to follow up with them     Orders:    [facility] Diabetic eye exam

## 2025-04-23 ENCOUNTER — RESULTS FOLLOW-UP (OUTPATIENT)
Age: 35
End: 2025-04-23

## 2025-04-25 ENCOUNTER — CONSULT (OUTPATIENT)
Dept: DIABETES SERVICES | Facility: CLINIC | Age: 35
End: 2025-04-25

## 2025-04-25 VITALS — WEIGHT: 127 LBS | BODY MASS INDEX: 27.48 KG/M2

## 2025-04-25 DIAGNOSIS — E10.65 TYPE 1 DIABETES MELLITUS WITH HYPERGLYCEMIA, WITH LONG-TERM CURRENT USE OF INSULIN (HCC): Primary | ICD-10-CM

## 2025-04-25 NOTE — PROGRESS NOTES
Dexcom G7 Personal Training    Met with Gloria Lloyd for Dexcom G7 personal training. Patient comes in today with there own unit to be trained on. Completed all aspects of training, including site selection on rotation, not infusing insulin near the sensor site, proper insertion technique, inserting codes into the , charging, waterproof sensor, range of 20ft, setting high low alarms that can be adjusted based on their preferences. They put on their first sensor by themselves with no issue.  Left my office today with sensor on and in 30 min warm up mode.      Gloria Lloyd will be running the dexcom through their Iphone.    Discussed creating a Clarity account to be able to link and upload from home or auto upload from their phone. Patient was added to our clarity account today while in office and invited to link to us if using their phone. Patient understands that reciever will be downloaded while in office at time of visit and/or cell phone will automatically upload to our clarity for them. They understand that their blood sugars are not monitored by us on a regular basis, but that we can access them as needed or desired by the patient and provider.     Dexcom's phone number is in their paperwork, encouraged Gloria to reach out to [employer] if they have any issues after hours, 24/7. Training completed, will call with questions.     Lab Results   Component Value Date    HGBA1C 14.0 (H) 03/10/2025       Lab Results   Component Value Date    SODIUM 129 (L) 03/10/2025    K 3.9 03/10/2025    CL 94 (L) 03/10/2025    CO2 22 03/10/2025    AGAP 13 03/10/2025    BUN 13 03/10/2025    CREATININE 0.61 03/10/2025    GLUC 557 (HH) 03/10/2025    GLUF 420 (H) 01/04/2024    CALCIUM 9.6 03/10/2025    AST 16 03/10/2025    ALT 14 03/10/2025    ALKPHOS 96 03/10/2025    TP 7.8 03/10/2025    TBILI 0.45 03/10/2025    EGFR 120 03/10/2025           Patient response to instruction    Comprehension: good  Motivation: good  Expected  Compliance: good  Response to Teachback: 100%, demonstrated understanding    Thank you for referring your patient to [facility], it was a pleasure working with them today. Please feel free to call with any questions or concerns.

## 2025-06-02 ENCOUNTER — TELEPHONE (OUTPATIENT)
Dept: ENDOCRINOLOGY | Facility: CLINIC | Age: 35
End: 2025-06-02

## 2025-06-02 NOTE — TELEPHONE ENCOUNTER
Called patient due to NO show. I will also send letter thru MY chart. Please schedule accordingly.

## 2025-06-11 DIAGNOSIS — R79.89 ELEVATED LFTS: ICD-10-CM

## 2025-06-11 DIAGNOSIS — E10.65 TYPE 1 DIABETES MELLITUS WITH HYPERGLYCEMIA, WITH LONG-TERM CURRENT USE OF INSULIN (HCC): ICD-10-CM

## 2025-06-11 DIAGNOSIS — E78.2 MIXED HYPERLIPIDEMIA: ICD-10-CM

## 2025-06-11 DIAGNOSIS — E16.2 HYPOGLYCEMIA: ICD-10-CM

## 2025-06-11 RX ORDER — INSULIN ASPART 100 [IU]/ML
3 INJECTION, SOLUTION INTRAVENOUS; SUBCUTANEOUS
Qty: 15 ML | Refills: 2 | Status: SHIPPED | OUTPATIENT
Start: 2025-06-11

## 2025-06-11 RX ORDER — INSULIN GLARGINE-YFGN 100 [IU]/ML
INJECTION, SOLUTION SUBCUTANEOUS
Qty: 15 ML | Refills: 0 | Status: SHIPPED | OUTPATIENT
Start: 2025-06-11

## 2025-06-11 RX ORDER — ACYCLOVIR 400 MG/1
1 TABLET ORAL
Qty: 3 EACH | Refills: 2 | Status: SHIPPED | OUTPATIENT
Start: 2025-06-11

## 2025-06-11 NOTE — TELEPHONE ENCOUNTER
Medication: Dexcom G7 Sensors     Dose/Frequency: 1 device every 10 days    Quantity: 30 day supply    Pharmacy: [address]    Office:   [] PCP/Provider -   [x] Speciality/Provider -     Does the patient have enough for 3 days?   [x] Yes   [] No - Send as HP to POD  __________________________________  Medication: Insulin Aspart (NovoLOG FlexPen)     Dose/Frequency: 100 UNIT/mL - Inject 3 Units under the skin 3 (three) times a day with meals    Quantity: 30 day supply    Pharmacy: [address]    Office:   [] PCP/Provider -   [x] Speciality/Provider -     Does the patient have enough for 3 days?   [x] Yes   [] No - Send as HP to POD  __________________________________    Medication: Insulin Glargine     Dose/Frequency: 100 UNIT/mL - inject 26 units Subcutaneously daily    Quantity: 30 day supply    Pharmacy: [address]    Office:   [] PCP/Provider -   [x] Speciality/Provider -     Does the patient have enough for 3 days?   [x] Yes   [] No - Send as HP to POD

## 2025-07-10 DIAGNOSIS — E10.65 TYPE 1 DIABETES MELLITUS WITH HYPERGLYCEMIA, WITH LONG-TERM CURRENT USE OF INSULIN (HCC): ICD-10-CM

## 2025-07-10 DIAGNOSIS — E16.2 HYPOGLYCEMIA: ICD-10-CM

## 2025-07-10 DIAGNOSIS — E78.2 MIXED HYPERLIPIDEMIA: ICD-10-CM

## 2025-07-10 DIAGNOSIS — R79.89 ELEVATED LFTS: ICD-10-CM

## 2025-07-10 RX ORDER — INSULIN GLARGINE-YFGN 100 [IU]/ML
INJECTION, SOLUTION SUBCUTANEOUS
Qty: 15 ML | Refills: 3 | Status: SHIPPED | OUTPATIENT
Start: 2025-07-10

## 2025-07-18 ENCOUNTER — TELEPHONE (OUTPATIENT)
Age: 35
End: 2025-07-18

## 2025-07-18 NOTE — TELEPHONE ENCOUNTER
Caller: Carlos    Doctor and/or Office: Dr. Saenz     #: [phone]     Escalation: Surgery would like to rs her sx

## 2025-07-22 NOTE — TELEPHONE ENCOUNTER
Returned patient call.  Date in August was offered. She stated she will have to schedule surgery another time.

## (undated) DEVICE — 3M™ STERI-STRIP™ REINFORCED ADHESIVE SKIN CLOSURES, R1547, 1/2 IN X 4 IN (12 MM X 100 MM), 6 STRIPS/ENVELOPE: Brand: 3M™ STERI-STRIP™

## (undated) DEVICE — SUT MONOCRYL 4-0 PS-2 27 IN Y426H

## (undated) DEVICE — SUT PLAIN 2-0 CTX 27 IN 872H

## (undated) DEVICE — SKIN MARKER DUAL TIP WITH RULER CAP, FLEXIBLE RULER AND LABELS: Brand: DEVON

## (undated) DEVICE — Device

## (undated) DEVICE — GLOVE SRG BIOGEL ECLIPSE 8

## (undated) DEVICE — SWABSTCK, BENZOIN TINCTURE, 1/PK, STRL: Brand: APLICARE

## (undated) DEVICE — TELFA NON-ADHERENT ABSORBENT DRESSING: Brand: TELFA

## (undated) DEVICE — GLOVE INDICATOR PI UNDERGLOVE SZ 8 BLUE

## (undated) DEVICE — SURGICEL 2 X 3

## (undated) DEVICE — CHLORAPREP HI-LITE 26ML ORANGE

## (undated) DEVICE — GAUZE SPONGES,16 PLY: Brand: CURITY

## (undated) DEVICE — PACK C-SECTION PBDS

## (undated) DEVICE — SUT VICRYL 0 CT-1 36 IN J946H

## (undated) DEVICE — ABDOMINAL PAD: Brand: DERMACEA